# Patient Record
Sex: MALE | Race: ASIAN | NOT HISPANIC OR LATINO | Employment: FULL TIME | URBAN - METROPOLITAN AREA
[De-identification: names, ages, dates, MRNs, and addresses within clinical notes are randomized per-mention and may not be internally consistent; named-entity substitution may affect disease eponyms.]

---

## 2017-06-13 ENCOUNTER — OFFICE VISIT (OUTPATIENT)
Dept: FAMILY MEDICINE | Facility: CLINIC | Age: 32
End: 2017-06-13
Payer: COMMERCIAL

## 2017-06-13 ENCOUNTER — APPOINTMENT (OUTPATIENT)
Dept: GENERAL RADIOLOGY | Facility: CLINIC | Age: 32
DRG: 280 | End: 2017-06-13
Attending: EMERGENCY MEDICINE
Payer: COMMERCIAL

## 2017-06-13 ENCOUNTER — HOSPITAL ENCOUNTER (INPATIENT)
Facility: CLINIC | Age: 32
LOS: 3 days | Discharge: HOME | DRG: 280 | End: 2017-06-16
Attending: EMERGENCY MEDICINE | Admitting: INTERNAL MEDICINE
Payer: COMMERCIAL

## 2017-06-13 VITALS
HEART RATE: 113 BPM | WEIGHT: 315 LBS | OXYGEN SATURATION: 94 % | BODY MASS INDEX: 44.1 KG/M2 | DIASTOLIC BLOOD PRESSURE: 138 MMHG | TEMPERATURE: 98.6 F | SYSTOLIC BLOOD PRESSURE: 190 MMHG | HEIGHT: 71 IN

## 2017-06-13 DIAGNOSIS — R60.0 PERIPHERAL EDEMA: ICD-10-CM

## 2017-06-13 DIAGNOSIS — I50.9 CONGESTIVE HEART FAILURE, UNSPECIFIED CONGESTIVE HEART FAILURE CHRONICITY, UNSPECIFIED CONGESTIVE HEART FAILURE TYPE: ICD-10-CM

## 2017-06-13 DIAGNOSIS — N28.9 RENAL INSUFFICIENCY: ICD-10-CM

## 2017-06-13 DIAGNOSIS — I21.4 NSTEMI (NON-ST ELEVATED MYOCARDIAL INFARCTION) (H): ICD-10-CM

## 2017-06-13 DIAGNOSIS — I16.1 HYPERTENSIVE EMERGENCY: ICD-10-CM

## 2017-06-13 DIAGNOSIS — R06.02 SHORTNESS OF BREATH: Primary | ICD-10-CM

## 2017-06-13 PROBLEM — I16.0 HYPERTENSIVE URGENCY: Status: ACTIVE | Noted: 2017-06-13

## 2017-06-13 LAB
ANION GAP SERPL CALCULATED.3IONS-SCNC: 6 MMOL/L (ref 3–14)
BUN SERPL-MCNC: 21 MG/DL (ref 7–30)
CALCIUM SERPL-MCNC: 8.9 MG/DL (ref 8.5–10.1)
CHLORIDE SERPL-SCNC: 101 MMOL/L (ref 94–109)
CO2 SERPL-SCNC: 30 MMOL/L (ref 20–32)
CREAT SERPL-MCNC: 1.53 MG/DL (ref 0.66–1.25)
ERYTHROCYTE [DISTWIDTH] IN BLOOD BY AUTOMATED COUNT: 15.9 % (ref 10–15)
ERYTHROCYTE [DISTWIDTH] IN BLOOD BY AUTOMATED COUNT: 16 % (ref 10–15)
GFR SERPL CREATININE-BSD FRML MDRD: 53 ML/MIN/1.7M2
GLUCOSE BLDC GLUCOMTR-MCNC: 102 MG/DL (ref 70–99)
GLUCOSE SERPL-MCNC: 142 MG/DL (ref 70–99)
HCT VFR BLD AUTO: 37.9 % (ref 40–53)
HCT VFR BLD AUTO: 40.6 % (ref 40–53)
HGB BLD-MCNC: 12 G/DL (ref 13.3–17.7)
HGB BLD-MCNC: 13.2 G/DL (ref 13.3–17.7)
INTERPRETATION ECG - MUSE: NORMAL
INTERPRETATION ECG - MUSE: NORMAL
LMWH PPP CHRO-ACNC: 0.13 IU/ML
MAGNESIUM SERPL-MCNC: 2.3 MG/DL (ref 1.6–2.3)
MAGNESIUM SERPL-MCNC: 2.4 MG/DL (ref 1.6–2.3)
MCH RBC QN AUTO: 28.1 PG (ref 26.5–33)
MCH RBC QN AUTO: 28.8 PG (ref 26.5–33)
MCHC RBC AUTO-ENTMCNC: 31.7 G/DL (ref 31.5–36.5)
MCHC RBC AUTO-ENTMCNC: 32.5 G/DL (ref 31.5–36.5)
MCV RBC AUTO: 89 FL (ref 78–100)
MCV RBC AUTO: 89 FL (ref 78–100)
MRSA DNA SPEC QL NAA+PROBE: NORMAL
NT-PROBNP SERPL-MCNC: 7018 PG/ML (ref 0–450)
PLATELET # BLD AUTO: 200 10E9/L (ref 150–450)
PLATELET # BLD AUTO: 202 10E9/L (ref 150–450)
POTASSIUM SERPL-SCNC: 3.2 MMOL/L (ref 3.4–5.3)
POTASSIUM SERPL-SCNC: 3.4 MMOL/L (ref 3.4–5.3)
RBC # BLD AUTO: 4.27 10E12/L (ref 4.4–5.9)
RBC # BLD AUTO: 4.59 10E12/L (ref 4.4–5.9)
SODIUM SERPL-SCNC: 137 MMOL/L (ref 133–144)
SPECIMEN SOURCE: NORMAL
TROPONIN I SERPL-MCNC: 0.47 UG/L (ref 0–0.04)
TROPONIN I SERPL-MCNC: 0.48 UG/L (ref 0–0.04)
TROPONIN I SERPL-MCNC: 0.53 UG/L (ref 0–0.04)
WBC # BLD AUTO: 17.6 10E9/L (ref 4–11)
WBC # BLD AUTO: 17.7 10E9/L (ref 4–11)

## 2017-06-13 PROCEDURE — 99285 EMERGENCY DEPT VISIT HI MDM: CPT | Mod: 25

## 2017-06-13 PROCEDURE — 80048 BASIC METABOLIC PNL TOTAL CA: CPT | Performed by: EMERGENCY MEDICINE

## 2017-06-13 PROCEDURE — 99223 1ST HOSP IP/OBS HIGH 75: CPT | Mod: AI | Performed by: INTERNAL MEDICINE

## 2017-06-13 PROCEDURE — 93005 ELECTROCARDIOGRAM TRACING: CPT

## 2017-06-13 PROCEDURE — 84132 ASSAY OF SERUM POTASSIUM: CPT | Performed by: INTERNAL MEDICINE

## 2017-06-13 PROCEDURE — 83880 ASSAY OF NATRIURETIC PEPTIDE: CPT | Performed by: EMERGENCY MEDICINE

## 2017-06-13 PROCEDURE — 99221 1ST HOSP IP/OBS SF/LOW 40: CPT | Performed by: INTERNAL MEDICINE

## 2017-06-13 PROCEDURE — 00000146 ZZHCL STATISTIC GLUCOSE BY METER IP

## 2017-06-13 PROCEDURE — 84484 ASSAY OF TROPONIN QUANT: CPT | Performed by: INTERNAL MEDICINE

## 2017-06-13 PROCEDURE — 20000003 ZZH R&B ICU

## 2017-06-13 PROCEDURE — 84484 ASSAY OF TROPONIN QUANT: CPT | Performed by: EMERGENCY MEDICINE

## 2017-06-13 PROCEDURE — 85027 COMPLETE CBC AUTOMATED: CPT | Performed by: INTERNAL MEDICINE

## 2017-06-13 PROCEDURE — 96375 TX/PRO/DX INJ NEW DRUG ADDON: CPT

## 2017-06-13 PROCEDURE — 25000132 ZZH RX MED GY IP 250 OP 250 PS 637: Performed by: INTERNAL MEDICINE

## 2017-06-13 PROCEDURE — 85520 HEPARIN ASSAY: CPT | Performed by: INTERNAL MEDICINE

## 2017-06-13 PROCEDURE — 25000128 H RX IP 250 OP 636: Performed by: EMERGENCY MEDICINE

## 2017-06-13 PROCEDURE — 99204 OFFICE O/P NEW MOD 45 MIN: CPT | Performed by: PHYSICIAN ASSISTANT

## 2017-06-13 PROCEDURE — 96368 THER/DIAG CONCURRENT INF: CPT

## 2017-06-13 PROCEDURE — 83735 ASSAY OF MAGNESIUM: CPT | Performed by: INTERNAL MEDICINE

## 2017-06-13 PROCEDURE — 93005 ELECTROCARDIOGRAM TRACING: CPT | Mod: 76

## 2017-06-13 PROCEDURE — 25000132 ZZH RX MED GY IP 250 OP 250 PS 637: Performed by: EMERGENCY MEDICINE

## 2017-06-13 PROCEDURE — 25000128 H RX IP 250 OP 636: Performed by: INTERNAL MEDICINE

## 2017-06-13 PROCEDURE — 87640 STAPH A DNA AMP PROBE: CPT | Performed by: INTERNAL MEDICINE

## 2017-06-13 PROCEDURE — 96365 THER/PROPH/DIAG IV INF INIT: CPT

## 2017-06-13 PROCEDURE — 87641 MR-STAPH DNA AMP PROBE: CPT | Performed by: INTERNAL MEDICINE

## 2017-06-13 PROCEDURE — 36415 COLL VENOUS BLD VENIPUNCTURE: CPT | Performed by: INTERNAL MEDICINE

## 2017-06-13 PROCEDURE — 76604 US EXAM CHEST: CPT

## 2017-06-13 PROCEDURE — 71020 XR CHEST 2 VW: CPT

## 2017-06-13 PROCEDURE — 85027 COMPLETE CBC AUTOMATED: CPT | Performed by: EMERGENCY MEDICINE

## 2017-06-13 PROCEDURE — 25000125 ZZHC RX 250: Performed by: INTERNAL MEDICINE

## 2017-06-13 RX ORDER — POTASSIUM CHLORIDE 1.5 G/1.58G
20-40 POWDER, FOR SOLUTION ORAL
Status: DISCONTINUED | OUTPATIENT
Start: 2017-06-13 | End: 2017-06-16 | Stop reason: HOSPADM

## 2017-06-13 RX ORDER — NITROGLYCERIN 0.4 MG/1
0.4 TABLET SUBLINGUAL EVERY 5 MIN PRN
Status: COMPLETED | OUTPATIENT
Start: 2017-06-13 | End: 2017-06-13

## 2017-06-13 RX ORDER — TRAZODONE HYDROCHLORIDE 50 MG/1
50 TABLET, FILM COATED ORAL
Status: DISCONTINUED | OUTPATIENT
Start: 2017-06-13 | End: 2017-06-16 | Stop reason: HOSPADM

## 2017-06-13 RX ORDER — NALOXONE HYDROCHLORIDE 0.4 MG/ML
.1-.4 INJECTION, SOLUTION INTRAMUSCULAR; INTRAVENOUS; SUBCUTANEOUS
Status: DISCONTINUED | OUTPATIENT
Start: 2017-06-13 | End: 2017-06-15

## 2017-06-13 RX ORDER — ASPIRIN 325 MG
325 TABLET ORAL ONCE
Status: COMPLETED | OUTPATIENT
Start: 2017-06-13 | End: 2017-06-13

## 2017-06-13 RX ORDER — POTASSIUM CHLORIDE 1500 MG/1
20-40 TABLET, EXTENDED RELEASE ORAL
Status: DISCONTINUED | OUTPATIENT
Start: 2017-06-13 | End: 2017-06-16 | Stop reason: HOSPADM

## 2017-06-13 RX ORDER — SPIRONOLACTONE 25 MG/1
25 TABLET ORAL DAILY
Status: DISCONTINUED | OUTPATIENT
Start: 2017-06-13 | End: 2017-06-16 | Stop reason: HOSPADM

## 2017-06-13 RX ORDER — HYDRALAZINE HYDROCHLORIDE 20 MG/ML
10-20 INJECTION INTRAMUSCULAR; INTRAVENOUS
Status: DISCONTINUED | OUTPATIENT
Start: 2017-06-13 | End: 2017-06-16 | Stop reason: HOSPADM

## 2017-06-13 RX ORDER — PROCHLORPERAZINE MALEATE 5 MG
5-10 TABLET ORAL EVERY 6 HOURS PRN
Status: DISCONTINUED | OUTPATIENT
Start: 2017-06-13 | End: 2017-06-16 | Stop reason: HOSPADM

## 2017-06-13 RX ORDER — ONDANSETRON 4 MG/1
4 TABLET, ORALLY DISINTEGRATING ORAL EVERY 6 HOURS PRN
Status: DISCONTINUED | OUTPATIENT
Start: 2017-06-13 | End: 2017-06-16 | Stop reason: HOSPADM

## 2017-06-13 RX ORDER — LISINOPRIL 10 MG/1
10 TABLET ORAL DAILY
Status: DISCONTINUED | OUTPATIENT
Start: 2017-06-13 | End: 2017-06-13

## 2017-06-13 RX ORDER — NITROGLYCERIN 20 MG/100ML
.07-1.12 INJECTION INTRAVENOUS CONTINUOUS
Status: DISCONTINUED | OUTPATIENT
Start: 2017-06-13 | End: 2017-06-15

## 2017-06-13 RX ORDER — MAGNESIUM SULFATE HEPTAHYDRATE 40 MG/ML
4 INJECTION, SOLUTION INTRAVENOUS EVERY 4 HOURS PRN
Status: DISCONTINUED | OUTPATIENT
Start: 2017-06-13 | End: 2017-06-16 | Stop reason: HOSPADM

## 2017-06-13 RX ORDER — POTASSIUM CHLORIDE 1500 MG/1
20 TABLET, EXTENDED RELEASE ORAL ONCE
Status: COMPLETED | OUTPATIENT
Start: 2017-06-13 | End: 2017-06-13

## 2017-06-13 RX ORDER — FUROSEMIDE 10 MG/ML
20 INJECTION INTRAMUSCULAR; INTRAVENOUS 3 TIMES DAILY
Status: COMPLETED | OUTPATIENT
Start: 2017-06-13 | End: 2017-06-14

## 2017-06-13 RX ORDER — LIDOCAINE 40 MG/G
CREAM TOPICAL
Status: DISCONTINUED | OUTPATIENT
Start: 2017-06-13 | End: 2017-06-13

## 2017-06-13 RX ORDER — ACETAMINOPHEN 325 MG/1
650 TABLET ORAL EVERY 4 HOURS PRN
Status: DISCONTINUED | OUTPATIENT
Start: 2017-06-13 | End: 2017-06-15 | Stop reason: ALTCHOICE

## 2017-06-13 RX ORDER — AMOXICILLIN 250 MG
1-2 CAPSULE ORAL 2 TIMES DAILY PRN
Status: DISCONTINUED | OUTPATIENT
Start: 2017-06-13 | End: 2017-06-16 | Stop reason: HOSPADM

## 2017-06-13 RX ORDER — PROCHLORPERAZINE 25 MG
25 SUPPOSITORY, RECTAL RECTAL EVERY 12 HOURS PRN
Status: DISCONTINUED | OUTPATIENT
Start: 2017-06-13 | End: 2017-06-16 | Stop reason: HOSPADM

## 2017-06-13 RX ORDER — METOPROLOL TARTRATE 25 MG/1
25 TABLET, FILM COATED ORAL 2 TIMES DAILY
Status: DISCONTINUED | OUTPATIENT
Start: 2017-06-13 | End: 2017-06-13

## 2017-06-13 RX ORDER — POTASSIUM CHLORIDE 7.45 MG/ML
10 INJECTION INTRAVENOUS
Status: DISCONTINUED | OUTPATIENT
Start: 2017-06-13 | End: 2017-06-16 | Stop reason: HOSPADM

## 2017-06-13 RX ORDER — POTASSIUM CL/LIDO/0.9 % NACL 10MEQ/0.1L
10 INTRAVENOUS SOLUTION, PIGGYBACK (ML) INTRAVENOUS
Status: DISCONTINUED | OUTPATIENT
Start: 2017-06-13 | End: 2017-06-16 | Stop reason: HOSPADM

## 2017-06-13 RX ORDER — NITROGLYCERIN 20 MG/100ML
.07-1.12 INJECTION INTRAVENOUS CONTINUOUS
Status: DISCONTINUED | OUTPATIENT
Start: 2017-06-13 | End: 2017-06-13

## 2017-06-13 RX ORDER — LABETALOL HYDROCHLORIDE 5 MG/ML
10 INJECTION, SOLUTION INTRAVENOUS ONCE
Status: COMPLETED | OUTPATIENT
Start: 2017-06-13 | End: 2017-06-13

## 2017-06-13 RX ORDER — HYDRALAZINE HYDROCHLORIDE 20 MG/ML
10 INJECTION INTRAMUSCULAR; INTRAVENOUS ONCE
Status: DISCONTINUED | OUTPATIENT
Start: 2017-06-13 | End: 2017-06-13

## 2017-06-13 RX ORDER — CARVEDILOL 25 MG/1
25 TABLET ORAL 2 TIMES DAILY WITH MEALS
Status: DISCONTINUED | OUTPATIENT
Start: 2017-06-13 | End: 2017-06-14

## 2017-06-13 RX ORDER — LISINOPRIL 10 MG/1
10 TABLET ORAL 2 TIMES DAILY
Status: DISCONTINUED | OUTPATIENT
Start: 2017-06-13 | End: 2017-06-15

## 2017-06-13 RX ORDER — HYDROMORPHONE HYDROCHLORIDE 1 MG/ML
.3-.5 INJECTION, SOLUTION INTRAMUSCULAR; INTRAVENOUS; SUBCUTANEOUS
Status: DISCONTINUED | OUTPATIENT
Start: 2017-06-13 | End: 2017-06-16 | Stop reason: HOSPADM

## 2017-06-13 RX ORDER — POTASSIUM CHLORIDE 29.8 MG/ML
20 INJECTION INTRAVENOUS
Status: DISCONTINUED | OUTPATIENT
Start: 2017-06-13 | End: 2017-06-16 | Stop reason: HOSPADM

## 2017-06-13 RX ORDER — ONDANSETRON 2 MG/ML
4 INJECTION INTRAMUSCULAR; INTRAVENOUS EVERY 6 HOURS PRN
Status: DISCONTINUED | OUTPATIENT
Start: 2017-06-13 | End: 2017-06-16 | Stop reason: HOSPADM

## 2017-06-13 RX ORDER — BISACODYL 10 MG
10 SUPPOSITORY, RECTAL RECTAL DAILY PRN
Status: DISCONTINUED | OUTPATIENT
Start: 2017-06-13 | End: 2017-06-16 | Stop reason: HOSPADM

## 2017-06-13 RX ADMIN — NITROGLYCERIN 0.4 MG: 0.4 TABLET SUBLINGUAL at 11:40

## 2017-06-13 RX ADMIN — NITROGLYCERIN 0.07 MCG/KG/MIN: 20 INJECTION INTRAVENOUS at 13:00

## 2017-06-13 RX ADMIN — LISINOPRIL 10 MG: 10 TABLET ORAL at 15:34

## 2017-06-13 RX ADMIN — Medication 3500 UNITS: at 20:48

## 2017-06-13 RX ADMIN — CARVEDILOL 25 MG: 25 TABLET, FILM COATED ORAL at 17:10

## 2017-06-13 RX ADMIN — HYDRALAZINE HYDROCHLORIDE 10 MG: 20 INJECTION INTRAMUSCULAR; INTRAVENOUS at 15:56

## 2017-06-13 RX ADMIN — NITROGLYCERIN 0.4 MG: 0.4 TABLET SUBLINGUAL at 11:34

## 2017-06-13 RX ADMIN — HEPARIN SODIUM 1400 UNITS/HR: 10000 INJECTION, SOLUTION INTRAVENOUS at 13:37

## 2017-06-13 RX ADMIN — CARVEDILOL 25 MG: 25 TABLET, FILM COATED ORAL at 21:54

## 2017-06-13 RX ADMIN — LABETALOL HYDROCHLORIDE 10 MG: 5 INJECTION, SOLUTION INTRAVENOUS at 12:25

## 2017-06-13 RX ADMIN — TRAZODONE HYDROCHLORIDE 50 MG: 50 TABLET ORAL at 21:44

## 2017-06-13 RX ADMIN — METOPROLOL TARTRATE 25 MG: 25 TABLET ORAL at 15:34

## 2017-06-13 RX ADMIN — Medication 0.5 MG: at 21:44

## 2017-06-13 RX ADMIN — NITROGLYCERIN 0.4 MG: 0.4 TABLET SUBLINGUAL at 11:45

## 2017-06-13 RX ADMIN — ASPIRIN 325 MG ORAL TABLET 325 MG: 325 PILL ORAL at 12:59

## 2017-06-13 RX ADMIN — Medication 7000 UNITS: at 13:38

## 2017-06-13 RX ADMIN — POTASSIUM CHLORIDE 20 MEQ: 1500 TABLET, EXTENDED RELEASE ORAL at 13:21

## 2017-06-13 RX ADMIN — FUROSEMIDE 20 MG: 10 INJECTION, SOLUTION INTRAVENOUS at 21:44

## 2017-06-13 RX ADMIN — SPIRONOLACTONE 25 MG: 25 TABLET ORAL at 17:10

## 2017-06-13 RX ADMIN — POTASSIUM CHLORIDE 40 MEQ: 1500 TABLET, EXTENDED RELEASE ORAL at 18:43

## 2017-06-13 RX ADMIN — FUROSEMIDE 20 MG: 10 INJECTION, SOLUTION INTRAVENOUS at 15:35

## 2017-06-13 ASSESSMENT — ENCOUNTER SYMPTOMS
CHILLS: 0
HEMATURIA: 0
DIARRHEA: 0
ABDOMINAL PAIN: 0
NUMBNESS: 0
VOMITING: 0
FEVER: 0
SHORTNESS OF BREATH: 1
WEAKNESS: 0
DYSURIA: 0
NAUSEA: 0
LIGHT-HEADEDNESS: 0
NECK PAIN: 0
HEADACHES: 0
BACK PAIN: 0
DIZZINESS: 0
COUGH: 1
BLOOD IN STOOL: 0
FLANK PAIN: 0

## 2017-06-13 ASSESSMENT — PAIN DESCRIPTION - DESCRIPTORS: DESCRIPTORS: ACHING

## 2017-06-13 NOTE — CONSULTS
CARDIOLOGY CONSULTATION      INDICATION FOR CONSULTATION:  Chest pressure, severe hypertension, left heart failure in a 32-year-old male.      We were asked by Dr. Han to see Leydi Mensah urgently  because of profound symptoms noted that brought him to the emergency room today.  The patient  thinks he has been getting worse for approximately a month, his symptoms have progressed to severe and worsening dyspnea on exertion, orthopnea, increased abdominal girth and mild lower leg edema.  He generally has lots of habits that are less than ideal, he uses salt generously, he smokes half pack a day, he is busy but he does not particularly exercise.      His symptoms have progressed such that he has not been able to sleep for the last week to 10 days; he has had to sit up in a chair and he has been breathless much of the time.  He came to the [facility] Emergency Room where his blood pressure was in the 200/130 range.  He was as high as 216/143, he has subsequently been given small dose of metoprolol, lisinopril and nitroglycerin drip has been initiated and his pressures currently in 180/90 range.  Since he has been in the hospital he has done virtually nothing, he has just been sitting in the chair.  When I met him, he was not short of breath but when he got up from the chair to get into bed he became dyspneic, he was also dyspneic lying supine.  He has been given 20 mg of Lasix and has started to diurese.  Heparin has been initiated.      He really does not have a physician:  He does not go to the doctor.      SOCIALLY:  He lives with family, he smokes half pack a day, he does not do other drugs, he does not drink.  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  He denies ETOH use/abuse.      FAMILY HISTORY:  Positive for both mother and father having hypertension.      ALLERGIES:  None.      MEDICATIONS:  Only melatonin for sleep.      PAST MEDICAL HISTORY:  He really not  really seen a doctor or had PMH but it is clear that has had;   1.  Unappreciated and severe-malignant hypertension.   2.  Chronic obesity.   3.  Probably obstructive sleep apnea.   4.  Currently left heart failure.      REVIEW OF SYSTEMS:     IN GENERAL:  His endurance has been diminished, he is short of breath on minimal exertion, he has had cough, orthopnea, lower leg edema and increasing abdominal girth.  He has not had chest pain but he has had pressure in the chest if he pushes himself to the point that he really short of breath.     UPPER/LOWER GI:  Negative.   :  Positive for nocturia and some frequency.   MUSCULOSKELETAL:  Negative.   NEUROLOGIC:  Negative for dizziness, weakness, syncope, et cetera.  All other reviews were negative.      Chest x-ray on admission shows cardiomegaly with hilar cephalization of vessels and very faint pulmonary congestion.      LABORATORY WORK:  Showed potassium 3.2, sodium 137, creatinine 1.5, BUN 21, calcium 8.9, magnesium 2.4, BNP 7018, troponin 0.5, hemoglobin 13 grams, white count 17,000.      EKG shows sinus rhythm.  He has voltage and nonspecific ST-T changes very suspicious of LVH.  There are no diagnostic Q-waves; the QT is a bit prolonged at a heart rate of 94.      An echocardiogram is pending but it will be done tomorrow morning.      PHYSICAL EXAMINATION:     GENERAL:  Very massive young man, bright, alert, oriented and cooperative but short of breath on trivial exertion in going from the chair to the bed.  On admission he weighs 396 pounds.   VITAL SIGNS:  Blood pressures since admission have serially been 190/120, 200/115, 206/130, 195/116 and currently 185/100.  Respiratory rate is around 30, O2 sats 95%.  He is afebrile.   HEAD:  Normal.   NECK:  He has a very thick neck that is hard to appreciate neck vein distention, no bruits heard.   HEART:  Regular, no murmur heard, there is what sounds like summation gallop, S4 or S3.   LUNGS:  Are very distant and  difficult to hear and no definite rales appreciated.   ABDOMEN:  Obese, firm and nontender without obvious pain, mass or guarding.   EXTREMITIES:  Show bilateral +1 edema, pedal pulses difficult to feel but +1    He is a very heavy set man.      Mr. Mensah is 32, he likely has had severe chronic untreated hypertension  He presents now with left heart failure clinically as well as by chest x-ray.  This has presented in a pattern of orthopnea and FOSS that is quite significant and still present today.   There is  some evidence of right heart failure also with he says increased abdominal girth and +1 lower leg edema.      He has any substantial drug abuse, he denies cocaine.      Accordingly we need to ramp up his medications aggressively and smoothly.  I have switched him from metoprolol to carvedilol and we will probably have to go to quite high doses, we will need to ramp up lisinopril smoothly and quickly, I have added Aldactone 25 mg a day.  Ultimately he may do well with nitrates and Apresoline but we will see if we can cool off his blood pressure promptly.  He is currently on a nitroglycerin drip, it is only having mild effects and I doubt that it will be an ideal drug.  We may switch tonight though we will try to go with the oral agents.      Diet, weight loss and exercise is mandatory, I discussed this at some length but there is much more to do.  I reviewed the need to abstain and minimize salt which he has been cavalier about.      I talked about abstinence from smoking.      I think he is a bright man, I think he understands, disappointed that he has got significant and multiple health problems.      I think likely has sleep apnea; a sleep study will be needed at some point.      IMPRESSION:   1.  Biventricular heart failure left worse than right.   2.  Malignant hypertension.   3.  Likely hypertensive cardiomyopathy suspecting severe LVH and likely systolic and diastolic dysfunction.   4.  Morbid obesity.    5.  Chronic nutritional abuse.   6.  Chronic smoker.   7.  Positive family history for hypertension.      PLAN:  As above, prognosis is guarded.      Over an hour was spent doing consultation with multiple assessments, communicating with the patient and his family the nurses, et cetera.         HEIDY KAUFFMAN MD, [facility]             D: 2017 17:24   T: 2017 18:20   MT: IVELISSE#[id number]      Name:     BREA DO   MRN:      [id number]        Account:       [id number]   :      1985           Consult Date:  2017      Document: [id number]

## 2017-06-13 NOTE — H&P
[facility]  History and Physical   Hospitalist Service    Sarkis Han MD    Brooks Mensah MRN# [id number]   YOB: 1985 Age: 32 year old      Date of Admission:  6/13/2017           Assessment and Plan:   Patient is a 32-year-old male previously healthy male, who presented to clinic for evaluation of progressive shortness of breath over 2 weeks- initially with dyspnea on exertion, then orthopnea, and finally shortness of breath at rest for the last day.  He also has had progressive pedal and lower extremity edema.  In the last day he had developed chest pressure with heavy breathing. In clinic, he was noted to be hypertensive with systolic blood pressures in the 190s and diastolic blood pressures in the 130s. He was referred to the emergency department.  Workup here confirmed severe hypertension. Blood pressure was as high as 216/143. He was also found to have renal insufficiency, troponin elevation, and findings suggestive of congestive heart failure by BNP and chest x-ray.    Problem list:    1.  Hypertensive urgency with suspected acute congestive heart failure.  Patient will be admitted to the intensive care unit and treated aggressively for hypertension.  Goal blood pressure in the short-term will be 140-160 systolic, but evantually < 140 systolic.  I will prescribe metoprolol 25 mg twice daily and lisinopril 10 mg daily. I will order Lasix 20 mg IV 3 times daily for 3 doses and reassess tomorrow.  Nitroglycerin drip was started in the emergency department and will be continued as needed to help with blood pressure.  IV Hydralazine will be used in addition as needed.  Echocardiogram will be obtained.  Telemetry will be ordered. Cardiology will be consulted.    2. Troponin elevation.  This is most likely myocardial strain from hypertensive urgency but ischemia will also be considered.  There are no findings diagnostic of ischemia on EKG.  Telemetry will be ordered.  Troponins will  be trended.  Heparin drip was started in the emergency department and will be continued for now.  Cardiology will be consulted. Fasting lipid panel and liver function tests will be checked in the morning.    3.  Suspected acute congestive heart failure.  See discussion above. Rule out cardiomyopathy with echocardiogram.  I suspect we will find some left ventricular hypertrophy from long-standing hypertension. Lasix 20 mg IV 3 times daily for 3 doses will be ordered with reassessment tomorrow.    4.  Suspect essential hypertension.    5.  Tobacco use disorder.  Smoking cessation consult will be requested.    6.  Left eye with medial subconjunctival hemorrhage.  Patient attributes this to a coughing spell.  This is not affecting vision.  Monitor.    Full code  Heparin drip will cover DVT prophylaxis  Disposition.  Admit as inpatient to ICU.               Code Status:   Full Code         Primary Care Physician:   No Ref-Primary, Physician None         Chief Complaint:   Shortness of breath, orthopnea, dyspnea on exertion, lower extremity edema, and chest pressure    History is obtained from Brooks, his sister, Dr. Solo, and the medical record         History of Present Illness:   Brooks Mensah is a 32-year-old male previously healthy male, who presented to clinic for evaluation of progressive shortness of breath over 2 weeks- initially with dyspnea on exertion, then orthopnea, and finally shortness of breath at rest for the last day.  He also has had progressive pedal and lower extremity edema.  In the last day he had developed chest pressure with heavy breathing. He has also had some occasional tickle in his throat recently that has caused coughing spells. After one of these coughing spells he developed subconjunctival hemorrhage- this was in the last few days.  In clinic, he was noted to be hypertensive with systolic blood pressures in the 190s and diastolic blood pressures in the 130s. He was referred to the emergency  department.  Workup here confirmed severe hypertension. Blood pressure was as high as 216/143. He was also tachycardic but not hypoxic.  He was afebrile. He was also found to have renal insufficiency with creatinine of 1.5, troponin elevation of 0.528, and findings suggestive of congestive heart failure (BNP of 7018) and chest x-ray (cardiomegaly and pulmonary edema).  EKG showed sinus rhythm.  There were some nonspecific ST segment changes but nothing diagnostic of acute ischemia.  Bedside echocardiogram performed by the emergency department physician showed no gross pericardial effusion.           Past Medical History:     Patient Active Problem List   Diagnosis     Congestive heart failure, unspecified congestive heart failure chronicity, unspecified congestive heart failure type (H)     Hypertensive emergency     Hypertensive urgency      No previously diagnosed chronic medical problems          Past Surgical History:   He had a cyst removed from his back but no other surgeries         Home Medications:     Prior to Admission medications    Medication Sig Last Dose Taking? Auth Provider   MELATONIN PO Take 10 mg by mouth nightly as needed   Yes Reported, Patient   TRAZODONE HCL PO Take 50 mg by mouth nightly as needed for sleep  Yes Unknown, Entered By History            Allergies:   No Known Allergies         Social History:     Social History   Substance Use Topics     Smoking status: Current Every Day Smoker     Types: Cigarettes     Smokeless tobacco: Not on file     Alcohol use Yes      Comment: occassional   Single         Family History:   His parents both have hypertension  There is no family history of premature heart disease or stroke           Review of Systems:   The 10 point Review of Systems is negative other than as noted in the HPI.           Physical Exam:   Blood pressure (!) 194/121, pulse 112, temperature 98.9  F (37.2  C), temperature source Oral, resp. rate (!) 38, SpO2 98 %.  0 lbs 0 oz       GENERAL: Pleasant and cooperative. No acute distress.  EYES: Pupils equal and round. No scleral erythema or icterus. Left eye with medial subconjunctival hemorrhage.  ENT: External ears are normal without deformity. Posterior oropharynx is without erythem, swelling, or exudate.  NECK: Supple. No masses or swelling. No tenderness. Thyroid is normal without mass or tenderness.  CHEST: Clear to auscultation. Normal breath sounds. No retractions.   CV: Regular rate and rhythm. No JVD. Pulses normal.  ABDOMEN: Bowel sounds present. No tenderness. No masses or hernia.  EXTREMETIES: No clubbing, cyanosis, or ischemia. 2+ to 3 over 4 and symmetric pedal and lower extremity edema  SKIN: Warm and dry to touch. No wounds or rashes.  NEUROLOGIC: Strength and sensation are normal. Deep tendon reflexes are normal. Cranial nerves are normal.             Data:   All new lab and imaging data was reviewed.     Results for orders placed or performed during the hospital encounter of 06/13/17 (from the past 24 hour(s))   CBC (platelets, no diff)   Result Value Ref Range    WBC 17.6 (H) 4.0 - 11.0 10e9/L    RBC Count 4.59 4.4 - 5.9 10e12/L    Hemoglobin 13.2 (L) 13.3 - 17.7 g/dL    Hematocrit 40.6 40.0 - 53.0 %    MCV 89 78 - 100 fl    MCH 28.8 26.5 - 33.0 pg    MCHC 32.5 31.5 - 36.5 g/dL    RDW 15.9 (H) 10.0 - 15.0 %    Platelet Count 202 150 - 450 10e9/L   Basic metabolic panel (BMP)   Result Value Ref Range    Sodium 137 133 - 144 mmol/L    Potassium 3.2 (L) 3.4 - 5.3 mmol/L    Chloride 101 94 - 109 mmol/L    Carbon Dioxide 30 20 - 32 mmol/L    Anion Gap 6 3 - 14 mmol/L    Glucose 142 (H) 70 - 99 mg/dL    Urea Nitrogen 21 7 - 30 mg/dL    Creatinine 1.53 (H) 0.66 - 1.25 mg/dL    GFR Estimate 53 (L) >60 mL/min/1.7m2    GFR Estimate If Black 64 >60 mL/min/1.7m2    Calcium 8.9 8.5 - 10.1 mg/dL   BNP   Result Value Ref Range    N-Terminal Pro BNP Inpatient 7018 (H) 0 - 450 pg/mL   Troponin I   Result Value Ref Range    Troponin I ES  0.528 () 0.000 - 0.045 ug/L   EKG 12 lead   Result Value Ref Range    Interpretation ECG Click View Image link to view waveform and result    POC US ECHO LIMITED    Impression     Limited Bedside ED Cardiac Ultrasound Procedure Note:    PROCEDURE: PERFORMED BY: Dr. Cliff Solo  INDICATIONS/SYMPTOM:  Shortness of Breath  PROBE: Cardiac phased array probe  BODY LOCATION: Chest  FINDINGS:   The ultrasound was performed utilizing the parasternal long axis and apical 4 chamber views.  Cardiac contractility:  Present  Gross estimation of cardiac kinesis: Difficult to assess given body habitus, limited exam  Pericardial Effusion:  No visualized effusion  RV:LV ratio: Difficult to assess  INTERPRETATION:    Technically difficult exam, cardiac contractility present, no appreciable effusion.  IMAGE DOCUMENTATION: Images were archived to PACs system.         Chest XR,  PA & LAT    Narrative    XR CHEST 2 VW 6/13/2017 12:23 PM    COMPARISON: None.    HISTORY: Shortness of breath      Impression    IMPRESSION: Enlarged cardiac silhouette is demonstrated. Mild  pulmonary edema. No significant pleural effusion. No pneumothorax.    RU WATSON   EKG 12 lead   Result Value Ref Range    Interpretation ECG Click View Image link to view waveform and result

## 2017-06-13 NOTE — NURSING NOTE
I was called to room to assess, patient. Brooks states a few weeks ago he felt a bit out of breath, but thought he was out of shape, or it was from smoking. This continued until yesterday when it progressed. He presents today with elevated blood pressure and Heart rate. Brooks is sitting chair and I can hear him gasping for breath at 32-35 breaths per minute. Breaths are shallow, labored. His left eye is red (hematoma) in inside corner from coughing, he also has some veins on the bridge of his nose which looked possibly more pronounced than they should be and visibly bluish in color. Lungs are clear upper bilaterally, I heard some fine crackles in left lower lung that clears with a deep breath. Sats 94% on room air. Patient is a current smoker. He states coughing started one month ago. He denies any exposure to any aerosols or chemicals. Says his legs/feel and look more swollen then normal.     Informed patient I feel he should proceed to ED for further evaluation and treatment to determine etiology of his SOB. I informed Brooks that if his breathing continues like this he could become more exhausted as well and it could worsen. Advised Brooks would like someone to drive him. He was able to get his sister to drive him to the ED. Assessment given to Berenice Ayon PA-C who then saw patient prior to his d/c from clinic to proceed to ED. Susan Monk R.N.

## 2017-06-13 NOTE — MR AVS SNAPSHOT
After Visit Summary   6/13/2017    Brooks Mensah    MRN: [id number]           Patient Information     Date Of Birth          1985        Visit Information        Provider Department      6/13/2017 10:00 AM Berenice Ayon PA-C [facility]        Today's Diagnoses     Shortness of breath    -  1    Peripheral edema           Follow-ups after your visit        Future tests that were ordered for you today     Open Standing Orders        Priority Remaining Interval Expires Ordered    Review medications with patient STAT [id number] PRN  6/13/2017    Oxygen: Nasal cannula STAT [id number] CONTINUOUS  6/13/2017    Notify Provider STAT [id number] PRN  6/13/2017    Notify Provider STAT [id number] PRN  6/13/2017    CBC with platelets STAT 6/6 EVERY THREE DAYS  6/13/2017    Heparin Xa (10a) Level STAT 1/1 CONDITIONAL X 1 STAT  6/13/2017    Heparin Xa (10a) Level Routine 14/14 DAILY  6/13/2017    Heparin Xa (10a) Level Routine 100/100 CONDITIONAL (SPECIFY)  6/13/2017            Who to contact     If you have questions or need follow up information about today's clinic visit or your schedule please contact [facility] directly at [phone].  Normal or non-critical lab and imaging results will be communicated to you by Mobile [employer], letter or phone within 4 business days after the clinic has received the results. If you do not hear from us within 7 days, please contact the clinic through Mobile [employer] Dayhart or phone. If you have a critical or abnormal lab result, we will notify you by phone as soon as possible.  Submit refill requests through [employer] or call your pharmacy and they will forward the refill request to us. Please allow 3 business days for your refill to be completed.          Additional Information About Your Visit        Mobile Game Dayhart Information     [employer] lets you send messages to your doctor, view your test results, renew your prescriptions, schedule appointments and more. To sign up,  "go to [email or web address] . Click on \"Log in\" on the left side of the screen, which will take you to the Welcome page. Then click on \"Sign up Now\" on the right side of the page.     You will be asked to enter the access code listed below, as well as some personal information. Please follow the directions to create your username and password.     Your access code is: [email or web address]  Expires: 2017 12:51 PM     Your access code will  in 90 days. If you need help or a new code, please call your [facility] clinic or [phone].        Care EveryWhere ID     This is your Care EveryWhere ID. This could be used by other organizations to access your [facility] medical records  [id number]        Your Vitals Were     Pulse Temperature Height Pulse Oximetry BMI (Body Mass Index)       113 98.6  F (37  C) (Oral) 5' 11\" (1.803 m) 94% 55.09 kg/m2        Blood Pressure from Last 3 Encounters:   17 (!) 173/116   17 (!) 190/138    Weight from Last 3 Encounters:   17 (!) 395 lb (179.2 kg)              Today, you had the following     No orders found for display       Primary Care Provider    Physician No Ref-Primary       No address on file        Thank you!     Thank you for choosing [facility]  for your care. Our goal is always to provide you with excellent care. Hearing back from our patients is one way we can continue to improve our services. Please take a few minutes to complete the written survey that you may receive in the mail after your visit with us. Thank you!             Your Updated Medication List - Protect others around you: Learn how to safely use, store and throw away your medicines at www.disposemymeds.org.          This list is accurate as of: 17  2:05 PM.  Always use your most recent med list.                   Brand Name Dispense Instructions for use    MELATONIN PO      Take 10 mg by mouth nightly as needed       TRAZODONE HCL PO      Take 50 mg by mouth nightly as " needed for sleep

## 2017-06-13 NOTE — PROGRESS NOTES
"  SUBJECTIVE:                                                    Brooks Mensah is a 32 year old male who presents to clinic today for the following health issues:    Acute Illness   Acute illness concerns: shortness of breath   Onset: coughing x 2 weeks - severe shortness of breath started yesterday     Fever: no    Chills/Sweats: no    Headache (location?): no    Sinus Pressure:no    Conjunctivitis:  no    Ear Pain: no    Rhinorrhea: no    Congestion: no    Sore Throat: no     Cough: YES-non-productive - coughing so hard vomiting and popped blood vessel in eye     Wheeze: YES     Decreased Appetite: no     Nausea: no    Vomiting: YES- has coughed so hard vomited    Diarrhea:  no    Dysuria/Freq.: no    Fatigue/Achiness: YES    Sick/Strep Exposure: no      Therapies Tried and outcome: none    Patient presents to clinic today in significant respiratory distress. He states that shortness of breath developed approximately 2 weeks ago, but significantly worsened yesterday. States that he has been awake for the past 26 hours due to inability to breathe or lay flat. Does report some coughing. Denies chest pain, except for when he has been breathing rapidly for approximately 10 minutes; he will experience some chest discomfort then.  Denies fevers or other symptoms of illness  Reports worsening swelling in his lower extremities. States that his feet are more swollen than usual and he is starting to notice some discomfort in his lower extremities. Feels that his swelling is symmetrical    Denies history of hypertension, heart problems, asthma or other respiratory problems    Denies recent plane travel    He denies calf pain  Also denies dizziness or lightheadedness. He drove himself to the clinic today    Does state that he is a smoker. Initially attributed his shortness of breath to \"being out of shape.\" Became more concerned when symptoms significantly worsened yesterday    Problem list and histories reviewed & adjusted, as " "indicated.  Additional history: as documented    There is no problem list on file for this patient.    No past surgical history on file.    Social History   Substance Use Topics     Smoking status: Current Every Day Smoker     Types: Cigarettes     Smokeless tobacco: Not on file     Alcohol use Yes      Comment: occassional     No family history on file.      Current Outpatient Prescriptions   Medication Sig Dispense Refill     MELATONIN PO Take 10 mg by mouth nightly as needed        TRAZODONE HCL PO Take 50 mg by mouth nightly as needed for sleep       No Known Allergies    ROS:  Constitutional, HEENT, cardiovascular, pulmonary, GI, , musculoskeletal, neuro, skin, endocrine and psych systems are negative, except as otherwise noted.    OBJECTIVE:                                                    BP (!) 190/138 (BP Location: Right arm, Patient Position: Chair, Cuff Size: Adult Large)  Pulse 113  Temp 98.6  F (37  C) (Oral)  Ht 5' 11\" (1.803 m)  Wt (!) 395 lb (179.2 kg)  SpO2 94%  BMI 55.09 kg/m2  Body mass index is 55.09 kg/(m^2).  GENERAL: alert and in acute distress  RESP: Crackles at the bases bilaterally. No wheezes  CV: tachycardia, normal S1 S2, no S3 or S4 and no murmur, click or rub  ABDOMEN: Obese  MS: 2+ pedal/ankle edema  SKIN: no suspicious lesions or rashes  PSYCH: mentation appears normal and anxious    Diagnostic Test Results:  none      ASSESSMENT/PLAN:                                                      1. Shortness of breath  Patient reports dyspnea on exertion, dyspnea at rest, and orthopnea. Has been unable to sleep due to severity of symptoms. Audible crackles at bases bilaterally. No known history of heart or lung problems. BP significantly elevated. Patient is also tachycardic with low O2 sats. No recent infection. No clear DVT risk factors. Differential diagnosis includes cardiomyopathy/heart failure, CAD, pulmonary embolism. Discussed with patient that he requires emergent medical " evaluation for further testing, including EKG, chest x-ray, labs. Patient plans to drive to his mother's house and have his sister bring him to the emergency department. Emergency department contacted via telephone to advise them of patient's arrival    2. Peripheral edema  See above. New-onset peripheral edema in setting of shortness of breath is concerning for cardiac pathology. Edema is symmetrical and does not have clear DVT risks    See Patient Instructions    Berenice Ayon PA-C  [facility]

## 2017-06-13 NOTE — ED PROVIDER NOTES
History   Chief Complaint:  Shortness of Breath    HPI   Brooks Mensah is a 32 year old male who presents with shortness of breath that has been ongoing for the past 2 weeks but has become worse within the past day. He visited the clinic across the street this morning. The patient reports he has shortness of breath on exertion, prompting him to visit the ED today. He states he has had a dry cough that causes intermittent dry heaving. He reports his shortness of breath is exacerbated when laying flat. He reports he experiences chest pain after breathing heavy for 10 minutes or longer. He states he smokes a pack of cigarettes every other day. He reports his feet have been swelling for about a week and his legs, below the knees, are tightening up. He states he has gained weight recently (15# in 2 wks). He denies recent expose to illness. He denies any fever, nausea, vomiting, sore throat, runny nose, asthma, lung problems, PE/DVT, or heart problems. He denies recent travel or hospitalization.     Allergies:  No known drug allergies    Medications:    Melatonin    Past Medical History:    The patient does not have any past pertinent medical history.    Past Surgical History:    History reviewed. No pertinent surgical history.    Family History:    hypertension     Social History:  Marital Status:  Single [1]  Smoking status: current every day  Alcohol use: yes (binge drinking 12 beers / day one day per week)  Denies drug use  Arrived to ED with sister     Review of Systems   Constitutional: Negative for chills and fever.   Respiratory: Positive for cough and shortness of breath.    Cardiovascular: Positive for leg swelling. Negative for chest pain.   Gastrointestinal: Negative for abdominal pain, blood in stool, diarrhea, nausea and vomiting.   Genitourinary: Negative for dysuria, flank pain and hematuria.   Musculoskeletal: Negative for back pain and neck pain.   Neurological: Negative for dizziness, syncope, weakness,  light-headedness, numbness and headaches.   All other systems reviewed and are negative.    Physical Exam   Patient Vitals for the past 24 hrs:   BP Temp Temp src Pulse Heart Rate Resp SpO2   06/13/17 1225 (!) 193/116 - - - - - 95 %   06/13/17 1200 (!) 187/132 - - - - - 92 %   06/13/17 1141 (!) 194/130 - - - - - 93 %   06/13/17 1136 - 98.9  F (37.2  C) Oral - - - -   06/13/17 1132 (!) 216/143 - - - - - -   06/13/17 1123 - - Oral 112 112 20 91 %     Physical Exam  General:                        Well-nourished                        Speaking in full sentences                        Obese male sitting upright  Eyes:                        Conjunctiva without injection or scleral icterus                        PERRL  ENT:                        Moist mucous membranes                        Posterior oropharynx clear without erythema or exudate                        Nares patent                        Pinnae normal  Neck:                        Full ROM                        No stiffness appreciated  Resp:                        Upper lungs are CTA                        Crackles at bases bilateral                        Good air movement  CV:                                        Tachycardic rate, regular rhythm                        S1 and S2 present                        No murmur, gallop or rub  GI:                        BS present                        Abdomen soft without distention                        Non-tender to light and deep palpation                        No guarding or rebound tenderness  Skin:                        Warm, dry, well perfused                        No rashes or open wounds on exposed skin  MSK:                        Moves all extremities                        2+ pitting edema bilaterally  Neuro:                        Alert                        Answers questions appropriately                        Moves all extremities equally                        Gait stable  Psych:                         Normal affect, normal mood    Emergency Department Course   ECG (11:32:16):  Rate 10 bpm. ME interval 170. QRS duration 106. QT/QTc 364/492. P-R-T axes 56 80 48. Sinus tachycardia. Possible left atrial enlargement. Nonspecific ST and T wave abnormality. Abnormal ECG.  Interpreted at 1134 by Cliff Solo MD.    ECG (13:13:29):  Rate 94 bpm. ME interval 176. QRS duration 108. QT/QTc 418/522. P-R-T axes 50 67 55. Normal sinus rhythm. Nonspecific ST and T wave abnormality. Prolonged QT. Abnormal ECG. No significant change compared to EKG dated 6/13/2017 Interpreted at 1319 by Cliff Solo MD.    Imaging:  Radiographic findings were communicated with the patient and family who voiced understanding of the findings.  Chest XR, PA & LAT:  Enlarged cardiac silhouette is demonstrated. Mild  pulmonary edema. No significant pleural effusion. No pneumothorax.  As read by Radiology.    Procedure:   Final result by Cliff Solo MD (06/13/17 12:07:55)     Impression:      Limited Bedside ED Cardiac Ultrasound Procedure Note:    PROCEDURE: PERFORMED BY: Dr. Cliff Solo  INDICATIONS/SYMPTOM:  Shortness of Breath  PROBE: Cardiac phased array probe  BODY LOCATION: Chest  FINDINGS:   The ultrasound was performed utilizing the parasternal long axis and apical 4 chamber views.  Cardiac contractility:  Present  Gross estimation of cardiac kinesis: Difficult to assess given body habitus, limited exam  Pericardial Effusion:  No visualized effusion  RV:LV ratio: Difficult to assess  INTERPRETATION:    Technically difficult exam, cardiac contractility present, no appreciable effusion.  IMAGE DOCUMENTATION: Images were archived to PACs system.         Laboratory:  Troponin:0.0528(HH)  CBC: WBC 17.6(H), HGB 13.2(L) o/w WNL ()   BMP: glucose 142(H), potassium 3.2(L), creatinine 1.53(H), GFR 53(L) o/w WNL  BNP:7018(H)    Interventions:  1134 Nitrostat 0.4 mg sublingual  1140 Nitrostat 0.4 mg  sublingual  1145 Nitrostat 0.4 mg sublingual  1225 Trandate 10 mg IV  1300 nitroglycerin 50 mg IV  1330 Heparin gtt   mg    Emergency Department Course:  Past medical records, nursing notes, and vitals reviewed.  I performed an exam of the patient and obtained history, as documented above.  IV inserted and blood drawn.  ECG obtained - see above for results.  The patient was sent for a US abdomen limited while in the emergency department, findings above.  1235: I rechecked the patient. Explained findings to imaging and labs.  1247: I rechecked the patient.  1320 I talked to Dr. Sow of cardiology.  Findings and plan explained to the Patient and sister who consents to admission.   1304: Discussed the patient with Dr. Han, who will admit the patient to a ICU bed for further monitoring, evaluation, and treatment.     Impression & Plan    Medical Decision Making:  Brooks Mensah is a 32 year old male who presents to the emergency department accompanied by his sister for evaluation of shortness of breath. Vital signs on presentation reveal blood pressure of 216/143 as well as heart reate of 112 and SPO2 of 91% on room air. Work up in the emergency department included imaging, laboratory studies and EKG. At present, symptoms are most concerning of hypertensive emergency. He demonstrates persistently elevated blood pressures, with imaging findings of cardiomegaly, increased venous congestion, elevated BNP, 15# weight gain, and LE edema. He additionally has evidence of cardiac damage with elevated troponin as above. Patient was initially treated with 3 sublingual Nitro with some improvements in symptoms though persistently elevated BP. He was additionally given one dose of labetalol without significant improvements. Given persistently elevated blood pressure, borderline hypoxia, patient will be started on Nitro drip. He will additionally be started on Heparin in light of elevated troponin. His EKG demonstrates sinus  tachycardia, nonspecific ST and T wave changes, with 1 box BARRIE in leads V2-V3 with concave up morphology.  No dynamic changes were noted on repeat EKG.  Case discussed with Dr. Sow of cardiology, who reviewed the above history, and EKG findings.  He is in agreement with the above assessment, and plan of care, and no emergent indication for coronary angiography. Patient has remained chest pain free during his entire emergency department course. Aspirin was provided. Patient will be admitted to the ICU under the care of Dr. Han for further treatment and care. Patient was updated multiple times during his ED course. All questions answered prior to admission.    Diagnosis:  Visit Diagnosis, Associated Orders, and Comments     ICD-10-CM    1. Congestive heart failure, unspecified congestive heart failure chronicity, unspecified congestive heart failure type (H) I50.9    2. NSTEMI (non-ST elevated myocardial infarction) (H) I21.4    3. Renal insufficiency N28.9    4. Hypertensive emergency I16.1          Disposition:  Admitted to an ICU bed.    Augustina Zuniga  6/13/2017   [facility] EMERGENCY DEPARTMENT    I, Augustina Zuniga, am serving as a scribe at 11:25 AM on 6/13/2017 to document services personally performed by Cliff Solo MD based on my observations and the provider's statements to me.        Cliff Solo MD  06/13/17 [id number]

## 2017-06-13 NOTE — IP AVS SNAPSHOT
[place] [address] [facility]    [address]    [facility] [address]    Phone:  [phone]    Fax:  [phone]                                       After Visit Summary   6/13/2017    Brooks Mensah    MRN: [id number]           After Visit Summary Signature Page     I have received my discharge instructions, and my questions have been answered. I have discussed any challenges I see with this plan with the nurse or doctor.    ..........................................................................................................................................  Patient/Patient Representative Signature      ..........................................................................................................................................  Patient Representative Print Name and Relationship to Patient    ..................................................               ................................................  Date                                            Time    ..........................................................................................................................................  Reviewed by Signature/Title    ...................................................              ..............................................  Date                                                            Time

## 2017-06-13 NOTE — IP AVS SNAPSHOT
MRN:[id number]                      After Visit Summary   6/13/2017    Brooks Mensah    MRN: [id number]           Thank you!     Thank you for choosing [facility] for your care. Our goal is always to provide you with excellent care. Hearing back from our patients is one way we can continue to improve our services. Please take a few minutes to complete the written survey that you may receive in the mail after you visit. If you would like to speak to someone directly about your visit please contact Patient Relations at [phone]. Thank you!          Patient Information     Date Of Birth          1985        Designated Caregiver       Most Recent Value    Caregiver    Will someone help with your care after discharge? no      About your hospital stay     You were admitted on:  June 13, 2017 You last received care in the:  [place] [address] [facility]    You were discharged on:  June 16, 2017       Who to Call     For medical emergencies, please call 911.  For non-urgent questions about your medical care, please call your primary care provider or clinic, None          Attending Provider     Provider Specialty    Cliff Solo MD Emergency Medicine    Sarkis Han MD Internal Medicine       Primary Care Provider    Physician No Ref-Primary      After Care Instructions     Activity       Your activity upon discharge: activity as tolerated            Diet       Follow this diet upon discharge: Orders Placed This Encounter      Regular Diet Adult                  Follow-up Appointments     Follow-up and recommended labs and tests        Follow up with primary care provider, Physician No Ref-Primary, within 7 days to evaluate medication change and for hospital follow- up.  The following labs/tests are recommended: BMP.  Follow up in sleep clinic for sleep study ASAP.  Recommend follow up in bariatric clinic to address weight loss strategies.  Discuss strategies for  "tobacco cessation with PCP.                  Your next 10 appointments already scheduled     Jun 28, 2017  8:10 AM CDT   CORE Enrollment with Prem Painter PA-C   [facility] [place] ([facility])    [address] [address] [address] [phone]              Additional Services     Follow-Up with cardiac sub-specialty clinic                 General Recommendations To Control Heart Failure When You Get Home     Instructions To Patients and Families: Please read and check off each of these important instructions as you do them when you get home.           Weight and symptoms      ___ Put a scale in your bathroom  ___ Post a weight chart or calendar next to the scale  ___Weigh yourself every day as soon as you you get up in the morning. You should only be wearing your pajamas. Write your weight on the chart/calendar.  ___ Bring your weight chart/calendar with you to all appointments    ___Call your doctor if you gain 2 pounds in 1 day or 5 pounds in 1 week from your \"dry\" weight (baseline weight). Also call your doctor if you have shortness of breath that gets worse over time, leg swelling or fatigue.         Medicines and diet     ___ Make sure to take your medicines as prescribed.    ___Bring a current list of your medicines and all of your medicine bottles with you to all appointments.    ___ Limit fluids if you still have swelling or shortness of breath, or if your doctor tells you to do so.  ___ Eat less than 2000 mg of sodium (salt) every day. Read food labels, and do not add salt to meals.   ___ Heart healthy diet with low fat and low cholesterol          Activity and suggested lifestyle changes    ___ Stay active. Talk to your doctor about an exercise program that is safe for your heart.    ___ Stop smoking. Reduce alcohol use.      ___ Lose weight if you are overweight. Extra weight puts a lot of stress on the heart.          Control for Leg " Swelling   ___ Keep your legs elevated (raised) as needed for swelling. If swelling is uncomfortable or elevation doesn t help, ask your doctor about using ACE wrap or Jobst stockings.          Follow-up appointments   ___ Make a C.O.R.E. Clinic appointment with a basic metabolic panel lab draw 3 to 5 days after you leave the hospital. Call one of the following locations:   [facility] and [facility]  [phone],  [facility] [phone],  [facility]  [phone].     ___ Make sure to take your medications as prescribed and bring an accurate list of your medications and your weight chart/calendar to your follow up appointment at the C.O.R.E. Clinic for continued education and adjustments          What is the CORE clinic?    The C.O.R.E (Cardiomyopathy, Optimization, Rehabilitation, Education) Clinic is a heart failure specialty clinic within the [facility]. At C.O.R.E., you will work with nurse practitioners to carefully adjust medicines, get education and learn who and when to call if symptoms appear. C.O.R.E nurses specialize in helping you:    better understand your disease.    slow the progress of your disease.    improve the length and quality of your life.    detect future heart problems before they become life threatening.    avoid hospital stays.            Pending Results     Date and Time Order Name Status Description    6/16/2017 0938 Lactic acid level STAT In process             Statement of Approval     Ordered          06/16/17 1104  I have reviewed and agree with all the recommendations and orders detailed in this document.  EFFECTIVE NOW     Approved and electronically signed by:  Alex Saenz DO             Admission Information     Date & Time Provider Department Dept. Phone    6/13/2017 Sarkis Han MD [place] [address] [facility] [phone]      Your Vitals Were   "   Blood Pressure Pulse Temperature Respirations Height Weight    131/73 (BP Location: Left arm) 78 99.2  F (37.3  C) (Oral) 20 1.803 m (5' 11\") 178.2 kg (392 lb 13.8 oz)    Pulse Oximetry BMI (Body Mass Index)                97% 54.79 kg/m2          MyChart Information     [employer] lets you send messages to your doctor, view your test results, renew your prescriptions, schedule appointments and more. To sign up, go to [email or web address] . Click on \"Log in\" on the left side of the screen, which will take you to the Welcome page. Then click on \"Sign up Now\" on the right side of the page.     You will be asked to enter the access code listed below, as well as some personal information. Please follow the directions to create your username and password.     Your access code is: [email or web address]  Expires: 2017 12:51 PM     Your access code will  in 90 days. If you need help or a new code, please call your [facility] clinic or [phone].        Care EveryWhere ID     This is your Care EveryWhere ID. This could be used by other organizations to access your [facility] medical records  [id number]           Review of your medicines      START taking        Dose / Directions    aspirin 81 MG EC tablet        Dose:  81 mg   Take 1 tablet (81 mg) by mouth daily   Quantity:  30 tablet   Refills:  3       atorvastatin 10 MG tablet   Commonly known as:  LIPITOR        Dose:  10 mg   Take 1 tablet (10 mg) by mouth daily   Quantity:  30 tablet   Refills:  1       carvedilol 25 MG tablet   Commonly known as:  COREG        Dose:  25 mg   Take 1 tablet (25 mg) by mouth 2 times daily (with meals)   Quantity:  60 tablet   Refills:  1       furosemide 20 MG tablet   Commonly known as:  LASIX        Dose:  20 mg   Take 1 tablet (20 mg) by mouth daily   Quantity:  30 tablet   Refills:  1       lisinopril 20 MG tablet   Commonly known as:  PRINIVIL/ZESTRIL        Dose:  20 mg   Take 1 tablet (20 mg) by mouth 2 times daily "   Quantity:  60 tablet   Refills:  1       spironolactone 25 MG tablet   Commonly known as:  ALDACTONE        Dose:  25 mg   Take 1 tablet (25 mg) by mouth daily   Quantity:  30 tablet   Refills:  1         CONTINUE these medicines which have NOT CHANGED        Dose / Directions    MELATONIN PO        Dose:  10 mg   Take 10 mg by mouth nightly as needed   Refills:  0       TRAZODONE HCL PO        Dose:  50 mg   Take 50 mg by mouth nightly as needed for sleep   Refills:  0            Where to get your medicines      [place] [facility] [address] [facility]  [address] [facility] [address]     Phone:  [phone]     aspirin 81 MG EC tablet    atorvastatin 10 MG tablet    carvedilol 25 MG tablet    furosemide 20 MG tablet    lisinopril 20 MG tablet    spironolactone 25 MG tablet                Protect others around you: Learn how to safely use, store and throw away your medicines at www.disposemymeds.org.             Medication List: This is a list of all your medications and when to take them. Check marks below indicate your daily home schedule. Keep this list as a reference.      Medications           Morning Afternoon Evening Bedtime As Needed    aspirin 81 MG EC tablet   Take 1 tablet (81 mg) by mouth daily   Last time this was given:  81 mg on 6/16/2017  8:17 AM                                atorvastatin 10 MG tablet   Commonly known as:  LIPITOR   Take 1 tablet (10 mg) by mouth daily   Last time this was given:  10 mg on 6/16/2017  8:17 AM                                carvedilol 25 MG tablet   Commonly known as:  COREG   Take 1 tablet (25 mg) by mouth 2 times daily (with meals)   Last time this was given:  25 mg on 6/16/2017  8:17 AM                                furosemide 20 MG tablet   Commonly known as:  LASIX   Take 1 tablet (20 mg) by mouth daily   Last time this was given:  20 mg on 6/16/2017  8:17 AM                                 lisinopril 20 MG tablet   Commonly known as:  PRINIVIL/ZESTRIL   Take 1 tablet (20 mg) by mouth 2 times daily   Last time this was given:  20 mg on 6/16/2017  8:17 AM                                MELATONIN PO   Take 10 mg by mouth nightly as needed   Last time this was given:  10 mg on 6/15/2017 11:53 PM                                spironolactone 25 MG tablet   Commonly known as:  ALDACTONE   Take 1 tablet (25 mg) by mouth daily   Last time this was given:  25 mg on 6/16/2017 11:29 AM                                TRAZODONE HCL PO   Take 50 mg by mouth nightly as needed for sleep   Last time this was given:  50 mg on 6/13/2017  9:44 PM

## 2017-06-13 NOTE — PHARMACY-ADMISSION MEDICATION HISTORY
Admission medication history interview status for this patient is complete. See [facility] admission navigator for allergy information, prior to admission medications and immunization status.     Medication history interview source(s):Patient  Medication history resources (including written lists, pill bottles, clinic record):None  Primary pharmacy:[facility]    Changes made to PTA medication list:  Added: trazodone  Deleted: none  Changed: melatoning    Actions taken by pharmacist (provider contacted, etc):None     Additional medication history information:None    Medication reconciliation/reorder completed by provider prior to medication history? No    Do you take OTC medications (eg tylenol, ibuprofen, fish oil, eye/ear drops, etc)? N(Y/N)    For patients on insulin therapy: N (Y/N)  Lantus/levemir/NPH/Mix 70/30 dose:   (Y/N) (see below)   Sliding scale Novolog Y/N  If Yes, do you have a baseline novolog pre-meal dose:  units with meals   Patients eat three meals a day:   Y/N     Any Barriers to therapy:  cost of medications/comfortable with giving injections (if applicable)/ comfortable and confident with current diabetes regimen:       Prior to Admission medications    Medication Sig Last Dose Taking? Auth Provider   MELATONIN PO Take 10 mg by mouth nightly as needed   Yes Reported, Patient   TRAZODONE HCL PO Take 50 mg by mouth nightly as needed for sleep  Yes Unknown, Entered By History

## 2017-06-13 NOTE — IP AVS SNAPSHOT
"[place] [address] [facility]: [phone]                                              INTERAGENCY TRANSFER FORM - PHYSICIAN ORDERS   2017                    Hospital Admission Date: 2017  BREA DO   : 1985  Sex: Male        Attending Provider: Sarkis Han MD     Allergies:  No Known Allergies    Infection:  None   Service:  HOSPITALIST    Ht:  1.803 m (5' 11\")   Wt:  178.2 kg (392 lb 13.8 oz)   Admission Wt:  179.7 kg (396 lb 2.7 oz)    BMI:  54.79 kg/m 2   BSA:  2.99 m 2            Patient PCP Information     Provider PCP Type    Physician No Ref-Primary General      ED Clinical Impression     Diagnosis Description Comment Added By Time Added    Congestive heart failure, unspecified congestive heart failure chronicity, unspecified congestive heart failure type (H) [I50.9] Congestive heart failure, unspecified congestive heart failure chronicity, unspecified congestive heart failure type (H) [I50.9]  Cliff Solo MD 2017 12:49 PM    NSTEMI (non-ST elevated myocardial infarction) (H) [I21.4] NSTEMI (non-ST elevated myocardial infarction) (H) [I21.4]  Cliff Solo MD 2017 12:50 PM    Renal insufficiency [N28.9] Renal insufficiency [N28.9]  Cliff Solo MD 2017 12:50 PM    Hypertensive emergency [I16.1] Hypertensive emergency [I16.1]  Cliff Solo MD 2017  1:41 PM      Hospital Problems as of 2017              Priority Class Noted POA    Congestive heart failure, unspecified congestive heart failure chronicity, unspecified congestive heart failure type (H) Medium  2017 Unknown    Hypertensive emergency Medium  2017 Unknown    Hypertensive urgency Medium  2017 Yes    Renal insufficiency Medium  2017 Yes    NSTEMI (non-ST elevated myocardial infarction) (H) Medium  2017 Yes      Non-Hospital Problems as of 2017     None      Code Status History     Date Active Date Inactive Code Status Order ID " Comments User Context    6/16/2017 11:04 AM  Full Code [id number]  Alex Saenz DO Outpatient    6/13/2017  3:06 PM 6/16/2017 11:04 AM Full Code [id number]  Sarkis Han MD Inpatient         Medication Review      START taking        Dose / Directions Comments    aspirin 81 MG EC tablet        Dose:  81 mg   Take 1 tablet (81 mg) by mouth daily   Quantity:  30 tablet   Refills:  3        atorvastatin 10 MG tablet   Commonly known as:  LIPITOR        Dose:  10 mg   Take 1 tablet (10 mg) by mouth daily   Quantity:  30 tablet   Refills:  1        carvedilol 25 MG tablet   Commonly known as:  COREG        Dose:  25 mg   Take 1 tablet (25 mg) by mouth 2 times daily (with meals)   Quantity:  60 tablet   Refills:  1        furosemide 20 MG tablet   Commonly known as:  LASIX        Dose:  20 mg   Take 1 tablet (20 mg) by mouth daily   Quantity:  30 tablet   Refills:  1        lisinopril 20 MG tablet   Commonly known as:  PRINIVIL/ZESTRIL        Dose:  20 mg   Take 1 tablet (20 mg) by mouth 2 times daily   Quantity:  60 tablet   Refills:  1        spironolactone 25 MG tablet   Commonly known as:  ALDACTONE        Dose:  25 mg   Take 1 tablet (25 mg) by mouth daily   Quantity:  30 tablet   Refills:  1          CONTINUE these medications which have NOT CHANGED        Dose / Directions Comments    MELATONIN PO        Dose:  10 mg   Take 10 mg by mouth nightly as needed   Refills:  0        TRAZODONE HCL PO        Dose:  50 mg   Take 50 mg by mouth nightly as needed for sleep   Refills:  0                After Care     Activity       Your activity upon discharge: activity as tolerated       Diet       Follow this diet upon discharge: Orders Placed This Encounter      Regular Diet Adult             Referrals     Follow-Up with cardiac sub-specialty clinic                 Your next 10 appointments already scheduled     Jun 28, 2017  8:10 AM CDT   CORE Enrollment with Prem Painter PA-C   [facility]  PHYSICI[facility] [place] ([facility]ics)    [address] [address]  [facility] [address] [phone]              Follow-Up Appointment Instructions     Future Labs/Procedures    Follow-up and recommended labs and tests      Comments:    Follow up with primary care provider, Physician No Ref-Primary, within 7 days to evaluate medication change and for hospital follow- up.  The following labs/tests are recommended: BMP.  Follow up in sleep clinic for sleep study ASAP.  Recommend follow up in bariatric clinic to address weight loss strategies.  Discuss strategies for tobacco cessation with PCP.      Follow-Up Appointment Instructions     Follow-up and recommended labs and tests        Follow up with primary care provider, Physician No Ref-Primary, within 7 days to evaluate medication change and for hospital follow- up.  The following labs/tests are recommended: BMP.  Follow up in sleep clinic for sleep study ASAP.  Recommend follow up in bariatric clinic to address weight loss strategies.  Discuss strategies for tobacco cessation with PCP.             Statement of Approval     Ordered          06/16/17 1104  I have reviewed and agree with all the recommendations and orders detailed in this document.  EFFECTIVE NOW     Approved and electronically signed by:  Alex Saenz DO

## 2017-06-13 NOTE — ED NOTES
Pt presents after seen in clinic. Pt c/o increased SOB, denies chest pain but states it hurts once he deep breathes for 10 minutes. Pt alert, oriented x3. ABCs intact

## 2017-06-13 NOTE — CONSULTS
Malignant HTN with LV failure, likely chronic  Likely systolic and diastolic failure  Severe obesity  MAURI likely  ++ FH for HTN  + troponin due to myocardial stress.  Chest pressure due to HTN and LV failure.   Could be CAD also but I doubt it.   he'll likely need a cath or an ischemic work up subsequently    Weight loss is mandatory  Echo tomorrow.    BB => switch to coreg 25 mg bid from metop  ACEI/ARB as able  Aldactone  CORE  NO SMOKING  hE NEEDS A pmd

## 2017-06-13 NOTE — LETTER
"Transition Communication Hand-off for Care Transitions to Next Level of Care Provider    Name: Brooks Mensah  MRN #: [id number]  Primary Care Provider: Physician No Ref-Primary     Primary Clinic: No address on file  Primary Care Clinic Name:  (TBFAHAD. YESSI provided pt with choices of PCP clinics in [place]. )  Reason for Hospitalization:  Renal insufficiency [N28.9]  NSTEMI (non-ST elevated myocardial infarction) (H) [I21.4]  Hypertensive emergency [I16.1]  Congestive heart failure, unspecified congestive heart failure chronicity, unspecified congestive heart failure type (H) [I50.9]  Hypertensive urgency [I16.0]  Admit Date/Time: 6/13/2017 11:20 AM  Discharge Date: ***  Payor Source: Payor: MEDICA / Plan: MEDICA CHOICE / Product Type: Indemnity /     Readmission Assessment Measure (OFELIA) Risk Score/category: ***    Plan of Care Goals/Milestone Events:   Patient Concerns: ***   Patient Goals:   Short-term ***   Long-term ***   Medical Goals   Short-term ***   Long-term ***         Reason for Communication Hand-off Referral: {CCREASONCMCTNHANDOFFREFERRALCTS:69106}    Discharge Plan:       Concern for non-adherence with plan of care:   Y/N ***  Discharge Needs Assessment:  Needs       Most Recent Value    Equipment Currently Used at Home none    Transportation Available car, family or friend will provide    # of Referrals Placed by [facility] Care Coordination [Info on PCP choices within network]          Already enrolled in Tele-monitoring program and name of program:  ***  Follow-up specialty is recommended: {YES / NO:47940::\"Yes\"}    Follow-up plan:  Future Appointments  Date Time Provider Department Center   6/15/2017 11:30 AM [id number]       Any outstanding tests or procedures:              Key Recommendations:  Reinforce importance of daily weights to monitor fluid volume status. Also, assess compliance with low sodium diet.     [address]    AVS/Discharge Summary is the source of truth; " this is a helpful guide for improved communication of patient story

## 2017-06-13 NOTE — PROGRESS NOTES
"ICU End of Shift Summary.  For vital signs and complete assessments, please see documentation flowsheets.     Pertinent assessments: A&O X4, Blood pressure elevated,nitro drip titrated for BP. Heparin infused secondary to increase troponins. Up with standby, denies chest pain, pt verbalizes respirations \"hurt once he deep breathes for 10 minutes\". Using urinal, no BM. Pt expressed need for sleep, melatonin and trazadone ordered. Sister at bedside. Pt and sister present, spoke with Dr. Han and Dr. Sow, questions answered. Support given, will continue to monitor.   Major Shift Events: See pertinent assessment   Plan (Upcoming Events): Monitor BP, titrate nitro for BP,replace electrolytes, medications for sleep  Discharge/Transfer Needs: TBD    Bedside Shift Report Completed :  Yes   Bedside Safety Check Completed: yes         "

## 2017-06-14 ENCOUNTER — APPOINTMENT (OUTPATIENT)
Dept: CARDIOLOGY | Facility: CLINIC | Age: 32
DRG: 280 | End: 2017-06-14
Attending: INTERNAL MEDICINE
Payer: COMMERCIAL

## 2017-06-14 LAB
ALBUMIN SERPL-MCNC: 2.5 G/DL (ref 3.4–5)
ALP SERPL-CCNC: 48 U/L (ref 40–150)
ALT SERPL W P-5'-P-CCNC: 47 U/L (ref 0–70)
ANION GAP SERPL CALCULATED.3IONS-SCNC: 7 MMOL/L (ref 3–14)
AST SERPL W P-5'-P-CCNC: 30 U/L (ref 0–45)
BILIRUB DIRECT SERPL-MCNC: 0.1 MG/DL (ref 0–0.2)
BILIRUB SERPL-MCNC: 0.6 MG/DL (ref 0.2–1.3)
BUN SERPL-MCNC: 22 MG/DL (ref 7–30)
CALCIUM SERPL-MCNC: 7.9 MG/DL (ref 8.5–10.1)
CHLORIDE SERPL-SCNC: 102 MMOL/L (ref 94–109)
CHOLEST SERPL-MCNC: 154 MG/DL
CO2 SERPL-SCNC: 28 MMOL/L (ref 20–32)
CREAT SERPL-MCNC: 1.5 MG/DL (ref 0.66–1.25)
GFR SERPL CREATININE-BSD FRML MDRD: 54 ML/MIN/1.7M2
GLUCOSE BLDC GLUCOMTR-MCNC: 105 MG/DL (ref 70–99)
GLUCOSE BLDC GLUCOMTR-MCNC: 113 MG/DL (ref 70–99)
GLUCOSE BLDC GLUCOMTR-MCNC: 116 MG/DL (ref 70–99)
GLUCOSE BLDC GLUCOMTR-MCNC: 97 MG/DL (ref 70–99)
GLUCOSE SERPL-MCNC: 102 MG/DL (ref 70–99)
HDLC SERPL-MCNC: 41 MG/DL
LDLC SERPL CALC-MCNC: 87 MG/DL
LMWH PPP CHRO-ACNC: 0.15 IU/ML
MAGNESIUM SERPL-MCNC: 2.2 MG/DL (ref 1.6–2.3)
NONHDLC SERPL-MCNC: 113 MG/DL
POTASSIUM SERPL-SCNC: 3.7 MMOL/L (ref 3.4–5.3)
PROT SERPL-MCNC: 6.2 G/DL (ref 6.8–8.8)
SODIUM SERPL-SCNC: 137 MMOL/L (ref 133–144)
TRIGL SERPL-MCNC: 132 MG/DL
TROPONIN I SERPL-MCNC: 0.4 UG/L (ref 0–0.04)

## 2017-06-14 PROCEDURE — 25000132 ZZH RX MED GY IP 250 OP 250 PS 637: Performed by: NURSE PRACTITIONER

## 2017-06-14 PROCEDURE — 80048 BASIC METABOLIC PNL TOTAL CA: CPT | Performed by: INTERNAL MEDICINE

## 2017-06-14 PROCEDURE — 80061 LIPID PANEL: CPT | Performed by: INTERNAL MEDICINE

## 2017-06-14 PROCEDURE — 85520 HEPARIN ASSAY: CPT | Performed by: INTERNAL MEDICINE

## 2017-06-14 PROCEDURE — 25500064 ZZH RX 255 OP 636: Performed by: INTERNAL MEDICINE

## 2017-06-14 PROCEDURE — 25000132 ZZH RX MED GY IP 250 OP 250 PS 637: Performed by: INTERNAL MEDICINE

## 2017-06-14 PROCEDURE — 36415 COLL VENOUS BLD VENIPUNCTURE: CPT | Performed by: INTERNAL MEDICINE

## 2017-06-14 PROCEDURE — 93306 TTE W/DOPPLER COMPLETE: CPT | Mod: 26 | Performed by: INTERNAL MEDICINE

## 2017-06-14 PROCEDURE — 99233 SBSQ HOSP IP/OBS HIGH 50: CPT | Performed by: INTERNAL MEDICINE

## 2017-06-14 PROCEDURE — 12000007 ZZH R&B INTERMEDIATE

## 2017-06-14 PROCEDURE — 84132 ASSAY OF SERUM POTASSIUM: CPT | Performed by: INTERNAL MEDICINE

## 2017-06-14 PROCEDURE — 40000264 ECHO COMPLETE WITH LUMASON

## 2017-06-14 PROCEDURE — 80076 HEPATIC FUNCTION PANEL: CPT | Performed by: INTERNAL MEDICINE

## 2017-06-14 PROCEDURE — 99233 SBSQ HOSP IP/OBS HIGH 50: CPT | Mod: 25 | Performed by: INTERNAL MEDICINE

## 2017-06-14 PROCEDURE — 83735 ASSAY OF MAGNESIUM: CPT | Performed by: INTERNAL MEDICINE

## 2017-06-14 PROCEDURE — 25000128 H RX IP 250 OP 636: Performed by: INTERNAL MEDICINE

## 2017-06-14 PROCEDURE — 00000146 ZZHCL STATISTIC GLUCOSE BY METER IP

## 2017-06-14 PROCEDURE — 84484 ASSAY OF TROPONIN QUANT: CPT | Performed by: INTERNAL MEDICINE

## 2017-06-14 RX ORDER — ASPIRIN 81 MG/1
243 TABLET ORAL ONCE
Status: COMPLETED | OUTPATIENT
Start: 2017-06-14 | End: 2017-06-14

## 2017-06-14 RX ORDER — FUROSEMIDE 10 MG/ML
20 INJECTION INTRAMUSCULAR; INTRAVENOUS 3 TIMES DAILY
Status: COMPLETED | OUTPATIENT
Start: 2017-06-14 | End: 2017-06-15

## 2017-06-14 RX ORDER — FLUMAZENIL 0.1 MG/ML
0.2 INJECTION, SOLUTION INTRAVENOUS
Status: DISCONTINUED | OUTPATIENT
Start: 2017-06-14 | End: 2017-06-14

## 2017-06-14 RX ORDER — LORAZEPAM 2 MG/ML
0.5 INJECTION INTRAMUSCULAR
Status: DISCONTINUED | OUTPATIENT
Start: 2017-06-14 | End: 2017-06-15 | Stop reason: HOSPADM

## 2017-06-14 RX ORDER — NALOXONE HYDROCHLORIDE 0.4 MG/ML
.1-.4 INJECTION, SOLUTION INTRAMUSCULAR; INTRAVENOUS; SUBCUTANEOUS
Status: DISCONTINUED | OUTPATIENT
Start: 2017-06-14 | End: 2017-06-14

## 2017-06-14 RX ORDER — LIDOCAINE 40 MG/G
CREAM TOPICAL
Status: DISCONTINUED | OUTPATIENT
Start: 2017-06-14 | End: 2017-06-15 | Stop reason: HOSPADM

## 2017-06-14 RX ORDER — SODIUM CHLORIDE 9 MG/ML
INJECTION, SOLUTION INTRAVENOUS CONTINUOUS
Status: DISCONTINUED | OUTPATIENT
Start: 2017-06-14 | End: 2017-06-15

## 2017-06-14 RX ORDER — ASPIRIN 81 MG/1
81 TABLET ORAL DAILY
Status: DISCONTINUED | OUTPATIENT
Start: 2017-06-14 | End: 2017-06-14 | Stop reason: DRUGHIGH

## 2017-06-14 RX ORDER — FLUMAZENIL 0.1 MG/ML
0.2 INJECTION, SOLUTION INTRAVENOUS
Status: DISCONTINUED | OUTPATIENT
Start: 2017-06-14 | End: 2017-06-15 | Stop reason: ALTCHOICE

## 2017-06-14 RX ORDER — LIDOCAINE 40 MG/G
CREAM TOPICAL
Status: DISCONTINUED | OUTPATIENT
Start: 2017-06-14 | End: 2017-06-15

## 2017-06-14 RX ORDER — LORAZEPAM 0.5 MG/1
0.5 TABLET ORAL
Status: DISCONTINUED | OUTPATIENT
Start: 2017-06-14 | End: 2017-06-15

## 2017-06-14 RX ORDER — POTASSIUM CHLORIDE 1500 MG/1
20 TABLET, EXTENDED RELEASE ORAL
Status: DISCONTINUED | OUTPATIENT
Start: 2017-06-14 | End: 2017-06-15

## 2017-06-14 RX ORDER — LORAZEPAM 0.5 MG/1
0.5 TABLET ORAL
Status: DISCONTINUED | OUTPATIENT
Start: 2017-06-14 | End: 2017-06-15 | Stop reason: HOSPADM

## 2017-06-14 RX ORDER — LORAZEPAM 2 MG/ML
0.5 INJECTION INTRAMUSCULAR
Status: DISCONTINUED | OUTPATIENT
Start: 2017-06-14 | End: 2017-06-15

## 2017-06-14 RX ADMIN — LISINOPRIL 10 MG: 10 TABLET ORAL at 08:30

## 2017-06-14 RX ADMIN — FUROSEMIDE 20 MG: 10 INJECTION, SOLUTION INTRAVENOUS at 08:34

## 2017-06-14 RX ADMIN — SPIRONOLACTONE 25 MG: 25 TABLET ORAL at 12:33

## 2017-06-14 RX ADMIN — ENOXAPARIN SODIUM 40 MG: 40 INJECTION SUBCUTANEOUS at 13:30

## 2017-06-14 RX ADMIN — CARVEDILOL 25 MG: 25 TABLET, FILM COATED ORAL at 08:30

## 2017-06-14 RX ADMIN — FUROSEMIDE 20 MG: 10 INJECTION, SOLUTION INTRAVENOUS at 16:03

## 2017-06-14 RX ADMIN — ASPIRIN 81 MG: 81 TABLET, COATED ORAL at 08:30

## 2017-06-14 RX ADMIN — HEPARIN SODIUM 1600 UNITS/HR: 10000 INJECTION, SOLUTION INTRAVENOUS at 01:01

## 2017-06-14 RX ADMIN — FUROSEMIDE 20 MG: 10 INJECTION, SOLUTION INTRAVENOUS at 21:20

## 2017-06-14 RX ADMIN — SULFUR HEXAFLUORIDE 4 ML: KIT at 08:30

## 2017-06-14 RX ADMIN — LISINOPRIL 10 MG: 10 TABLET ORAL at 21:20

## 2017-06-14 RX ADMIN — POTASSIUM CHLORIDE 20 MEQ: 1500 TABLET, EXTENDED RELEASE ORAL at 08:29

## 2017-06-14 RX ADMIN — ASPIRIN 243 MG: 81 TABLET, COATED ORAL at 17:28

## 2017-06-14 RX ADMIN — CARVEDILOL 37.5 MG: 12.5 TABLET, FILM COATED ORAL at 17:28

## 2017-06-14 NOTE — PROGRESS NOTES
Cardiology Progress Note  YUNIER Medina          Assessment and Plan:   Admit (6/13) w/ 2 wk progressive orthopnea/SOB/abdominal distention/SIA  Evidence of CHF exacerbation/mild troponin elevation/HTN urgency.  No prior medical problems    Bi-ventricular HF  -BNP 7000/pulmonary edema/ECHO done:    Interpretation Summary:     The left ventricle is normal in size with severe concentric left ventricular  hypertrophy. Septal and LV posterior wall thicknesses are 2.8 and 2.6 cm (the ULNL = 1.2 cm).  Left ventricular systolic function is moderately reduced. The visual ejection fraction is estimated at 30-35%.  This decrease in the LVEF is due to moderate global hypokinesia and moderate  to severe lateral wall hypokinesis.  Grade III or advanced diastolic dysfunction is present.  The right ventricular systolic function is normal.  All four cardiac valves are normal in structure and function.  Hypertensive CM vs hypertrophic CM are to be considered.       -etiology:  HTN likely  Ischemia has not been r/o  -improved symptoms w/ diuresis  -(weight down 4lbs)  396--> 392lb  -(-1100 OP overnight)  -CHF education/low salt  -consider assessment of PE (CT/lower ext U/S)  Will check d-dimer  -bblocker/ACE-I/IV lasix/spironolactone/NTG drip    HTN urgency, acute and chronic  -BP greatly improved since admit--> 152/92mmHg this am  -will slowly increase meds as able    NSTEMI:  -troponin peak 0.5 (flat)/no significant ST abnormalities, poor R-wave progression/ECHO pending  -No CP (only after coughing)  -doubt ACS, but consider ischemic evaluation/cath  -LDL 87 w/o statin  Leukocytosis  -will add ASA   -heparin/NTG/bblocker    JEFFERY:  -Cr. 1.5 (stable with diuresis)    Likely MAURI  -outpt sleep study recommended  -consider overnight oximetry prior to discharge    Tobacco Dependence  -encouraged cessation    Leukocytosis    Severely over weight      Plan:  BP control with titration of his current program  MRI as OP to assess possible  "[facility]  Coronary angiogram tomorrow  Move to 3rd tele  Increase coreg 37.5 mg bid    I have reviewed the catheterization procedures including risks and benefits.   This could include angiography, angioplasty, stenting, and other coronary interventions as the  Interventional cardiologist deems necessary.  Risks were discussed but may not be limited to death, heart attack, bleeding, kidney injury, stroke, urgent coronary bypass surgery and vascular injury.                 Interval History:   Denies CP  SOB/orthopnea improved  No palpitations                Medications:       furosemide  20 mg Intravenous TID     carvedilol  25 mg Oral BID w/meals     lisinopril  10 mg Oral BID     spironolactone  25 mg Oral Daily            Physical Exam:   Blood pressure 147/90, pulse 112, temperature 98.6  F (37  C), temperature source Oral, resp. rate 15, height 1.803 m (5' 11\"), weight (!) 178.2 kg (392 lb 13.8 oz), SpO2 97 %.  Wt Readings from Last 3 Encounters:   06/14/17 (!) 178.2 kg (392 lb 13.8 oz)   06/13/17 (!) 179.2 kg (395 lb)     I/O last 3 completed shifts:  In: 1104.2 [P.O.:660; I.V.:444.2]  Out: 2240 [Urine:2240]    CONST:  Alert and oriented    LUNGS:  CTA bilat  CARDIO:  RRR, s1, S2  +S3 Diminished heart tones  ABD:  obese  EXT:  Generalized edema           Data:   TELE:  SR with prolonged QT interval    CBC    Recent Labs  Lab 06/13/17  1535 06/13/17  1131   WBC 17.7* 17.6*   HGB 12.0* 13.2*    202       BMP    Recent Labs  Lab 06/14/17  0605 06/13/17  1725 06/13/17  1131     --  137   POTASSIUM 3.7 3.4 3.2*   CHLORIDE 102  --  101   GAYLE 7.9*  --  8.9   CO2 28  --  30   BUN 22  --  21   CR 1.50*  --  1.53*   *  --  142*     Recent Labs   Lab Test  06/14/17   0605   CHOL  154   HDL  41   LDL  87   TRIG  132       TROP  Lab Results   Component Value Date    TROPI 0.399 () 06/14/2017    TROPI 0.465 () 06/13/2017    TROPI 0.483 () 06/13/2017    TROPI 0.528 () 06/13/2017       BNP    Recent " Labs  Lab 06/13/17  1131   NTBNPI 7018*

## 2017-06-14 NOTE — PROGRESS NOTES
[facility]  Hospitalist Progress Note  Patient Name: Brooks Mensah    MRN: [id number]  Provider: Sarkis Han MD  06/14/17    Initial presenting complaint/issue to hospital (Diagnosis): dyspnea with exertion, orthopnea, shortness of breath, progressive edema, and chest pressure.         Assessment and Plan:      Patient is a 32-year-old male previously healthy male, who presented to clinic for evaluation of progressive shortness of breath over 2 weeks- initially with dyspnea on exertion, then orthopnea, and finally shortness of breath at rest for the last day.  He also had progressive pedal and lower extremity edema.  In the last day he had developed chest pressure with heavy breathing. In clinic, he was noted to be hypertensive with systolic blood pressures in the 190s and diastolic blood pressures in the 130s. He was referred to the emergency department.  Workup in the ED here confirmed severe hypertension. Blood pressure was as high as 216/143. He was also found to have renal insufficiency, troponin elevation, and findings suggestive of congestive heart failure by BNP and chest x-ray.     Problem list:     1.  Hypertensive urgency with acute systolic and diastolic congestive heart failure. Echo shows severe LVH, grade III diastolic heart dysfunction, EF of 30-35%, and moderate global hypokinesia. Cardiology consult appreciated.  Continue Coreg, Lisinopril, and Aldactone.  Continue Lasix 20 mg IV q 8 hours for 3 more doses then reassess tomorrow (down 2 liters since admission, so far).  Continue prn IV Hydralazine.  Nitroglycerin drip was stopped last evening.      2. Non ST elevation myocardial infarction.  Suspect type 2, due to severe hypertension, but I agree with heart catheterization tomorrow. OK to stop Heparin drip.       3.  Acute renal insufficiency.  Stable. Monitor.      4.  Suspect essential hypertension. Continue Coreg, Lisinopril, and Aldactone.       5.  Tobacco use disorder.  Smoking  "cessation consult requested.     6.  Left eye with medial subconjunctival hemorrhage.  Stable.     7.  Severe obesity.     Full code  Stopping Heparin drip, but will add SQ Lovenox for DVT prophylaxis  Disposition.  OK to transfer to telemetry.            Interval History:      Patient is breathing much better.  No chest tightness. BP improved.                   Physical Exam:      Last Vital Signs:  BP (!) 162/119  Pulse 82  Temp (P) 97.7  F (36.5  C) (Oral)  Resp 14  Ht 1.803 m (5' 11\")  Wt (!) 178.2 kg (392 lb 13.8 oz)  SpO2 97%  BMI 54.79 kg/m2    Intake/Output Summary (Last 24 hours) at 06/14/17 1355  Last data filed at 06/14/17 1000   Gross per 24 hour   Intake           1104.2 ml   Output             3185 ml   Net          -2080.8 ml       GENERAL:  Comfortable appearing. Cooperative.  PSYCH: pleasant, oriented, No acute distress.  EYES: PERRLA, Normal conjunctiva.  HEART:  Regular rate and rhythm. No JVD. Pulses normal. Improved edema.  LUNGS:  Clear to auscultation, normal Respiratory effort.  ABDOMEN:  Soft, no hepatosplenomegaly, normal bowel sounds.  EXTREMETIES: No clubbing, cyanosis or ischemia  SKIN:  Dry to touch, No rash.           Medications:      All current medications were reviewed.         Data:      All new lab and imaging data was reviewed.   Labs:       Lab Results   Component Value Date     06/14/2017     06/13/2017    Lab Results   Component Value Date    CHLORIDE 102 06/14/2017    CHLORIDE 101 06/13/2017    Lab Results   Component Value Date    BUN 22 06/14/2017    BUN 21 06/13/2017      Lab Results   Component Value Date    POTASSIUM 3.7 06/14/2017    POTASSIUM 3.4 06/13/2017    POTASSIUM 3.2 06/13/2017    Lab Results   Component Value Date    CO2 28 06/14/2017    CO2 30 06/13/2017    Lab Results   Component Value Date    CR 1.50 06/14/2017    CR 1.53 06/13/2017          Recent Labs  Lab 06/13/17  1535 06/13/17  1131   WBC 17.7* 17.6*   HGB 12.0* 13.2*   HCT 37.9* " 40.6   MCV 89 89    202

## 2017-06-14 NOTE — PROCEDURES
SMALL BOWEL FEEDING TUBE PLACEMENT ASSESSMENT      Reason for Feeding Tube Placement:  Nutrition    Cortrak Start Time:  11:00 AM  Cortrak End Time: 11:30 AM    Medicine Delivered During Procedure:  N/a, lubrication    Placement Successful:  Yes, x-ray pending. Bridle successfully placed.    Procedure Complications:  none    Final Placement Janes at exit of nare 115 (cm)    Face to Face Time With Patient:  30 minutes (with supervision of carlos Gonzalez RN)    HERNANDEZ Flynn  3rd floor/ICU: [phone]  All other floors: [phone]  Weekend/holiday: [phone]  Office: [phone]

## 2017-06-14 NOTE — PLAN OF CARE
Problem: Goal Outcome Summary  Goal: Goal Outcome Summary  Outcome: Improving  Patient arrived from ICU at 1345.  Alert and oriented x4.  Lung sounds diminished and clear.  VSS.  Saline locked.  Low sodium diet now and NPO at midnight for cath lab procedure.  Denies pain.  Will continue to monitor.

## 2017-06-14 NOTE — PLAN OF CARE
Problem: Goal Outcome Summary  Goal: Goal Outcome Summary  Outcome: No Change  A & O. Up independently. VSS. Tele SR. IV lasix. LS dim. Plan for angiogram tomorrow. K replaced on days prior to ICU Tx. Declined watching angiogram video

## 2017-06-14 NOTE — PLAN OF CARE
Problem: Goal Outcome Summary  Goal: Goal Outcome Summary  Outcome: Improving  ICU End of Shift Summary.  For vital signs and complete assessments, please see documentation flowsheets.      Pertinent assessments: A&Ox4. VSS. Afebrile. Denies pain. LS: dim. SOB continues through the shift, tachyenic at times, +BS. Tele: SR, inverted T's. SBA. Heparin gtt infusing, Trop trending down, recheck this am. On IV lasix, uses Urinal Adequately   Major Shift Events: Coreg 25 mg was given and Lisinopril was held (per Dr Sow Instructions through Phone) and Nitro gtt turned off, BP maintains below 160 systolic. Slept better post trazadone  Plan (Upcoming Events): continue hep gtt, follow up labs, Echo this am  Discharge/Transfer Needs: continue ICU cares for now     Bedside Shift Report Completed : yes  Bedside Safety Check Completed: yes

## 2017-06-14 NOTE — PROGRESS NOTES
Pt A&O x4, BP decreased with Coreg & Lisinopril, Nitro gtt stopped overnight, heparin gtt d/c'd this Am. Up independent, minimal SOB with walking in peters.   Plan: Cath at 1130 6/15/17, explained procedure to pt, questions answered.   Report called to Juliann MACKEY  Pt walked independently to .

## 2017-06-15 ENCOUNTER — APPOINTMENT (OUTPATIENT)
Dept: CARDIOLOGY | Facility: CLINIC | Age: 32
DRG: 280 | End: 2017-06-15
Attending: INTERNAL MEDICINE
Payer: COMMERCIAL

## 2017-06-15 ENCOUNTER — APPOINTMENT (OUTPATIENT)
Dept: ULTRASOUND IMAGING | Facility: CLINIC | Age: 32
DRG: 280 | End: 2017-06-15
Attending: HOSPITALIST
Payer: COMMERCIAL

## 2017-06-15 ENCOUNTER — APPOINTMENT (OUTPATIENT)
Dept: OCCUPATIONAL THERAPY | Facility: CLINIC | Age: 32
DRG: 280 | End: 2017-06-15
Attending: INTERNAL MEDICINE
Payer: COMMERCIAL

## 2017-06-15 LAB
ANION GAP SERPL CALCULATED.3IONS-SCNC: 3 MMOL/L (ref 3–14)
ANION GAP SERPL CALCULATED.3IONS-SCNC: 5 MMOL/L (ref 3–14)
BUN SERPL-MCNC: 27 MG/DL (ref 7–30)
BUN SERPL-MCNC: 27 MG/DL (ref 7–30)
CALCIUM SERPL-MCNC: 8.5 MG/DL (ref 8.5–10.1)
CALCIUM SERPL-MCNC: 8.6 MG/DL (ref 8.5–10.1)
CHLORIDE SERPL-SCNC: 103 MMOL/L (ref 94–109)
CHLORIDE SERPL-SCNC: 104 MMOL/L (ref 94–109)
CO2 SERPL-SCNC: 28 MMOL/L (ref 20–32)
CO2 SERPL-SCNC: 30 MMOL/L (ref 20–32)
CREAT SERPL-MCNC: 1.54 MG/DL (ref 0.66–1.25)
CREAT SERPL-MCNC: 1.56 MG/DL (ref 0.66–1.25)
D DIMER PPP FEU-MCNC: 1.5 UG/ML FEU (ref 0–0.5)
ERYTHROCYTE [DISTWIDTH] IN BLOOD BY AUTOMATED COUNT: 16.3 % (ref 10–15)
GFR SERPL CREATININE-BSD FRML MDRD: 52 ML/MIN/1.7M2
GFR SERPL CREATININE-BSD FRML MDRD: 53 ML/MIN/1.7M2
GLUCOSE SERPL-MCNC: 106 MG/DL (ref 70–99)
GLUCOSE SERPL-MCNC: 106 MG/DL (ref 70–99)
HCG SERPL QL: NEGATIVE
HCT VFR BLD AUTO: 36.6 % (ref 40–53)
HGB BLD-MCNC: 11.9 G/DL (ref 13.3–17.7)
LACTATE BLD-SCNC: 0.5 MMOL/L (ref 0.7–2.1)
MAGNESIUM SERPL-MCNC: 2.2 MG/DL (ref 1.6–2.3)
MCH RBC QN AUTO: 29.2 PG (ref 26.5–33)
MCHC RBC AUTO-ENTMCNC: 32.5 G/DL (ref 31.5–36.5)
MCV RBC AUTO: 90 FL (ref 78–100)
PHOSPHATE SERPL-MCNC: 4.9 MG/DL (ref 2.5–4.5)
PLATELET # BLD AUTO: 219 10E9/L (ref 150–450)
POTASSIUM SERPL-SCNC: 3.7 MMOL/L (ref 3.4–5.3)
POTASSIUM SERPL-SCNC: 3.7 MMOL/L (ref 3.4–5.3)
POTASSIUM SERPL-SCNC: 4.1 MMOL/L (ref 3.4–5.3)
RBC # BLD AUTO: 4.07 10E12/L (ref 4.4–5.9)
SODIUM SERPL-SCNC: 136 MMOL/L (ref 133–144)
SODIUM SERPL-SCNC: 137 MMOL/L (ref 133–144)
WBC # BLD AUTO: 15.2 10E9/L (ref 4–11)

## 2017-06-15 PROCEDURE — 83605 ASSAY OF LACTIC ACID: CPT | Performed by: INTERNAL MEDICINE

## 2017-06-15 PROCEDURE — 25000128 H RX IP 250 OP 636: Performed by: INTERNAL MEDICINE

## 2017-06-15 PROCEDURE — 27210946 ZZH KIT HC TOTES DISP CR8

## 2017-06-15 PROCEDURE — 99153 MOD SED SAME PHYS/QHP EA: CPT

## 2017-06-15 PROCEDURE — 93458 L HRT ARTERY/VENTRICLE ANGIO: CPT

## 2017-06-15 PROCEDURE — 27210742 ZZH CATH CR1

## 2017-06-15 PROCEDURE — B2111ZZ FLUOROSCOPY OF MULTIPLE CORONARY ARTERIES USING LOW OSMOLAR CONTRAST: ICD-10-PCS | Performed by: INTERNAL MEDICINE

## 2017-06-15 PROCEDURE — 36415 COLL VENOUS BLD VENIPUNCTURE: CPT | Performed by: HOSPITALIST

## 2017-06-15 PROCEDURE — 4A023N7 MEASUREMENT OF CARDIAC SAMPLING AND PRESSURE, LEFT HEART, PERCUTANEOUS APPROACH: ICD-10-PCS | Performed by: INTERNAL MEDICINE

## 2017-06-15 PROCEDURE — 99152 MOD SED SAME PHYS/QHP 5/>YRS: CPT

## 2017-06-15 PROCEDURE — 84132 ASSAY OF SERUM POTASSIUM: CPT | Performed by: HOSPITALIST

## 2017-06-15 PROCEDURE — 85027 COMPLETE CBC AUTOMATED: CPT | Performed by: INTERNAL MEDICINE

## 2017-06-15 PROCEDURE — 25000132 ZZH RX MED GY IP 250 OP 250 PS 637: Performed by: INTERNAL MEDICINE

## 2017-06-15 PROCEDURE — 93458 L HRT ARTERY/VENTRICLE ANGIO: CPT | Mod: 26 | Performed by: INTERNAL MEDICINE

## 2017-06-15 PROCEDURE — 36415 COLL VENOUS BLD VENIPUNCTURE: CPT | Performed by: INTERNAL MEDICINE

## 2017-06-15 PROCEDURE — 25000132 ZZH RX MED GY IP 250 OP 250 PS 637: Performed by: HOSPITALIST

## 2017-06-15 PROCEDURE — 99406 BEHAV CHNG SMOKING 3-10 MIN: CPT | Performed by: REHABILITATION PRACTITIONER

## 2017-06-15 PROCEDURE — 84100 ASSAY OF PHOSPHORUS: CPT | Performed by: INTERNAL MEDICINE

## 2017-06-15 PROCEDURE — 83735 ASSAY OF MAGNESIUM: CPT | Performed by: INTERNAL MEDICINE

## 2017-06-15 PROCEDURE — 85379 FIBRIN DEGRADATION QUANT: CPT | Performed by: INTERNAL MEDICINE

## 2017-06-15 PROCEDURE — C1769 GUIDE WIRE: HCPCS

## 2017-06-15 PROCEDURE — 99152 MOD SED SAME PHYS/QHP 5/>YRS: CPT | Performed by: INTERNAL MEDICINE

## 2017-06-15 PROCEDURE — 99207 ZZC APP CREDIT; MD BILLING SHARED VISIT: CPT | Performed by: INTERNAL MEDICINE

## 2017-06-15 PROCEDURE — 93970 EXTREMITY STUDY: CPT

## 2017-06-15 PROCEDURE — 84703 CHORIONIC GONADOTROPIN ASSAY: CPT | Performed by: INTERNAL MEDICINE

## 2017-06-15 PROCEDURE — 80048 BASIC METABOLIC PNL TOTAL CA: CPT | Performed by: INTERNAL MEDICINE

## 2017-06-15 PROCEDURE — 99233 SBSQ HOSP IP/OBS HIGH 50: CPT | Performed by: HOSPITALIST

## 2017-06-15 PROCEDURE — 27210856 ZZH ACCESS HEART CATH CR2

## 2017-06-15 PROCEDURE — 25000125 ZZHC RX 250: Performed by: INTERNAL MEDICINE

## 2017-06-15 PROCEDURE — 40000133 ZZH STATISTIC OT WARD VISIT: Performed by: REHABILITATION PRACTITIONER

## 2017-06-15 PROCEDURE — C1894 INTRO/SHEATH, NON-LASER: HCPCS

## 2017-06-15 PROCEDURE — 12000007 ZZH R&B INTERMEDIATE

## 2017-06-15 PROCEDURE — 99232 SBSQ HOSP IP/OBS MODERATE 35: CPT | Mod: 25 | Performed by: INTERNAL MEDICINE

## 2017-06-15 PROCEDURE — 27210827 ZZH KIT ACIST INJECTOR CR6

## 2017-06-15 PROCEDURE — 99153 MOD SED SAME PHYS/QHP EA: CPT | Performed by: INTERNAL MEDICINE

## 2017-06-15 RX ORDER — FLUMAZENIL 0.1 MG/ML
0.2 INJECTION, SOLUTION INTRAVENOUS
Status: ACTIVE | OUTPATIENT
Start: 2017-06-15 | End: 2017-06-16

## 2017-06-15 RX ORDER — CLOPIDOGREL BISULFATE 75 MG/1
75 TABLET ORAL
Status: DISCONTINUED | OUTPATIENT
Start: 2017-06-15 | End: 2017-06-15 | Stop reason: HOSPADM

## 2017-06-15 RX ORDER — HEPARIN SODIUM 1000 [USP'U]/ML
INJECTION, SOLUTION INTRAVENOUS; SUBCUTANEOUS
Status: DISCONTINUED
Start: 2017-06-15 | End: 2017-06-15 | Stop reason: HOSPADM

## 2017-06-15 RX ORDER — SODIUM CHLORIDE 9 MG/ML
INJECTION, SOLUTION INTRAVENOUS CONTINUOUS
Status: DISCONTINUED | OUTPATIENT
Start: 2017-06-15 | End: 2017-06-15 | Stop reason: HOSPADM

## 2017-06-15 RX ORDER — FLUMAZENIL 0.1 MG/ML
0.2 INJECTION, SOLUTION INTRAVENOUS
Status: DISCONTINUED | OUTPATIENT
Start: 2017-06-15 | End: 2017-06-15 | Stop reason: HOSPADM

## 2017-06-15 RX ORDER — PROTAMINE SULFATE 10 MG/ML
25-100 INJECTION, SOLUTION INTRAVENOUS EVERY 5 MIN PRN
Status: DISCONTINUED | OUTPATIENT
Start: 2017-06-15 | End: 2017-06-15 | Stop reason: HOSPADM

## 2017-06-15 RX ORDER — NICARDIPINE HYDROCHLORIDE 2.5 MG/ML
100 INJECTION INTRAVENOUS
Status: DISCONTINUED | OUTPATIENT
Start: 2017-06-15 | End: 2017-06-15 | Stop reason: HOSPADM

## 2017-06-15 RX ORDER — POTASSIUM CHLORIDE 7.45 MG/ML
10 INJECTION INTRAVENOUS
Status: DISCONTINUED | OUTPATIENT
Start: 2017-06-15 | End: 2017-06-15 | Stop reason: HOSPADM

## 2017-06-15 RX ORDER — POTASSIUM CHLORIDE 29.8 MG/ML
20 INJECTION INTRAVENOUS
Status: DISCONTINUED | OUTPATIENT
Start: 2017-06-15 | End: 2017-06-15 | Stop reason: HOSPADM

## 2017-06-15 RX ORDER — HEPARIN SODIUM 1000 [USP'U]/ML
1000-10000 INJECTION, SOLUTION INTRAVENOUS; SUBCUTANEOUS EVERY 5 MIN PRN
Status: DISCONTINUED | OUTPATIENT
Start: 2017-06-15 | End: 2017-06-15 | Stop reason: HOSPADM

## 2017-06-15 RX ORDER — NITROGLYCERIN 20 MG/100ML
.07-1.12 INJECTION INTRAVENOUS CONTINUOUS PRN
Status: DISCONTINUED | OUTPATIENT
Start: 2017-06-15 | End: 2017-06-15 | Stop reason: HOSPADM

## 2017-06-15 RX ORDER — ONDANSETRON 2 MG/ML
4 INJECTION INTRAMUSCULAR; INTRAVENOUS EVERY 4 HOURS PRN
Status: DISCONTINUED | OUTPATIENT
Start: 2017-06-15 | End: 2017-06-15 | Stop reason: HOSPADM

## 2017-06-15 RX ORDER — FENTANYL CITRATE 50 UG/ML
25-50 INJECTION, SOLUTION INTRAMUSCULAR; INTRAVENOUS
Status: DISCONTINUED | OUTPATIENT
Start: 2017-06-15 | End: 2017-06-15 | Stop reason: HOSPADM

## 2017-06-15 RX ORDER — METHYLPREDNISOLONE SODIUM SUCCINATE 125 MG/2ML
125 INJECTION, POWDER, LYOPHILIZED, FOR SOLUTION INTRAMUSCULAR; INTRAVENOUS
Status: DISCONTINUED | OUTPATIENT
Start: 2017-06-15 | End: 2017-06-15 | Stop reason: HOSPADM

## 2017-06-15 RX ORDER — VERAPAMIL HYDROCHLORIDE 2.5 MG/ML
1-2.5 INJECTION, SOLUTION INTRAVENOUS
Status: COMPLETED | OUTPATIENT
Start: 2017-06-15 | End: 2017-06-15

## 2017-06-15 RX ORDER — ATORVASTATIN CALCIUM 10 MG/1
10 TABLET, FILM COATED ORAL DAILY
Status: DISCONTINUED | OUTPATIENT
Start: 2017-06-15 | End: 2017-06-16 | Stop reason: HOSPADM

## 2017-06-15 RX ORDER — CARVEDILOL 25 MG/1
25 TABLET ORAL 2 TIMES DAILY WITH MEALS
Status: DISCONTINUED | OUTPATIENT
Start: 2017-06-15 | End: 2017-06-16 | Stop reason: HOSPADM

## 2017-06-15 RX ORDER — EPTIFIBATIDE 2 MG/ML
180 INJECTION, SOLUTION INTRAVENOUS EVERY 10 MIN PRN
Status: DISCONTINUED | OUTPATIENT
Start: 2017-06-15 | End: 2017-06-15 | Stop reason: HOSPADM

## 2017-06-15 RX ORDER — NITROGLYCERIN 5 MG/ML
100-500 VIAL (ML) INTRAVENOUS
Status: COMPLETED | OUTPATIENT
Start: 2017-06-15 | End: 2017-06-15

## 2017-06-15 RX ORDER — ASPIRIN 81 MG/1
81 TABLET ORAL DAILY
Status: DISCONTINUED | OUTPATIENT
Start: 2017-06-16 | End: 2017-06-16 | Stop reason: HOSPADM

## 2017-06-15 RX ORDER — LIDOCAINE HYDROCHLORIDE 10 MG/ML
30 INJECTION, SOLUTION EPIDURAL; INFILTRATION; INTRACAUDAL; PERINEURAL
Status: DISCONTINUED | OUTPATIENT
Start: 2017-06-15 | End: 2017-06-15 | Stop reason: HOSPADM

## 2017-06-15 RX ORDER — DOPAMINE HYDROCHLORIDE 160 MG/100ML
2-20 INJECTION, SOLUTION INTRAVENOUS CONTINUOUS PRN
Status: DISCONTINUED | OUTPATIENT
Start: 2017-06-15 | End: 2017-06-15 | Stop reason: HOSPADM

## 2017-06-15 RX ORDER — LIDOCAINE 40 MG/G
CREAM TOPICAL
Status: DISCONTINUED | OUTPATIENT
Start: 2017-06-15 | End: 2017-06-15

## 2017-06-15 RX ORDER — PROMETHAZINE HYDROCHLORIDE 25 MG/ML
6.25-25 INJECTION, SOLUTION INTRAMUSCULAR; INTRAVENOUS EVERY 4 HOURS PRN
Status: DISCONTINUED | OUTPATIENT
Start: 2017-06-15 | End: 2017-06-15 | Stop reason: HOSPADM

## 2017-06-15 RX ORDER — PHENYLEPHRINE HCL IN 0.9% NACL 1 MG/10 ML
20-100 SYRINGE (ML) INTRAVENOUS
Status: DISCONTINUED | OUTPATIENT
Start: 2017-06-15 | End: 2017-06-15 | Stop reason: HOSPADM

## 2017-06-15 RX ORDER — MORPHINE SULFATE 2 MG/ML
1-2 INJECTION, SOLUTION INTRAMUSCULAR; INTRAVENOUS EVERY 5 MIN PRN
Status: DISCONTINUED | OUTPATIENT
Start: 2017-06-15 | End: 2017-06-15 | Stop reason: HOSPADM

## 2017-06-15 RX ORDER — NITROGLYCERIN 0.4 MG/1
0.4 TABLET SUBLINGUAL EVERY 5 MIN PRN
Status: DISCONTINUED | OUTPATIENT
Start: 2017-06-15 | End: 2017-06-15 | Stop reason: HOSPADM

## 2017-06-15 RX ORDER — CLOPIDOGREL BISULFATE 75 MG/1
300-600 TABLET ORAL
Status: DISCONTINUED | OUTPATIENT
Start: 2017-06-15 | End: 2017-06-15 | Stop reason: HOSPADM

## 2017-06-15 RX ORDER — FUROSEMIDE 20 MG
20 TABLET ORAL DAILY
Status: DISCONTINUED | OUTPATIENT
Start: 2017-06-15 | End: 2017-06-16 | Stop reason: HOSPADM

## 2017-06-15 RX ORDER — ACETAMINOPHEN 325 MG/1
325-650 TABLET ORAL EVERY 4 HOURS PRN
Status: DISCONTINUED | OUTPATIENT
Start: 2017-06-15 | End: 2017-06-16 | Stop reason: HOSPADM

## 2017-06-15 RX ORDER — FENTANYL CITRATE 50 UG/ML
INJECTION, SOLUTION INTRAMUSCULAR; INTRAVENOUS
Status: DISCONTINUED
Start: 2017-06-15 | End: 2017-06-15 | Stop reason: HOSPADM

## 2017-06-15 RX ORDER — NIFEDIPINE 10 MG/1
10 CAPSULE ORAL
Status: DISCONTINUED | OUTPATIENT
Start: 2017-06-15 | End: 2017-06-15 | Stop reason: HOSPADM

## 2017-06-15 RX ORDER — FENTANYL CITRATE 50 UG/ML
25-50 INJECTION, SOLUTION INTRAMUSCULAR; INTRAVENOUS
Status: ACTIVE | OUTPATIENT
Start: 2017-06-15 | End: 2017-06-16

## 2017-06-15 RX ORDER — ADENOSINE 3 MG/ML
12-12000 INJECTION, SOLUTION INTRAVENOUS
Status: DISCONTINUED | OUTPATIENT
Start: 2017-06-15 | End: 2017-06-15 | Stop reason: HOSPADM

## 2017-06-15 RX ORDER — ASPIRIN 81 MG/1
81-324 TABLET, CHEWABLE ORAL
Status: DISCONTINUED | OUTPATIENT
Start: 2017-06-15 | End: 2017-06-15 | Stop reason: HOSPADM

## 2017-06-15 RX ORDER — ATROPINE SULFATE 0.1 MG/ML
.5-1 INJECTION INTRAVENOUS
Status: DISCONTINUED | OUTPATIENT
Start: 2017-06-15 | End: 2017-06-15 | Stop reason: HOSPADM

## 2017-06-15 RX ORDER — SODIUM NITROPRUSSIDE 25 MG/ML
100-200 INJECTION INTRAVENOUS
Status: DISCONTINUED | OUTPATIENT
Start: 2017-06-15 | End: 2017-06-15 | Stop reason: HOSPADM

## 2017-06-15 RX ORDER — PRASUGREL 10 MG/1
10-60 TABLET, FILM COATED ORAL
Status: DISCONTINUED | OUTPATIENT
Start: 2017-06-15 | End: 2017-06-15 | Stop reason: HOSPADM

## 2017-06-15 RX ORDER — FUROSEMIDE 10 MG/ML
20-100 INJECTION INTRAMUSCULAR; INTRAVENOUS
Status: DISCONTINUED | OUTPATIENT
Start: 2017-06-15 | End: 2017-06-15 | Stop reason: HOSPADM

## 2017-06-15 RX ORDER — HYDRALAZINE HYDROCHLORIDE 20 MG/ML
10-20 INJECTION INTRAMUSCULAR; INTRAVENOUS
Status: DISCONTINUED | OUTPATIENT
Start: 2017-06-15 | End: 2017-06-15 | Stop reason: HOSPADM

## 2017-06-15 RX ORDER — ENALAPRILAT 1.25 MG/ML
1.25-2.5 INJECTION INTRAVENOUS
Status: DISCONTINUED | OUTPATIENT
Start: 2017-06-15 | End: 2017-06-15 | Stop reason: HOSPADM

## 2017-06-15 RX ORDER — ATROPINE SULFATE 0.1 MG/ML
0.5 INJECTION INTRAVENOUS EVERY 5 MIN PRN
Status: ACTIVE | OUTPATIENT
Start: 2017-06-15 | End: 2017-06-16

## 2017-06-15 RX ORDER — METOPROLOL TARTRATE 1 MG/ML
5 INJECTION, SOLUTION INTRAVENOUS EVERY 5 MIN PRN
Status: DISCONTINUED | OUTPATIENT
Start: 2017-06-15 | End: 2017-06-15 | Stop reason: HOSPADM

## 2017-06-15 RX ORDER — NALOXONE HYDROCHLORIDE 0.4 MG/ML
.1-.4 INJECTION, SOLUTION INTRAMUSCULAR; INTRAVENOUS; SUBCUTANEOUS
Status: DISCONTINUED | OUTPATIENT
Start: 2017-06-15 | End: 2017-06-15

## 2017-06-15 RX ORDER — DIPHENHYDRAMINE HYDROCHLORIDE 50 MG/ML
25-50 INJECTION INTRAMUSCULAR; INTRAVENOUS
Status: DISCONTINUED | OUTPATIENT
Start: 2017-06-15 | End: 2017-06-15 | Stop reason: HOSPADM

## 2017-06-15 RX ORDER — LIDOCAINE HYDROCHLORIDE 10 MG/ML
1-10 INJECTION, SOLUTION EPIDURAL; INFILTRATION; INTRACAUDAL; PERINEURAL
Status: COMPLETED | OUTPATIENT
Start: 2017-06-15 | End: 2017-06-15

## 2017-06-15 RX ORDER — SODIUM CHLORIDE 9 MG/ML
INJECTION, SOLUTION INTRAVENOUS CONTINUOUS
Status: DISCONTINUED | OUTPATIENT
Start: 2017-06-15 | End: 2017-06-16 | Stop reason: HOSPADM

## 2017-06-15 RX ORDER — NALOXONE HYDROCHLORIDE 0.4 MG/ML
0.4 INJECTION, SOLUTION INTRAMUSCULAR; INTRAVENOUS; SUBCUTANEOUS EVERY 5 MIN PRN
Status: DISCONTINUED | OUTPATIENT
Start: 2017-06-15 | End: 2017-06-15 | Stop reason: HOSPADM

## 2017-06-15 RX ORDER — VERAPAMIL HYDROCHLORIDE 2.5 MG/ML
INJECTION, SOLUTION INTRAVENOUS
Status: DISCONTINUED
Start: 2017-06-15 | End: 2017-06-15 | Stop reason: HOSPADM

## 2017-06-15 RX ORDER — LISINOPRIL 20 MG/1
20 TABLET ORAL 2 TIMES DAILY
Status: DISCONTINUED | OUTPATIENT
Start: 2017-06-15 | End: 2017-06-16 | Stop reason: HOSPADM

## 2017-06-15 RX ORDER — NITROGLYCERIN 5 MG/ML
100-200 VIAL (ML) INTRAVENOUS
Status: DISCONTINUED | OUTPATIENT
Start: 2017-06-15 | End: 2017-06-15 | Stop reason: HOSPADM

## 2017-06-15 RX ORDER — BUPIVACAINE HYDROCHLORIDE 2.5 MG/ML
1-10 INJECTION, SOLUTION EPIDURAL; INFILTRATION; INTRACAUDAL
Status: DISCONTINUED | OUTPATIENT
Start: 2017-06-15 | End: 2017-06-15 | Stop reason: HOSPADM

## 2017-06-15 RX ORDER — LORAZEPAM 2 MG/ML
.5-2 INJECTION INTRAMUSCULAR EVERY 4 HOURS PRN
Status: DISCONTINUED | OUTPATIENT
Start: 2017-06-15 | End: 2017-06-15 | Stop reason: HOSPADM

## 2017-06-15 RX ORDER — DOBUTAMINE HYDROCHLORIDE 200 MG/100ML
2-20 INJECTION INTRAVENOUS CONTINUOUS PRN
Status: DISCONTINUED | OUTPATIENT
Start: 2017-06-15 | End: 2017-06-15 | Stop reason: HOSPADM

## 2017-06-15 RX ORDER — ASPIRIN 325 MG
325 TABLET ORAL
Status: DISCONTINUED | OUTPATIENT
Start: 2017-06-15 | End: 2017-06-15 | Stop reason: HOSPADM

## 2017-06-15 RX ORDER — HYDROCODONE BITARTRATE AND ACETAMINOPHEN 5; 325 MG/1; MG/1
1-2 TABLET ORAL EVERY 4 HOURS PRN
Status: DISCONTINUED | OUTPATIENT
Start: 2017-06-15 | End: 2017-06-16 | Stop reason: HOSPADM

## 2017-06-15 RX ORDER — EPTIFIBATIDE 2 MG/ML
15 INJECTION, SOLUTION INTRAVENOUS CONTINUOUS PRN
Status: DISCONTINUED | OUTPATIENT
Start: 2017-06-15 | End: 2017-06-15 | Stop reason: HOSPADM

## 2017-06-15 RX ORDER — PROTAMINE SULFATE 10 MG/ML
1-5 INJECTION, SOLUTION INTRAVENOUS
Status: DISCONTINUED | OUTPATIENT
Start: 2017-06-15 | End: 2017-06-15 | Stop reason: HOSPADM

## 2017-06-15 RX ORDER — DEXTROSE MONOHYDRATE 25 G/50ML
12.5-5 INJECTION, SOLUTION INTRAVENOUS EVERY 30 MIN PRN
Status: DISCONTINUED | OUTPATIENT
Start: 2017-06-15 | End: 2017-06-15 | Stop reason: HOSPADM

## 2017-06-15 RX ORDER — IOPAMIDOL 755 MG/ML
100 INJECTION, SOLUTION INTRAVASCULAR ONCE
Status: COMPLETED | OUTPATIENT
Start: 2017-06-15 | End: 2017-06-15

## 2017-06-15 RX ORDER — NALOXONE HYDROCHLORIDE 0.4 MG/ML
.2-.4 INJECTION, SOLUTION INTRAMUSCULAR; INTRAVENOUS; SUBCUTANEOUS
Status: DISCONTINUED | OUTPATIENT
Start: 2017-06-15 | End: 2017-06-15

## 2017-06-15 RX ORDER — NITROGLYCERIN 5 MG/ML
VIAL (ML) INTRAVENOUS
Status: DISCONTINUED
Start: 2017-06-15 | End: 2017-06-15 | Stop reason: HOSPADM

## 2017-06-15 RX ORDER — POTASSIUM CHLORIDE 1500 MG/1
20 TABLET, EXTENDED RELEASE ORAL
Status: DISCONTINUED | OUTPATIENT
Start: 2017-06-15 | End: 2017-06-15 | Stop reason: HOSPADM

## 2017-06-15 RX ADMIN — CARVEDILOL 25 MG: 25 TABLET, FILM COATED ORAL at 08:01

## 2017-06-15 RX ADMIN — LISINOPRIL 10 MG: 10 TABLET ORAL at 08:01

## 2017-06-15 RX ADMIN — SODIUM CHLORIDE: 9 INJECTION, SOLUTION INTRAVENOUS at 16:12

## 2017-06-15 RX ADMIN — SPIRONOLACTONE 25 MG: 25 TABLET ORAL at 11:13

## 2017-06-15 RX ADMIN — Medication 10 MG: at 02:57

## 2017-06-15 RX ADMIN — CARVEDILOL 25 MG: 25 TABLET, FILM COATED ORAL at 17:18

## 2017-06-15 RX ADMIN — FUROSEMIDE 20 MG: 20 TABLET ORAL at 14:25

## 2017-06-15 RX ADMIN — HEPARIN SODIUM 5000 UNITS: 1000 INJECTION, SOLUTION INTRAVENOUS; SUBCUTANEOUS at 13:07

## 2017-06-15 RX ADMIN — FENTANYL CITRATE 50 MCG: 50 INJECTION INTRAMUSCULAR; INTRAVENOUS at 13:05

## 2017-06-15 RX ADMIN — IOPAMIDOL 60 ML: 755 INJECTION, SOLUTION INTRAVASCULAR at 13:00

## 2017-06-15 RX ADMIN — NITROGLYCERIN 400 MCG: 10 INJECTION INTRAVENOUS at 13:07

## 2017-06-15 RX ADMIN — LISINOPRIL 20 MG: 20 TABLET ORAL at 20:11

## 2017-06-15 RX ADMIN — SODIUM CHLORIDE: 9 INJECTION, SOLUTION INTRAVENOUS at 08:02

## 2017-06-15 RX ADMIN — VERAPAMIL HYDROCHLORIDE 2.5 MG: 2.5 INJECTION, SOLUTION INTRAVENOUS at 13:07

## 2017-06-15 RX ADMIN — Medication 10 MG: at 23:53

## 2017-06-15 RX ADMIN — LIDOCAINE HYDROCHLORIDE 10 MG: 10 INJECTION, SOLUTION EPIDURAL; INFILTRATION; INTRACAUDAL; PERINEURAL at 13:05

## 2017-06-15 RX ADMIN — ATORVASTATIN CALCIUM 10 MG: 10 TABLET, FILM COATED ORAL at 16:06

## 2017-06-15 RX ADMIN — MIDAZOLAM 1 MG: 1 INJECTION INTRAMUSCULAR; INTRAVENOUS at 13:05

## 2017-06-15 RX ADMIN — ASPIRIN 325 MG: 325 TABLET, DELAYED RELEASE ORAL at 08:01

## 2017-06-15 RX ADMIN — POTASSIUM CHLORIDE 20 MEQ: 1500 TABLET, EXTENDED RELEASE ORAL at 08:01

## 2017-06-15 RX ADMIN — FUROSEMIDE 20 MG: 10 INJECTION, SOLUTION INTRAVENOUS at 08:01

## 2017-06-15 NOTE — PROGRESS NOTES
[facility]    Cardiology Progress Note    Date of Service (when I saw the patient): 06/15/2017    Summary: Brooks Mensah is a 32 year old male with no significant past medical history, who was admitted on 6/13/2017 w/ 2 wk progressive orthopnea, SOB, abdominal distension, and edema.  HTN urgency, evidence of CHF exacerbation.  Mild troponin elevation.       Discussed near normal coronary arteries at cath,   Mild CAD => statin  Discussed meds  Need for weight loss  Long tern health issue  Decreased renal function  HEIDY SOW  Date of Service (when I saw the patient): 06/15/17         Interval History   Tele: SR with pauses up to 2.9 seconds when sleeping, episodes of 3rd degree AVB overnight  Edema better, breathing better but still cannot lie flat       Assessment & Plan   Acute systolic and diastolic CHF  - BNP 7018  - CXR showing pulmonary edema    - Echo 6/13/17: The left ventricle is normal in size with severe concentric left ventricular  hypertrophy. Septal and LV posterior wall thicknesses are 2.8 and 2.6 cm (the ULNL = 1.2 cm). Left ventricular systolic function is moderately reduced. The visual ejection fraction is estimated at 30-35%. This decrease in the LVEF is due to moderate global hypokinesia and moderate  to severe lateral wall hypokinesis. Grade III or advanced diastolic dysfunction is present. The right ventricular systolic function is normal. All four cardiac valves are normal in structure and function. Hypertensive CM vs hypertrophic CM are to be considered.  - improved symptoms w/ diuresis. Was on IV Lasix 20mg IV TID, last dose this AM 0800. Weight down 4 lbs initially, no weight today. Think he needs continued IV diuresis  - CHF education/low salt  - BP control  - Currently on Coreg 25mg BID (decreased from 37.5mg BID), lisinopril 10mg BID, and spironolactone 25mg  - Cor angio today, would get LVEDP or RHC as well   * R and B discussed with patient by Dr. Sow   * He can lie  nearly flat   - CMRI as outpatient     HTN urgency, acute and chronic  - BP greatly improved since admit   - Titrate meds as needed/able. Currently on Coreg 25mg BID, increase  lisinopril 20mg BID, and spironolactone 25mg     NSTEMI  - troponin peak 0.5 (flat)  - no significant ST abnormalities, poor R-wave progression  - Echo with decreased LVEF 30-35%  - no CP  - Cor angio today   - ASA, BB, ACEI  - LDL 87 w/o statin    - With SOB and + trop, D-Dimer checked and 1.5. Getting US LE      JEFFERY  - Cr. 1.5, stable with diuresis and ACEi     Likely MAURI  - outpt sleep study recommended  - consider overnight oximetry prior to discharge     Tobacco Dependence  - encouraged cessation     Leukocytosis   - Per IM    Morbid obesity, BMI > 50     Intermittent 3rd degree AVB during sleep  Pauses  - while sleeping, no sxs   - Likely has sleep apnea  - Decreasing Coreg to 25mg BID        Prem Painter PA-C      Patient Active Problem List   Diagnosis     Congestive heart failure, unspecified congestive heart failure chronicity, unspecified congestive heart failure type (H)     Hypertensive emergency     Hypertensive urgency     Renal insufficiency     NSTEMI (non-ST elevated myocardial infarction) (H)       Physical Exam   Temp: 97.8  F (36.6  C) Temp src: Oral BP: 146/86 Pulse: 82 Heart Rate: 82 Resp: 24 SpO2: 94 % O2 Device: Nasal cannula Oxygen Delivery: 2 LPM  Vitals:    06/13/17 1420 06/14/17 0645   Weight: (!) 179.7 kg (396 lb 2.7 oz) (!) 178.2 kg (392 lb 13.8 oz)     Vital Signs with Ranges  Temp:  [97.7  F (36.5  C)-98  F (36.7  C)] 97.8  F (36.6  C)  Pulse:  [82] 82  Heart Rate:  [71-85] 82  Resp:  [12-24] 24  BP: (119-162)/() 146/86  SpO2:  [83 %-98 %] 94 %  I/O last 3 completed shifts:  In: 240 [P.O.:240]  Out: 1720 [Urine:1720]    Constitutional: NAD. Obese  Respiratory: decreased air movement  Cardiovascular: RRR, distant heart tones, +S3. Cannot adequately assess JVP, + generalized edema though just trace-1+  pitting edema in LEs  GI: soft, BS+  Skin: warm, no rashes  Musculoskeletal: Moving all extremities  Neurologic: Alert, oriented x 3  Neuropsychiatric: Normal affect       Data     Recent Labs  Lab 06/15/17  0510 06/15/17  0440 06/14/17  0605 06/13/17  1930 06/13/17  1725 06/13/17  1535 06/13/17  1131   WBC 15.2*  --   --   --   --  17.7* 17.6*   HGB 11.9*  --   --   --   --  12.0* 13.2*   MCV 90  --   --   --   --  89 89     --   --   --   --  200 202    136 137  --   --   --  137   POTASSIUM 3.7 3.7 3.7  --  3.4  --  3.2*   CHLORIDE 104 103 102  --   --   --  101   CO2 28 30 28  --   --   --  30   BUN 27 27 22  --   --   --  21   CR 1.54* 1.56* 1.50*  --   --   --  1.53*   ANIONGAP 5 3 7  --   --   --  6   GAYLE 8.6 8.5 7.9*  --   --   --  8.9   * 106* 102*  --   --   --  142*   ALBUMIN  --   --  2.5*  --   --   --   --    PROTTOTAL  --   --  6.2*  --   --   --   --    BILITOTAL  --   --  0.6  --   --   --   --    ALKPHOS  --   --  48  --   --   --   --    ALT  --   --  47  --   --   --   --    AST  --   --  30  --   --   --   --    TROPI  --   --  0.399* 0.465* 0.483*  --  0.528*     No results found for this or any previous visit (from the past 24 hour(s)).    Echo 6/13/17:  Interpretation Summary     The left ventricle is normal in size with severe concentric left ventricular  hypertrophy. Septal and LV posterior wall thicknesses are 2.8 and 2.6 cm (the  ULNL = 1.2 cm).  Left ventricular systolic function is moderately reduced. The visual ejection  fraction is estimated at 30-35%.  This decrease in the LVEF is due to moderate global hypokinesia and moderate  to severe lateral wall hypokinesis.  Grade III or advanced diastolic dysfunction is present.  The right ventricular systolic function is normal.  All four cardiac valves are normal in structure and function.  Hypertensive CM vs hypertrophic CM are to be considered.       Medications     NaCl       - MEDICATION INSTRUCTIONS -        nitroglycerin Stopped (06/13/17 2985)       carvedilol  25 mg Oral BID w/meals     aspirin EC  325 mg Oral Daily     midazolam  1-2 mg Intravenous Once within 24 hrs     lisinopril  10 mg Oral BID     spironolactone  25 mg Oral Daily

## 2017-06-15 NOTE — PLAN OF CARE
Problem: Goal Outcome Summary  Goal: Goal Outcome Summary  Outcome: No Change  Pt. AxOx3, up independently, Tele: Pt. Has been SR but has had episodes of what appears to be 2nd degree block type 1, 3rd degree possibly, pauses up to 2.9 sec. Pt.'s sat's down to 88% while sleeping, O2 applied at 2L with sat's at 94%. Lung sounds diminished, appears to have sleep apneic episodes, Lactic acid level=0.5. D-Dimer=1.5, MD paged. Plan: Tele, monitor resp. Status, lasix, monitor BP, Lovenox for DVT prophylaxis, Angio scheduled for today. Pt. Denies any needs at this time. Will continue with POC.

## 2017-06-15 NOTE — PROVIDER NOTIFICATION
Pt. had 2.9 sec. pause and having non-conducted P waves at times on Tele. Pt. denies any chest pain, VSS. MD paged. Pt to have Angio in AM.

## 2017-06-15 NOTE — PLAN OF CARE
"Problem: Goal Outcome Summary  Goal: Goal Outcome Summary  Outcome: No Change  A & O. VSS. Tele  SR. Up independently. R radial cath with no intervention. Pt noncompliant with restrictions with multiple attempts at education. Pt states,\"It's like telling a person to not look down, I am going to look down.\" TR band removed at 2110. CMS intact. Reviewed meds and heart healthy tips. Nutrition consult ordered. Pt ate hospital diet and family brought in take out. Cardiology stressed weight loss, exercise, meds, tobacco avoidance.     1845 - 2 ml air replaced into band as site oozing  "

## 2017-06-15 NOTE — PROGRESS NOTES
"   06/15/17 0800   General Information   Patient is receptive to smoking cessation at this time Yes   Packs Per Day 0.5 PPD   Years Smoked (#) 10 yrs   Cigarette Pack Years 5   Stage of Behavior Change   Patient's Stage of Behavior Change Contemplation \"I might (within the next 60 days\"   Processes of Change   The following interventions were used (smoking cessation) Increase awareness of effects of smoking on health;Develop strategies to increase confidence to quit;Initiate pro/con list of reasons to quit;Problem-solve barriers to quitting;Identify healthy alternatives;Identify support system;Recognize rewards of value to him/her   Motivation to Quit Scale (1-10) 7   Confidence to Quit Scale (1-10) 8   Education/Recommendations   Education QUIT PLAN phone line   Total Evaluation Time   Total Evaluation Time (Minutes) 8     "

## 2017-06-15 NOTE — PROGRESS NOTES
Paged for elevated d-dimer which was ordered this am. Chart reviewed.    Unsure regarding the reason the d dimer was ordered. Pt is due to get a cardiac cath this am. Will get U/S to r/o DVT, though my suspicion is low.    Addendum.  - Will hold off on U/S for now.

## 2017-06-15 NOTE — CONSULTS
I have examined the patient, reviewed the history, medications and pre procedural tests. Acute systolic heart failure in setting of severe hypertension with baseline creatinine = 1.5/.  I have explained to the patient the risks of death, MI, stroke, hematoma, possible urgent bypass surgery for failed PCI, use of stents, thienopyridine agents, possible peripheral vascular complications, renal failure,  arrhythmia, the use of FFR in clinical decision making with  coronary angiography, and possible percutaneous coronary intervention. The patient voiced understanding and wishes to proceed. The patient has a good right radial pulse, normal ulnar pulse and a normal Werner's sign.

## 2017-06-15 NOTE — PLAN OF CARE
Problem: Cardiac: Heart Failure (Adult)  Goal: Signs and Symptoms of Listed Potential Problems Will be Absent or Manageable (Cardiac: Heart Failure)  Signs and symptoms of listed potential problems will be absent or manageable by discharge/transition of care (reference Cardiac: Heart Failure (Adult) CPG).   Outcome: No Change  RN SHIFT SUMMARY :  DX/STORY : admit of 6/13 with SOB, L/E edema , Heart failure , LV hypertrophy , EF=30 % , went for Angio this afternoon- basically clean & no intervention needed.   HX : SOB HTN , Obese, MAURI -, smoker 1/2 PPD   LABS/PROTOCOLS : K/MG protocols : K 3.7 -replaced per pre-angio protocol & recheck =4.1 , MG good at 2.2 . Had U/S of L/e's to r/o DVT (neg)  TELE : SR -did have episode of 2nd degree AVHB & 3rd degree during noc. Md &  cards aware. meds getting adjusted.   ASSESS : a/o, pleasant , obese, up in room indep. Had IV lasix this am & voiding well. Denies CP but does have SL. SOB with activity. 1+ L/E edema . Rt radial site with TR band intact - (10 cc) . Can start withdrawing at 1530 . Sl.numbness/tingling in fingers since procedure he says but moves well & pulse intact .    OT saw for Smoking cessation info   TEACHING : had discussed angio last treasure & this am gave handouts on all current meds -needs reinforcement as meds all new.   PLAN : at baseline lives in [place] with sister & Mom. Cont. POC of cardiology consulting -suggests wt loss/exercise program, Ace & BB, diuretic for BP control, out pt sleep study. ordered Nutrition consult for diet teaching & OT saw in am for smoking cessation- interested in Nicoderm patch

## 2017-06-15 NOTE — PROGRESS NOTES
[facility]    Hospitalist Progress Note  Name: Brooks Mensah    MRN: [id number]  Provider:  Alex Saenz DO, MPH  Date of Service: 06/15/2017    Summary:  Patient is a 32-year-old male previously healthy male, who presented to clinic for evaluation of progressive shortness of breath over 2 weeks- initially with dyspnea on exertion, then orthopnea, and finally shortness of breath at rest for the last day.  He also had progressive pedal and lower extremity edema.  In the last day he had developed chest pressure with heavy breathing. In clinic, he was noted to be hypertensive with systolic blood pressures in the 190s and diastolic blood pressures in the 130s. He was referred to the emergency department.  Workup in the ED here confirmed severe hypertension. Blood pressure was as high as 216/143. He was also found to have renal insufficiency, troponin elevation, and findings suggestive of congestive heart failure by BNP and chest x-ray.      Problem list:      1.  Hypertensive urgency with acute systolic and diastolic congestive heart failure. Echo shows severe LVH, grade III diastolic heart dysfunction, EF of 30-35%, and moderate global hypokinesia. Cardiology consult appreciated.  Continue Coreg, Lisinopril, and Aldactone.  Was on IV lasix and down -2.6L since admission.  Will start oral lasix 20 mg daily today.  Continue prn IV Hydralazine.  Nitroglycerin drip was stopped.       2. Non ST elevation myocardial infarction.  Suspect type 2, due to severe hypertension, but I agree with heart catheterization today.  Now off Heparin drip.        3.  EMILY vs CKD.  Stable since admission and therefore suspect more chronic renal insufficiency. Monitor. Check Phos to get an idea of chronicity of renal failure.      4.  Suspect essential hypertension. Continue Coreg, Lisinopril, and Aldactone.        5.  Tobacco use disorder.  Smoking cessation consult requested.      6.  Left eye with medial subconjunctival hemorrhage.   Stable.      7.  Severe obesity and suspected sleep disordered breathing.  Needs OP sleep study ASAP.    8.  Heart block and bradycardia.  Rhythm strips reviewed with cardiology. Concern for brief 3rd degree HB but not confirmatory. Now with normal rhythm. This is in context of unmanaged sleep disordered breathing. Will decrease coreg from 37.5 to 25 bid. No indication for PPM at this time.     9.  Elevated d-dimer.  Unclear significance. BL LE venous US neg for DVT.    DVT Prophylaxis: Pneumatic Compression Devices  Code Status: Full Code  Disposition: Expected discharge in 2-3 days to home. Goals prior to discharge include coronary angiogram, monitor rhythm, BP mgmt.   Family updated today: No     Interval History   Assumed care from previous hospitalist. The history was fully reviewed.  The patient reports doing well. No chest pain or shortness of breath. No nausea, vomiting, diarrhea, constipation. No fevers. No other specific complaints identified. Concern overnight for bradycardia and possible heart block.    -Data reviewed today: I reviewed all new labs and imaging results over the last 24 hours.     Physical Exam   Temp: 97.6  F (36.4  C) Temp src: Oral BP: 132/73   Heart Rate: 72 Resp: 22 SpO2: 93 % O2 Device: None (Room air) Oxygen Delivery: 2 LPM  Vitals:    06/13/17 1420 06/14/17 0645   Weight: (!) 179.7 kg (396 lb 2.7 oz) (!) 178.2 kg (392 lb 13.8 oz)     Vital Signs with Ranges  Temp:  [96.8  F (36  C)-98  F (36.7  C)] 97.6  F (36.4  C)  Heart Rate:  [72-84] 72  Resp:  [18-24] 22  BP: (119-158)/() 132/73  SpO2:  [83 %-97 %] 93 %  I/O last 3 completed shifts:  In: 240 [P.O.:240]  Out: 1720 [Urine:1720]    GENERAL: No apparent distress. Awake, alert, and fully oriented. Obese.  HEENT: Normocephalic, atraumatic. Extraocular movements intact.  CARDIOVASCULAR: Regular rate and rhythm without murmurs or rubs. No S3.  PULMONARY: Clear bilaterally.  GASTROINTESTINAL: Soft, non-tender, non-distended.  Bowel sounds normoactive.   EXTREMITIES: No cyanosis or clubbing. 2+ BL LE edema.  NEUROLOGICAL: CN 2-12 grossly intact, no focal neurological deficits.  DERMATOLOGICAL: No rash, ulcer, bruising, nor jaundice.     Medications     NaCl       adenosine (ADENOSCAN) 90mg in sodium chloride 0.9% 90 mL - for Cath LAB (FFR)       bivalirudin (ANGIOMAX) infusion ADULT       DOBUTamine       DOPamine       eptifibatide       nitroglycerin       nitroprusside (NIPRIDE) infusion ADULT/PEDS GREATER than or EQUAL to 45 kg std conc       phenylephrine IV infusion ADULT       - MEDICATION INSTRUCTIONS -       nitroglycerin Stopped (06/13/17 1458)       carvedilol  25 mg Oral BID w/meals     midazolam         verapamil         heparin (porcine)         fentaNYL Citrate (PF)         nitroglycerin         acetylcholine  20 mcg INTRACORONARY Once    Followed by     acetylcholine  50 mcg INTRACORONARY Once    Followed by     acetylcholine  50 mcg INTRACORONARY Once     acetylcholine  20 mcg INTRACORONARY Once    Followed by     acetylcholine  50 mcg INTRACORONARY Once     aspirin EC  325 mg Oral Daily     lisinopril  10 mg Oral BID     spironolactone  25 mg Oral Daily     Data     Laboratory:    Recent Labs  Lab 06/15/17  0510 06/13/17  1535 06/13/17  1131   WBC 15.2* 17.7* 17.6*   HGB 11.9* 12.0* 13.2*   HCT 36.6* 37.9* 40.6   MCV 90 89 89    200 202       Recent Labs  Lab 06/15/17  1159 06/15/17  0510 06/15/17  0440 06/14/17  0605   NA  --  137 136 137   POTASSIUM 4.1 3.7 3.7 3.7   CHLORIDE  --  104 103 102   CO2  --  28 30 28   ANIONGAP  --  5 3 7   GLC  --  106* 106* 102*   BUN  --  27 27 22   CR  --  1.54* 1.56* 1.50*   GFRESTIMATED  --  53* 52* 54*   GFRESTBLACK  --  64 63 66   GAYLE  --  8.6 8.5 7.9*       Recent Labs  Lab 06/14/17  0605 06/13/17  1930 06/13/17  1725   TROPI 0.399* 0.465* 0.483*     No results for input(s): CULT in the last 168 hours.    Imaging:  Recent Results (from the past 24 hour(s))   US Lower  Extremity Venous Duplex Bilateral    Narrative    ULTRASOUND VENOUS BILATERAL LOWER EXTREMITIES WITH DOPPLER   6/15/2017  9:02 AM     HISTORY: Shortness of breath.    COMPARISON: None.    TECHNIQUE: Spectral waveform and color Doppler evaluation were  performed.    FINDINGS: Normal compressibility and unremarkable Doppler waveform  evaluation of bilateral common femoral, femoral, popliteal and  posterior tibial veins.      Impression    IMPRESSION: No evidence of thrombus in the major veins of bilateral  lower extremities.    MD Alex SIU DO MPH  [facility] Hospitalist  [facility] [place] [address]  Pager: [phone]  06/15/2017

## 2017-06-15 NOTE — PLAN OF CARE
Problem: Cardiac: Heart Failure (Adult)  Goal: Signs and Symptoms of Listed Potential Problems Will be Absent or Manageable (Cardiac: Heart Failure)  Signs and symptoms of listed potential problems will be absent or manageable by discharge/transition of care (reference Cardiac: Heart Failure (Adult) CPG).   Outcome: No Change  RN SHIFT SUMMARY :  DX/STORY : admit of 6/13 with SOB, L/E edema , Heart failure , LV hypertrophy , EF=30 % , went for Angio this afternoon- pending   HX : SOB HTN , Obese, MAURI -,   LABS/PROTOCOLS : K/MG protocols : K 3.7 -replaced per pre-angio protocol & recheck =4.1 , MG good at 2.2 . Had U/S of L/e's to r/o DVT (neg)  TELE : SR -did have episode of 2nd degree AVHB & 3rd degree during noc. Md 7 cards aware. meds getting adjusted.   ASSESS : a/o, pleasant , obese, up in room indep. Had IV lasix this am & voiding well. Denies CP but does have SL. SOB with activity. 1+ L/E edema .  Had sips water with am meds. No family here. OT saw for Smoking cessation info   TEACHING : had discussed angio last treasure & this am gave handouts on all current meds -needs reinforcement as meds all new.   PLAN : at baseline lives in [place] with sister & Mom. Cont. POC of cariology consulting for Angio today -

## 2017-06-15 NOTE — PROVIDER NOTIFICATION
Pt.'s HR down to mid 30's, now 80's, Pt. denies any chest pain, O2 sat's down to 88%, O2 applied at 2L. Pt. having sleep apnea episodes. Pt. To have Angio today. MD paged.

## 2017-06-15 NOTE — PROCEDURES
Procedure    1) CAG  2) LHC    Approach RTR  Complications none  Indication : acute systolic /diastolic heart failure, Cr 1.5, severe hypertension    Findings  LVEDP 28 mmHg    LMCA normal large  LAD normal large  CX normal large nondominant  RI normal large   RCA very large, ectatic consistent with atherosclerotic remodeling 20% proximal   30% distal    Recommendations  1) asa 81 mg daily  2) tobacco avoidance  3) Mediterranean style weight loss diet  4) exercise program  5) ACE  6) Beta blocker  7) diuretics as needed with close follow up of renal function  8) eventual repeat TTE once therapy for systolic heart failure maximized.     [place]

## 2017-06-15 NOTE — CONSULTS
Care Transition Initial Assessment - RN    Reason For Consult: care coordination/care conference, discharge planning   Met with: Patient.    DATA   Active Problems:    Congestive heart failure, unspecified congestive heart failure chronicity, unspecified congestive heart failure type (H)    Hypertensive emergency    Hypertensive urgency    Renal insufficiency    NSTEMI (non-ST elevated myocardial infarction) (H)       Cognitive Status: awake, alert and oriented.  Primary Care Clinic Name:  (TBD. YESSI provided pt with choices of PCP clinics in [place]. )     Contact information and PCP information verified: No    Lives With: parent(s), sibling(s)  Living Arrangements: house     Description of Support System: Supportive, Involved   Who is your support system?: Parent(s), Sibling(s)      Insurance concerns: No Insurance issues identified    ASSESSMENT  Patient currently receives the following services:  None. He lives at home with his mother & sisters.         Identified issues/concerns regarding health management: New diagnosis of CHF, morbidly obese (Nutrition to see). CM discussed CHF strategies; patient has scale for daily weights. Discussed low sodium diet, provided patient with handouts on portion size and sodium content.     PLAN  Financial costs for the patient were not discussed.  Patient given options and choices for discharge.  Patient/family is agreeable to the plan?  Yes: home  Patient anticipates discharging back to home.     Patient anticipates needs for home equipment: No  Discharge Planner   Discharge Plans in progress: Yes  Barriers to discharge plan: Patient having diagnostic heart cath today.   Follow up plan: Information on clinic choices in [place] given to patient.        Entered by: Shea Way 06/15/2017 11:23 AM     Plan/Disposition: Home   Appointments: TBD. Once patient decides on clinic & MD, CM will schedule initial appointment.     Handoff pended in [facility].     CM will continue to  follow patient until discharge for any additional needs.     Tanja Way RN, BSN, CTS  [facility]  [phone]

## 2017-06-16 VITALS
BODY MASS INDEX: 44.1 KG/M2 | TEMPERATURE: 99.2 F | HEIGHT: 71 IN | RESPIRATION RATE: 20 BRPM | HEART RATE: 78 BPM | DIASTOLIC BLOOD PRESSURE: 73 MMHG | WEIGHT: 315 LBS | SYSTOLIC BLOOD PRESSURE: 131 MMHG | OXYGEN SATURATION: 97 %

## 2017-06-16 LAB
ANION GAP SERPL CALCULATED.3IONS-SCNC: 6 MMOL/L (ref 3–14)
BUN SERPL-MCNC: 24 MG/DL (ref 7–30)
CALCIUM SERPL-MCNC: 8.3 MG/DL (ref 8.5–10.1)
CHLORIDE SERPL-SCNC: 106 MMOL/L (ref 94–109)
CO2 SERPL-SCNC: 28 MMOL/L (ref 20–32)
CREAT SERPL-MCNC: 1.54 MG/DL (ref 0.66–1.25)
ERYTHROCYTE [DISTWIDTH] IN BLOOD BY AUTOMATED COUNT: 16.6 % (ref 10–15)
GFR SERPL CREATININE-BSD FRML MDRD: 53 ML/MIN/1.7M2
GLUCOSE SERPL-MCNC: 132 MG/DL (ref 70–99)
HCT VFR BLD AUTO: 37.2 % (ref 40–53)
HGB BLD-MCNC: 11.7 G/DL (ref 13.3–17.7)
LACTATE BLD-SCNC: 0.5 MMOL/L (ref 0.7–2.1)
MAGNESIUM SERPL-MCNC: 2.5 MG/DL (ref 1.6–2.3)
MCH RBC QN AUTO: 29 PG (ref 26.5–33)
MCHC RBC AUTO-ENTMCNC: 31.5 G/DL (ref 31.5–36.5)
MCV RBC AUTO: 92 FL (ref 78–100)
PLATELET # BLD AUTO: 214 10E9/L (ref 150–450)
POTASSIUM SERPL-SCNC: 4.2 MMOL/L (ref 3.4–5.3)
RBC # BLD AUTO: 4.04 10E12/L (ref 4.4–5.9)
SODIUM SERPL-SCNC: 140 MMOL/L (ref 133–144)
WBC # BLD AUTO: 13.3 10E9/L (ref 4–11)

## 2017-06-16 PROCEDURE — 85027 COMPLETE CBC AUTOMATED: CPT | Performed by: HOSPITALIST

## 2017-06-16 PROCEDURE — 25000132 ZZH RX MED GY IP 250 OP 250 PS 637: Performed by: INTERNAL MEDICINE

## 2017-06-16 PROCEDURE — 83735 ASSAY OF MAGNESIUM: CPT | Performed by: HOSPITALIST

## 2017-06-16 PROCEDURE — 83605 ASSAY OF LACTIC ACID: CPT | Performed by: HOSPITALIST

## 2017-06-16 PROCEDURE — 25000132 ZZH RX MED GY IP 250 OP 250 PS 637: Performed by: HOSPITALIST

## 2017-06-16 PROCEDURE — 99239 HOSP IP/OBS DSCHRG MGMT >30: CPT | Performed by: HOSPITALIST

## 2017-06-16 PROCEDURE — 80048 BASIC METABOLIC PNL TOTAL CA: CPT | Performed by: HOSPITALIST

## 2017-06-16 PROCEDURE — 36415 COLL VENOUS BLD VENIPUNCTURE: CPT | Performed by: HOSPITALIST

## 2017-06-16 PROCEDURE — 99233 SBSQ HOSP IP/OBS HIGH 50: CPT | Performed by: INTERNAL MEDICINE

## 2017-06-16 RX ORDER — FUROSEMIDE 20 MG
20 TABLET ORAL DAILY
Qty: 30 TABLET | Refills: 1 | Status: SHIPPED | OUTPATIENT
Start: 2017-06-16 | End: 2017-07-13

## 2017-06-16 RX ORDER — LISINOPRIL 20 MG/1
20 TABLET ORAL 2 TIMES DAILY
Qty: 60 TABLET | Refills: 1 | Status: SHIPPED | OUTPATIENT
Start: 2017-06-16 | End: 2017-07-13

## 2017-06-16 RX ORDER — CARVEDILOL 25 MG/1
25 TABLET ORAL 2 TIMES DAILY WITH MEALS
Qty: 60 TABLET | Refills: 1 | Status: SHIPPED | OUTPATIENT
Start: 2017-06-16 | End: 2017-07-13

## 2017-06-16 RX ORDER — ATORVASTATIN CALCIUM 10 MG/1
10 TABLET, FILM COATED ORAL DAILY
Qty: 30 TABLET | Refills: 1 | Status: SHIPPED | OUTPATIENT
Start: 2017-06-16 | End: 2017-07-13

## 2017-06-16 RX ORDER — SPIRONOLACTONE 25 MG/1
25 TABLET ORAL DAILY
Qty: 30 TABLET | Refills: 1 | Status: SHIPPED | OUTPATIENT
Start: 2017-06-16 | End: 2017-07-13

## 2017-06-16 RX ADMIN — LISINOPRIL 20 MG: 20 TABLET ORAL at 08:17

## 2017-06-16 RX ADMIN — ASPIRIN 81 MG: 81 TABLET, COATED ORAL at 08:17

## 2017-06-16 RX ADMIN — FUROSEMIDE 20 MG: 20 TABLET ORAL at 08:17

## 2017-06-16 RX ADMIN — CARVEDILOL 25 MG: 25 TABLET, FILM COATED ORAL at 08:17

## 2017-06-16 RX ADMIN — ATORVASTATIN CALCIUM 10 MG: 10 TABLET, FILM COATED ORAL at 08:17

## 2017-06-16 RX ADMIN — SPIRONOLACTONE 25 MG: 25 TABLET ORAL at 11:29

## 2017-06-16 NOTE — PLAN OF CARE
Pt is a/o. VSS. Tele: SR. Up independent. Angio yesterday. R radial site bandage c/d/i. All questions and concerns addressed. Will continue POC

## 2017-06-16 NOTE — PROGRESS NOTES
Cardiology Progress Note  YUNIER Medina          Assessment and Plan:   Admit (6/13) w/ 2 wk progressive orthopnea/SOB/abdominal distention/SIA  Evidence of CHF exacerbation/mild troponin elevation/HTN urgency.  No prior medical problems    Bi-ventricular HF  -BNP 7000/pulmonary edema/ECHO done:    Interpretation Summary:     The left ventricle is normal in size with severe concentric left ventricular  hypertrophy. Septal and LV posterior wall thicknesses are 2.8 and 2.6 cm (the ULNL = 1.2 cm).  Left ventricular systolic function is moderately reduced. The visual ejection fraction is estimated at 30-35%.  This decrease in the LVEF is due to moderate global hypokinesia and moderate  to severe lateral wall hypokinesis.  Grade III or advanced diastolic dysfunction is present.  The right ventricular systolic function is normal.  All four cardiac valves are normal in structure and function.  Hypertensive CM vs hypertrophic CM are to be considered.       -etiology:  HTN likely  Ischemia has not been r/o  -improved symptoms w/ diuresis  -(weight down 4lbs)  396--> 392lb  -(-1100 OP overnight)  -CHF education/low salt  -consider assessment of PE (CT/lower ext U/S)  Will check d-dimer  -bblocker/ACE-I/IV lasix/spironolactone/NTG drip    HTN urgency, acute and chronic  -BP greatly improved since admit--> 152/92mmHg this am  -will slowly increase meds as able    NSTEMI:  -troponin peak 0.5 (flat)/no significant ST abnormalities, poor R-wave progression/ECHO pending  -No CP (only after coughing)  -doubt ACS, but consider ischemic evaluation/cath  -LDL 87 w/o statin  Leukocytosis  -will add ASA   -heparin/NTG/bblocker    JEFFERY:  -Cr. 1.5 (stable with diuresis)    Likely MAURI  -outpt sleep study recommended  -consider overnight oximetry prior to discharge    Tobacco Dependence  -encouraged cessation    Leukocytosis    Severely over weight      Plan:  BP control with titration of his current program  MRI as OP to assess possible  "HTCM      LMCA normal large  LAD normal large  CX normal large nondominant  RI normal large   RCA very large, ectatic consistent with atherosclerotic remodeling 20% proximal   30% distal     Recommendations  1) asa 81 mg daily  2) tobacco avoidance  3) Mediterranean style weight loss diet  4) exercise program  5) ACE  6) Beta blocker  7) diuretics as needed with close follow up of renal function  8) eventual repeat TTE once therapy for systolic heart failure maximized.                  Interval History:   Denies CP  SOB/orthopnea improved  No palpitations                Medications:       carvedilol  25 mg Oral BID w/meals     furosemide  20 mg Oral Daily     sodium chloride (PF)  3 mL Intracatheter Q8H     aspirin EC  81 mg Oral Daily     lisinopril  20 mg Oral BID     atorvastatin  10 mg Oral Daily     spironolactone  25 mg Oral Daily            Physical Exam:   Blood pressure (!) 143/91, pulse 78, temperature 99.2  F (37.3  C), temperature source Oral, resp. rate 22, height 1.803 m (5' 11\"), weight (!) 178.2 kg (392 lb 13.8 oz), SpO2 93 %.  Wt Readings from Last 3 Encounters:   06/14/17 (!) 178.2 kg (392 lb 13.8 oz)   06/13/17 (!) 179.2 kg (395 lb)     I/O last 3 completed shifts:  In: 710 [P.O.:710]  Out: 600 [Urine:600]    CONST:  Alert and oriented    LUNGS:  CTA bilat  CARDIO:  RRR, s1, S2  +S4 Diminished heart tones  ABD:  obese  EXT:  Generalized edema           Data:   TELE:  SR with prolonged QT interval    CBC    Recent Labs  Lab 06/16/17  0615 06/15/17  0510   WBC 13.3* 15.2*   HGB 11.7* 11.9*    219       BMP    Recent Labs  Lab 06/16/17  0615 06/15/17  1159 06/15/17  0510 06/15/17  0440 06/14/17  0605     --  137 136 137   POTASSIUM 4.2 4.1 3.7 3.7 3.7   CHLORIDE 106  --  104 103 102   GAYLE 8.3*  --  8.6 8.5 7.9*   CO2 28  --  28 30 28   BUN 24  --  27 27 22   CR 1.54*  --  1.54* 1.56* 1.50*   *  --  106* 106* 102*     Recent Labs   Lab Test  06/14/17   0605   CHOL  154   HDL  41 "   LDL  87   TRIG  132       TROP  Lab Results   Component Value Date    TROPI 0.399 () 06/14/2017    TROPI 0.465 () 06/13/2017    TROPI 0.483 () 06/13/2017    TROPI 0.528 () 06/13/2017       BNP    Recent Labs  Lab 06/13/17  1131   NTBNPI 7018*

## 2017-06-16 NOTE — DISCHARGE SUMMARY
DATE OF ADMISSION:  06/13/2017.      DATE OF DISCHARGE:  06/16/2017.      DIAGNOSES ON DISCHARGE:   1.  Malignant hypertension.   2.  Biventricular heart failure, left worse than right; heart failure, both systolic and diastolic.   3.  Hypertensive cardiomyopathy.   4.  Trivial coronary artery disease.   5.  Mild renal insufficiency.   6.  Morbid obesity.   7.  Suspected obstructive sleep apnea.   8.  Chronic tobacco abuse.   9.  Trace positive troponins, but not due to a demand ischemia.      Brooks Mensah, 32-year-old gentleman was admitted with shortness of breath and orthopnea that have progressed over a couple of months.  In retrospect, he likely has had chronic untreated severe hypertension.  His blood pressures on initial admission were as high as 240/140.  He was typically 190/110.  He had mild left heart failure by chest x-ray.  Echocardiography showed severe LVH with septal and posterior wall measurements of approximately 2.5 cm with 1.5 cm being the upper limits of normal.  Not surprisingly, there was diastolic dysfunction of the left ventricle and visually approximated EF was 30-35%.  Left atrium was not particularly dilated, cardiac valves were relatively normal.      He was treated initially with nitrates, carvedilol, ACE inhibitors and Lasix diuresis.  With this, his pressures came down to be in the 140/80 range.        He weighed 392 pounds on admission and this was clearly a part of his complex medical situation.      LABORATORY DATA:  On admission showed a creatinine of 1.5, BUN of 22.  On discharge, creatinine 1.5, BUN 24.  Electrolytes were normal.  Magnesium 2.5, hemoglobin 11.7 grams, white count 13,000.      Coronary angiography was done.  He had trivial coronary artery disease of the RCA with a stenosis of 10-20%.  Most of his coronary arteries were large and free of disease.      Blood sugars were in the 100-105 range.  Cholesterol 154, LDL 87.  Chest x-ray showed mild left heart failure, hilar  cephalization and a borderline cardiomegaly.      EKG showed sinus rhythm, ST-T changes suggestive of mild LVH and voltage criteria supported that diagnosis, also QT was a bit prolonged.  This should be rechecked on an outpatient basis.      During monitoring and at 2:00 a.m. on the morning he was noted to have periods of sinus rhythm with increased AV block.  There were 2 waves did not seem to the ventricle with a 3 second pause and nothing like this occurred during waking hours, but it suggested a night time component of this and perhaps increased vagal tone, likely sleep apnea.      ADDITIONAL DIAGNOSES ON DISCHARGE:  In concert with the above would be:   1.  Transient increased AV block.   2.  Strongly suspected sleep apnea.      PLAN:  Long-term antihypertensive treatment for probably rest of his life.  Will have him follow up with Cardiology in 10-14 days and with me in 2-3 months.  He needs a primary care physician.      He needs to get a blood pressure cuff to monitor his at home blood pressures.      He needs to minimize salt intake.      Diet, weight loss and exercise were discussed redundantly and at length.      Medications will need to be adjusted with the goals of therapy to get the systolic blood pressure were obtainable 140/90.      MEDICATIONS ON DISCHARGE:   1.  Carvedilol 25 mg b.i.d.   2.  Lisinopril 20 mg b.i.d.   3.  Lasix 20 mg a day.   4.  Aspirin 81 mg a day.   5.  Atorvastatin 10 mg at bedtime.   6.  Spironolactone 25 mg a day.      We will need to follow renal and electrolyte functions closely.  We need to continue diet, weight loss, exercise.      It should be noted he also has been a smoker, although lightly.  Abstinence from cigarettes is mandatory.      He has mild noncritical coronary disease but it certainly has a trend in that direction at a very young age, coronary arteries otherwise were widely patent and large vessels.        Sleep apnea is likely, sleep study is indicated.       Abstinence from smoking, stress.      PROGNOSIS:  I think is good, but will have to hope that his ejection fraction improves and his LVH will remodel.      Over an hour was spent in review of the dictation and discharge summary reviewing multiple old records, imaging, cardiac catheterization, EKG, echo, laboratory work, medications, indications, side effects, followup.         HEIDY KAUFFMAN MD, [facility]             D: 2017 09:36   T: 2017 10:12   MT: [id number]      Name:     BREA DO   MRN:      [id number]        Account:        [id number]   :      1985           Admit Date:                                       Discharge Date:       Document: [id number]

## 2017-06-16 NOTE — DISCHARGE SUMMARY
Hospitalist Discharge Summary    Brooks Mensah MRN# [id number]   YOB: 1985 Age: 32 year old     Date of Admission:  6/13/2017  Date of Discharge:  6/16/2017  Admitting Physician:  Sarkis Han MD  Discharge Physician:  Alex Saenz  Discharging Service:  Hospitalist     Primary Provider: No Ref-Primary, Physician          Discharge Diagnosis:   Hypertensive urgency  Acute systolic and diastolic CHF exacerbation  Suspected CKD  Essential HTN  Tob dep  Lt eye subconjunctival hemorrhage  Morbid obesity  Suspected sleep disordered breathing  Bradycardia, resolved  Elevated d-dimer             Discharge Disposition:   Discharged to home           Allergies:   No Known Allergies           Discharge Medications:   Current Discharge Medication List      START taking these medications    Details   aspirin EC 81 MG EC tablet Take 1 tablet (81 mg) by mouth daily  Qty: 30 tablet, Refills: 3    Associated Diagnoses: NSTEMI (non-ST elevated myocardial infarction) (H)      atorvastatin (LIPITOR) 10 MG tablet Take 1 tablet (10 mg) by mouth daily  Qty: 30 tablet, Refills: 1    Associated Diagnoses: NSTEMI (non-ST elevated myocardial infarction) (H)      lisinopril (PRINIVIL/ZESTRIL) 20 MG tablet Take 1 tablet (20 mg) by mouth 2 times daily  Qty: 60 tablet, Refills: 1    Associated Diagnoses: Hypertensive emergency      carvedilol (COREG) 25 MG tablet Take 1 tablet (25 mg) by mouth 2 times daily (with meals)  Qty: 60 tablet, Refills: 1    Associated Diagnoses: Hypertensive emergency      furosemide (LASIX) 20 MG tablet Take 1 tablet (20 mg) by mouth daily  Qty: 30 tablet, Refills: 1    Associated Diagnoses: Congestive heart failure, unspecified congestive heart failure chronicity, unspecified congestive heart failure type (H)      spironolactone (ALDACTONE) 25 MG tablet Take 1 tablet (25 mg) by mouth daily  Qty: 30 tablet, Refills: 1    Associated Diagnoses: Congestive heart failure, unspecified congestive  "heart failure chronicity, unspecified congestive heart failure type (H)         CONTINUE these medications which have NOT CHANGED    Details   MELATONIN PO Take 10 mg by mouth nightly as needed       TRAZODONE HCL PO Take 50 mg by mouth nightly as needed for sleep                    Condition on Discharge:   Discharge condition: Stable   Discharge vitals: Blood pressure 131/73, pulse 78, temperature 99.2  F (37.3  C), temperature source Oral, resp. rate 20, height 1.803 m (5' 11\"), weight (!) 178.2 kg (392 lb 13.8 oz), SpO2 97 %.   Code status on discharge: Full Code      BASIC PHYSICAL EXAMINATION:  GENERAL: No apparent distress. Morbid obesity.  CARDIOVASCULAR: Regular rate and rhythm without murmurs.  PULMONARY: Clear to auscultation bilaterally.   GASTROINTESTINAL: Abdomen soft, non-tender.  EXTREMITIES: 2+ BL LE edema, pulses intact.  NEUROLOGIC: No focal deficits.          History of Illness:   See detailed admission note for full details.               Procedures excluding imaging which is summarized below:   See EMR           Consultations:   PHARMACY TO DOSE HEPARIN  CARDIOLOGY IP CONSULT  SMOKING CESSATION PROGRAM IP CONSULT  PHARMACY DISCHARGE EDUCATION BY PHARMACIST  CARE COORDINATOR IP CONSULT  NUTRITION SERVICES ADULT IP CONSULT  SMOKING CESSATION PROGRAM IP CONSULT          Significant Results:   Results for orders placed or performed during the hospital encounter of 06/13/17   Chest XR,  PA & LAT    Narrative    XR CHEST 2 VW 6/13/2017 12:23 PM    COMPARISON: None.    HISTORY: Shortness of breath      Impression    IMPRESSION: Enlarged cardiac silhouette is demonstrated. Mild  pulmonary edema. No significant pleural effusion. No pneumothorax.    RU WATSON   POC US ECHO LIMITED    Impression     Limited Bedside ED Cardiac Ultrasound Procedure Note:    PROCEDURE: PERFORMED BY: Dr. Cliff Solo  INDICATIONS/SYMPTOM:  Shortness of Breath  PROBE: Cardiac phased array probe  BODY LOCATION: " Chest  FINDINGS:   The ultrasound was performed utilizing the parasternal long axis and apical 4 chamber views.  Cardiac contractility:  Present  Gross estimation of cardiac kinesis: Difficult to assess given body habitus, limited exam  Pericardial Effusion:  No visualized effusion  RV:LV ratio: Difficult to assess  INTERPRETATION:    Technically difficult exam, cardiac contractility present, no appreciable effusion.  IMAGE DOCUMENTATION: Images were archived to PACs system.         US Lower Extremity Venous Duplex Bilateral    Narrative    ULTRASOUND VENOUS BILATERAL LOWER EXTREMITIES WITH DOPPLER   6/15/2017  9:02 AM     HISTORY: Shortness of breath.    COMPARISON: None.    TECHNIQUE: Spectral waveform and color Doppler evaluation were  performed.    FINDINGS: Normal compressibility and unremarkable Doppler waveform  evaluation of bilateral common femoral, femoral, popliteal and  posterior tibial veins.      Impression    IMPRESSION: No evidence of thrombus in the major veins of bilateral  lower extremities.    EMMANUELLE DUMONT MD                Pending Results:   Unresulted Labs Ordered in the Past 30 Days of this Admission     Date and Time Order Name Status Description    6/16/2017 0938 Lactic acid level STAT In process                         Discharge Instructions and Follow-Up:   Discharge instructions and follow-up:   Discharge Procedure Orders  Follow-Up with cardiac sub-specialty clinic   Standing Status: Future  Standing Exp. Date: 06/15/18   Referral Type: CV Cardio consult     Follow-up and recommended labs and tests    Order Comments: Follow up with primary care provider, Physician No Ref-Primary, within 7 days to evaluate medication change and for hospital follow- up.  The following labs/tests are recommended: BMP.  Follow up in sleep clinic for sleep study ASAP.  Recommend follow up in bariatric clinic to address weight loss strategies.  Discuss strategies for tobacco cessation with PCP.     Activity    Order Comments: Your activity upon discharge: activity as tolerated   Order Specific Question Answer Comments   Is discharge order? Yes      Full Code     Diet   Order Comments: Follow this diet upon discharge: Orders Placed This Encounter     Regular Diet Adult   Order Specific Question Answer Comments   Is discharge order? Yes                Hospital Course:   Summary:  Patient is a 32-year-old male previously healthy male, who presented to clinic for evaluation of progressive shortness of breath over 2 weeks- initially with dyspnea on exertion, then orthopnea, and finally shortness of breath at rest for the last day.  He also had progressive pedal and lower extremity edema.  In the last day he had developed chest pressure with heavy breathing. In clinic, he was noted to be hypertensive with systolic blood pressures in the 190s and diastolic blood pressures in the 130s. He was referred to the emergency department.  Workup in the ED here confirmed severe hypertension. Blood pressure was as high as 216/143. He was also found to have renal insufficiency, troponin elevation, and findings suggestive of congestive heart failure by BNP and chest x-ray.  He was started on coreg, lisinopril and IV diuretics with IV nitroglycerin and IV heparin.  He has improved with blood pressure management. TTE shows severe LVH, grade III diastolic heart dysfunction, EF of 30-35%, and moderate global hypokinesia. He had coronary angio showing no significant obstructive CAD. He likely has severe MAURI and will need outpatient sleep study. He also needs tobacco cessation. He was counseled extensively on importance of medication and dietary compliance and expressed understanding.       Problem list:      1.  Hypertensive urgency with acute systolic and diastolic congestive heart failure. Echo shows severe LVH, grade III diastolic heart dysfunction, EF of 30-35%, and moderate global hypokinesia. Cardiology consult appreciated.  Continue  Coreg, Lisinopril, Lasix, and Aldactone.  Was on IV lasix and down -2.5 L since admission.  Will continue oral lasix 20 mg daily on discharge and check BMP as outpatient in 1 week.      2. Non ST elevation myocardial infarction.  Suspect type 2, due to severe hypertension, but had heart catheterization yesterday.  Now off Heparin drip.  Coronary angiogram showed trivial non-obstructive CAD and no intervention performed.      3.  EMILY vs CKD.  Stable since admission and therefore suspect more chronic renal insufficiency. Monitor.  Phosphorus minimally elevated at 4.9 further supporting more chronic picture.  Baseline creatinine appears to be 1.5.      4.  Suspected essential hypertension.  Continue Coreg, Lisinopril, and Aldactone.  Could also have MAURI contributing to difficult to manage BP.      5.  Tobacco use disorder.  Smoking cessation encouraged.  He will discuss with PCP regarding smoking cessation strategies.      6.  Left eye with medial subconjunctival hemorrhage.  Stable.  Possibly related to significantly elevated BP.  No vision deficits.      7.  Severe obesity and suspected sleep disordered breathing.  Needs OP sleep study ASAP.     8.  Heart block and bradycardia.  Rhythm strips reviewed with cardiology yesterday. Concern for brief 3rd degree HB but not confirmatory. Now with normal rhythm and rate. This was in context of unmanaged sleep disordered breathing and high beta blocker dosing. Decreased coreg from 37.5 to 25 bid and now appears resolved. No indication for PPM at this time.  Should correct after proper management of presumed sleep apnea.     9.  Elevated d-dimer.  Unclear significance. BL LE venous US neg for DVT.  Hesitant to check CT PE due to recent contrast from coronary angio and creatinine of 1.5.    The patient was seen, examined, and counseled on this day. The hospitalization and plan of care was reviewed with the patient extensively. All questions were addressed and the patient agreed  to follow-up as noted above.      Total time spent in face to face contact with the patient and coordinating discharge was:  35 Minutes    Alex Saenz DO MPH  [facility] Hospitalist  [address] [place] [address]  Pager: [phone]  06/16/2017

## 2017-06-16 NOTE — PLAN OF CARE
Problem: Goal Outcome Summary  Goal: Goal Outcome Summary  Outcome: Adequate for Discharge Date Met:  06/16/17  Ambulatory Status:  Pt up independently.  VS:  Stable, BP high at times  Pain:  denies  Resp: LS diminished on room air, smoker  Cardiac: TELE: SR  GI:  denies nausea.  good appetite and on cardiac diet.  BS active.  Passing flatus.  Last BM yesterday.  :  Denies concerns  Skin:  Slight swelling in lower extremities, intact  Tx:  Needs sleep study done outpt, BP meds  Labs:  Na 140, K 4.2, creat 1.54, mag 2.5, WBC 13.3, Hgb 11.7, Lactic 0.5  Consults:  Cards, nutrition  Disposition:  Discharging today.  Pt to D/C to home.  Pt provided with d/c instructions, including new medications, when medications were last given, and when to take them again.  Pt also informed to f/u with PCP in 7 days.  Pt verbalized understanding of all d/c and f/u instructions.  All questions were answered at this time.  Copy of paperwork sent with pt.  Medication/Scripts sent with pt.  Family to provide transport.  All personal belongings sent with pt.

## 2017-06-16 NOTE — PHARMACY - DISCHARGE MEDICATION RECONCILIATION
Discharge medication review for this patient is complete. Face-to-face medication education was provided by the pharmacist.  See [facility] for allergy information and immunization status.   Pharmacist assisted with medication reconciliation of discharge medications with PTA medications.    Patient was informed to STOP taking the following HOME medications:   none    Patient was informed to START taking the following NEW medications:   ASA, Lipitor, Coreg, Lasix, Lisinopril and Spironolactone.    Patient was informed to make the following changes to prior to admission medications:  none    Patient was educated on the following for each discharge medication:   Rationale for therapy  Duration of treatment  Dosing and or monitoring drug levels  Common side effects  Importance of compliance  Drug/food interactions  Missed doses  Self monitoring parameters    Left written materials and instructions (Clinical notes from [facility]) for new medications: Yes    OUTCOMES: Patient verbalized understanding    Discharge Medication List     Review of your medicines      START taking       Dose / Directions    aspirin 81 MG EC tablet        Dose:  81 mg   Take 1 tablet (81 mg) by mouth daily   Quantity:  30 tablet   Refills:  3       atorvastatin 10 MG tablet   Commonly known as:  LIPITOR        Dose:  10 mg   Take 1 tablet (10 mg) by mouth daily   Quantity:  30 tablet   Refills:  1       carvedilol 25 MG tablet   Commonly known as:  COREG        Dose:  25 mg   Take 1 tablet (25 mg) by mouth 2 times daily (with meals)   Quantity:  60 tablet   Refills:  1       furosemide 20 MG tablet   Commonly known as:  LASIX        Dose:  20 mg   Take 1 tablet (20 mg) by mouth daily   Quantity:  30 tablet   Refills:  1       lisinopril 20 MG tablet   Commonly known as:  PRINIVIL/ZESTRIL        Dose:  20 mg   Take 1 tablet (20 mg) by mouth 2 times daily   Quantity:  60 tablet   Refills:  1       spironolactone 25 MG tablet   Commonly known as:  ALDACTONE         Dose:  25 mg   Take 1 tablet (25 mg) by mouth daily   Quantity:  30 tablet   Refills:  1         CONTINUE these medicines which have NOT CHANGED       Dose / Directions    MELATONIN PO        Dose:  10 mg   Take 10 mg by mouth nightly as needed   Refills:  0       TRAZODONE HCL PO        Dose:  50 mg   Take 50 mg by mouth nightly as needed for sleep   Refills:  0            Where to get your medicines      These medications were sent to [place], [place] - [address] [facility]  [address] [facility] [address]     Phone:  [phone]      aspirin 81 MG EC tablet     atorvastatin 10 MG tablet     carvedilol 25 MG tablet     furosemide 20 MG tablet     lisinopril 20 MG tablet     spironolactone 25 MG tablet

## 2017-06-16 NOTE — CONSULTS
"CLINICAL NUTRITION SERVICES  -  ASSESSMENT NOTE       REASON FOR ASSESSMENT  Brooks Mensah is a 32 year old male seen by Registered Dietitian for RN Consult - \"obese, poor heart function, CHF - wt loss/heart healthy diet\".       NUTRITION HISTORY  - Information obtained from patient.   Breakfast from gas station: Coffee (cream and sugar), breakfast sandwich (sausage, egg, cheese on biscuit), paired with fruit, sometimes chips  Lunch: From cafeteria or home.  French fries from cafeteria with ketchup, or cup of soup.  If from home, rice + protein + vegetables.   Dinner: Prepared by Mom.  Typically rice + protein + vegetables.  Often cultural food and is unaware of sauces/seasonings added to food.    HS Snack: Dessert - cookies, sometimes fruit, \"something sweet\".    Snacks: Yogurt with granola, sometimes candy.   Drinks: 8 oz pop daily, sometimes juice in the AM.  - NKFA.      CURRENT NUTRITION ORDERS  Diet Order:     Regular    Current Intake/Tolerance:  Per discussion with RN and per documentation from last evening, patient non-compliant and struggling with diet restrictions.  Family brought in fast food yesterday evening.  Noted [employer] napkins in room this AM.  Patient reports consumption of banana and peanut butter toast this AM.       PHYSICAL FINDINGS  Observed  See below  Obtained from Chart/Interdisciplinary Team  No nutritionally pertinent    ANTHROPOMETRICS  Height: 5' 11\"  Weight: 178.2 kg (392#)  Body mass index is 54.79 kg/(m^2).  Weight Status:  Obesity Grade III BMI >40  IBW: 78.2 kg (172#)  % IBW: 228%  Weight History:  Wt Readings from Last 10 Encounters:   06/14/17 (!) 178.2 kg (392 lb 13.8 oz)   06/13/17 (!) 179.2 kg (395 lb)   - Patient denies recent wt changes though suspect some degree of fluctuation 2/2 fluid shifts, use of diuretics as below.     LABS  Labs reviewed    MEDICATIONS  Medications reviewed:  - Lasix  - Aldactone    Dosing Weight 103.2 kg - adjusted weight    ASSESSED NUTRITION " "NEEDS PER APPROVED PRACTICE GUIDELINES:  Estimated Energy Needs: 7368-9832 kcals (20-25 Kcal/Kg)  Justification: obese  Estimated Protein Needs: 103-124 grams protein (1-1.2 g pro/Kg)  Justification: maintenance  Estimated Fluid Needs: 3577-1751 mL (1 mL/Kcal)  Justification: maintenance and per provider pending fluid status    MALNUTRITION:  % Weight Loss:  None noted  % Intake:  No decreased intake noted   Subcutaneous Fat Loss:  None observed  Muscle Loss:  None observed though suspect degree of sarcopenic obesity  Fluid Retention:  None noted    Malnutrition Diagnosis: Patient does not meet two of the above criteria necessary for diagnosing malnutrition    NUTRITION DIAGNOSIS:  Food and nutrition-related knowledge deficit related to lack of exposure to nutrition education as evidenced by high sodium food items identified in dietary recall, patient with many questions regarding portion control during session.      NUTRITION INTERVENTIONS  Recommendations / Nutrition Prescription  2 gm Na diet order with focus on portion control/limiting of empty calories, high fiber/high protein.     Highly recommend outpatient follow-up, patient still considering this option.      Implementation  Nutrition education: Provided education on combination 2 gm Na/wt loss nutrition therapy:    Assessed learning needs, learning preferences, and willingness to learn    Nutrition Education (Content):  a) Provided handout \"low sodium nutrition therapy\", and \"weight loss tips\".  b) Discussed foods high in sodium and appropriate substitutes, tips for decreasing, g salt recommended daily and how to read a food label  c) Discussed MyPlate method and importance of portion control.  Discussed importance of protein intake when focusing on wt loss.  Discussed zero calorie beverages and importance of hydration.     Nutrition Education (Application):  a) Discussed eating habits and recommended alternative food choices    Patient verbalizes " "understanding of diet by setting goals for not adding table salt to foods and limiting eating out, preparing more meals at home and using MyPlate method.     Anticipate fair compliance.  Patient often referring to reliance on Mom/family and using as a barrier to changing eating habits instead of realizing his own need to take control of meal prep instead of relying on others/convenience foods.  Made multiple comments on how is \"not sure\" his Mom will cook with lower-sodium seasonings/sauces and does not think she will adhere to recommendations when cooking.  Encouraged patient to focus on preparing meals independently.      Diet Education - refer to Education Flowsheet    Collaboration and Referral of Nutrition care: Discussed POC with team during rounds.  Discussed non-compliance and RD consult with MD/RN this AM.       Nutrition Goals  Patient to name at least 2 changes he will make to current eating habits.      MONITORING AND EVALUATION:  Progress towards goals will be monitored and evaluated per protocol and Practice Guidelines        Fabiola Biswas RD, LD  Clinical Dietitian  3rd floor/ICU: [phone]  All other floors: [phone]  Weekend/holiday: [phone]    "

## 2017-06-19 ENCOUNTER — TELEPHONE (OUTPATIENT)
Dept: FAMILY MEDICINE | Facility: CLINIC | Age: 32
End: 2017-06-19

## 2017-06-19 ENCOUNTER — CARE COORDINATION (OUTPATIENT)
Dept: CARDIOLOGY | Facility: CLINIC | Age: 32
End: 2017-06-19

## 2017-06-19 NOTE — TELEPHONE ENCOUNTER
ED / Discharge Outreach Protocol    Patient Contact    Attempt # 1    Was call answered?  No.  Left message on voicemail with information to call me back.  Brenda Kearney RN  Triage [employer]

## 2017-06-19 NOTE — TELEPHONE ENCOUNTER
"Hospital/TCU/ED for chronic condition Discharge Protocol    \"Hi, my name is Susan Monk, a registered nurse, and I am calling from [facility].  I am calling to follow up and see how things are going for you after your recent emergency visit/hospital/TCU stay.\"    Tell me how you are doing now that you are home?\" I'm doing a lot better!!! Thank you.       Discharge Instructions    \"Let's review your discharge instructions.  What is/are the follow-up recommendations?  Pt. Response: I have all the paperwork. I don't have any questions.     \"Has an appointment with your primary care provider been scheduled?\"   Yes. (confirm) Just scheduled now. 6/21 appointment [facility].     \"When you see the provider, I would recommend that you bring your medications with you.\"    Medications    \"Tell me what changed about your medicines when you discharged?\"    Changes to chronic meds?    New medications prescribed. No questions at this time. Will place MTM referral due to complexity of new diagnosis.     \"What questions do you have about your medications?\"    None  Will follow also with cardiology     New diagnoses of heart failure, COPD, diabetes, or MI?    No              Medication reconciliation completed? Yes  Was MTM referral placed (*Make sure to put transitions as reason for referral)?   Yes - \"The Medication Therapy  will call you within the next few days to schedule an appointment with an MTM pharmacist to discuss your medicines and make sure they are working as well as they possibly can for you. This visit can be done in a [facility] or on the phone and is at no charge to you.\"    Call Summary    \"What questions or concerns do you have about your recent visit and your follow-up care?\"     none  Advised to call us if he has any questions.     \"If you have questions or things don't continue to improve, we encourage you contact us through the main clinic number (give number).  Even if " "the clinic is not open, triage nurses are available 24/7 to help you.     We would like you to know that our clinic has extended hours (provide information).  We also have urgent care (provide details on closest location and hours/contact info)\"      \"Thank you for your time and take care!\"    Susan Monk R.N.               "

## 2017-06-19 NOTE — TELEPHONE ENCOUNTER
Pt was DC'd from [facility] on 6/16/2017 after being treated for Congestive Heart Failure, Unspecified Congestive Heart Failure Chronicity, Unspecified Congestive Heart Failure Type (H).  No upcoming scheduled appt.  Please call patient.  Thank you!  Cindy Palm

## 2017-06-19 NOTE — PROGRESS NOTES
Called patient and left VM to discuss any post hospital d/c questions he may have, review medication changes, and confirm f/u appts. RN advised patient in  that he should be weighing himself daily, recording weight, and bringing record of weights with him to apt. RN also advised patient in  that if he has a weight gain of >2lb in a day or 5lb in a week to call the clinic. RN advised patient in  that he has an apt scheduled on 6/28/17 with [facility]). Patient advised in  to call clinic with any cardiac related questions or concerns prior to his apt on 6/28/17.

## 2017-06-20 ENCOUNTER — TELEPHONE (OUTPATIENT)
Dept: PHARMACY | Facility: OTHER | Age: 32
End: 2017-06-20

## 2017-06-20 NOTE — TELEPHONE ENCOUNTER
MTM referral from: Transitions of Care (recent hospital discharge or ED visit)    MTM referral outreach attempt #1 on June 20, 2017 at 11:30 AM      Outcome: Left Message    Elizabeth Garcia MTM Coordinator

## 2017-06-21 ENCOUNTER — OFFICE VISIT (OUTPATIENT)
Dept: FAMILY MEDICINE | Facility: CLINIC | Age: 32
End: 2017-06-21
Payer: COMMERCIAL

## 2017-06-21 VITALS
HEART RATE: 89 BPM | TEMPERATURE: 98.8 F | DIASTOLIC BLOOD PRESSURE: 88 MMHG | HEIGHT: 71 IN | WEIGHT: 315 LBS | BODY MASS INDEX: 44.1 KG/M2 | OXYGEN SATURATION: 96 % | SYSTOLIC BLOOD PRESSURE: 132 MMHG

## 2017-06-21 DIAGNOSIS — N28.9 RENAL INSUFFICIENCY: ICD-10-CM

## 2017-06-21 DIAGNOSIS — I50.9 CONGESTIVE HEART FAILURE, UNSPECIFIED CONGESTIVE HEART FAILURE CHRONICITY, UNSPECIFIED CONGESTIVE HEART FAILURE TYPE: Primary | ICD-10-CM

## 2017-06-21 DIAGNOSIS — I21.4 NSTEMI (NON-ST ELEVATED MYOCARDIAL INFARCTION) (H): ICD-10-CM

## 2017-06-21 DIAGNOSIS — E66.01 MORBID OBESITY WITH BMI OF 50.0-59.9, ADULT (H): ICD-10-CM

## 2017-06-21 DIAGNOSIS — I16.0 HYPERTENSIVE URGENCY: ICD-10-CM

## 2017-06-21 DIAGNOSIS — R06.83 SNORING: ICD-10-CM

## 2017-06-21 DIAGNOSIS — I16.1 HYPERTENSIVE EMERGENCY: ICD-10-CM

## 2017-06-21 PROCEDURE — 36415 COLL VENOUS BLD VENIPUNCTURE: CPT | Performed by: PHYSICIAN ASSISTANT

## 2017-06-21 PROCEDURE — 80048 BASIC METABOLIC PNL TOTAL CA: CPT | Performed by: PHYSICIAN ASSISTANT

## 2017-06-21 PROCEDURE — 99214 OFFICE O/P EST MOD 30 MIN: CPT | Performed by: PHYSICIAN ASSISTANT

## 2017-06-21 NOTE — LETTER
[facility]          [address] [place] [address]                                           [phone]  June 30, 2017     Brooks Mensah  [address] [facility] [address]  [place] [address]      Dear Brooks,    The results of your recent tests were reviewed and are as follows:    -Kidney function (Cr, GFR) cpntinues to decline. It's hard to say if this is acute from your recent hospitalization or perhaps evidence of chronic kidney disease secondary to previously untreated high BP. Would advise repeat testing in 1 month to monitor for further decline.   -Sodium is normal, Potassium is normal, Calcium is normal, Glucose is normal (diabetes screening test).     Enclosed is a copy of the results.     Thank you for choosing [facility].  We appreciate the opportunity to serve you and look forward to supporting your healthcare needs in the future.    If you have any questions or concerns, please call me or my staff at [phone].      Sincerely,    KASEY Yip

## 2017-06-21 NOTE — PATIENT INSTRUCTIONS
So glad you're feeling better.  Continue meds without changes until follow and further recommendations by cardiology.  Re-check BMP to re-assess kidney function and electrolytes.  Needs further evaluation with sleep study - referral given.  Advised follow-up after cardiology visit for physical and further smoking cessation counseling.  Cessation options offered, but would like to continue own efforts for now.  Make list of triggers and alternate plan for follow-up.    Electronically Signed By: Eva Varner PA-C

## 2017-06-21 NOTE — MR AVS SNAPSHOT
After Visit Summary   6/21/2017    Brooks Mensah    MRN: [id number]           Patient Information     Date Of Birth          1985        Visit Information        Provider Department      6/21/2017 2:20 PM Eva Varner PA-C [facility]        Today's Diagnoses     Congestive heart failure, unspecified congestive heart failure chronicity, unspecified congestive heart failure type (H)    -  1    Hypertensive urgency        Renal insufficiency        NSTEMI (non-ST elevated myocardial infarction) (H)        Hypertensive emergency        Snoring        Morbid obesity with BMI of 50.0-59.9, adult (H)          Care Instructions    So glad you're feeling better.  Continue meds without changes until follow and further recommendations by cardiology.  Re-check BMP to re-assess kidney function and electrolytes.  Needs further evaluation with sleep study - referral given.  Advised follow-up after cardiology visit for physical and further smoking cessation counseling.  Cessation options offered, but would like to continue own efforts for now.  Make list of triggers and alternate plan for follow-up.    Electronically Signed By: Eva Varner PA-C            Follow-ups after your visit        Additional Services     SLEEP EVALUATION & MANAGEMENT REFERRAL - ADULT       Please be aware that coverage of these services is subject to the terms and limitations of your health insurance plan.  Call member services at your health plan with any benefit or coverage questions.      Please bring the following to your appointment:    >>   List of current medications   >>   This referral request   >>   Any documents/labs given to you for this referral    [facility] [phone] (Age 18 and up)                  Your next 10 appointments already scheduled     Jun 28, 2017  8:10 AM CDT   CORE Enrollment with Prem Painter PA-C   [facility] AT  "[place] [facility])    [address]  [place] [place] [facility] [address]   [phone]              Future tests that were ordered for you today     Open Future Orders        Priority Expected Expires Ordered    SLEEP EVALUATION & MANAGEMENT REFERRAL - ADULT Routine  2018            Who to contact     If you have questions or need follow up information about today's clinic visit or your schedule please contact [facility] directly at [phone].  Normal or non-critical lab and imaging results will be communicated to you by MyChart, letter or phone within 4 business days after the clinic has received the results. If you do not hear from us within 7 days, please contact the clinic through [employer] or phone. If you have a critical or abnormal lab result, we will notify you by phone as soon as possible.  Submit refill requests through [employer] or call your pharmacy and they will forward the refill request to us. Please allow 3 business days for your refill to be completed.          Additional Information About Your Visit        [employer] Information     [employer] lets you send messages to your doctor, view your test results, renew your prescriptions, schedule appointments and more. To sign up, go to [email or web address] . Click on \"Log in\" on the left side of the screen, which will take you to the Welcome page. Then click on \"Sign up Now\" on the right side of the page.     You will be asked to enter the access code listed below, as well as some personal information. Please follow the directions to create your username and password.     Your access code is: [email or web address]  Expires: 2017 12:51 PM     Your access code will  in 90 days. If you need help or a new code, please call your [facility] clinic or [phone].        Care EveryWhere ID     This is your Care EveryWhere ID. This could be used by other organizations to access your [facility] medical records  [id number]   " "     Your Vitals Were     Pulse Temperature Height Pulse Oximetry BMI (Body Mass Index)       89 98.8  F (37.1  C) (Oral) 5' 11\" (1.803 m) 96% 52.86 kg/m2        Blood Pressure from Last 3 Encounters:   06/21/17 132/88   06/16/17 131/73   06/13/17 (!) 190/138    Weight from Last 3 Encounters:   06/21/17 (!) 379 lb (171.9 kg)   06/14/17 (!) 392 lb 13.8 oz (178.2 kg)   06/13/17 (!) 395 lb (179.2 kg)              We Performed the Following     Basic metabolic panel  (Ca, Cl, CO2, Creat, Gluc, K, Na, BUN)        Primary Care Provider    Physician No Ref-Primary       No address on file        Equal Access to Services     LONNY DINH : Maggie Wall, waaxda luqadaha, qaybta kaalmada adeegyaheather, baldemar root . So [facility] [phone].    ATENCIÓN: Si habla español, tiene a tello disposición servicios gratuitos de asistencia lingüística. Llame al [phone].    We comply with applicable federal civil rights laws and [place] laws. We do not discriminate on the basis of race, color, national origin, age, disability sex, sexual orientation or gender identity.            Thank you!     [facility] you [facility]  [facility] your [facility]. Our goal is always to provide you with excellent care. Hearing back from our patients is one way we can continue to improve our services. Please take a few minutes to complete the written survey that you may receive in the mail after your visit with us. Thank you!             Your Updated Medication List - Protect others around you: Learn how to safely use, store and throw away your medicines at www.disposemymeds.org.          This list is accurate as of: 6/21/17  3:12 PM.  Always use your most recent med list.                   Brand Name Dispense Instructions for use Diagnosis    aspirin 81 MG EC tablet     30 tablet    Take 1 tablet (81 mg) by mouth daily    NSTEMI (non-ST elevated myocardial infarction) (H)       atorvastatin 10 MG tablet "    LIPITOR    30 tablet    Take 1 tablet (10 mg) by mouth daily    NSTEMI (non-ST elevated myocardial infarction) (H)       carvedilol 25 MG tablet    COREG    60 tablet    Take 1 tablet (25 mg) by mouth 2 times daily (with meals)    Hypertensive emergency       furosemide 20 MG tablet    LASIX    30 tablet    Take 1 tablet (20 mg) by mouth daily    Congestive heart failure, unspecified congestive heart failure chronicity, unspecified congestive heart failure type (H)       lisinopril 20 MG tablet    PRINIVIL/ZESTRIL    60 tablet    Take 1 tablet (20 mg) by mouth 2 times daily    Hypertensive emergency       MELATONIN PO      Take 10 mg by mouth nightly as needed        spironolactone 25 MG tablet    ALDACTONE    30 tablet    Take 1 tablet (25 mg) by mouth daily    Congestive heart failure, unspecified congestive heart failure chronicity, unspecified congestive heart failure type (H)       TRAZODONE HCL PO      Take 50 mg by mouth nightly as needed for sleep

## 2017-06-21 NOTE — PROGRESS NOTES
SUBJECTIVE:                                                    Brooks Mensah is a 32 year old male who presents to clinic today for the following health issues:          Hospital Follow-up Visit:    Hospital/Nursing Home/IP Rehab Facility: [facility]  Date of Admission: 6/13/2017  Date of Discharge: 6/16/2017  Reason(s) for Admission: high blood pressure, shortness of breath.  Diagnosed with an Acute MI, Hypertensive urgency, CHF.    Was felt that he had significant HTN that resulted in acute episode of HF.   Non ST elevation MI due to severe HTN, but heart cath showed trivial non-obstructive CAD.  EMILY vs CKD - as renal panel was stable thoughout visit   Needs repeat BMP for re-assessment and with being on lasix.  Has been tracking weight since discharge which was been stable - brings report of 5 readings showing start weight of 385 and he is down to 379 today on home scale.  Using BP machine at [facility] - highest reading since d/c 159/91 and best reading was 132/76  Of 5 readings 4 were above 140/90.  Considering getting a home cuff.  Needs sleep study - admits to snoring. No daytime fatigue or napping though.    No longer having SOB, swelling in LE. No orthopnea.   Feels markedly better.    Used to smoke 1/2 pack per day - smoked off/on for 10 yrs.  Attempting to quit smoking already - none all week while in hospital, but admits he did smoke about 4-5 cigarettes on Sunday. 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     Works at an office during the week so during the week he didn't have any cigarettes as doesn't have urge to smoke. Wants to continue efforts on own for now, but will re-address at his CPE.           Problems taking medications regularly:  None       Medication changes since discharge: yes, lipitor, coreg, lasix, lisinopril       Problems adhering to non-medication therapy:  none    Summary of hospitalization:  [place] hospital discharge  summary reviewed  Diagnostic Tests/Treatments reviewed.  Follow up needed: Cardiology appointment is scheduled for 6/28 and needs a referral for sleep study  Other Healthcare Providers Involved in Patient s Care: cardiology, sleep medicine coming up  Update since discharge: improved.     Post Discharge Medication Reconciliation: discharge medications reconciled, continue medications without change.  Plan of care communicated with patient     Coding guidelines for this visit:  Type of Medical   Decision Making Face-to-Face Visit       within 7 Days of discharge Face-to-Face Visit        within 14 days of discharge   Moderate Complexity [id number]   High Complexity [id number]          Problem list and histories reviewed & adjusted, as indicated.  Additional history: as documented    Patient Active Problem List   Diagnosis     Congestive heart failure, unspecified congestive heart failure chronicity, unspecified congestive heart failure type (H)     Hypertensive emergency     Hypertensive urgency     Renal insufficiency     NSTEMI (non-ST elevated myocardial infarction) (H)     History reviewed. No pertinent surgical history.    Social History   Substance Use Topics     Smoking status: Current Every Day Smoker     Types: Cigarettes     Smokeless tobacco: Not on file     Alcohol use Yes      Comment: occassional     History reviewed. No pertinent family history.      Current Outpatient Prescriptions   Medication Sig Dispense Refill     aspirin EC 81 MG EC tablet Take 1 tablet (81 mg) by mouth daily 30 tablet 3     atorvastatin (LIPITOR) 10 MG tablet Take 1 tablet (10 mg) by mouth daily 30 tablet 1     lisinopril (PRINIVIL/ZESTRIL) 20 MG tablet Take 1 tablet (20 mg) by mouth 2 times daily 60 tablet 1     carvedilol (COREG) 25 MG tablet Take 1 tablet (25 mg) by mouth 2 times daily (with meals) 60 tablet 1     furosemide (LASIX) 20 MG tablet Take 1 tablet (20 mg) by mouth daily 30 tablet 1     spironolactone (ALDACTONE)  "25 MG tablet Take 1 tablet (25 mg) by mouth daily 30 tablet 1     MELATONIN PO Take 10 mg by mouth nightly as needed        TRAZODONE HCL PO Take 50 mg by mouth nightly as needed for sleep       No Known Allergies    Reviewed and updated as needed this visit by clinical staff  Tobacco  Allergies  Meds  Med Hx  Surg Hx  Fam Hx  Soc Hx      Reviewed and updated as needed this visit by Provider  Tobacco  Allergies  Meds  Med Hx  Surg Hx  Fam Hx  Soc Hx        ROS:  Constitutional, HEENT, cardiovascular, pulmonary, gi and gu systems are negative, except as otherwise noted.    OBJECTIVE:                                                    /88  Pulse 89  Temp 98.8  F (37.1  C) (Oral)  Ht 5' 11\" (1.803 m)  Wt (!) 379 lb (171.9 kg)  SpO2 96%  BMI 52.86 kg/m2  Body mass index is 52.86 kg/(m^2).  GENERAL: healthy, alert and no distress  EYES: Eyes grossly normal to inspection, PERRL and conjunctivae and sclerae normal  RESP: lungs clear to auscultation - no rales, rhonchi or wheezes  CV: regular rate and rhythm, normal S1 S2, no S3 or S4, no murmur, click or rub, no peripheral edema and peripheral pulses strong  EXT: No LE edema.    Diagnostic Test Results:  BMP pending.     ASSESSMENT/PLAN:                                                        ICD-10-CM    1. Congestive heart failure, unspecified congestive heart failure chronicity, unspecified congestive heart failure type (H) I50.9 SLEEP EVALUATION & MANAGEMENT REFERRAL - ADULT     Basic metabolic panel  (Ca, Cl, CO2, Creat, Gluc, K, Na, BUN)   2. Hypertensive urgency I16.0 SLEEP EVALUATION & MANAGEMENT REFERRAL - ADULT     Basic metabolic panel  (Ca, Cl, CO2, Creat, Gluc, K, Na, BUN)   3. Renal insufficiency N28.9 Basic metabolic panel  (Ca, Cl, CO2, Creat, Gluc, K, Na, BUN)   4. NSTEMI (non-ST elevated myocardial infarction) (H) I21.4    5. Hypertensive emergency I16.1 Basic metabolic panel  (Ca, Cl, CO2, Creat, Gluc, K, Na, BUN)   6. Snoring " R06.83 SLEEP EVALUATION & MANAGEMENT REFERRAL - ADULT   7. Morbid obesity with BMI of 50.0-59.9, adult (H) E66.01     Z68.43    Weight and BP stable since hospital d/c.  Will defer to cardiology regarding final medication combination.  Long discussion had regarding treating any other underlying contributing conditions like potential MAURI and need to optimize other CV risk factors with smoking cessation and continued weight loss.  Pt would like to start with sleep study and quitting cigarettes on own, but is amenable to further discussion at physical.  May also need to consider nephrology consult moving forward should he persist to have abnormal kidney function. Reviewed why maintaining good BP control is imperative to minimize any further kidney damage.  Pt in agreement with plan.  Re-check after completion of sleep study.  See Patient Instructions  Patient Instructions   So glad you're feeling better.  Continue meds without changes until follow and further recommendations by cardiology.  Re-check BMP to re-assess kidney function and electrolytes.  Needs further evaluation with sleep study - referral given.  Advised follow-up after cardiology visit for physical and further smoking cessation counseling.  Cessation options offered, but would like to continue own efforts for now.  Make list of triggers and alternate plan for follow-up.    Electronically Signed By: Eva Varner PA-C

## 2017-06-21 NOTE — NURSING NOTE
"Chief Complaint   Patient presents with     Hospital F/U       Initial /88  Pulse 89  Temp 98.8  F (37.1  C) (Oral)  Ht 5' 11\" (1.803 m)  Wt (!) 379 lb (171.9 kg)  SpO2 96%  BMI 52.86 kg/m2 Estimated body mass index is 52.86 kg/(m^2) as calculated from the following:    Height as of this encounter: 5' 11\" (1.803 m).    Weight as of this encounter: 379 lb (171.9 kg).  Medication Reconciliation: complete    "

## 2017-06-22 LAB
ANION GAP SERPL CALCULATED.3IONS-SCNC: 10 MMOL/L (ref 3–14)
BUN SERPL-MCNC: 30 MG/DL (ref 7–30)
CALCIUM SERPL-MCNC: 9.3 MG/DL (ref 8.5–10.1)
CHLORIDE SERPL-SCNC: 103 MMOL/L (ref 94–109)
CO2 SERPL-SCNC: 27 MMOL/L (ref 20–32)
CREAT SERPL-MCNC: 1.72 MG/DL (ref 0.66–1.25)
GFR SERPL CREATININE-BSD FRML MDRD: 46 ML/MIN/1.7M2
GLUCOSE SERPL-MCNC: 95 MG/DL (ref 70–99)
POTASSIUM SERPL-SCNC: 4.9 MMOL/L (ref 3.4–5.3)
SODIUM SERPL-SCNC: 140 MMOL/L (ref 133–144)

## 2017-06-22 NOTE — TELEPHONE ENCOUNTER
MTM referral from: Transitions of Care (recent hospital discharge or ED visit)    MTM referral outreach attempt #2 on June 22, 2017 at 11:06 AM      Outcome: Patient not reachable after several attempts, will route to MTM Pharmacist/Provider as an FYI. Thank you for the referral.    Elizabeth Garcia MTM Coordinator

## 2017-06-27 DIAGNOSIS — I50.9 CONGESTIVE HEART FAILURE, UNSPECIFIED CONGESTIVE HEART FAILURE CHRONICITY, UNSPECIFIED CONGESTIVE HEART FAILURE TYPE: Primary | ICD-10-CM

## 2017-06-28 ENCOUNTER — OFFICE VISIT (OUTPATIENT)
Dept: CARDIOLOGY | Facility: CLINIC | Age: 32
End: 2017-06-28
Attending: PHYSICIAN ASSISTANT
Payer: COMMERCIAL

## 2017-06-28 VITALS
BODY MASS INDEX: 44.1 KG/M2 | DIASTOLIC BLOOD PRESSURE: 86 MMHG | HEIGHT: 71 IN | OXYGEN SATURATION: 94 % | HEART RATE: 81 BPM | WEIGHT: 315 LBS | SYSTOLIC BLOOD PRESSURE: 128 MMHG

## 2017-06-28 DIAGNOSIS — I50.9 CONGESTIVE HEART FAILURE, UNSPECIFIED CONGESTIVE HEART FAILURE CHRONICITY, UNSPECIFIED CONGESTIVE HEART FAILURE TYPE: ICD-10-CM

## 2017-06-28 DIAGNOSIS — I21.4 NSTEMI (NON-ST ELEVATED MYOCARDIAL INFARCTION) (H): ICD-10-CM

## 2017-06-28 DIAGNOSIS — I25.10 ASHD (ARTERIOSCLEROTIC HEART DISEASE): ICD-10-CM

## 2017-06-28 DIAGNOSIS — I50.23 ACUTE ON CHRONIC SYSTOLIC CONGESTIVE HEART FAILURE (H): Primary | ICD-10-CM

## 2017-06-28 LAB
ANION GAP SERPL CALCULATED.3IONS-SCNC: 5 MMOL/L (ref 3–14)
BUN SERPL-MCNC: 25 MG/DL (ref 7–30)
CALCIUM SERPL-MCNC: 9 MG/DL (ref 8.5–10.1)
CHLORIDE SERPL-SCNC: 107 MMOL/L (ref 94–109)
CO2 SERPL-SCNC: 27 MMOL/L (ref 20–32)
CREAT SERPL-MCNC: 1.6 MG/DL (ref 0.66–1.25)
GFR SERPL CREATININE-BSD FRML MDRD: 50 ML/MIN/1.7M2
GLUCOSE SERPL-MCNC: 136 MG/DL (ref 70–99)
POTASSIUM SERPL-SCNC: 4.2 MMOL/L (ref 3.4–5.3)
SODIUM SERPL-SCNC: 139 MMOL/L (ref 133–144)

## 2017-06-28 PROCEDURE — 99214 OFFICE O/P EST MOD 30 MIN: CPT | Performed by: PHYSICIAN ASSISTANT

## 2017-06-28 PROCEDURE — 80048 BASIC METABOLIC PNL TOTAL CA: CPT | Performed by: PHYSICIAN ASSISTANT

## 2017-06-28 PROCEDURE — 36415 COLL VENOUS BLD VENIPUNCTURE: CPT | Performed by: PHYSICIAN ASSISTANT

## 2017-06-28 NOTE — PATIENT INSTRUCTIONS
Call the C.O.R.E. nurse for any questions or concerns:  [phone]    1. Medication changes from today:         * No medication changes today, continue current meds.     2. Continue to weigh yourself daily and write it down.    3. Call CORE nurse if your weight is up more than 2 pounds in one day or 5 pounds in one week.    4. Call CORE nurse if you feel more short of breath, have more abdominal bloating, or leg swelling.  Also call if lightheaded, dizzy or feel like you will pass out.     5. Continue low sodium diet (less than 2000 mg daily). If you eat less salt, you will retain less fluid.    6. Alcohol can weaken your heart further. You should avoid alcohol or limit its use to special times, such as a holiday or birthday.    7. Do NOT take Aleve or ibuprofen (Advil) without talking to your doctor first.     8. Lab Results:     Component      Latest Ref Rng & Units 6/28/2017   Sodium      133 - 144 mmol/L 139   Potassium      3.4 - 5.3 mmol/L 4.2   Chloride      94 - 109 mmol/L 107   Carbon Dioxide      20 - 32 mmol/L 27   Anion Gap      3 - 14 mmol/L 5   Glucose      70 - 99 mg/dL 136 (H)   Urea Nitrogen      7 - 30 mg/dL 25   Creatinine      0.66 - 1.25 mg/dL 1.60 (H)   GFR Estimate      >60 mL/min/1.7m2 50 (L)   GFR Estimate If Black      >60 mL/min/1.7m2 61   Calcium      8.5 - 10.1 mg/dL 9.0     8.   Follow-Up:    * 2 weeks: nurse visit with lab and check blood pressure   * 1 month: see Prem Painter PA-C with lab   * 2 months: see Dr. Sow with cardiac MRI, EKG, labs (BMP, FLP, ALT)    [facility] Clinic: Cardiomyopathy, Optimization, Rehabilitation, Education  The [facility] is a heart failure specialty clinic within the [facility] where you will work with specialized nurse practitioners, physician assistants, doctors, and registered nurses. They are dedicated to helping patients with heart failure to carefully adjust medications, receive education, and learn who and when to call if symptoms  develop. They specialize in helping you better understand your condition, slow the progression of your disease, improve the length and quality of your life, help you detect future heart problems before they become life threatening, and avoid hospitalizations.

## 2017-06-28 NOTE — MR AVS SNAPSHOT
After Visit Summary   6/28/2017    Brooks Mensah    MRN: [id number]           Patient Information     Date Of Birth          1985        Visit Information        Provider Department      6/28/2017 8:10 AM Prem Painter PA-C [facility] [place]        Today's Diagnoses     Acute on chronic systolic congestive heart failure (H)    -  1    NSTEMI (non-ST elevated myocardial infarction) (H)        Congestive heart failure, unspecified congestive heart failure chronicity, unspecified congestive heart failure type (H)        ASHD (arteriosclerotic heart disease)          Care Instructions    Call the C.O.R.E. nurse for any questions or concerns:  [phone]    1. Medication changes from today:         * No medication changes today, continue current meds.     2. Continue to weigh yourself daily and write it down.    3. Call CORE nurse if your weight is up more than 2 pounds in one day or 5 pounds in one week.    4. Call CORE nurse if you feel more short of breath, have more abdominal bloating, or leg swelling.  Also call if lightheaded, dizzy or feel like you will pass out.     5. Continue low sodium diet (less than 2000 mg daily). If you eat less salt, you will retain less fluid.    6. Alcohol can weaken your heart further. You should avoid alcohol or limit its use to special times, such as a holiday or birthday.    7. Do NOT take Aleve or ibuprofen (Advil) without talking to your doctor first.     8. Lab Results:     Component      Latest Ref Rng & Units 6/28/2017   Sodium      133 - 144 mmol/L 139   Potassium      3.4 - 5.3 mmol/L 4.2   Chloride      94 - 109 mmol/L 107   Carbon Dioxide      20 - 32 mmol/L 27   Anion Gap      3 - 14 mmol/L 5   Glucose      70 - 99 mg/dL 136 (H)   Urea Nitrogen      7 - 30 mg/dL 25   Creatinine      0.66 - 1.25 mg/dL 1.60 (H)   GFR Estimate      >60 mL/min/1.7m2 50 (L)   GFR Estimate If Black      >60 mL/min/1.7m2 61   Calcium       8.5 - 10.1 mg/dL 9.0     8.   Follow-Up:    * 2 weeks: nurse visit with lab and check blood pressure   * 1 month: see Prem Painter PA-C with lab   * 2 months: see Dr. Sow with cardiac MRI, EKG, labs (BMP, FLP, ALT)    [facility]: Cardiomyopathy, Optimization, Rehabilitation, Education  The [facility] Clinic is a heart failure specialty clinic within the [facility] where you will work with specialized nurse practitioners, physician assistants, doctors, and registered nurses. They are dedicated to helping patients with heart failure to carefully adjust medications, receive education, and learn who and when to call if symptoms develop. They specialize in helping you better understand your condition, slow the progression of your disease, improve the length and quality of your life, help you detect future heart problems before they become life threatening, and avoid hospitalizations.            Follow-ups after your visit        Additional Services     Follow-Up with Nurse       Early morning preferable  BP check and BMP            Follow-Up with CORE Clinic           Follow-Up with Cardiologist                 Your next 10 appointments already scheduled     Jul 12, 2017  9:30 AM CDT   Nurse Only with RU [facility] HRT NURSE   [facility] ([facility])    [address]  [facility] [address]   [phone]            Jul 12, 2017 10:00 AM CDT   LAB with RU LAB   [facility] ([facility])    [address]  [facility] [address]   [phone]           Patient must bring picture ID.  Patient should be prepared to give a urine specimen  Please do not eat 10-12 hours before your appointment if you are coming in fasting for labs on lipids, cholesterol, or glucose (sugar).  Pregnant women should follow their Care Team instructions. Water with medications is okay. Do not drink coffee or other  fluids.   If you have concerns about taking  your medications, please ask at office or if scheduling via [employer], send a message by clicking on Secure Messaging, Message Your Care Team.            Aug 02, 2017  8:00 AM CDT   LAB with RU LAB   [facility] ([facility])    [address] [place] [address] [address]  [facility] [address]   [phone]           Patient must bring picture ID.  Patient should be prepared to give a urine specimen  Please do not eat 10-12 hours before your appointment if you are coming in fasting for labs on lipids, cholesterol, or glucose (sugar).  Pregnant women should follow their Care Team instructions. Water with medications is okay. Do not drink coffee or other fluids.   If you have concerns about taking  your medications, please ask at office or if scheduling via [employer], send a message by clicking on Secure Messaging, Message Your Care Team.            Aug 02, 2017  8:50 AM CDT   Core Return with Prem Painter PA-C   [facility] ([facility])    [address] [place] [address] [address]  [facility] [address]   [phone]            Aug 25, 2017  8:00 AM CDT   MR MYOCARDIUM W CONTRAST with SCIMR1   [facility] (Cardiovascular Imaging at [facility])    [address] [place] [id number] [phone]           Take your medicines as usual, unless your doctor tells you not to. Bring a list of your current medicines to your exam (including vitamins, minerals and over-the-counter drugs).  You will be given intravenous contrast for this exam. To prepare:   The day before your exam, drink extra fluids at least six 8-ounce glasses (unless your doctor tells you to restrict your fluids).   Have a blood test (creatinine test) within 30 days of your exam. Go to your clinic or Diagnostic Imaging Department for this test.  The MRI machine uses a strong magnet.  Please wear clothes without metal (snaps, zippers). A sweatsuit works well, or we may give you a hospital gown.  Please remove any body piercings and hair extensions before you arrive. You will also remove watches, jewelry, hairpins, wallets, dentures, partial dental plates and hearing aids. You may wear contact lenses, and you may be able to wear your rings. We have a safe place to keep your personal items, but it is safer to leave them at home.   **IMPORTANT** THE INSTRUCTIONS BELOW ARE ONLY FOR THOSE PATIENTS WHO HAVE BEEN TOLD THEY WILL RECEIVE SEDATION OR GENERAL ANESTHESIA DURING THEIR MRI PROCEDURE:  IF YOU WILL RECEIVE SEDATION (take medicine to help you relax during your exam):   You must get the medicine from your doctor before you arrive. Bring the medicine to the exam. Do not take it at home.   Arrive one hour early. Bring someone who can take you home after the test. Your medicine will make you sleepy. After the exam, you may not drive, take a bus or take a taxi by yourself.   No eating 8 hours before your exam. You may have clear liquids up until 4 hours before your exam. (Clear liquids include water, clear tea, black coffee and fruit juice without pulp.)  IF YOU WILL RECEIVE ANESTHESIA (be asleep for your exam):   Arrive 1 1/2 hours early. Bring someone who can take you home after the test. You may not drive, take a bus or take a taxi by yourself.   No eating 8 hours before your exam. You may have clear liquids up until 4 hours before your exam. (Clear liquids include water, clear tea, black coffee and fruit juice without pulp.)  Please call the Imaging Department at your exam site with any questions.            Sep 18, 2017  1:45 PM CDT   LAB with RU LAB   [facility] ([facility])    [address]  [facility] [address] [phone]           Patient must bring picture ID.  Patient should be prepared to give a urine specimen  Please  do not eat 10-12 hours before your appointment if you are coming in fasting for labs on lipids, cholesterol, or glucose (sugar).  Pregnant women should follow their Care Team instructions. Water with medications is okay. Do not drink coffee or other fluids.   If you have concerns about taking  your medications, please ask at office or if scheduling via [employer], send a message by clicking on Secure Messaging, Message Your Care Team.            Sep 18, 2017  2:45 PM CDT   Return Visit with Manuel Sow MD   [facility]YS[facility] AT [place] ([facility])    [address] [address] [id number] [phone]              Future tests that were ordered for you today     Open Future Orders        Priority Expected Expires Ordered    Basic metabolic panel Routine 8/25/2017 6/28/2018 6/28/2017    Lipid Profile Routine 8/25/2017 6/28/2018 6/28/2017    ALT Routine 8/25/2017 6/28/2018 6/28/2017    EKG 12-lead complete w/read - Clinics Routine 8/25/2017 6/28/2018 6/28/2017    MRI Cardiac w/contrast Routine 8/25/2017 6/28/2018 6/28/2017    Follow-Up with Cardiologist Routine 8/25/2017 6/28/2018 6/28/2017    Basic metabolic panel Routine 7/28/2017 6/28/2018 6/28/2017    Follow-Up with [facility] Clinic Routine 7/28/2017 6/28/2018 6/28/2017    Basic metabolic panel Routine 7/12/2017 6/28/2019 6/28/2017    Follow-Up with Nurse Routine 7/12/2017 6/28/2018 6/28/2017            Who to contact     If you have questions or need follow up information about today's clinic visit or your schedule please contact [facility] directly at [phone].  Normal or non-critical lab and imaging results will be communicated to you by MyChart, letter or phone within 4 business days after the clinic has received the results. If you do not hear from us within 7 days, please contact the clinic through MyChart or phone. If you have a critical or abnormal lab result, we  "will notify you by phone as soon as possible.  Submit refill requests through [employer] or call your pharmacy and they will forward the refill request to us. Please allow 3 business days for your refill to be completed.          Additional Information About Your Visit        [employer] Information     [employer]Pixies lets you send messages to your doctor, view your test results, renew your prescriptions, schedule appointments and more. To sign up, go to [email or web address] . Click on \"Log in\" on the left side of the screen, which will take you to the Welcome page. Then click on \"Sign up Now\" on the right side of the page.     You will be asked to enter the access code listed below, as well as some personal information. Please follow the directions to create your username and password.     Your access code is: [email or web address]  Expires: 2017 12:51 PM     Your access code will  in 90 days. If you need help or a new code, please call your [place] clinic or [phone].        Care EveryWhere ID     This is your Care EveryWhere ID. This could be used by other organizations to access your [place] medical records  [id number]        Your Vitals Were     Pulse Height Pulse Oximetry BMI (Body Mass Index)          81 1.803 m (5' 11\") 94% 52.36 kg/m2         Blood Pressure from Last 3 Encounters:   17 128/86   17 132/88   17 131/73    Weight from Last 3 Encounters:   17 (!) 170.3 kg (375 lb 6.4 oz)   17 (!) 171.9 kg (379 lb)   17 (!) 178.2 kg (392 lb 13.8 oz)              We Performed the Following     Follow-Up with cardiac sub-specialty clinic        Primary Care Provider Office Phone # Fax #    Eva Varner PA-C [phone]       [facility] [place] [address]    [place] [address]        Equal Access to Services     LONNY DINH AH: Hadii abhishek ku moriaho Soamaury, waaxda luqadaha, qaybta baldemar germain. So " [facility] [phone].    ATENCIÓN: Si mei delacruz, tiene a tello disposición servicios gratuitos de asistencia lingüística. Marion carreon [phone].    We comply with applicable federal civil rights laws and [place] laws. We do not discriminate on the basis of race, color, national origin, age, disability sex, sexual orientation or gender identity.            Thank you!     Thank you for choosing [facility] AT [place]  for your care. Our goal is always to provide you with excellent care. Hearing back from our patients is one way we can continue to improve our services. Please take a few minutes to complete the written survey that you may receive in the mail after your visit with us. Thank you!             Your Updated Medication List - Protect others around you: Learn how to safely use, store and throw away your medicines at www.disposemymeds.org.          This list is accurate as of: 6/28/17  8:54 AM.  Always use your most recent med list.                   Brand Name Dispense Instructions for use Diagnosis    aspirin 81 MG EC tablet     30 tablet    Take 1 tablet (81 mg) by mouth daily    NSTEMI (non-ST elevated myocardial infarction) (H)       atorvastatin 10 MG tablet    LIPITOR    30 tablet    Take 1 tablet (10 mg) by mouth daily    NSTEMI (non-ST elevated myocardial infarction) (H)       carvedilol 25 MG tablet    COREG    60 tablet    Take 1 tablet (25 mg) by mouth 2 times daily (with meals)    Hypertensive emergency       furosemide 20 MG tablet    LASIX    30 tablet    Take 1 tablet (20 mg) by mouth daily    Congestive heart failure, unspecified congestive heart failure chronicity, unspecified congestive heart failure type (H)       lisinopril 20 MG tablet    PRINIVIL/ZESTRIL    60 tablet    Take 1 tablet (20 mg) by mouth 2 times daily    Hypertensive emergency       MELATONIN PO      Take 10 mg by mouth nightly as needed        spironolactone 25 MG tablet    ALDACTONE    30 tablet     Take 1 tablet (25 mg) by mouth daily    Congestive heart failure, unspecified congestive heart failure chronicity, unspecified congestive heart failure type (H)       TRAZODONE HCL PO      Take 50 mg by mouth nightly as needed for sleep

## 2017-06-28 NOTE — PROGRESS NOTES
HPI and Plan:   See dictation  [id number]    Orders this Visit:  Orders Placed This Encounter   Procedures     MRI Cardiac w/contrast     Basic metabolic panel     Basic metabolic panel     Basic metabolic panel     Lipid Profile     ALT     Follow-Up with Nurse     Follow-Up with [facility] Clinic     Follow-Up with Cardiologist     EKG 12-lead complete w/read - Clinics     No orders of the defined types were placed in this encounter.    There are no discontinued medications.      Encounter Diagnoses   Name Primary?     NSTEMI (non-ST elevated myocardial infarction) (H)      Congestive heart failure, unspecified congestive heart failure chronicity, unspecified congestive heart failure type (H)      Acute on chronic systolic congestive heart failure (H) Yes     ASHD (arteriosclerotic heart disease)        CURRENT MEDICATIONS:  Current Outpatient Prescriptions   Medication Sig Dispense Refill     aspirin EC 81 MG EC tablet Take 1 tablet (81 mg) by mouth daily 30 tablet 3     atorvastatin (LIPITOR) 10 MG tablet Take 1 tablet (10 mg) by mouth daily 30 tablet 1     lisinopril (PRINIVIL/ZESTRIL) 20 MG tablet Take 1 tablet (20 mg) by mouth 2 times daily 60 tablet 1     carvedilol (COREG) 25 MG tablet Take 1 tablet (25 mg) by mouth 2 times daily (with meals) 60 tablet 1     furosemide (LASIX) 20 MG tablet Take 1 tablet (20 mg) by mouth daily 30 tablet 1     spironolactone (ALDACTONE) 25 MG tablet Take 1 tablet (25 mg) by mouth daily 30 tablet 1     MELATONIN PO Take 10 mg by mouth nightly as needed        TRAZODONE HCL PO Take 50 mg by mouth nightly as needed for sleep         ALLERGIES  No Known Allergies    PAST MEDICAL HISTORY:  No past medical history on file.    PAST SURGICAL HISTORY:  No past surgical history on file.    FAMILY HISTORY:  No family history on file.    SOCIAL HISTORY:  Social History     Social History     Marital status: Single     Spouse name: N/A     Number of children: N/A     Years of education: N/A  "    Social History Main Topics     Smoking status: Current Every Day Smoker     Types: Cigarettes     Smokeless tobacco: Not on file     Alcohol use Yes      Comment: occassional     Drug use: No     Sexual activity: Not on file     Other Topics Concern     Not on file     Social History Narrative       Review of Systems:  Skin:  Negative     Eyes:  Negative    ENT:  Negative    Respiratory:  Negative    Cardiovascular:  Negative    Gastroenterology: Negative    Genitourinary:  Negative    Musculoskeletal:  Negative    Neurologic:  Negative    Psychiatric:  Positive for substance abuse;excessive alcohol consumption  Heme/Lymph/Imm:       Endocrine:           Physical Exam:  Vitals: /86 (BP Location: Right arm, Patient Position: Chair, Cuff Size: Adult Large)  Pulse 81  Ht 1.803 m (5' 11\")  Wt (!) 170.3 kg (375 lb 6.4 oz)  SpO2 94%  BMI 52.36 kg/m2    Constitutional:  cooperative, alert and oriented, well developed, well nourished, in no acute distress morbidly obese      Skin:  warm and dry to the touch        Head:  normocephalic, no masses or lesions        Eyes:  pupils equal and round;conjunctivae and lids unremarkable;sclera white;no xanthalasma        ENT:  no pallor or cyanosis        Neck:      difficult to assess given neck size, but no apparent JVD    Chest:  clear to auscultation        Cardiac: regular rhythm   distant heart sounds         no S3 or S4 appreciated, no murmur appreciated     Abdomen:  abdomen soft;BS normoactive;non-tender obese      Vascular: pulses full and equal                                 right radial site: 2+ pulse, reverse paty test normal    Extremities and Back:  no deformities, clubbing, cyanosis, erythema observed;no edema        Neurological:  affect appropriate, oriented to time, person and place;no gross motor deficits          Recent Lab Results:  LIPID RESULTS:  Lab Results   Component Value Date    CHOL 154 06/14/2017    HDL 41 06/14/2017    LDL 87 " 06/14/2017    TRIG 132 06/14/2017       LIVER ENZYME RESULTS:  Lab Results   Component Value Date    AST 30 06/14/2017    ALT 47 06/14/2017       CBC RESULTS:  Lab Results   Component Value Date    WBC 13.3 (H) 06/16/2017    RBC 4.04 (L) 06/16/2017    HGB 11.7 (L) 06/16/2017    HCT 37.2 (L) 06/16/2017    MCV 92 06/16/2017    MCH 29.0 06/16/2017    MCHC 31.5 06/16/2017    RDW 16.6 (H) 06/16/2017     06/16/2017       BMP RESULTS:  Lab Results   Component Value Date     06/28/2017    POTASSIUM 4.2 06/28/2017    CHLORIDE 107 06/28/2017    CO2 27 06/28/2017    ANIONGAP 5 06/28/2017     (H) 06/28/2017    BUN 25 06/28/2017    CR 1.60 (H) 06/28/2017    GFRESTIMATED 50 (L) 06/28/2017    GFRESTBLACK 61 06/28/2017    GAYLE 9.0 06/28/2017        A1C RESULTS:  No results found for: A1C    INR RESULTS:  No results found for: INR        CC  Prem Painter PA-C   PHYSICIANS  [address] [address]

## 2017-06-28 NOTE — LETTER
6/28/2017    Eva Varner PA-C  [address]  [facility] [address]    RE: Brooks Mensah       Dear Colleague,    I had the pleasure of seeing Brooks Mensah in the [facility].    HPI and Plan:   See dictation  [id number]    Orders this Visit:  Orders Placed This Encounter   Procedures     MRI Cardiac w/contrast     Basic metabolic panel     Basic metabolic panel     Basic metabolic panel     Lipid Profile     ALT     Follow-Up with Nurse     Follow-Up with [facility]     Follow-Up with Cardiologist     EKG 12-lead complete w/read - Clinics     No orders of the defined types were placed in this encounter.    There are no discontinued medications.      Encounter Diagnoses   Name Primary?     NSTEMI (non-ST elevated myocardial infarction) (H)      Congestive heart failure, unspecified congestive heart failure chronicity, unspecified congestive heart failure type (H)      Acute on chronic systolic congestive heart failure (H) Yes     ASHD (arteriosclerotic heart disease)        CURRENT MEDICATIONS:  Current Outpatient Prescriptions   Medication Sig Dispense Refill     aspirin EC 81 MG EC tablet Take 1 tablet (81 mg) by mouth daily 30 tablet 3     atorvastatin (LIPITOR) 10 MG tablet Take 1 tablet (10 mg) by mouth daily 30 tablet 1     lisinopril (PRINIVIL/ZESTRIL) 20 MG tablet Take 1 tablet (20 mg) by mouth 2 times daily 60 tablet 1     carvedilol (COREG) 25 MG tablet Take 1 tablet (25 mg) by mouth 2 times daily (with meals) 60 tablet 1     furosemide (LASIX) 20 MG tablet Take 1 tablet (20 mg) by mouth daily 30 tablet 1     spironolactone (ALDACTONE) 25 MG tablet Take 1 tablet (25 mg) by mouth daily 30 tablet 1     MELATONIN PO Take 10 mg by mouth nightly as needed        TRAZODONE HCL PO Take 50 mg by mouth nightly as needed for sleep         ALLERGIES  No Known Allergies    PAST MEDICAL HISTORY:  No past medical history on file.    PAST SURGICAL HISTORY:  No past surgical history on  "file.    FAMILY HISTORY:  No family history on file.    SOCIAL HISTORY:  Social History     Social History     Marital status: Single     Spouse name: N/A     Number of children: N/A     Years of education: N/A     Social History Main Topics     Smoking status: Current Every Day Smoker     Types: Cigarettes     Smokeless tobacco: Not on file     Alcohol use Yes      Comment: occassional     Drug use: No     Sexual activity: Not on file     Other Topics Concern     Not on file     Social History Narrative       Review of Systems:  Skin:  Negative     Eyes:  Negative    ENT:  Negative    Respiratory:  Negative    Cardiovascular:  Negative    Gastroenterology: Negative    Genitourinary:  Negative    Musculoskeletal:  Negative    Neurologic:  Negative    Psychiatric:  Positive for substance abuse;excessive alcohol consumption  Heme/Lymph/Imm:       Endocrine:           Physical Exam:  Vitals: /86 (BP Location: Right arm, Patient Position: Chair, Cuff Size: Adult Large)  Pulse 81  Ht 1.803 m (5' 11\")  Wt (!) 170.3 kg (375 lb 6.4 oz)  SpO2 94%  BMI 52.36 kg/m2    Constitutional:  cooperative, alert and oriented, well developed, well nourished, in no acute distress morbidly obese      Skin:  warm and dry to the touch        Head:  normocephalic, no masses or lesions        Eyes:  pupils equal and round;conjunctivae and lids unremarkable;sclera white;no xanthalasma        ENT:  no pallor or cyanosis        Neck:      difficult to assess given neck size, but no apparent JVD    Chest:  clear to auscultation        Cardiac: regular rhythm   distant heart sounds         no S3 or S4 appreciated, no murmur appreciated     Abdomen:  abdomen soft;BS normoactive;non-tender obese      Vascular: pulses full and equal                                 right radial site: 2+ pulse, reverse paty test normal    Extremities and Back:  no deformities, clubbing, cyanosis, erythema observed;no edema        Neurological:  affect " appropriate, oriented to time, person and place;no gross motor deficits          Recent Lab Results:  LIPID RESULTS:  Lab Results   Component Value Date    CHOL 154 06/14/2017    HDL 41 06/14/2017    LDL 87 06/14/2017    TRIG 132 06/14/2017       LIVER ENZYME RESULTS:  Lab Results   Component Value Date    AST 30 06/14/2017    ALT 47 06/14/2017       CBC RESULTS:  Lab Results   Component Value Date    WBC 13.3 (H) 06/16/2017    RBC 4.04 (L) 06/16/2017    HGB 11.7 (L) 06/16/2017    HCT 37.2 (L) 06/16/2017    MCV 92 06/16/2017    MCH 29.0 06/16/2017    MCHC 31.5 06/16/2017    RDW 16.6 (H) 06/16/2017     06/16/2017       BMP RESULTS:  Lab Results   Component Value Date     06/28/2017    POTASSIUM 4.2 06/28/2017    CHLORIDE 107 06/28/2017    CO2 27 06/28/2017    ANIONGAP 5 06/28/2017     (H) 06/28/2017    BUN 25 06/28/2017    CR 1.60 (H) 06/28/2017    GFRESTIMATED 50 (L) 06/28/2017    GFRESTBLACK 61 06/28/2017    GAYLE 9.0 06/28/2017        A1C RESULTS:  No results found for: A1C    INR RESULTS:  No results found for: INR        CC  Prem Painter PA-C   [facility]  [address] [place] [address]                  Cardiology Progress Note          Assessment and Plan:   ASSESSMENT:  1.  Malignant hypertension. BP much better controlled, today 128/86.   2.   Acute systolic and diastolic CHF.  Echo 6/13/17: LVEF 30-35%, grade III diastolic dysfunction.  Currently NYHA functional class II.   3.  Hypertensive cardiomyopathy.  Cor angio 6/25/17 without significant obstructive CAD. On BB, ACEi, spironolactone.   4.  Trivial coronary artery disease. No anginal sxs.  On ASA, statin.    5.  Mild renal insufficiency.  Overall stable on Lasix, spironolactone, lisinopril.   6.  Morbid obesity.   7.  Suspected obstructive sleep apnea.  Referral has been made.   8.  Chronic tobacco abuse.  Working on cutting back.   9.  Trace positive troponins, but not due to a demand ischemia.   10. Transient increased AV  block, overnight while sleeping, and on Coreg 50mg BID. Decreased to 25mg BID and no recurrence.       PLAN:  1.  No med changes today. Continue ASA 81mg, atorvastatin 10mg QHS for CAD. Continue Coreg 25mg BID, lisinopril 20mg BID, spironolactone 25mg, Lasix 20mg for CHF and HTN.   2.  Low sodium diet and Mediterranean style diet.   3.  Daily weights. Educated to call if his weight is trending up.   4.  General weight loss.   5.  Outpatient sleep study.   6.  Complete smoking cessation.   7.  Follow up 2 weeks: BMP and RN visit to check blood pressure  8.  Follow up in 1 month: Prem Painter PA-C with BMP  9.  Follow up in 2 months: Dr. Sow with cardiac MRI (called MRI tech and with his height/weight, they think MRI should accommodate him), EKG, labs (BMP, FLP, ALT)           HPI:   I had the pleasure of meeting Brooks Mensah today for hospital follow up and C[facility] clinic enrollment.     Brooks is a very pleasant 32 year old male with no significant past medical history, who was admitted to [facility] on 6/13/2017 w/ 2 wk progressive orthopnea, SOB, abdominal distension, and edema.  His blood pressures on initial admission were as high as 210s/140s.  He was typically 190/110.  He had mild left heart failure by chest x-ray.   NT pro BNP elevated.  Mild troponin elevation.   Echo 6/13/17: The left ventricle is normal in size with severe concentric left ventricular  hypertrophy. Septal and LV posterior wall thicknesses are 2.8 and 2.6 cm (the ULNL = 1.2 cm). Left ventricular systolic function is moderately reduced. The visual ejection fraction is estimated at 30-35%. This decrease in the LVEF is due to moderate global hypokinesia and moderate  to severe lateral wall hypokinesis. Grade III or advanced diastolic dysfunction is present. The right ventricular systolic function is normal. All four cardiac valves are normal in structure and function. Hypertensive CM vs hypertrophic CM are to be considered.    He was treated  "initially with nitrates, carvedilol, ACE inhibitors and Lasix diuresis.  With this, his pressures came down to be in the 140/80 range.       He underwent coronary angiography 6/15/17 that showed trivial coronary artery disease of the RCA with a stenosis of about 20% proximal and 30% distal disease.  Most of his coronary arteries were large and free of disease.     He had some transient high grade AVB on tele on Coreg 50mg BID dosing. This was during sleep. His Coreg was decreased to 25mg BID and this did not recur. He was discharged to home on ASA, atorvastatin, lisinopril, Coreg, spironolactone.     He was seen by PCP 6/21/17. Creatinine was 1.72.     Presents today for follow up. He says he is taking his medications as prescribed. He is feeling much better. He denies chest pain, SOB, edema, orthopnea, PND, lightheadedness, dizziness, near syncope, syncope. He is weighing daily. He is trying to eat low sodium. Works two jobs, [employer], and in [employer] in [place] on weekends. 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 Alcohol just once or twice a week at most. Creatinine today is 1.60, down a little from PCP visit.                      Review of Systems:   Review of Systems:  Skin:  Negative     Eyes:  Negative    ENT:  Negative    Respiratory:  Negative    Cardiovascular:  Negative    Gastroenterology: Negative    Genitourinary:  Negative    Musculoskeletal:  Negative    Neurologic:  Negative    Psychiatric:  Positive for substance abuse;excessive alcohol consumption  Heme/Lymph/Imm:       Endocrine:                   Physical Exam:     Vitals: /86 (BP Location: Right arm, Patient Position: Chair, Cuff Size: Adult Large)  Pulse 81  Ht 1.803 m (5' 11\")  Wt (!) 170.3 kg (375 lb 6.4 oz)  SpO2 94%  BMI 52.36 kg/m2  Constitutional:  cooperative, alert and oriented, well developed, well nourished, in no acute distress morbidly obese      Skin:  warm and dry to the touch        Head:  " normocephalic, no masses or lesions        Eyes:  pupils equal and round;conjunctivae and lids unremarkable;sclera white;no xanthalasma        ENT:  no pallor or cyanosis        Neck:      difficult to assess given neck size, but no apparent JVD    Chest:  clear to auscultation        Cardiac: regular rhythm   distant heart sounds         no S3 or S4 appreciated, no murmur appreciated     Abdomen:  abdomen soft;BS normoactive;non-tender obese      Vascular: pulses full and equal                                 right radial site: 2+ pulse, reverse paty test normal    Extremities and Back:  no deformities, clubbing, cyanosis, erythema observed;no edema        Neurological:  affect appropriate, oriented to time, person and place;no gross motor deficits                 Medications:     Current Outpatient Prescriptions   Medication Sig Dispense Refill     aspirin EC 81 MG EC tablet Take 1 tablet (81 mg) by mouth daily 30 tablet 3     atorvastatin (LIPITOR) 10 MG tablet Take 1 tablet (10 mg) by mouth daily 30 tablet 1     lisinopril (PRINIVIL/ZESTRIL) 20 MG tablet Take 1 tablet (20 mg) by mouth 2 times daily 60 tablet 1     carvedilol (COREG) 25 MG tablet Take 1 tablet (25 mg) by mouth 2 times daily (with meals) 60 tablet 1     furosemide (LASIX) 20 MG tablet Take 1 tablet (20 mg) by mouth daily 30 tablet 1     spironolactone (ALDACTONE) 25 MG tablet Take 1 tablet (25 mg) by mouth daily 30 tablet 1     MELATONIN PO Take 10 mg by mouth nightly as needed        TRAZODONE HCL PO Take 50 mg by mouth nightly as needed for sleep                  Data:   All laboratory data reviewed  No results found for this or any previous visit (from the past 24 hour(s)).    All laboratory data reviewed  Lab Results   Component Value Date    CHOL 154 06/14/2017     Lab Results   Component Value Date    HDL 41 06/14/2017     Lab Results   Component Value Date    LDL 87 06/14/2017     Lab Results   Component Value Date    TRIG 132 06/14/2017      No results found for: CHOLHDLRATIO  No results found for: TSH  Last Basic Metabolic Panel:  Lab Results   Component Value Date     06/28/2017      Lab Results   Component Value Date    POTASSIUM 4.2 06/28/2017     Lab Results   Component Value Date    CHLORIDE 107 06/28/2017     Lab Results   Component Value Date    GAYLE 9.0 06/28/2017     Lab Results   Component Value Date    CO2 27 06/28/2017     Lab Results   Component Value Date    BUN 25 06/28/2017     Lab Results   Component Value Date    CR 1.60 06/28/2017     Lab Results   Component Value Date     06/28/2017     Lab Results   Component Value Date    WBC 13.3 06/16/2017     Lab Results   Component Value Date    RBC 4.04 06/16/2017     Lab Results   Component Value Date    HGB 11.7 06/16/2017     Lab Results   Component Value Date    HCT 37.2 06/16/2017     Lab Results   Component Value Date    MCV 92 06/16/2017     Lab Results   Component Value Date    MCH 29.0 06/16/2017     Lab Results   Component Value Date    MCHC 31.5 06/16/2017     Lab Results   Component Value Date    RDW 16.6 06/16/2017     Lab Results   Component Value Date     06/16/2017                 DATE OF SERVICE:  6/28/2017    HISTORY OF PRESENT ILLNESS:  It was a pleasure following up with the patient today in the Cardiology Clinic for followup of recent admission, coronary angiogram and to enroll in the Cardiology [facility].      The patient is a very pleasant 32-year-old gentleman with a prior past medical history significant for tobacco use, morbid obesity who was admitted to [facility] 6/13/2017 with a 2-week history of progressive orthopnea, shortness of breath, abdominal distention and edema.  He was found to be in hypertensive emergency with blood pressures as high as 240/140.  He had evidence of CHF exacerbation.  A troponin was mildly elevated.   Echocardiogram was performed 6/14/2017 that showed severe LVH with septal and posterior wall  measurements of approximately 2.5 cm with 1.5 cm being the upper limits of normal.  There was diastolic dysfunction of the left ventricle and the LVEF was 30-35%.  Left atrium was not particularly dilated and the predicts also relatively normal.  He was treated initially with nitrates, Carvedilol, ACE inhibitor, Lasix for diuresis.  With these therapies, his blood pressures came down nicely.  Coronary angiography was performed 6/15/2017.  He did show trivial coronary artery disease of the right coronary artery with a stenosis of 10-20%, otherwise his coronary arteries were large and free of disease.   During his admission, it was noted that he likely has severe sleep apnea as he had some pauses on telemetry and outpatient sleep study was recommended.   Counseled on tobacco cessation and on heart failure indication.   On telemetry there were some brief episodes of what looked intermittent 3rd degree AV block in the middle of the night during sleep.  He was asymptomatic.  His Carvedilol was decreased from 50 mg twice daily to 25 mg twice daily.  Further monitoring showed sinus rhythm.      The patient presents today for followup.  He tells me that he is doing quite well.  He has no symptoms of shortness of breath, orthopnea, PND, edema.  He has no chest pain or palpitations.  He also has no lightheadedness, dizziness, near syncope or syncope.  He says he is tolerating his medications without any adverse side effects.  He is weighing himself daily and his weights have been stable.  He is attempting a low-sodium diet but it is difficult as he works 7 days a week and long days.  He drinks alcohol 1 day a week in moderation.  He smokes cigarettes on Sundays only.  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  He established with a primary care provider on 6/21/2017.  On that day, his weight was down to 379 pounds, and his creatinine  was a bit elevated 1.72.  Today, his weight is down a couple more pounds at 375 pounds 6.4 ounces, though his creatinine is trending downward at 1.60.      He goes to the grocery store once a week and checks his blood pressure, and it has been controlled.     PHYSICAL EXAMINATION:  Blood pressure 128/86, pulse 81, weight 375 pounds 6.4 ounces, BMI 52.47. General:  A 32-year-old  gentleman, morbidly obese, no apparent distress.  Neck is thick but no JVD appreciated.  Lungs are clear.  Cardiac is regular, distant heart sounds.  No S3 or S4 or murmurs appreciated.  Abdomen:  Obese, soft.  Bowel sounds present, nontender.  Extremities are warm without any pitting edema.  The right radial arteriotomy site was assessed.  There was a 2+ radial pulse.  The reverse Werner test was normal.  Neuro:  Alert and oriented x3.  No focal deficits.     DIAGNOSTICS:  6/28/2017 BMP:  Sodium 139, potassium 4.2, BUN 25, creatinine 1.60, GFR 50.  6/21/2017 BMP:  Sodium 148, potassium 4.9, BUN 30, creatinine 1.72, GFR 46.      6/15/2017 coronary angiogram showed a very large ectatic right coronary artery with a 20% ostial/proximal and distal 30% narrowing for the crux.  LVEDP 28 mmHg.      6/14/2017 echocardiogram, LV normal in size with severe concentric LVH.  Septal and LV posterior wall thicknesses are 2.8 and 2.6 cm (the upper limits of normal is 1.2 mm).  LVEF 30-35%.  Moderate global hypokinesia and moderate to severe lateral wall hypokinesis, grade 3 or advanced diastolic dysfunction is present.  RV function is normal.  All 4 cardiac valves are normal in structure and function.  Hypertensive cardiomyopathy versus hypertrophic cardiomyopathy are to be considered.    ASSESSMENT:    1.  Chronic tobacco abuse.  2.  Morbid obesity.  3.  Suspected obstructive sleep apnea.  4.  Mild renal insufficiency.  5.  Trivial nonobstructive coronary artery disease.  6.  Suspected hypertensive cardiomyopathy.  7.  Acute systolic and diastolic  congestive heart failure.  8.  Hypertension, malignant.  9.  Transient high-grade AV block during sleep, asymptomatic.    PLAN:  1.  The patient is doing very well following this admission for heart failure, coronary angiogram, diuresis and transitioned to oral medications.  He is taking his medications as prescribed without any adverse effects.  He is weighing himself daily, aiming for a low-sodium diet.  He does continue to smoke but only 1 day a week, on Sundays.  He drinks alcohol rarely, at most once or twice a week.  He has no symptoms of chest pain, shortness of breath, edema, palpitations, lightheadedness, dizziness, near syncope or syncope.    2.  He had what appears to be chronic mild renal insufficiency.  This was a bit more elevated on 6/21/2017 when he was seen in the primary care clinic.  His weight continues to trend down on his current diuretic dosing and lisinopril.  Perhaps it was more elevated due to his contrast dye exposure.  Nonetheless, we will have him repeat a basic metabolic panel in 2 weeks and check blood pressure at that time.  However, he will continue on his current medications at this time.  3.  I will follow up with the patient in the [facility] Clinic in 1 month with the basic metabolic panel.    4.  He will follow up with  ______ in 2 months with an EKG to follow his QT, cardiac MRI to evaluate for hypertensive cardiomyopathy versus hypertrophic cardiomyopathy, and re-evaluate LVEF with medical therapy, basic metabolic panel, fasting lipid panel/ALT.    5.  We discussed using alcohol sparingly.  We discussed the importance of complete smoking cessation.    6.  Daily weights.  He knows to give our office a call should his weight go up more than 2 or 3 in a day or 5 pounds in a week.  7.  Low-sodium diet.    8.  He should be referred for an outpatient sleep study.    9.  Weight loss which would positively affect his sleep disorder breathing, blood pressure, coronary disease.    Thank  you very much for allowing me to participate in the care of this patient, a very pleasant gentleman.  He is very stable today and so in 2 weeks we will just check a blood pressure and a basic metabolic panel, and he will be seen again in clinic in 1 month.    DATE OF SERVICE:  6/28/2017    HISTORY OF PRESENT ILLNESS:  It was a pleasure following up with the patient today in the Cardiology Clinic for followup of recent admission, coronary angiogram and to enroll in the Cardiology [facility].      The patient is a very pleasant 32-year-old gentleman with a prior past medical history significant for tobacco use, morbid obesity who was admitted to [facility] 6/13/2017 with a 2-week history of progressive orthopnea, shortness of breath, abdominal distention and edema.  He was found to be in hypertensive emergency with blood pressures as high as 240/140.  He had evidence of CHF exacerbation.  A troponin was mildly elevated.   Echocardiogram was performed 6/14/2017 that showed severe LVH with septal and posterior wall measurements of approximately 2.5 cm with 1.5 cm being the upper limits of normal.  There was diastolic dysfunction of the left ventricle and the LVEF was 30-35%.  Left atrium was not particularly dilated and the predicts also relatively normal.  He was treated initially with nitrates, Carvedilol, ACE inhibitor, Lasix for diuresis.  With these therapies, his blood pressures came down nicely.  Coronary angiography was performed 6/15/2017.  He did show trivial coronary artery disease of the right coronary artery with a stenosis of 10-20%, otherwise his coronary arteries were large and free of disease.   During his admission, it was noted that he likely has severe sleep apnea as he had some pauses on telemetry and outpatient sleep study was recommended.   Counseled on tobacco cessation and on heart failure indication.   On telemetry there were some brief episodes of what looked intermittent 3rd  degree AV block in the middle of the night during sleep.  He was asymptomatic.  His Carvedilol was decreased from 50 mg twice daily to 25 mg twice daily.  Further monitoring showed sinus rhythm.      The patient presents today for followup.  He tells me that he is doing quite well.  He has no symptoms of shortness of breath, orthopnea, PND, edema.  He has no chest pain or palpitations.  He also has no lightheadedness, dizziness, near syncope or syncope.  He says he is tolerating his medications without any adverse side effects.  He is weighing himself daily and his weights have been stable.  He is attempting a low-sodium diet but it is difficult as he works 7 days a week and long days.  He drinks alcohol 1 day a week in moderation.  He smokes cigarettes on Sundays only.  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  He established with a primary care provider on 6/21/2017.  On that day, his weight was down to 379 pounds, and his creatinine was a bit elevated 1.72.  Today, his weight is down a couple more pounds at 375 pounds 6.4 ounces, though his creatinine is trending downward at 1.60.      He goes to the grocery store once a week and checks his blood pressure, and it has been controlled.     PHYSICAL EXAMINATION:  Blood pressure 128/86, pulse 81, weight 375 pounds 6.4 ounces, BMI 52.47. General:  A 32-year-old  gentleman, morbidly obese, no apparent distress.  Neck is thick but no JVD appreciated.  Lungs are clear.  Cardiac is regular, distant heart sounds.  No S3 or S4 or murmurs appreciated.  Abdomen:  Obese, soft.  Bowel sounds present, nontender.  Extremities are warm without any pitting edema.  The right radial arteriotomy site was assessed.  There was a 2+ radial pulse.  The reverse Werner test was normal.  Neuro:  Alert and oriented x3.  No focal deficits.     DIAGNOSTICS:  6/28/2017 BMP:  Sodium 139, potassium  4.2, BUN 25, creatinine 1.60, GFR 50.  6/21/2017 BMP:  Sodium 148, potassium 4.9, BUN 30, creatinine 1.72, GFR 46.      6/15/2017 coronary angiogram showed a very large ectatic right coronary artery with a 20% ostial/proximal and distal 30% narrowing for the crux.  LVEDP 28 mmHg.      6/14/2017 echocardiogram, LV normal in size with severe concentric LVH.  Septal and LV posterior wall thicknesses are 2.8 and 2.6 cm (the upper limits of normal is 1.2 mm).  LVEF 30-35%.  Moderate global hypokinesia and moderate to severe lateral wall hypokinesis, grade 3 or advanced diastolic dysfunction is present.  RV function is normal.  All 4 cardiac valves are normal in structure and function.  Hypertensive cardiomyopathy versus hypertrophic cardiomyopathy are to be considered.    ASSESSMENT:    1.  Chronic tobacco abuse.  2.  Morbid obesity.  3.  Suspected obstructive sleep apnea.  4.  Mild renal insufficiency.  5.  Trivial nonobstructive coronary artery disease.  6.  Suspected hypertensive cardiomyopathy.  7.  Acute systolic and diastolic congestive heart failure.  8.  Hypertension, malignant.  9.  Transient high-grade AV block during sleep, asymptomatic.    PLAN:  1.  The patient is doing very well following this admission for heart failure, coronary angiogram, diuresis and transitioned to oral medications.  He is taking his medications as prescribed without any adverse effects.  He is weighing himself daily, aiming for a low-sodium diet.  He does continue to smoke but only 1 day a week, on Sundays.  He drinks alcohol rarely, at most once or twice a week.  He has no symptoms of chest pain, shortness of breath, edema, palpitations, lightheadedness, dizziness, near syncope or syncope.    2.  He had what appears to be chronic mild renal insufficiency.  This was a bit more elevated on 6/21/2017 when he was seen in the primary care clinic.  His weight continues to trend down on his current diuretic dosing and lisinopril.  Perhaps  it was more elevated due to his contrast dye exposure.  Nonetheless, we will have him repeat a basic metabolic panel in 2 weeks and check blood pressure at that time.  However, he will continue on his current medications at this time.  3.  I will follow up with the patient in the [facility] in 1 month with the basic metabolic panel.    4.  He will follow up with Dr. Sow in 2 months with an EKG to follow his QT, cardiac MRI to evaluate for hypertensive cardiomyopathy versus hypertrophic cardiomyopathy, and re-evaluate LVEF with medical therapy, basic metabolic panel, fasting lipid panel/ALT.    5.  We discussed using alcohol sparingly.  We discussed the importance of complete smoking cessation.    6.  Daily weights.  He knows to give our office a call should his weight go up more than 2 or 3 in a day or 5 pounds in a week.  7.  Low-sodium diet.    8.  He should be referred for an outpatient sleep study.    9.  Weight loss which would positively affect his sleep disorder breathing, blood pressure, coronary disease.    Thank you very much for allowing me to participate in the care of this patient, a very pleasant gentleman.  He is very stable today and so in 2 weeks we will just check a blood pressure and a basic metabolic panel, and he will be seen again in clinic in 1 month.    Sincerely,     Prem Painter PA-C     [facility]

## 2017-06-30 NOTE — PROGRESS NOTES
Please call or write patient with the following results:    -Kidney function (Cr, GFR) cpntinues to decline. It's hard to say if this is acute from your recent hospitalization or perhaps evidence of chronic kidney disease secondary to previously untreated high BP. Would advise repeat testing in 1 month to monitor for further decline.  -Sodium is normal, Potassium is normal, Calcium is normal, Glucose is normal (diabetes screening test).     Electronically Signed By: Eva Varner PA-C

## 2017-07-06 NOTE — PROGRESS NOTES
Cardiology Progress Note          Assessment and Plan:   ASSESSMENT:  1.  Malignant hypertension. BP much better controlled, today 128/86.   2.  Acute systolic and diastolic CHF.  Echo 6/13/17: LVEF 30-35%, grade III diastolic dysfunction.  Currently NYHA functional class II.   3.  Hypertensive cardiomyopathy.  Cor angio 6/25/17 without significant obstructive CAD. On BB, ACEi, spironolactone.   4.  Trivial coronary artery disease. No anginal sxs.  On ASA, statin.    5.  Mild renal insufficiency.  Overall stable on Lasix, spironolactone, lisinopril.   6.  Morbid obesity.   7.  Suspected obstructive sleep apnea.  Referral has been made.   8.  Chronic tobacco abuse.  Working on cutting back.   9.  Trace positive troponins, but not due to a demand ischemia.   10. Transient increased AV block, overnight while sleeping, and on Coreg 50mg BID. Decreased to 25mg BID and no recurrence.       PLAN:  1.  No med changes today. Continue ASA 81mg, atorvastatin 10mg QHS for CAD. Continue Coreg 25mg BID, lisinopril 20mg BID, spironolactone 25mg, Lasix 20mg for CHF and HTN.   2.  Low sodium diet and Mediterranean style diet.   3.  Daily weights. Educated to call if his weight is trending up.   4.  General weight loss.   5.  Outpatient sleep study.   6.  Complete smoking cessation.   7.  Follow up 2 weeks: BMP and RN visit to check blood pressure  8.  Follow up in 1 month: Prem Painter PA-C with BMP  9.  Follow up in 2 months: Dr. Sow with cardiac MRI (called MRI tech and with his height/weight, they think MRI should accommodate him), EKG, labs (BMP, FLP, ALT)           HPI:   I had the pleasure of meeting Brooks Mensah today for hospital follow up and CORE clinic enrollment.     Brooks is a very pleasant 32 year old male with no significant past medical history, who was admitted to [facility] on 6/13/2017 w/ 2 wk progressive orthopnea, SOB, abdominal distension, and edema.  His blood pressures on initial admission were as high as  210s/140s.  He was typically 190/110.  He had mild left heart failure by chest x-ray.  NT pro BNP elevated.  Mild troponin elevation.   Echo 6/13/17: The left ventricle is normal in size with severe concentric left ventricular  hypertrophy. Septal and LV posterior wall thicknesses are 2.8 and 2.6 cm (the ULNL = 1.2 cm). Left ventricular systolic function is moderately reduced. The visual ejection fraction is estimated at 30-35%. This decrease in the LVEF is due to moderate global hypokinesia and moderate  to severe lateral wall hypokinesis. Grade III or advanced diastolic dysfunction is present. The right ventricular systolic function is normal. All four cardiac valves are normal in structure and function. Hypertensive CM vs hypertrophic CM are to be considered.    He was treated initially with nitrates, carvedilol, ACE inhibitors and Lasix diuresis.  With this, his pressures came down to be in the 140/80 range.       He underwent coronary angiography 6/15/17 that showed trivial coronary artery disease of the RCA with a stenosis of about 20% proximal and 30% distal disease.  Most of his coronary arteries were large and free of disease.     He had some transient high grade AVB on tele on Coreg 50mg BID dosing. This was during sleep. His Coreg was decreased to 25mg BID and this did not recur. He was discharged to home on ASA, atorvastatin, lisinopril, Coreg, spironolactone.     He was seen by PCP 6/21/17. Creatinine was 1.72.     Presents today for follow up. He says he is taking his medications as prescribed. He is feeling much better. He denies chest pain, SOB, edema, orthopnea, PND, lightheadedness, dizziness, near syncope, syncope. He is weighing daily. He is trying to eat low sodium. Works two jobs, [employer], and in [employer] in [place] on weekends. 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 Alcohol just once or twice a week at most. Creatinine today is 1.60, down a little from PCP visit.            "           Review of Systems:   Review of Systems:  Skin:  Negative     Eyes:  Negative    ENT:  Negative    Respiratory:  Negative    Cardiovascular:  Negative    Gastroenterology: Negative    Genitourinary:  Negative    Musculoskeletal:  Negative    Neurologic:  Negative    Psychiatric:  Positive for substance abuse;excessive alcohol consumption  Heme/Lymph/Imm:       Endocrine:                   Physical Exam:     Vitals: /86 (BP Location: Right arm, Patient Position: Chair, Cuff Size: Adult Large)  Pulse 81  Ht 1.803 m (5' 11\")  Wt (!) 170.3 kg (375 lb 6.4 oz)  SpO2 94%  BMI 52.36 kg/m2  Constitutional:  cooperative, alert and oriented, well developed, well nourished, in no acute distress morbidly obese      Skin:  warm and dry to the touch        Head:  normocephalic, no masses or lesions        Eyes:  pupils equal and round;conjunctivae and lids unremarkable;sclera white;no xanthalasma        ENT:  no pallor or cyanosis        Neck:      difficult to assess given neck size, but no apparent JVD    Chest:  clear to auscultation        Cardiac: regular rhythm   distant heart sounds         no S3 or S4 appreciated, no murmur appreciated     Abdomen:  abdomen soft;BS normoactive;non-tender obese      Vascular: pulses full and equal                                 right radial site: 2+ pulse, reverse paty test normal    Extremities and Back:  no deformities, clubbing, cyanosis, erythema observed;no edema        Neurological:  affect appropriate, oriented to time, person and place;no gross motor deficits                 Medications:     Current Outpatient Prescriptions   Medication Sig Dispense Refill     aspirin EC 81 MG EC tablet Take 1 tablet (81 mg) by mouth daily 30 tablet 3     atorvastatin (LIPITOR) 10 MG tablet Take 1 tablet (10 mg) by mouth daily 30 tablet 1     lisinopril (PRINIVIL/ZESTRIL) 20 MG tablet Take 1 tablet (20 mg) by mouth 2 times daily 60 tablet 1     carvedilol (COREG) 25 MG tablet " Take 1 tablet (25 mg) by mouth 2 times daily (with meals) 60 tablet 1     furosemide (LASIX) 20 MG tablet Take 1 tablet (20 mg) by mouth daily 30 tablet 1     spironolactone (ALDACTONE) 25 MG tablet Take 1 tablet (25 mg) by mouth daily 30 tablet 1     MELATONIN PO Take 10 mg by mouth nightly as needed        TRAZODONE HCL PO Take 50 mg by mouth nightly as needed for sleep                  Data:   All laboratory data reviewed  No results found for this or any previous visit (from the past 24 hour(s)).    All laboratory data reviewed  Lab Results   Component Value Date    CHOL 154 06/14/2017     Lab Results   Component Value Date    HDL 41 06/14/2017     Lab Results   Component Value Date    LDL 87 06/14/2017     Lab Results   Component Value Date    TRIG 132 06/14/2017     No results found for: CHOLHDLRATIO  No results found for: TSH  Last Basic Metabolic Panel:  Lab Results   Component Value Date     06/28/2017      Lab Results   Component Value Date    POTASSIUM 4.2 06/28/2017     Lab Results   Component Value Date    CHLORIDE 107 06/28/2017     Lab Results   Component Value Date    GAYLE 9.0 06/28/2017     Lab Results   Component Value Date    CO2 27 06/28/2017     Lab Results   Component Value Date    BUN 25 06/28/2017     Lab Results   Component Value Date    CR 1.60 06/28/2017     Lab Results   Component Value Date     06/28/2017     Lab Results   Component Value Date    WBC 13.3 06/16/2017     Lab Results   Component Value Date    RBC 4.04 06/16/2017     Lab Results   Component Value Date    HGB 11.7 06/16/2017     Lab Results   Component Value Date    HCT 37.2 06/16/2017     Lab Results   Component Value Date    MCV 92 06/16/2017     Lab Results   Component Value Date    MCH 29.0 06/16/2017     Lab Results   Component Value Date    MCHC 31.5 06/16/2017     Lab Results   Component Value Date    RDW 16.6 06/16/2017     Lab Results   Component Value Date     06/16/2017

## 2017-07-12 ENCOUNTER — ALLIED HEALTH/NURSE VISIT (OUTPATIENT)
Dept: CARDIOLOGY | Facility: CLINIC | Age: 32
End: 2017-07-12
Attending: PHYSICIAN ASSISTANT
Payer: COMMERCIAL

## 2017-07-12 ENCOUNTER — TELEPHONE (OUTPATIENT)
Dept: CARDIOLOGY | Facility: CLINIC | Age: 32
End: 2017-07-12

## 2017-07-12 VITALS — HEART RATE: 74 BPM | DIASTOLIC BLOOD PRESSURE: 80 MMHG | SYSTOLIC BLOOD PRESSURE: 140 MMHG

## 2017-07-12 DIAGNOSIS — I16.1 HYPERTENSIVE EMERGENCY: ICD-10-CM

## 2017-07-12 DIAGNOSIS — I50.9 CONGESTIVE HEART FAILURE, UNSPECIFIED CONGESTIVE HEART FAILURE CHRONICITY, UNSPECIFIED CONGESTIVE HEART FAILURE TYPE: ICD-10-CM

## 2017-07-12 DIAGNOSIS — I21.4 NSTEMI (NON-ST ELEVATED MYOCARDIAL INFARCTION) (H): ICD-10-CM

## 2017-07-12 DIAGNOSIS — I50.9 CONGESTIVE HEART FAILURE, UNSPECIFIED CONGESTIVE HEART FAILURE CHRONICITY, UNSPECIFIED CONGESTIVE HEART FAILURE TYPE: Primary | ICD-10-CM

## 2017-07-12 DIAGNOSIS — I50.23 ACUTE ON CHRONIC SYSTOLIC CONGESTIVE HEART FAILURE (H): ICD-10-CM

## 2017-07-12 LAB
ANION GAP SERPL CALCULATED.3IONS-SCNC: 5 MMOL/L (ref 3–14)
BUN SERPL-MCNC: 20 MG/DL (ref 7–30)
CALCIUM SERPL-MCNC: 8.7 MG/DL (ref 8.5–10.1)
CHLORIDE SERPL-SCNC: 105 MMOL/L (ref 94–109)
CO2 SERPL-SCNC: 27 MMOL/L (ref 20–32)
CREAT SERPL-MCNC: 1.51 MG/DL (ref 0.66–1.25)
GFR SERPL CREATININE-BSD FRML MDRD: 54 ML/MIN/1.7M2
GLUCOSE SERPL-MCNC: 131 MG/DL (ref 70–99)
POTASSIUM SERPL-SCNC: 4.2 MMOL/L (ref 3.4–5.3)
SODIUM SERPL-SCNC: 137 MMOL/L (ref 133–144)

## 2017-07-12 PROCEDURE — 99207 ZZC NO CHARGE LOS: CPT

## 2017-07-12 PROCEDURE — 80048 BASIC METABOLIC PNL TOTAL CA: CPT | Performed by: PHYSICIAN ASSISTANT

## 2017-07-12 PROCEDURE — 36415 COLL VENOUS BLD VENIPUNCTURE: CPT | Performed by: PHYSICIAN ASSISTANT

## 2017-07-12 NOTE — TELEPHONE ENCOUNTER
BMP results today after BP check. Cr down from 1.60 on 6/28/17.  Component      Latest Ref Rng & Units 7/12/2017   Sodium      133 - 144 mmol/L 137   Potassium      3.4 - 5.3 mmol/L 4.2   Chloride      94 - 109 mmol/L 105   Carbon Dioxide      20 - 32 mmol/L 27   Anion Gap      3 - 14 mmol/L 5   Glucose      70 - 99 mg/dL 131 (H)   Urea Nitrogen      7 - 30 mg/dL 20   Creatinine      0.66 - 1.25 mg/dL 1.51 (H)   GFR Estimate      >60 mL/min/1.7m2 54 (L)   GFR Estimate If Black      >60 mL/min/1.7m2 65   Calcium      8.5 - 10.1 mg/dL 8.7     Routing to Prem Brar RN

## 2017-07-12 NOTE — TELEPHONE ENCOUNTER
"Pt in clinic for nurse only BP check per Prem Painter PA-C. Last OV was 6/28/17, was enrolled in the CORE clinic. His BP at that time was 128/86 HR 81, continued on medical regimen.     Today pt BP in clinic is 140/80 HR 74, however pt took his meds 30 minutes prior. Pt denies SOB, HA, dizziness, chest pain, palpitations, swelling. Pt has been feeling \"really good\" and is monitoring his sodium intake as much as possible. Pt has been taking his medications consistently. Pt needs refills of his meds and will refill after recommendations from Prem Painter PA-C. Pt has BMP after nurse BP check. Will route results when available.     Pt scheduled for f/u w/ Prem Painter PA-C 8/2/17 w/ BMP; scheduled for cMRI 8/25/17; scheduled for f/u w/ Dr. Sow 9/18/17 w/ BMP/FLP/ALT.     Ordering Provider: Prem Painter PA-C / In-Clinic Provider: Dr. Aleman / Primary Cardiologist: Dr. Juanjose Brar RN  "

## 2017-07-12 NOTE — MR AVS SNAPSHOT
After Visit Summary   7/12/2017    Brooks Mensah    MRN: [id number]           Patient Information     Date Of Birth          1985        Visit Information        Provider Department      7/12/2017 9:30 AM [facility] HRT NURSE [facility]        Today's Diagnoses     Congestive heart failure, unspecified congestive heart failure chronicity, unspecified congestive heart failure type (H)    -  1    NSTEMI (non-ST elevated myocardial infarction) (H)        Hypertensive emergency           Follow-ups after your visit        Your next 10 appointments already scheduled     Jul 12, 2017 10:00 AM CDT   LAB with RU LAB   [facility] ([facility])    [address]  [place] [facility] [place] [id number] [phone]           Patient must bring picture ID.  Patient should be prepared to give a urine specimen  Please do not eat 10-12 hours before your appointment if you are coming in fasting for labs on lipids, cholesterol, or glucose (sugar).  Pregnant women should follow their Care Team instructions. Water with medications is okay. Do not drink coffee or other fluids.   If you have concerns about taking  your medications, please ask at office or if scheduling via [employer], send a message by clicking on Secure Messaging, Message Your Care Team.            Aug 02, 2017  8:00 AM CDT   LAB with RU LAB   [facility] ([facility])    [address]  [place] [facility] [place] [facility] [place] [id number] [phone]           Patient must bring picture ID.  Patient should be prepared to give a urine specimen  Please do not eat 10-12 hours before your appointment if you are coming in fasting for labs on lipids, cholesterol, or glucose (sugar).  Pregnant women should follow their Care Team instructions. Water with medications is okay. Do not drink coffee or other fluids.   If you have concerns about  taking  your medications, please ask at office or if scheduling via [email or web address], send a message by clicking on Secure Messaging, Message Your Care Team.            Aug 02, 2017  8:50 AM CDT   Core Return with Prem Painter PA-C   [facility] [place] ([facility])    [address]  [place] [address]   [phone]            Aug 25, 2017  8:00 AM CDT   MR MYOCARDIUM W CONTRAST with SCIMR1   [facility] ([facility])    [address]  [address]  [place] [address] [phone]           Take your medicines as usual, unless your doctor tells you not to. Bring a list of your current medicines to your exam (including vitamins, minerals and over-the-counter drugs).  You will be given intravenous contrast for this exam. To prepare:   The day before your exam, drink extra fluids at least six 8-ounce glasses (unless your doctor tells you to restrict your fluids).   Have a blood test (creatinine test) within 30 days of your exam. Go to your clinic or Diagnostic Imaging Department for this test.  The MRI machine uses a strong magnet. Please wear clothes without metal (snaps, zippers). A sweatsuit works well, or we may give you a hospital gown.  Please remove any body piercings and hair extensions before you arrive. You will also remove watches, jewelry, hairpins, wallets, dentures, partial dental plates and hearing aids. You may wear contact lenses, and you may be able to wear your rings. We have a safe place to keep your personal items, but it is safer to leave them at home.   **IMPORTANT** THE INSTRUCTIONS BELOW ARE ONLY FOR THOSE PATIENTS WHO HAVE BEEN TOLD THEY WILL RECEIVE SEDATION OR GENERAL ANESTHESIA DURING THEIR MRI PROCEDURE:  IF YOU WILL RECEIVE SEDATION (take medicine to help you relax during your exam):   You must get the medicine from your doctor before you arrive. Bring the medicine  to the exam. Do not take it at home.   Arrive one hour early. Bring someone who can take you home after the test. Your medicine will make you sleepy. After the exam, you may not drive, take a bus or take a taxi by yourself.   No eating 8 hours before your exam. You may have clear liquids up until 4 hours before your exam. (Clear liquids include water, clear tea, black coffee and fruit juice without pulp.)  IF YOU WILL RECEIVE ANESTHESIA (be asleep for your exam):   Arrive 1 1/2 hours early. Bring someone who can take you home after the test. You may not drive, take a bus or take a taxi by yourself.   No eating 8 hours before your exam. You may have clear liquids up until 4 hours before your exam. (Clear liquids include water, clear tea, black coffee and fruit juice without pulp.)  Please call the Imaging Department at your exam site with any questions.            Sep 18, 2017  1:45 PM CDT   LAB with RU LAB   [facility] ([facility])    [address]  [place] [address] [phone]           Patient must bring picture ID.  Patient should be prepared to give a urine specimen  Please do not eat 10-12 hours before your appointment if you are coming in fasting for labs on lipids, cholesterol, or glucose (sugar).  Pregnant women should follow their Care Team instructions. Water with medications is okay. Do not drink coffee or other fluids.   If you have concerns about taking  your medications, please ask at office or if scheduling via [employer], send a message by clicking on Secure Messaging, Message Your Care Team.            Sep 18, 2017  2:45 PM CDT   Return Visit with Manuel Sow MD   [facility] [place] ([facility])    [address]  [place] [address] [phone]              Who to contact     If you have questions or need follow up information about today's clinic visit or your  "schedule please contact [facility] [place] directly at [phone].  Normal or non-critical lab and imaging results will be communicated to you by MyChart, letter or phone within 4 business days after the clinic has received the results. If you do not hear from us within 7 days, please contact the clinic through MyChart or phone. If you have a critical or abnormal lab result, we will notify you by phone as soon as possible.  Submit refill requests through [employer] or call your pharmacy and they will forward the refill request to us. Please allow 3 business days for your refill to be completed.          Additional Information About Your Visit        [employer] Medical Information     [employer] lets you send messages to your doctor, view your test results, renew your prescriptions, schedule appointments and more. To sign up, go to [email or web address] . Click on \"Log in\" on the left side of the screen, which will take you to the Welcome page. Then click on \"Sign up Now\" on the right side of the page.     You will be asked to enter the access code listed below, as well as some personal information. Please follow the directions to create your username and password.     Your access code is: [email or web address]  Expires: 2017 12:51 PM     Your access code will  in 90 days. If you need help or a new code, please call your [facility] clinic or [phone].        Care EveryWhere ID     This is your Care EveryWhere ID. This could be used by other organizations to access your [facility] medical records  [id number]        Your Vitals Were     Pulse                   74            Blood Pressure from Last 3 Encounters:   17 140/80   17 128/86   17 132/88    Weight from Last 3 Encounters:   17 (!) 170.3 kg (375 lb 6.4 oz)   17 (!) 171.9 kg (379 lb)   17 (!) 178.2 kg (392 lb 13.8 oz)              Today, you had the following     No orders found for display       " Primary Care Provider Office Phone # Fax #    Eva Varner PA-C [phone]       [facility] [address] [address]        Equal Access to Services     LONNY DINH : Hadii abhishek ku hadkeshiao Soomaali, waaxda luqadaha, qaybta kaalmada adeegyada, baldemar palman alfredoina garrido laMarekanne swain. So [facility] [phone].    ATENCIÓN: Si habla español, tiene a tello disposición servicios gratuitos de asistencia lingüística. Llame al [phone].    We comply with applicable federal civil rights laws and [place] laws. We do not discriminate on the basis of race, color, national origin, age, disability sex, sexual orientation or gender identity.            Thank you!     Thank you for choosing [facility] [place]  for your care. Our goal is always to provide you with excellent care. Hearing back from our patients is one way we can continue to improve our services. Please take a few minutes to complete the written survey that you may receive in the mail after your visit with us. Thank you!             Your Updated Medication List - Protect others around you: Learn how to safely use, store and throw away your medicines at www.disposemymeds.org.          This list is accurate as of: 7/12/17  9:53 AM.  Always use your most recent med list.                   Brand Name Dispense Instructions for use Diagnosis    aspirin 81 MG EC tablet     30 tablet    Take 1 tablet (81 mg) by mouth daily    NSTEMI (non-ST elevated myocardial infarction) (H)       atorvastatin 10 MG tablet    LIPITOR    30 tablet    Take 1 tablet (10 mg) by mouth daily    NSTEMI (non-ST elevated myocardial infarction) (H)       carvedilol 25 MG tablet    COREG    60 tablet    Take 1 tablet (25 mg) by mouth 2 times daily (with meals)    Hypertensive emergency       furosemide 20 MG tablet    LASIX    30 tablet    Take 1 tablet (20 mg) by mouth daily    Congestive heart failure, unspecified  congestive heart failure chronicity, unspecified congestive heart failure type (H)       lisinopril 20 MG tablet    PRINIVIL/ZESTRIL    60 tablet    Take 1 tablet (20 mg) by mouth 2 times daily    Hypertensive emergency       MELATONIN PO      Take 10 mg by mouth nightly as needed        spironolactone 25 MG tablet    ALDACTONE    30 tablet    Take 1 tablet (25 mg) by mouth daily    Congestive heart failure, unspecified congestive heart failure chronicity, unspecified congestive heart failure type (H)       TRAZODONE HCL PO      Take 50 mg by mouth nightly as needed for sleep

## 2017-07-13 RX ORDER — LISINOPRIL 20 MG/1
20 TABLET ORAL 2 TIMES DAILY
Qty: 90 TABLET | Refills: 3 | Status: SHIPPED | OUTPATIENT
Start: 2017-07-13 | End: 2018-07-31

## 2017-07-13 RX ORDER — CARVEDILOL 25 MG/1
25 TABLET ORAL 2 TIMES DAILY WITH MEALS
Qty: 90 TABLET | Refills: 3 | Status: SHIPPED | OUTPATIENT
Start: 2017-07-13 | End: 2017-08-09

## 2017-07-13 RX ORDER — FUROSEMIDE 20 MG
20 TABLET ORAL DAILY
Qty: 90 TABLET | Refills: 1 | Status: SHIPPED | OUTPATIENT
Start: 2017-07-13 | End: 2017-08-09

## 2017-07-13 RX ORDER — SPIRONOLACTONE 25 MG/1
25 TABLET ORAL DAILY
Qty: 90 TABLET | Refills: 1 | Status: SHIPPED | OUTPATIENT
Start: 2017-07-13 | End: 2017-08-09

## 2017-07-13 RX ORDER — ATORVASTATIN CALCIUM 10 MG/1
10 TABLET, FILM COATED ORAL DAILY
Qty: 90 TABLET | Refills: 1 | Status: SHIPPED | OUTPATIENT
Start: 2017-07-13 | End: 2017-08-09

## 2017-07-13 NOTE — TELEPHONE ENCOUNTER
Called pt, left detailed vm advising BP and labs looked good per Prem Painter PA-C, continue current regimen. Also advised would refill meds to [facility].   Zonia MACKEY

## 2017-07-26 NOTE — PROGRESS NOTES
DATE OF SERVICE:  6/28/2017    HISTORY OF PRESENT ILLNESS:  It was a pleasure following up with the patient today in the Cardiology Clinic for followup of recent admission, coronary angiogram and to enroll in the Cardiology [facility] Clinic.      The patient is a very pleasant 32-year-old gentleman with a prior past medical history significant for tobacco use, morbid obesity who was admitted to [facility] 6/13/2017 with a 2-week history of progressive orthopnea, shortness of breath, abdominal distention and edema.  He was found to be in hypertensive emergency with blood pressures as high as 240/140.  He had evidence of CHF exacerbation.  A troponin was mildly elevated.   Echocardiogram was performed 6/14/2017 that showed severe LVH with septal and posterior wall measurements of approximately 2.5 cm with 1.5 cm being the upper limits of normal.  There was diastolic dysfunction of the left ventricle and the LVEF was 30-35%.  Left atrium was not particularly dilated and the predicts also relatively normal.  He was treated initially with nitrates, Carvedilol, ACE inhibitor, Lasix for diuresis.  With these therapies, his blood pressures came down nicely.  Coronary angiography was performed 6/15/2017.  He did show trivial coronary artery disease of the right coronary artery with a stenosis of 10-20%, otherwise his coronary arteries were large and free of disease.   During his admission, it was noted that he likely has severe sleep apnea as he had some pauses on telemetry and outpatient sleep study was recommended.   Counseled on tobacco cessation and on heart failure indication.   On telemetry there were some brief episodes of what looked intermittent 3rd degree AV block in the middle of the night during sleep.  He was asymptomatic.  His Carvedilol was decreased from 50 mg twice daily to 25 mg twice daily.  Further monitoring showed sinus rhythm.      The patient presents today for followup.  He tells me that he  is doing quite well.  He has no symptoms of shortness of breath, orthopnea, PND, edema.  He has no chest pain or palpitations.  He also has no lightheadedness, dizziness, near syncope or syncope.  He says he is tolerating his medications without any adverse side effects.  He is weighing himself daily and his weights have been stable.  He is attempting a low-sodium diet but it is difficult as he works 7 days a week and long days.  He drinks alcohol 1 day a week in moderation.  He smokes cigarettes on Sundays only.  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  He established with a primary care provider on 6/21/2017.  On that day, his weight was down to 379 pounds, and his creatinine was a bit elevated 1.72.  Today, his weight is down a couple more pounds at 375 pounds 6.4 ounces, though his creatinine is trending downward at 1.60.      He goes to the grocery store once a week and checks his blood pressure, and it has been controlled.     PHYSICAL EXAMINATION:  Blood pressure 128/86, pulse 81, weight 375 pounds 6.4 ounces, BMI 52.47. General:  A 32-year-old  gentleman, morbidly obese, no apparent distress.  Neck is thick but no JVD appreciated.  Lungs are clear.  Cardiac is regular, distant heart sounds.  No S3 or S4 or murmurs appreciated.  Abdomen:  Obese, soft.  Bowel sounds present, nontender.  Extremities are warm without any pitting edema.  The right radial arteriotomy site was assessed.  There was a 2+ radial pulse.  The reverse Werner test was normal.  Neuro:  Alert and oriented x3.  No focal deficits.     DIAGNOSTICS:  6/28/2017 BMP:  Sodium 139, potassium 4.2, BUN 25, creatinine 1.60, GFR 50.  6/21/2017 BMP:  Sodium 148, potassium 4.9, BUN 30, creatinine 1.72, GFR 46.      6/15/2017 coronary angiogram showed a very large ectatic right coronary artery with a 20% ostial/proximal and distal 30% narrowing for the crux.   LVEDP 28 mmHg.      6/14/2017 echocardiogram, LV normal in size with severe concentric LVH.  Septal and LV posterior wall thicknesses are 2.8 and 2.6 cm (the upper limits of normal is 1.2 mm).  LVEF 30-35%.  Moderate global hypokinesia and moderate to severe lateral wall hypokinesis, grade 3 or advanced diastolic dysfunction is present.  RV function is normal.  All 4 cardiac valves are normal in structure and function.  Hypertensive cardiomyopathy versus hypertrophic cardiomyopathy are to be considered.    ASSESSMENT:    1.  Chronic tobacco abuse.  2.  Morbid obesity.  3.  Suspected obstructive sleep apnea.  4.  Mild renal insufficiency.  5.  Trivial nonobstructive coronary artery disease.  6.  Suspected hypertensive cardiomyopathy.  7.  Acute systolic and diastolic congestive heart failure.  8.  Hypertension, malignant.  9.  Transient high-grade AV block during sleep, asymptomatic.    PLAN:  1.  The patient is doing very well following this admission for heart failure, coronary angiogram, diuresis and transitioned to oral medications.  He is taking his medications as prescribed without any adverse effects.  He is weighing himself daily, aiming for a low-sodium diet.  He does continue to smoke but only 1 day a week, on Sundays.  He drinks alcohol rarely, at most once or twice a week.  He has no symptoms of chest pain, shortness of breath, edema, palpitations, lightheadedness, dizziness, near syncope or syncope.    2.  He had what appears to be chronic mild renal insufficiency.  This was a bit more elevated on 6/21/2017 when he was seen in the primary care clinic.  His weight continues to trend down on his current diuretic dosing and lisinopril.  Perhaps it was more elevated due to his contrast dye exposure.  Nonetheless, we will have him repeat a basic metabolic panel in 2 weeks and check blood pressure at that time.  However, he will continue on his current medications at this time.  3.  I will follow up with the  patient in the Core Clinic in 1 month with the basic metabolic panel.    4.  He will follow up with  ______ in 2 months with an EKG to follow his QT, cardiac MRI to evaluate for hypertensive cardiomyopathy versus hypertrophic cardiomyopathy, and re-evaluate LVEF with medical therapy, basic metabolic panel, fasting lipid panel/ALT.    5.  We discussed using alcohol sparingly.  We discussed the importance of complete smoking cessation.    6.  Daily weights.  He knows to give our office a call should his weight go up more than 2 or 3 in a day or 5 pounds in a week.  7.  Low-sodium diet.    8.  He should be referred for an outpatient sleep study.    9.  Weight loss which would positively affect his sleep disorder breathing, blood pressure, coronary disease.    Thank you very much for allowing me to participate in the care of this patient, a very pleasant gentleman.  He is very stable today and so in 2 weeks we will just check a blood pressure and a basic metabolic panel, and he will be seen again in clinic in 1 month.

## 2017-08-01 NOTE — PROGRESS NOTES
DATE OF SERVICE:  6/28/2017    HISTORY OF PRESENT ILLNESS:  It was a pleasure following up with the patient today in the Cardiology Clinic for followup of recent admission, coronary angiogram and to enroll in the Cardiology Core Clinic.      The patient is a very pleasant 32-year-old gentleman with a prior past medical history significant for tobacco use, morbid obesity who was admitted to [facility] 6/13/2017 with a 2-week history of progressive orthopnea, shortness of breath, abdominal distention and edema.  He was found to be in hypertensive emergency with blood pressures as high as 240/140.  He had evidence of CHF exacerbation.  A troponin was mildly elevated.   Echocardiogram was performed 6/14/2017 that showed severe LVH with septal and posterior wall measurements of approximately 2.5 cm with 1.5 cm being the upper limits of normal.  There was diastolic dysfunction of the left ventricle and the LVEF was 30-35%.  Left atrium was not particularly dilated and the predicts also relatively normal.  He was treated initially with nitrates, Carvedilol, ACE inhibitor, Lasix for diuresis.  With these therapies, his blood pressures came down nicely.  Coronary angiography was performed 6/15/2017.  He did show trivial coronary artery disease of the right coronary artery with a stenosis of 10-20%, otherwise his coronary arteries were large and free of disease.   During his admission, it was noted that he likely has severe sleep apnea as he had some pauses on telemetry and outpatient sleep study was recommended.   Counseled on tobacco cessation and on heart failure indication.   On telemetry there were some brief episodes of what looked intermittent 3rd degree AV block in the middle of the night during sleep.  He was asymptomatic.  His Carvedilol was decreased from 50 mg twice daily to 25 mg twice daily.  Further monitoring showed sinus rhythm.      The patient presents today for followup.  He tells me that he  is doing quite well.  He has no symptoms of shortness of breath, orthopnea, PND, edema.  He has no chest pain or palpitations.  He also has no lightheadedness, dizziness, near syncope or syncope.  He says he is tolerating his medications without any adverse side effects.  He is weighing himself daily and his weights have been stable.  He is attempting a low-sodium diet but it is difficult as he works 7 days a week and long days.  He drinks alcohol 1 day a week in moderation.  He smokes cigarettes on Sundays only.  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  He established with a primary care provider on 6/21/2017.  On that day, his weight was down to 379 pounds, and his creatinine was a bit elevated 1.72.  Today, his weight is down a couple more pounds at 375 pounds 6.4 ounces, though his creatinine is trending downward at 1.60.      He goes to the grocery store once a week and checks his blood pressure, and it has been controlled.     PHYSICAL EXAMINATION:  Blood pressure 128/86, pulse 81, weight 375 pounds 6.4 ounces, BMI 52.47. General:  A 32-year-old  gentleman, morbidly obese, no apparent distress.  Neck is thick but no JVD appreciated.  Lungs are clear.  Cardiac is regular, distant heart sounds.  No S3 or S4 or murmurs appreciated.  Abdomen:  Obese, soft.  Bowel sounds present, nontender.  Extremities are warm without any pitting edema.  The right radial arteriotomy site was assessed.  There was a 2+ radial pulse.  The reverse Werner test was normal.  Neuro:  Alert and oriented x3.  No focal deficits.     DIAGNOSTICS:  6/28/2017 BMP:  Sodium 139, potassium 4.2, BUN 25, creatinine 1.60, GFR 50.  6/21/2017 BMP:  Sodium 148, potassium 4.9, BUN 30, creatinine 1.72, GFR 46.      6/15/2017 coronary angiogram showed a very large ectatic right coronary artery with a 20% ostial/proximal and distal 30% narrowing for the crux.   LVEDP 28 mmHg.      6/14/2017 echocardiogram, LV normal in size with severe concentric LVH.  Septal and LV posterior wall thicknesses are 2.8 and 2.6 cm (the upper limits of normal is 1.2 mm).  LVEF 30-35%.  Moderate global hypokinesia and moderate to severe lateral wall hypokinesis, grade 3 or advanced diastolic dysfunction is present.  RV function is normal.  All 4 cardiac valves are normal in structure and function.  Hypertensive cardiomyopathy versus hypertrophic cardiomyopathy are to be considered.    ASSESSMENT:    1.  Chronic tobacco abuse.  2.  Morbid obesity.  3.  Suspected obstructive sleep apnea.  4.  Mild renal insufficiency.  5.  Trivial nonobstructive coronary artery disease.  6.  Suspected hypertensive cardiomyopathy.  7.  Acute systolic and diastolic congestive heart failure.  8.  Hypertension, malignant.  9.  Transient high-grade AV block during sleep, asymptomatic.    PLAN:  1.  The patient is doing very well following this admission for heart failure, coronary angiogram, diuresis and transitioned to oral medications.  He is taking his medications as prescribed without any adverse effects.  He is weighing himself daily, aiming for a low-sodium diet.  He does continue to smoke but only 1 day a week, on Sundays.  He drinks alcohol rarely, at most once or twice a week.  He has no symptoms of chest pain, shortness of breath, edema, palpitations, lightheadedness, dizziness, near syncope or syncope.    2.  He had what appears to be chronic mild renal insufficiency.  This was a bit more elevated on 6/21/2017 when he was seen in the primary care clinic.  His weight continues to trend down on his current diuretic dosing and lisinopril.  Perhaps it was more elevated due to his contrast dye exposure.  Nonetheless, we will have him repeat a basic metabolic panel in 2 weeks and check blood pressure at that time.  However, he will continue on his current medications at this time.  3.  I will follow up with the  patient in the Core Clinic in 1 month with the basic metabolic panel.    4.  He will follow up with Dr. Sow in 2 months with an EKG to follow his QT, cardiac MRI to evaluate for hypertensive cardiomyopathy versus hypertrophic cardiomyopathy, and re-evaluate LVEF with medical therapy, basic metabolic panel, fasting lipid panel/ALT.    5.  We discussed using alcohol sparingly.  We discussed the importance of complete smoking cessation.    6.  Daily weights.  He knows to give our office a call should his weight go up more than 2 or 3 in a day or 5 pounds in a week.  7.  Low-sodium diet.    8.  He should be referred for an outpatient sleep study.    9.  Weight loss which would positively affect his sleep disorder breathing, blood pressure, coronary disease.    Thank you very much for allowing me to participate in the care of this patient, a very pleasant gentleman.  He is very stable today and so in 2 weeks we will just check a blood pressure and a basic metabolic panel, and he will be seen again in clinic in 1 month.    Prem Painter PA-C

## 2017-08-09 ENCOUNTER — OFFICE VISIT (OUTPATIENT)
Dept: CARDIOLOGY | Facility: CLINIC | Age: 32
End: 2017-08-09
Attending: PHYSICIAN ASSISTANT
Payer: COMMERCIAL

## 2017-08-09 VITALS
OXYGEN SATURATION: 96 % | DIASTOLIC BLOOD PRESSURE: 80 MMHG | BODY MASS INDEX: 44.1 KG/M2 | HEART RATE: 85 BPM | WEIGHT: 315 LBS | SYSTOLIC BLOOD PRESSURE: 116 MMHG | HEIGHT: 71 IN

## 2017-08-09 DIAGNOSIS — I50.23 ACUTE ON CHRONIC SYSTOLIC CONGESTIVE HEART FAILURE (H): ICD-10-CM

## 2017-08-09 DIAGNOSIS — I42.9 CARDIOMYOPATHY, UNSPECIFIED (H): Primary | ICD-10-CM

## 2017-08-09 DIAGNOSIS — I50.9 CONGESTIVE HEART FAILURE, UNSPECIFIED CONGESTIVE HEART FAILURE CHRONICITY, UNSPECIFIED CONGESTIVE HEART FAILURE TYPE: ICD-10-CM

## 2017-08-09 DIAGNOSIS — I21.4 NSTEMI (NON-ST ELEVATED MYOCARDIAL INFARCTION) (H): ICD-10-CM

## 2017-08-09 DIAGNOSIS — I16.1 HYPERTENSIVE EMERGENCY: ICD-10-CM

## 2017-08-09 LAB
ANION GAP SERPL CALCULATED.3IONS-SCNC: 7 MMOL/L (ref 3–14)
BUN SERPL-MCNC: 30 MG/DL (ref 7–30)
CALCIUM SERPL-MCNC: 9.2 MG/DL (ref 8.5–10.1)
CHLORIDE SERPL-SCNC: 101 MMOL/L (ref 94–109)
CO2 SERPL-SCNC: 29 MMOL/L (ref 20–32)
CREAT SERPL-MCNC: 1.6 MG/DL (ref 0.66–1.25)
GFR SERPL CREATININE-BSD FRML MDRD: 50 ML/MIN/1.7M2
GLUCOSE SERPL-MCNC: 123 MG/DL (ref 70–99)
POTASSIUM SERPL-SCNC: 4.1 MMOL/L (ref 3.4–5.3)
SODIUM SERPL-SCNC: 137 MMOL/L (ref 133–144)

## 2017-08-09 PROCEDURE — 80048 BASIC METABOLIC PNL TOTAL CA: CPT | Performed by: PHYSICIAN ASSISTANT

## 2017-08-09 PROCEDURE — 99213 OFFICE O/P EST LOW 20 MIN: CPT | Performed by: PHYSICIAN ASSISTANT

## 2017-08-09 PROCEDURE — 36415 COLL VENOUS BLD VENIPUNCTURE: CPT | Performed by: PHYSICIAN ASSISTANT

## 2017-08-09 RX ORDER — SPIRONOLACTONE 25 MG/1
12.5 TABLET ORAL DAILY
Qty: 15 TABLET | Refills: 11 | Status: SHIPPED | OUTPATIENT
Start: 2017-08-09 | End: 2018-09-04

## 2017-08-09 RX ORDER — ATORVASTATIN CALCIUM 10 MG/1
10 TABLET, FILM COATED ORAL DAILY
Qty: 30 TABLET | Refills: 11 | Status: SHIPPED | OUTPATIENT
Start: 2017-08-09 | End: 2018-08-31

## 2017-08-09 RX ORDER — FUROSEMIDE 20 MG
20 TABLET ORAL DAILY
Qty: 30 TABLET | Refills: 11 | Status: SHIPPED | OUTPATIENT
Start: 2017-08-09 | End: 2018-08-31

## 2017-08-09 RX ORDER — CARVEDILOL 25 MG/1
25 TABLET ORAL 2 TIMES DAILY WITH MEALS
Qty: 60 TABLET | Refills: 11 | Status: SHIPPED | OUTPATIENT
Start: 2017-08-09 | End: 2018-09-04

## 2017-08-09 NOTE — MR AVS SNAPSHOT
After Visit Summary   8/9/2017    Brooks Mensah    MRN: [id number]           Patient Information     Date Of Birth          1985        Visit Information        Provider Department      8/9/2017 3:50 PM Prem Painter PA-C [facility] [place]        Today's Diagnoses     Cardiomyopathy, unspecified (H)    -  1    Acute on chronic systolic congestive heart failure (H)        Congestive heart failure, unspecified congestive heart failure chronicity, unspecified congestive heart failure type (H)        Hypertensive emergency        NSTEMI (non-ST elevated myocardial infarction) (H)          Care Instructions    Call the C.O.R.E. nurse for any questions or concerns:  [phone]    1. Medication changes from today:         * Decrease the spironolactone to 12.5 mg (1/2 tab) daily.     2. Continue to weigh yourself daily and write it down. If you notice it is trending up after you get back into your normal routine (after traveling and eating out), please call us.     3. Call CORE nurse if your weight is up more than 2 pounds in one day or 5 pounds in one week.    4. Call CORE nurse if you feel more short of breath, have more abdominal bloating, or leg swelling.    5. Continue low sodium diet (less than 2000 mg daily). If you eat less salt, you will retain less fluid.    6. Alcohol can weaken your heart further. You should avoid alcohol or limit its use to special times, such as a holiday or birthday.    7. Do NOT take Aleve or ibuprofen without talking to your doctor first.     8. Lab Results:  Component      Latest Ref Rng & Units 8/9/2017   Sodium      133 - 144 mmol/L 137   Potassium      3.4 - 5.3 mmol/L 4.1   Chloride      94 - 109 mmol/L 101   Carbon Dioxide      20 - 32 mmol/L 29   Anion Gap      3 - 14 mmol/L 7   Glucose      70 - 99 mg/dL 123 (H)   Urea Nitrogen      7 - 30 mg/dL 30   Creatinine      0.66 - 1.25 mg/dL 1.60 (H)   GFR Estimate      >60  mL/min/1.7m2 50 (L)   GFR Estimate If Black      >60 mL/min/1.7m2 61   Calcium      8.5 - 10.1 mg/dL 9.2       8.   Follow-Up:    * Cardiac MRI is scheduled 8/25/17   * Dr. Sow 9/18/17 with EKG and labs    [facility] Clinic: Cardiomyopathy, Optimization, Rehabilitation, Education  The CORE Clinic is a heart failure specialty clinic within the [facility] where you will work with specialized nurse practitioners, physician assistants, doctors, and registered nurses. They are dedicated to helping patients with heart failure to carefully adjust medications, receive education, and learn who and when to call if symptoms develop. They specialize in helping you better understand your condition, slow the progression of your disease, improve the length and quality of your life, help you detect future heart problems before they become life threatening, and avoid hospitalizations.            Follow-ups after your visit        Your next 10 appointments already scheduled     Aug 25, 2017  8:00 AM CDT   MR MYOCARDIUM W CONTRAST with SCIMR1   [facility] (Cardiovascular Imaging at [facility] [place])    [address] [place] [address]  [address] [address] [phone]           Take your medicines as usual, unless your doctor tells you not to. Bring a list of your current medicines to your exam (including vitamins, minerals and over-the-counter drugs).  You will be given intravenous contrast for this exam. To prepare:   The day before your exam, drink extra fluids at least six 8-ounce glasses (unless your doctor tells you to restrict your fluids).   Have a blood test (creatinine test) within 30 days of your exam. Go to your clinic or Diagnostic Imaging Department for this test.  The MRI machine uses a strong magnet. Please wear clothes without metal (snaps, zippers). A sweatsuit works well, or we may give you a hospital gown.  Please remove any body piercings and hair extensions before you  arrive. You will also remove watches, jewelry, hairpins, wallets, dentures, partial dental plates and hearing aids. You may wear contact lenses, and you may be able to wear your rings. We have a safe place to keep your personal items, but it is safer to leave them at home.   **IMPORTANT** THE INSTRUCTIONS BELOW ARE ONLY FOR THOSE PATIENTS WHO HAVE BEEN TOLD THEY WILL RECEIVE SEDATION OR GENERAL ANESTHESIA DURING THEIR MRI PROCEDURE:  IF YOU WILL RECEIVE SEDATION (take medicine to help you relax during your exam):   You must get the medicine from your doctor before you arrive. Bring the medicine to the exam. Do not take it at home.   Arrive one hour early. Bring someone who can take you home after the test. Your medicine will make you sleepy. After the exam, you may not drive, take a bus or take a taxi by yourself.   No eating 8 hours before your exam. You may have clear liquids up until 4 hours before your exam. (Clear liquids include water, clear tea, black coffee and fruit juice without pulp.)  IF YOU WILL RECEIVE ANESTHESIA (be asleep for your exam):   Arrive 1 1/2 hours early. Bring someone who can take you home after the test. You may not drive, take a bus or take a taxi by yourself.   No eating 8 hours before your exam. You may have clear liquids up until 4 hours before your exam. (Clear liquids include water, clear tea, black coffee and fruit juice without pulp.)  Please call the Imaging Department at your exam site with any questions.            Sep 18, 2017  1:45 PM CDT   LAB with RU LAB   [facility] [place] ([facility])    [address] [address] [place] [id number] [phone]           Patient must bring picture ID. Patient should be prepared to give a urine specimen  Please do not eat 10-12 hours before your appointment if you are coming in fasting for labs on lipids, cholesterol, or glucose (sugar). Pregnant women should follow their Care Team  "instructions. Water with medications is okay. Do not drink coffee or other fluids. If you have concerns about taking  your medications, please ask at office or if scheduling via [employer], send a message by clicking on Secure Messaging, Message Your Care Team.            Sep 18, 2017  2:45 PM CDT   Return Visit with Manuel Sow MD   [facility]YS[facility] AT [place] ([facility])    [address]  [facility] [address] [phone]              Who to contact     If you have questions or need follow up information about today's clinic visit or your schedule please contact [facility] [place] directly at [phone].  Normal or non-critical lab and imaging results will be communicated to you by [employer], letter or phone within 4 business days after the clinic has received the results. If you do not hear from us within 7 days, please contact the clinic through [employer] or phone. If you have a critical or abnormal lab result, we will notify you by phone as soon as possible.  Submit refill requests through [employer] or call your pharmacy and they will forward the refill request to us. Please allow 3 business days for your refill to be completed.          Additional Information About Your Visit        [employer] Information     [employer] lets you send messages to your doctor, view your test results, renew your prescriptions, schedule appointments and more. To sign up, go to [email or web address] . Click on \"Log in\" on the left side of the screen, which will take you to the Welcome page. Then click on \"Sign up Now\" on the right side of the page.     You will be asked to enter the access code listed below, as well as some personal information. Please follow the directions to create your username and password.     Your access code is: [email or web address]  Expires: 2017 12:51 PM     Your access code will  in 90 days. If you need help or a new code, " "please call your [facility] clinic or [phone].        Care EveryWhere ID     This is your Care EveryWhere ID. This could be used by other organizations to access your [facility] medical records  [id number]        Your Vitals Were     Pulse Height Pulse Oximetry BMI (Body Mass Index)          85 1.803 m (5' 11\") 96% 53.17 kg/m2         Blood Pressure from Last 3 Encounters:   08/09/17 116/80   07/12/17 140/80   06/28/17 128/86    Weight from Last 3 Encounters:   08/09/17 (!) 172.9 kg (381 lb 3.2 oz)   06/28/17 (!) 170.3 kg (375 lb 6.4 oz)   06/21/17 (!) 171.9 kg (379 lb)              We Performed the Following     Follow-Up with [facility]          Today's Medication Changes          These changes are accurate as of: 8/9/17  4:54 PM.  If you have any questions, ask your nurse or doctor.               These medicines have changed or have updated prescriptions.        Dose/Directions    spironolactone 25 MG tablet   Commonly known as:  ALDACTONE   This may have changed:  how much to take   Used for:  Congestive heart failure, unspecified congestive heart failure chronicity, unspecified congestive heart failure type (H)   Changed by:  Prem Painter PA-C        Dose:  12.5 mg   Take 0.5 tablets (12.5 mg) by mouth daily   Quantity:  15 tablet   Refills:  11            Where to get your medicines      These medications were sent to [id number] [place] [id number] [address]     Phone:  [phone]     atorvastatin 10 MG tablet    carvedilol 25 MG tablet    furosemide 20 MG tablet    spironolactone 25 MG tablet                Primary Care Provider Office Phone # Fax #    Eva Varner PA-C [phone] [address]  [place] [address]        Equal Access to Services     [facility] FABRIZIO AH: Hadii abhishek ku hadasho Soomaali, waaxda luqadaha, qaybta kaalmada adeegyada, waxay shanika root ah. So wa " [phone].    ATENCIÓN: Si mei delacruz, tiene a tello disposición servicios gratuitos de asistencia lingüística. Marion carreon [phone].    We comply with applicable federal civil rights laws and [place] laws. We do not discriminate on the basis of race, color, national origin, age, disability sex, sexual orientation or gender identity.            Thank you!     Thank you for choosing [facility] [place]  for your care. Our goal is always to provide you with excellent care. Hearing back from our patients is one way we can continue to improve our services. Please take a few minutes to complete the written survey that you may receive in the mail after your visit with us. Thank you!             Your Updated Medication List - Protect others around you: Learn how to safely use, store and throw away your medicines at www.disposemymeds.org.          This list is accurate as of: 8/9/17  4:54 PM.  Always use your most recent med list.                   Brand Name Dispense Instructions for use Diagnosis    aspirin 81 MG EC tablet     30 tablet    Take 1 tablet (81 mg) by mouth daily    NSTEMI (non-ST elevated myocardial infarction) (H)       atorvastatin 10 MG tablet    LIPITOR    30 tablet    Take 1 tablet (10 mg) by mouth daily    NSTEMI (non-ST elevated myocardial infarction) (H)       carvedilol 25 MG tablet    COREG    60 tablet    Take 1 tablet (25 mg) by mouth 2 times daily (with meals)    Hypertensive emergency       furosemide 20 MG tablet    LASIX    30 tablet    Take 1 tablet (20 mg) by mouth daily    Congestive heart failure, unspecified congestive heart failure chronicity, unspecified congestive heart failure type (H)       lisinopril 20 MG tablet    PRINIVIL/ZESTRIL    90 tablet    Take 1 tablet (20 mg) by mouth 2 times daily    Hypertensive emergency       MELATONIN PO      Take 10 mg by mouth nightly as needed        spironolactone 25 MG tablet    ALDACTONE    15 tablet     Take 0.5 tablets (12.5 mg) by mouth daily    Congestive heart failure, unspecified congestive heart failure chronicity, unspecified congestive heart failure type (H)       TRAZODONE HCL PO      Take 50 mg by mouth nightly as needed for sleep

## 2017-08-09 NOTE — LETTER
2017    Eva Varner PA-C  [address]  [facility] [address]    RE: Brooks Mensah       Dear Colleague,    I had the pleasure of seeing Brooks Mensah in the [facility].    CARDIOLOGY CLINIC PROGRESS NOTE - [facility]IC    DOS: 2017    Brooks Mensah  : 1985, 32 year old  MRN: [id number]      History:   It was a pleasure following up with Brooks (or also goes by Javon) today in the Cardiology [facility] Clinic.     Brooks is a very pleasant 32-year-old gentleman with a prior past medical history significant for tobacco use, morbid obesity who was admitted to [facility] 2017 with a 2-week history of progressive orthopnea, shortness of breath, abdominal distention and edema.  He was found to be in hypertensive emergency with blood pressures as high as 240/140.  He had evidence of CHF exacerbation.  A troponin was mildly elevated.   Echocardiogram was performed 2017 that showed severe LVH with septal and posterior wall measurements of approximately 2.5 cm with 1.5 cm being the upper limits of normal.  There was diastolic dysfunction of the left ventricle and the LVEF was 30-35%.  Left atrium was not particularly dilated.  He was treated initially with nitrates, Carvedilol, ACE inhibitor, Lasix for diuresis.  With these therapies, his blood pressures came down nicely.  Coronary angiography was performed 6/15/2017.  He did show trivial coronary artery disease of the right coronary artery with a stenosis of 10-20%, otherwise his coronary arteries were large and free of disease.   During his admission, it was noted that he likely has severe sleep apnea as he had some pauses on telemetry and outpatient sleep study was recommended.  On telemetry there were some brief episodes of what looked intermittent 3rd degree AV block in the middle of the night during sleep.  He was asymptomatic.  His Carvedilol was decreased from 50 mg twice daily to 25 mg twice daily.   Further monitoring showed sinus rhythm.  Counseled on tobacco cessation and on heart failure indication.      The patient presents today for followup.  He tells me that he is doing very well.  He has no symptoms of shortness of breath, orthopnea, PND, edema.  He has no chest pain or palpitations.  He also has no lightheadedness, dizziness, near syncope or syncope.  He says he is tolerating his medications without any adverse side effects.   He is sleeping great, best in a very long time.   He goes to the grocery store once a week and checks his blood pressure, and it has been controlled. He was in [place] for the past 1-2 weeks for job training, and ate out daily. His weight is up some, but he thinks it is caloric as he is feeling so great.   He continues to attempt a low-sodium diet but it is difficult as he works 7 days a week and long days.  He drinks alcohol 1 day a week in moderation.  He smokes cigarettes on Sundays only.  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          ROS:  Skin:      no rashes  Eyes:       ENT:       Respiratory:  Negative  no FOSS, no SOB  Cardiovascular:  Negative  no CP, palpitations, no edema, no orthopnea  Gastroenterology: Negative  tolerating meds  Genitourinary:  Negative    Musculoskeletal:  Negative   no edema  Neurologic:  Negative    Psychiatric:       Heme/Lymph/Imm:       Endocrine:         PAST MEDICAL HISTORY:  No past medical history on file.    PAST SURGICAL HISTORY:  No past surgical history on file.    SOCIAL HISTORY:  Social History     Social History     Marital status: Single     Spouse name: N/A     Number of children: N/A     Years of education: N/A     Social History Main Topics     Smoking status: Current Every Day Smoker     Types: Cigarettes     Smokeless tobacco: Not on file     Alcohol use Yes      Comment: occassional     Drug use: No     Sexual activity: Not on  "file     Other Topics Concern     Not on file     Social History Narrative       FAMILY HISTORY:  No family history on file.    MEDS:   Current Outpatient Prescriptions on File Prior to Visit:  lisinopril (PRINIVIL/ZESTRIL) 20 MG tablet Take 1 tablet (20 mg) by mouth 2 times daily   aspirin EC 81 MG EC tablet Take 1 tablet (81 mg) by mouth daily   [DISCONTINUED] atorvastatin (LIPITOR) 10 MG tablet Take 1 tablet (10 mg) by mouth daily   [DISCONTINUED] carvedilol (COREG) 25 MG tablet Take 1 tablet (25 mg) by mouth 2 times daily (with meals)   [DISCONTINUED] furosemide (LASIX) 20 MG tablet Take 1 tablet (20 mg) by mouth daily   [DISCONTINUED] spironolactone (ALDACTONE) 25 MG tablet Take 1 tablet (25 mg) by mouth daily   MELATONIN PO Take 10 mg by mouth nightly as needed    TRAZODONE HCL PO Take 50 mg by mouth nightly as needed for sleep     No current facility-administered medications on file prior to visit.     ALLERGIES: No Known Allergies    PHYSICAL EXAM:  Vitals: /80 (BP Location: Right arm, Patient Position: Chair, Cuff Size: Adult Large)  Pulse 85  Ht 1.803 m (5' 11\")  Wt (!) 172.9 kg (381 lb 3.2 oz)  SpO2 96%  BMI 53.17 kg/m2  Constitutional:  cooperative, alert and oriented, well developed, well nourished, in no acute distress morbidly obese      Skin:  warm and dry to the touch        Head:  normocephalic, no masses or lesions        Eyes:  pupils equal and round;conjunctivae and lids unremarkable;sclera white;no xanthalasma        ENT:  no pallor or cyanosis        Neck:      difficult to assess given neck size, but no apparent JVD    Chest:  clear to auscultation        Cardiac: regular rhythm   distant heart sounds         no S3 or S4 appreciated, no murmur appreciated     Abdomen:  abdomen soft;BS normoactive;non-tender obese      Vascular: pulses full and equal                                 right radial site: 2+ pulse, reverse paty test normal    Extremities and Back:  no deformities, " clubbing, cyanosis, erythema observed;no edema        Neurological:  affect appropriate, oriented to time, person and place;no gross motor deficits          LABS/DATA:  I reviewed the following:    Lab Results   Component Value Date     08/09/2017    POTASSIUM 4.1 08/09/2017    CHLORIDE 101 08/09/2017    CO2 29 08/09/2017    BUN 30 08/09/2017    CR 1.60 (H) 08/09/2017     (H) 08/09/2017       ASSESSMENT:  1.  Chronic tobacco abuse.   2.  Morbid obesity.  3.  Suspected obstructive sleep apnea. Referral has been made.   4.  Mild renal insufficiency. ?hypertensive.  Overall stable on Lasix, spironolactone, lisinopril.   5.  Trivial nonobstructive coronary artery disease.   No anginal sxs.  On ASA, statin.    6.  Suspected hypertensive cardiomyopathy.  Echo 6/13/17: LVEF 30-35%, grade III diastolic dysfunction.  Cor angio 6/25/17 without significant obstructive CAD. On BB, ACEi, spironolactone.   7.  Acute systolic and diastolic congestive heart failure.  Currently NYHA functional class II.   8.  Hypertension, malignant. BP now controlled.   9.  Transient high-grade AV block during sleep, and on Coreg 50mg BID. Decreased to 25mg BID and no recurrence.        PLAN:  Brooks continues to do very well following his admission for heart failure, coronary angiogram, diuresis.  He is taking his medications as prescribed without any adverse effects.  He is weighing himself daily, aiming for a low-sodium diet.   He checks his BP frequently at [employer].  He does continue to smoke but only 1 day a week, on Sundays.  He drinks alcohol rarely, at most once or twice a week.  He has no symptoms of chest pain, shortness of breath, edema, palpitations, lightheadedness, dizziness, near syncope or syncope.      1. Despite his weight being up, he has no e/o fluid retention on exam and is asymptomatic from a cardiorespiratorry standpoint. Creat remains elevated (actually up a bit) and BUN is running a bit higher (though WNL). Will  decrease the spironolactone to 12.5 mg daily.   2.  Continue ASA 81mg, atorvastatin 10mg QHS for CAD. Continue Coreg 25mg BID, lisinopril 20mg BID, spironolactone 12.5mg (decreasing today), Lasix 20mg for CHF and HTN.   3.  Low sodium diet and Mediterranean style diet.   4.  Daily weights. Educated to call if his weight is trending up. Specifically, if his weight is not coming down as he resumes his normal daily routine and food patterns, he is to call.    4.  General weight loss.   5.  Outpatient sleep study.   6.  Complete smoking cessation.   7.  Follow up is scheduled: [facility] (called MRI tech and with his height/weight, they think MRI should accommodate him), labs and EKG to follow QT (prolonged in hospital); see Dr. Sow in about 1 month.    Thank you for allowing me to participate in the care of your patient.    Sincerely,     Prem Painter PA-C     [facility]

## 2017-08-09 NOTE — PROGRESS NOTES
CARDIOLOGY CLINIC PROGRESS NOTE - [facility]    DOS: 2017    Brooks Mensah  : 1985, 32 year old  MRN: [id number]      History:   It was a pleasure following up with Brooks (or also goes by Javon) today in the Cardiology [facility].     Brooks is a very pleasant 32-year-old gentleman with a prior past medical history significant for tobacco use, morbid obesity who was admitted to [facility] 2017 with a 2-week history of progressive orthopnea, shortness of breath, abdominal distention and edema.  He was found to be in hypertensive emergency with blood pressures as high as 240/140.  He had evidence of CHF exacerbation.  A troponin was mildly elevated.   Echocardiogram was performed 2017 that showed severe LVH with septal and posterior wall measurements of approximately 2.5 cm with 1.5 cm being the upper limits of normal.  There was diastolic dysfunction of the left ventricle and the LVEF was 30-35%.  Left atrium was not particularly dilated.  He was treated initially with nitrates, Carvedilol, ACE inhibitor, Lasix for diuresis.  With these therapies, his blood pressures came down nicely.  Coronary angiography was performed 6/15/2017.  He did show trivial coronary artery disease of the right coronary artery with a stenosis of 10-20%, otherwise his coronary arteries were large and free of disease.   During his admission, it was noted that he likely has severe sleep apnea as he had some pauses on telemetry and outpatient sleep study was recommended.  On telemetry there were some brief episodes of what looked intermittent 3rd degree AV block in the middle of the night during sleep.  He was asymptomatic.  His Carvedilol was decreased from 50 mg twice daily to 25 mg twice daily.  Further monitoring showed sinus rhythm.  Counseled on tobacco cessation and on heart failure indication.      The patient presents today for followup.  He tells me that he is doing very well.  He has no symptoms of  shortness of breath, orthopnea, PND, edema.  He has no chest pain or palpitations.  He also has no lightheadedness, dizziness, near syncope or syncope.  He says he is tolerating his medications without any adverse side effects.  He is sleeping great, best in a very long time.   He goes to the grocery store once a week and checks his blood pressure, and it has been controlled. He was in [place] for the past 1-2 weeks for job training, and ate out daily. His weight is up some, but he thinks it is caloric as he is feeling so great.   He continues to attempt a low-sodium diet but it is difficult as he works 7 days a week and long days.  He drinks alcohol 1 day a week in moderation.  He smokes cigarettes on Sundays only.  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          ROS:  Skin:      no rashes  Eyes:       ENT:       Respiratory:  Negative  no FOSS, no SOB  Cardiovascular:  Negative  no CP, palpitations, no edema, no orthopnea  Gastroenterology: Negative  tolerating meds  Genitourinary:  Negative    Musculoskeletal:  Negative   no edema  Neurologic:  Negative    Psychiatric:       Heme/Lymph/Imm:       Endocrine:         PAST MEDICAL HISTORY:  No past medical history on file.    PAST SURGICAL HISTORY:  No past surgical history on file.    SOCIAL HISTORY:  Social History     Social History     Marital status: Single     Spouse name: N/A     Number of children: N/A     Years of education: N/A     Social History Main Topics     Smoking status: Current Every Day Smoker     Types: Cigarettes     Smokeless tobacco: Not on file     Alcohol use Yes      Comment: occassional     Drug use: No     Sexual activity: Not on file     Other Topics Concern     Not on file     Social History Narrative       FAMILY HISTORY:  No family history on file.    MEDS:   Current Outpatient Prescriptions on File Prior to Visit:  lisinopril  "(PRINIVIL/ZESTRIL) 20 MG tablet Take 1 tablet (20 mg) by mouth 2 times daily   aspirin EC 81 MG EC tablet Take 1 tablet (81 mg) by mouth daily   [DISCONTINUED] atorvastatin (LIPITOR) 10 MG tablet Take 1 tablet (10 mg) by mouth daily   [DISCONTINUED] carvedilol (COREG) 25 MG tablet Take 1 tablet (25 mg) by mouth 2 times daily (with meals)   [DISCONTINUED] furosemide (LASIX) 20 MG tablet Take 1 tablet (20 mg) by mouth daily   [DISCONTINUED] spironolactone (ALDACTONE) 25 MG tablet Take 1 tablet (25 mg) by mouth daily   MELATONIN PO Take 10 mg by mouth nightly as needed    TRAZODONE HCL PO Take 50 mg by mouth nightly as needed for sleep     No current facility-administered medications on file prior to visit.     ALLERGIES: No Known Allergies    PHYSICAL EXAM:  Vitals: /80 (BP Location: Right arm, Patient Position: Chair, Cuff Size: Adult Large)  Pulse 85  Ht 1.803 m (5' 11\")  Wt (!) 172.9 kg (381 lb 3.2 oz)  SpO2 96%  BMI 53.17 kg/m2  Constitutional:  cooperative, alert and oriented, well developed, well nourished, in no acute distress morbidly obese      Skin:  warm and dry to the touch        Head:  normocephalic, no masses or lesions        Eyes:  pupils equal and round;conjunctivae and lids unremarkable;sclera white;no xanthalasma        ENT:  no pallor or cyanosis        Neck:      difficult to assess given neck size, but no apparent JVD    Chest:  clear to auscultation        Cardiac: regular rhythm   distant heart sounds         no S3 or S4 appreciated, no murmur appreciated     Abdomen:  abdomen soft;BS normoactive;non-tender obese      Vascular: pulses full and equal                                 right radial site: 2+ pulse, reverse paty test normal    Extremities and Back:  no deformities, clubbing, cyanosis, erythema observed;no edema        Neurological:  affect appropriate, oriented to time, person and place;no gross motor deficits          LABS/DATA:  I reviewed the following:    Lab Results "   Component Value Date     08/09/2017    POTASSIUM 4.1 08/09/2017    CHLORIDE 101 08/09/2017    CO2 29 08/09/2017    BUN 30 08/09/2017    CR 1.60 (H) 08/09/2017     (H) 08/09/2017       ASSESSMENT:  1.  Chronic tobacco abuse.   2.  Morbid obesity.  3.  Suspected obstructive sleep apnea. Referral has been made.   4.  Mild renal insufficiency. ?hypertensive.  Overall stable on Lasix, spironolactone, lisinopril.   5.  Trivial nonobstructive coronary artery disease.   No anginal sxs.  On ASA, statin.    6.  Suspected hypertensive cardiomyopathy.  Echo 6/13/17: LVEF 30-35%, grade III diastolic dysfunction.  Cor angio 6/25/17 without significant obstructive CAD. On BB, ACEi, spironolactone.   7.  Acute systolic and diastolic congestive heart failure.  Currently NYHA functional class II.   8.  Hypertension, malignant. BP now controlled.   9.  Transient high-grade AV block during sleep, and on Coreg 50mg BID. Decreased to 25mg BID and no recurrence.        PLAN:  Brooks continues to do very well following his admission for heart failure, coronary angiogram, diuresis.  He is taking his medications as prescribed without any adverse effects.  He is weighing himself daily, aiming for a low-sodium diet.  He checks his BP frequently at [employer].  He does continue to smoke but only 1 day a week, on Sundays.  He drinks alcohol rarely, at most once or twice a week.  He has no symptoms of chest pain, shortness of breath, edema, palpitations, lightheadedness, dizziness, near syncope or syncope.      1. Despite his weight being up, he has no e/o fluid retention on exam and is asymptomatic from a cardiorespiratorry standpoint. Creat remains elevated (actually up a bit) and BUN is running a bit higher (though WNL). Will decrease the spironolactone to 12.5 mg daily.   2.  Continue ASA 81mg, atorvastatin 10mg QHS for CAD. Continue Coreg 25mg BID, lisinopril 20mg BID, spironolactone 12.5mg (decreasing today), Lasix 20mg for CHF  and HTN.   3.  Low sodium diet and Mediterranean style diet.   4.  Daily weights. Educated to call if his weight is trending up. Specifically, if his weight is not coming down as he resumes his normal daily routine and food patterns, he is to call.    4.  General weight loss.   5.  Outpatient sleep study.   6.  Complete smoking cessation.   7.  Follow up is scheduled: [facility] (called MRI tech and with his height/weight, they think MRI should accommodate him), labs and EKG to follow QT (prolonged in hospital); see Dr. Sow in about 1 month.    YAIMA PiñaC

## 2017-08-09 NOTE — PATIENT INSTRUCTIONS
Call the C.O.R.E. nurse for any questions or concerns:  [phone]    1. Medication changes from today:         * Decrease the spironolactone to 12.5 mg (1/2 tab) daily.     2. Continue to weigh yourself daily and write it down. If you notice it is trending up after you get back into your normal routine (after traveling and eating out), please call us.     3. Call [facility] nurse if your weight is up more than 2 pounds in one day or 5 pounds in one week.    4. Call [facility] nurse if you feel more short of breath, have more abdominal bloating, or leg swelling.    5. Continue low sodium diet (less than 2000 mg daily). If you eat less salt, you will retain less fluid.    6. Alcohol can weaken your heart further. You should avoid alcohol or limit its use to special times, such as a holiday or birthday.    7. Do NOT take Aleve or ibuprofen without talking to your doctor first.     8. Lab Results:  Component      Latest Ref Rng & Units 8/9/2017   Sodium      133 - 144 mmol/L 137   Potassium      3.4 - 5.3 mmol/L 4.1   Chloride      94 - 109 mmol/L 101   Carbon Dioxide      20 - 32 mmol/L 29   Anion Gap      3 - 14 mmol/L 7   Glucose      70 - 99 mg/dL 123 (H)   Urea Nitrogen      7 - 30 mg/dL 30   Creatinine      0.66 - 1.25 mg/dL 1.60 (H)   GFR Estimate      >60 mL/min/1.7m2 50 (L)   GFR Estimate If Black      >60 mL/min/1.7m2 61   Calcium      8.5 - 10.1 mg/dL 9.2       8.   Follow-Up:    * Cardiac MRI is scheduled 8/25/17   * Dr. Sow 9/18/17 with EKG and labs    [facility]: Cardiomyopathy, Optimization, Rehabilitation, Education  The [facility] is a heart failure specialty clinic within the [facility] where you will work with specialized nurse practitioners, physician assistants, doctors, and registered nurses. They are dedicated to helping patients with heart failure to carefully adjust medications, receive education, and learn who and when to call if symptoms develop. They specialize in helping you better  understand your condition, slow the progression of your disease, improve the length and quality of your life, help you detect future heart problems before they become life threatening, and avoid hospitalizations.

## 2017-09-15 ENCOUNTER — PRE VISIT (OUTPATIENT)
Dept: CARDIOLOGY | Facility: CLINIC | Age: 32
End: 2017-09-15

## 2018-07-31 DIAGNOSIS — I16.1 HYPERTENSIVE EMERGENCY: ICD-10-CM

## 2018-07-31 RX ORDER — LISINOPRIL 20 MG/1
20 TABLET ORAL 2 TIMES DAILY
Qty: 60 TABLET | Refills: 0 | Status: SHIPPED | OUTPATIENT
Start: 2018-07-31 | End: 2018-08-01

## 2018-07-31 NOTE — TELEPHONE ENCOUNTER
Received refill request for:    Last OV was: 17 w/Prem Painter PA-C  Labs/EK17  BMP  F/U scheduled: Needs f/u, no show and cancels frequently  New script sent to: [facility], 1 month supply w/ note to pharm that pt needs to f/u   Donna Sandoval, RN 12:51 PM 18

## 2018-08-01 DIAGNOSIS — I16.1 HYPERTENSIVE EMERGENCY: ICD-10-CM

## 2018-08-01 RX ORDER — LISINOPRIL 20 MG/1
20 TABLET ORAL 2 TIMES DAILY
Qty: 60 TABLET | Refills: 0 | Status: SHIPPED | OUTPATIENT
Start: 2018-08-01 | End: 2018-08-31

## 2018-08-31 ENCOUNTER — TELEPHONE (OUTPATIENT)
Dept: CARDIOLOGY | Facility: CLINIC | Age: 33
End: 2018-08-31

## 2018-08-31 DIAGNOSIS — I50.9 CONGESTIVE HEART FAILURE, UNSPECIFIED CONGESTIVE HEART FAILURE CHRONICITY, UNSPECIFIED CONGESTIVE HEART FAILURE TYPE: ICD-10-CM

## 2018-08-31 DIAGNOSIS — I16.1 HYPERTENSIVE EMERGENCY: ICD-10-CM

## 2018-08-31 DIAGNOSIS — I21.4 NSTEMI (NON-ST ELEVATED MYOCARDIAL INFARCTION) (H): ICD-10-CM

## 2018-08-31 RX ORDER — FUROSEMIDE 20 MG
20 TABLET ORAL DAILY
Qty: 30 TABLET | Refills: 0 | Status: SHIPPED | OUTPATIENT
Start: 2018-08-31 | End: 2018-10-03

## 2018-08-31 RX ORDER — ATORVASTATIN CALCIUM 10 MG/1
10 TABLET, FILM COATED ORAL DAILY
Qty: 30 TABLET | Refills: 0 | Status: SHIPPED | OUTPATIENT
Start: 2018-08-31 | End: 2018-10-03

## 2018-08-31 RX ORDER — LISINOPRIL 20 MG/1
20 TABLET ORAL 2 TIMES DAILY
Qty: 60 TABLET | Refills: 0 | Status: SHIPPED | OUTPATIENT
Start: 2018-08-31 | End: 2018-10-03

## 2018-08-31 NOTE — TELEPHONE ENCOUNTER
Called patient and left message stating he is overdue for follow up and needs to be seen by a cardiologist.  He was last seen by KASEY Amaro in 08/2017 and was supposed to see Dr. Sow but he hasn't seen a cardiologist in the clinic yet.  We received refill request for Lisinopril, Lasix, and Lipitor.  We will refill meds for 1 month with no refills since he has been noncompliant with follow up.  Left message for patient to call the scheduling department.  Nikolay MACKEY

## 2018-09-04 DIAGNOSIS — I50.9 CONGESTIVE HEART FAILURE, UNSPECIFIED CONGESTIVE HEART FAILURE CHRONICITY, UNSPECIFIED CONGESTIVE HEART FAILURE TYPE: ICD-10-CM

## 2018-09-04 DIAGNOSIS — I16.1 HYPERTENSIVE EMERGENCY: ICD-10-CM

## 2018-09-04 RX ORDER — SPIRONOLACTONE 25 MG/1
12.5 TABLET ORAL DAILY
Qty: 15 TABLET | Refills: 0 | Status: SHIPPED | OUTPATIENT
Start: 2018-09-04 | End: 2018-10-03

## 2018-09-04 RX ORDER — CARVEDILOL 25 MG/1
25 TABLET ORAL 2 TIMES DAILY WITH MEALS
Qty: 60 TABLET | Refills: 0 | Status: SHIPPED | OUTPATIENT
Start: 2018-09-04 | End: 2018-10-03

## 2018-10-02 ENCOUNTER — CARE COORDINATION (OUTPATIENT)
Dept: CARDIOLOGY | Facility: CLINIC | Age: 33
End: 2018-10-02

## 2018-10-02 NOTE — PROGRESS NOTES
VM left for pt asking which medications he needs refills on.  Donna Sandoval RN 4:17 PM 10/02/18

## 2018-10-03 DIAGNOSIS — I50.22 CHRONIC SYSTOLIC CONGESTIVE HEART FAILURE (H): Primary | ICD-10-CM

## 2018-10-03 DIAGNOSIS — I21.4 NSTEMI (NON-ST ELEVATED MYOCARDIAL INFARCTION) (H): ICD-10-CM

## 2018-10-03 DIAGNOSIS — I16.1 HYPERTENSIVE EMERGENCY: ICD-10-CM

## 2018-10-03 RX ORDER — LISINOPRIL 20 MG/1
20 TABLET ORAL 2 TIMES DAILY
Qty: 60 TABLET | Refills: 3 | Status: SHIPPED | OUTPATIENT
Start: 2018-10-03 | End: 2019-03-22

## 2018-10-03 RX ORDER — SPIRONOLACTONE 25 MG/1
12.5 TABLET ORAL DAILY
Qty: 15 TABLET | Refills: 3 | Status: SHIPPED | OUTPATIENT
Start: 2018-10-03 | End: 2019-03-22

## 2018-10-03 RX ORDER — FUROSEMIDE 20 MG
20 TABLET ORAL DAILY
Qty: 30 TABLET | Refills: 3 | Status: SHIPPED | OUTPATIENT
Start: 2018-10-03 | End: 2019-03-22

## 2018-10-03 RX ORDER — CARVEDILOL 25 MG/1
25 TABLET ORAL 2 TIMES DAILY WITH MEALS
Qty: 60 TABLET | Refills: 3 | Status: SHIPPED | OUTPATIENT
Start: 2018-10-03 | End: 2019-03-22

## 2018-10-03 RX ORDER — LISINOPRIL 20 MG/1
20 TABLET ORAL 2 TIMES DAILY
Qty: 60 TABLET | Refills: 0 | Status: SHIPPED | OUTPATIENT
Start: 2018-10-03 | End: 2018-10-03

## 2018-10-03 RX ORDER — ATORVASTATIN CALCIUM 10 MG/1
10 TABLET, FILM COATED ORAL DAILY
Qty: 30 TABLET | Refills: 3 | Status: SHIPPED | OUTPATIENT
Start: 2018-10-03 | End: 2019-04-24

## 2018-10-03 NOTE — PROGRESS NOTES
Received refill request for:  Atorvastatin, carvedilol, furosemide, lisinopril & Spironlactone  Last OV was: 2017  Labs/EK2017  F/U scheduled: 10/9/2018 w/ [address] CNP  New script sent to: [facility] [place] Sandoval, RN 8:48 AM 10/03/18

## 2019-03-22 ENCOUNTER — OFFICE VISIT (OUTPATIENT)
Dept: CARDIOLOGY | Facility: CLINIC | Age: 34
End: 2019-03-22
Payer: COMMERCIAL

## 2019-03-22 VITALS
SYSTOLIC BLOOD PRESSURE: 158 MMHG | DIASTOLIC BLOOD PRESSURE: 100 MMHG | HEART RATE: 76 BPM | BODY MASS INDEX: 44.1 KG/M2 | WEIGHT: 315 LBS | HEIGHT: 71 IN

## 2019-03-22 DIAGNOSIS — I16.1 HYPERTENSIVE EMERGENCY: ICD-10-CM

## 2019-03-22 DIAGNOSIS — I42.8 OTHER CARDIOMYOPATHY (H): Primary | ICD-10-CM

## 2019-03-22 DIAGNOSIS — I50.22 CHRONIC SYSTOLIC CONGESTIVE HEART FAILURE (H): ICD-10-CM

## 2019-03-22 PROCEDURE — 99215 OFFICE O/P EST HI 40 MIN: CPT | Performed by: INTERNAL MEDICINE

## 2019-03-22 RX ORDER — LISINOPRIL 20 MG/1
20 TABLET ORAL 2 TIMES DAILY
Qty: 180 TABLET | Refills: 3 | Status: SHIPPED | OUTPATIENT
Start: 2019-03-22 | End: 2020-08-19

## 2019-03-22 RX ORDER — SPIRONOLACTONE 25 MG/1
12.5 TABLET ORAL DAILY
Qty: 45 TABLET | Refills: 3 | Status: SHIPPED | OUTPATIENT
Start: 2019-03-22 | End: 2020-08-19

## 2019-03-22 RX ORDER — FUROSEMIDE 20 MG
20 TABLET ORAL DAILY
Qty: 90 TABLET | Refills: 3 | Status: SHIPPED | OUTPATIENT
Start: 2019-03-22 | End: 2020-08-19

## 2019-03-22 RX ORDER — CARVEDILOL 25 MG/1
25 TABLET ORAL 2 TIMES DAILY WITH MEALS
Qty: 180 TABLET | Refills: 3 | Status: SHIPPED | OUTPATIENT
Start: 2019-03-22 | End: 2020-08-19

## 2019-03-22 ASSESSMENT — MIFFLIN-ST. JEOR: SCORE: 2798.3

## 2019-03-22 NOTE — PROGRESS NOTES
Service Date: 03/22/2019      HISTORY OF PRESENT ILLNESS:  It is a pleasure to follow up with Mr. Brooks Mensah.  This is a gentleman with a history of congestive heart failure from hypertensive cardiomyopathy.  I read from his excellent medical records that he was initially seen in 2017 when he was admitted with congestive heart failure and moderate to severe left ventricular systolic dysfunction.  His blood pressure was as high as 240.  He had a severe concentric left ventricular hypertrophy.  He underwent coronary angiography, which did not show significant obstructive coronary artery disease.        With appropriate medications, his CHF resolved.  Currently, he tells me that he is not out of breath that has no PND, orthopnea or ankle swelling.  His body mass index, which was 52.97 in 06/2017, has now increased to 56.3.  Initially he was on 50 mg of Coreg b.i.d. and this was decreased to 25 mg p.o. b.i.d. due to transient high-grade AV block during sleep.  When in hospital, he was noted to have episodes of apnea.  He was referred to have a sleep study, which he has not had as yet.  He was also set up for cardiac MR which again he has not had either.      It has been more than a year out since we last saw him.  His medications have run out.  He admits to medication noncompliance and only takes his medications intermittently.  Not surprisingly, his blood pressure is 158/100 today.  Fortunately, there is no evidence of congestive heart failure by physical exam.  He continues to smoke.      IMPRESSION:   1.  Hypertension.   2.  Medication noncompliance.   3.  Dietary noncompliance.   4.  Hypertensive cardiomyopathy.   5.  Probable sleep apnea   6.  Tobacco use.      We had a nice discussion about the importance of medication compliance.  I also advised him of the absolute necessity of a low-salt diet.  I strongly advised him to give up smoking.  We talked about gastric bypass and he is not keen on that at all.      I  renewed all his medications for him.  I think if he takes them, there is a good chance he would be able to maintain a normal blood pressure.  I will have him follow up with my colleague, Prem Painter, who he has seen previously, in about a month's time.  He is agreeable to having a sleep study and I will arrange.      Of note also when he was in hospital, his creatinine is 1.6.  I will have this rechecked as well prior to his next clinic visit.        Finally, I think we should have another look at his ejection fraction and I will request an echocardiogram for this purpose. I do not think a cardiac MR is necessary at this time, his echo images appeared to have been adequate previously.     I have provisionally arranged to see him again in 6 months' time for further followup.         AMELIA HOLLINGSWORTH MD, FACC             D: 2019   T: 2019   MT: MYLA      Name:     BREA DO   MRN:      [id number]        Account:      [id number]   :      1985           Service Date: 2019      Document: [id number]

## 2019-03-22 NOTE — LETTER
3/22/2019    Eva Kent PA-C  [address] [place] [address]    RE: rBooks Mensah       Dear Colleague,    I had the pleasure of seeing Brooks Mensah in the [facility].    HPI and Plan:   See dictation    Orders Placed This Encounter   Procedures     Basic metabolic panel     Follow-Up with Cardiac Advanced Practice Provider     SLEEP EVALUATION & MANAGEMENT REFERRAL - [facility]  [phone] (Age 18 and up)     Follow-Up with Cardiologist     Echocardiogram Complete       Orders Placed This Encounter   Medications     carvedilol (COREG) 25 MG tablet     Sig: Take 1 tablet (25 mg) by mouth 2 times daily (with meals)     Dispense:  180 tablet     Refill:  3     furosemide (LASIX) 20 MG tablet     Sig: Take 1 tablet (20 mg) by mouth daily     Dispense:  90 tablet     Refill:  3     lisinopril (PRINIVIL/ZESTRIL) 20 MG tablet     Sig: Take 1 tablet (20 mg) by mouth 2 times daily     Dispense:  180 tablet     Refill:  3     spironolactone (ALDACTONE) 25 MG tablet     Sig: Take 0.5 tablets (12.5 mg) by mouth daily     Dispense:  45 tablet     Refill:  3       Encounter Diagnoses   Name Primary?     Hypertensive emergency      Chronic systolic congestive heart failure (H)      Other cardiomyopathy (H) Yes       CURRENT MEDICATIONS:  Current Outpatient Medications   Medication Sig Dispense Refill     aspirin EC 81 MG EC tablet Take 1 tablet (81 mg) by mouth daily 30 tablet 3     atorvastatin (LIPITOR) 10 MG tablet Take 1 tablet (10 mg) by mouth daily 30 tablet 3     carvedilol (COREG) 25 MG tablet Take 1 tablet (25 mg) by mouth 2 times daily (with meals) 180 tablet 3     furosemide (LASIX) 20 MG tablet Take 1 tablet (20 mg) by mouth daily 90 tablet 3     lisinopril (PRINIVIL/ZESTRIL) 20 MG tablet Take 1 tablet (20 mg) by mouth 2 times daily 180 tablet 3     MELATONIN PO Take 10 mg by mouth nightly as needed        spironolactone (ALDACTONE) 25 MG tablet  Take 0.5 tablets (12.5 mg) by mouth daily 45 tablet 3     TRAZODONE HCL PO Take 50 mg by mouth nightly as needed for sleep         ALLERGIES   No Known Allergies    PAST MEDICAL HISTORY:  Past Medical History:   Diagnosis Date     Acute on chronic systolic congestive heart failure (H) 8/9/2017     CAD (coronary artery disease)     Nonobstructive     Cardiomyopathy, unspecified (H) 8/9/2017     Congestive heart failure, unspecified congestive heart failure chronicity, unspecified congestive heart failure type 6/13/2017     HTN (hypertension), malignant      Hypertensive urgency 6/13/2017     Morbid obesity with BMI of 50.0-59.9, adult (H) 6/21/2017     NSTEMI (non-ST elevated myocardial infarction) (H) 6/13/2017     Renal insufficiency 6/13/2017     Sleep apnea      Tobacco abuse        PAST SURGICAL HISTORY:  History reviewed. No pertinent surgical history.    FAMILY HISTORY:  History reviewed. No pertinent family history.    SOCIAL HISTORY:  Social History     Socioeconomic History     Marital status: Single     Spouse name: None     Number of children: None     Years of education: None     Highest education level: None   Occupational History     None   Social Needs     Financial resource strain: None     Food insecurity:     Worry: None     Inability: None     Transportation needs:     Medical: None     Non-medical: None   Tobacco Use     Smoking status: Current Every Day Smoker     Types: Cigarettes     Smokeless tobacco: Never Used   Substance and Sexual Activity     Alcohol use: Yes     Comment: occassional     Drug use: No     Sexual activity: None   Lifestyle     Physical activity:     Days per week: None     Minutes per session: None     Stress: None   Relationships     Social connections:     Talks on phone: None     Gets together: None     Attends [employer] service: None     Active member of club or organization: None     Attends meetings of clubs or organizations: None     Relationship status: None      "Intimate partner violence:     Fear of current or ex partner: None     Emotionally abused: None     Physically abused: None     Forced sexual activity: None   Other Topics Concern     Parent/sibling w/ CABG, MI or angioplasty before 65F 55M? Not Asked   Social History Narrative     None       Review of Systems:  Skin:  Negative     Eyes:  Negative    ENT:  Negative    Respiratory:  Negative    Cardiovascular:  Negative    Gastroenterology: Negative    Genitourinary:  Negative    Musculoskeletal:  Negative    Neurologic:  Negative    Psychiatric:  Positive for sleep disturbances  Heme/Lymph/Imm:  Negative    Endocrine:  Negative      Physical Exam:  Vitals: BP (!) 158/100 (BP Location: Right arm, Patient Position: Sitting, Cuff Size: Adult Large)   Pulse 76   Ht 1.803 m (5' 11\")   Wt (!) 183.1 kg (403 lb 11.2 oz)   BMI 56.30 kg/m       Constitutional:  cooperative, alert and oriented, well developed, well nourished, in no acute distress morbidly obese      Skin:  warm and dry to the touch          Head:  normocephalic, no masses or lesions        Eyes:  pupils equal and round;conjunctivae and lids unremarkable;sclera white;no xanthalasma        Lymph:No Cervical lymphadenopathy present     ENT:  no pallor or cyanosis        Neck:  carotid pulses are full and equal bilaterally, JVP normal, no carotid bruit   difficult to assess given neck size, but no apparent JVD    Respiratory:  normal breath sounds, clear to auscultation, normal A-P diameter, normal symmetry, normal respiratory excursion, no use of accessory muscles         Cardiac: regular rhythm;apical impulse not displaced   distant heart sounds         no S3 or S4 appreciated, no murmur appreciated   pulses full and equal                                   right radial site: 2+ pulse, reverse paty test normal    GI:  abdomen soft;BS normoactive;non-tender obese      Extremities and Muscular Skeletal:  no deformities, clubbing, cyanosis, erythema observed;no " edema              Neurological:  no gross motor deficits        Psych:  Alert and Oriented x 3        Recent Lab Results:  LIPID RESULTS:  Lab Results   Component Value Date    CHOL 154 06/14/2017    HDL 41 06/14/2017    LDL 87 06/14/2017    TRIG 132 06/14/2017       LIVER ENZYME RESULTS:  Lab Results   Component Value Date    AST 30 06/14/2017    ALT 47 06/14/2017       CBC RESULTS:  Lab Results   Component Value Date    WBC 13.3 (H) 06/16/2017    RBC 4.04 (L) 06/16/2017    HGB 11.7 (L) 06/16/2017    HCT 37.2 (L) 06/16/2017    MCV 92 06/16/2017    MCH 29.0 06/16/2017    MCHC 31.5 06/16/2017    RDW 16.6 (H) 06/16/2017     06/16/2017       BMP RESULTS:  Lab Results   Component Value Date     08/09/2017    POTASSIUM 4.1 08/09/2017    CHLORIDE 101 08/09/2017    CO2 29 08/09/2017    ANIONGAP 7 08/09/2017     (H) 08/09/2017    BUN 30 08/09/2017    CR 1.60 (H) 08/09/2017    GFRESTIMATED 50 (L) 08/09/2017    GFRESTBLACK 61 08/09/2017    GAYLE 9.2 08/09/2017        A1C RESULTS:  No results found for: A1C    INR RESULTS:  No results found for: INR        CC  Eva Kent PA-C  [address] [place] [address]                  Thank you for allowing me to participate in the care of your patient.      Sincerely,     DR AMELIA HOLLINGSWORTH MD     [facility]    cc:   Eva Kent PA-C  [address] [place] [address]

## 2019-03-22 NOTE — PROGRESS NOTES
HPI and Plan:   See dictation    Orders Placed This Encounter   Procedures     Basic metabolic panel     Follow-Up with Cardiac Advanced Practice Provider     SLEEP EVALUATION & MANAGEMENT REFERRAL - [facility]  [phone] (Age 18 and up)     Follow-Up with Cardiologist     Echocardiogram Complete       Orders Placed This Encounter   Medications     carvedilol (COREG) 25 MG tablet     Sig: Take 1 tablet (25 mg) by mouth 2 times daily (with meals)     Dispense:  180 tablet     Refill:  3     furosemide (LASIX) 20 MG tablet     Sig: Take 1 tablet (20 mg) by mouth daily     Dispense:  90 tablet     Refill:  3     lisinopril (PRINIVIL/ZESTRIL) 20 MG tablet     Sig: Take 1 tablet (20 mg) by mouth 2 times daily     Dispense:  180 tablet     Refill:  3     spironolactone (ALDACTONE) 25 MG tablet     Sig: Take 0.5 tablets (12.5 mg) by mouth daily     Dispense:  45 tablet     Refill:  3       Encounter Diagnoses   Name Primary?     Hypertensive emergency      Chronic systolic congestive heart failure (H)      Other cardiomyopathy (H) Yes       CURRENT MEDICATIONS:  Current Outpatient Medications   Medication Sig Dispense Refill     aspirin EC 81 MG EC tablet Take 1 tablet (81 mg) by mouth daily 30 tablet 3     atorvastatin (LIPITOR) 10 MG tablet Take 1 tablet (10 mg) by mouth daily 30 tablet 3     carvedilol (COREG) 25 MG tablet Take 1 tablet (25 mg) by mouth 2 times daily (with meals) 180 tablet 3     furosemide (LASIX) 20 MG tablet Take 1 tablet (20 mg) by mouth daily 90 tablet 3     lisinopril (PRINIVIL/ZESTRIL) 20 MG tablet Take 1 tablet (20 mg) by mouth 2 times daily 180 tablet 3     MELATONIN PO Take 10 mg by mouth nightly as needed        spironolactone (ALDACTONE) 25 MG tablet Take 0.5 tablets (12.5 mg) by mouth daily 45 tablet 3     TRAZODONE HCL PO Take 50 mg by mouth nightly as needed for sleep         ALLERGIES   No Known Allergies    PAST MEDICAL HISTORY:  Past Medical History:    Diagnosis Date     Acute on chronic systolic congestive heart failure (H) 8/9/2017     CAD (coronary artery disease)     Nonobstructive     Cardiomyopathy, unspecified (H) 8/9/2017     Congestive heart failure, unspecified congestive heart failure chronicity, unspecified congestive heart failure type 6/13/2017     HTN (hypertension), malignant      Hypertensive urgency 6/13/2017     Morbid obesity with BMI of 50.0-59.9, adult (H) 6/21/2017     NSTEMI (non-ST elevated myocardial infarction) (H) 6/13/2017     Renal insufficiency 6/13/2017     Sleep apnea      Tobacco abuse        PAST SURGICAL HISTORY:  History reviewed. No pertinent surgical history.    FAMILY HISTORY:  History reviewed. No pertinent family history.    SOCIAL HISTORY:  Social History     Socioeconomic History     Marital status: Single     Spouse name: None     Number of children: None     Years of education: None     Highest education level: None   Occupational History     None   Social Needs     Financial resource strain: None     Food insecurity:     Worry: None     Inability: None     Transportation needs:     Medical: None     Non-medical: None   Tobacco Use     Smoking status: Current Every Day Smoker     Types: Cigarettes     Smokeless tobacco: Never Used   Substance and Sexual Activity     Alcohol use: Yes     Comment: occassional     Drug use: No     Sexual activity: None   Lifestyle     Physical activity:     Days per week: None     Minutes per session: None     Stress: None   Relationships     Social connections:     Talks on phone: None     Gets together: None     Attends [employer] service: None     Active member of club or organization: None     Attends meetings of clubs or organizations: None     Relationship status: None     Intimate partner violence:     Fear of current or ex partner: None     Emotionally abused: None     Physically abused: None     Forced sexual activity: None   Other Topics Concern     Parent/sibling w/ CABG, MI  "or angioplasty before 65F 55M? Not Asked   Social History Narrative     None       Review of Systems:  Skin:  Negative     Eyes:  Negative    ENT:  Negative    Respiratory:  Negative    Cardiovascular:  Negative    Gastroenterology: Negative    Genitourinary:  Negative    Musculoskeletal:  Negative    Neurologic:  Negative    Psychiatric:  Positive for sleep disturbances  Heme/Lymph/Imm:  Negative    Endocrine:  Negative      Physical Exam:  Vitals: BP (!) 158/100 (BP Location: Right arm, Patient Position: Sitting, Cuff Size: Adult Large)   Pulse 76   Ht 1.803 m (5' 11\")   Wt (!) 183.1 kg (403 lb 11.2 oz)   BMI 56.30 kg/m      Constitutional:  cooperative, alert and oriented, well developed, well nourished, in no acute distress morbidly obese      Skin:  warm and dry to the touch          Head:  normocephalic, no masses or lesions        Eyes:  pupils equal and round;conjunctivae and lids unremarkable;sclera white;no xanthalasma        Lymph:No Cervical lymphadenopathy present     ENT:  no pallor or cyanosis        Neck:  carotid pulses are full and equal bilaterally, JVP normal, no carotid bruit   difficult to assess given neck size, but no apparent JVD    Respiratory:  normal breath sounds, clear to auscultation, normal A-P diameter, normal symmetry, normal respiratory excursion, no use of accessory muscles         Cardiac: regular rhythm;apical impulse not displaced   distant heart sounds         no S3 or S4 appreciated, no murmur appreciated   pulses full and equal                                   right radial site: 2+ pulse, reverse paty test normal    GI:  abdomen soft;BS normoactive;non-tender obese      Extremities and Muscular Skeletal:  no deformities, clubbing, cyanosis, erythema observed;no edema              Neurological:  no gross motor deficits        Psych:  Alert and Oriented x 3        Recent Lab Results:  LIPID RESULTS:  Lab Results   Component Value Date    CHOL 154 06/14/2017    HDL 41 " 06/14/2017    LDL 87 06/14/2017    TRIG 132 06/14/2017       LIVER ENZYME RESULTS:  Lab Results   Component Value Date    AST 30 06/14/2017    ALT 47 06/14/2017       CBC RESULTS:  Lab Results   Component Value Date    WBC 13.3 (H) 06/16/2017    RBC 4.04 (L) 06/16/2017    HGB 11.7 (L) 06/16/2017    HCT 37.2 (L) 06/16/2017    MCV 92 06/16/2017    MCH 29.0 06/16/2017    MCHC 31.5 06/16/2017    RDW 16.6 (H) 06/16/2017     06/16/2017       BMP RESULTS:  Lab Results   Component Value Date     08/09/2017    POTASSIUM 4.1 08/09/2017    CHLORIDE 101 08/09/2017    CO2 29 08/09/2017    ANIONGAP 7 08/09/2017     (H) 08/09/2017    BUN 30 08/09/2017    CR 1.60 (H) 08/09/2017    GFRESTIMATED 50 (L) 08/09/2017    GFRESTBLACK 61 08/09/2017    GAYLE 9.2 08/09/2017        A1C RESULTS:  No results found for: A1C    INR RESULTS:  No results found for: INR        CC  Eva Kent PA-C  [address] [place] [address]

## 2019-03-22 NOTE — LETTER
3/22/2019      Eva Kent PA-C  [address]  [facility] [address]      RE: Brooks Mensah       Dear Colleague,    I had the pleasure of seeing Brooks Mensah in the [facility].    Service Date: 03/22/2019      HISTORY OF PRESENT ILLNESS:  It is a pleasure to follow up with Mr. Brooks Mensah.  This is a gentleman with a history of congestive heart failure from hypertensive cardiomyopathy.  I read from his excellent medical records that he was initially seen in 2017 when he was admitted with congestive heart failure and moderate to severe left ventricular systolic dysfunction.  His blood pressure was as high as 240.  He had a severe concentric left ventricular hypertrophy.  He underwent coronary angiography, which did not show significant obstructive coronary artery disease.        With appropriate medications, his CHF resolved.  Currently, he tells me that he is not out of breath that has no PND, orthopnea or ankle swelling.  His body mass index, which was 52.97 in 06/2017, has now increased to 56.3.  Initially he was on 50 mg of Coreg b.i.d. and this was decreased to 25 mg p.o. b.i.d. due to transient high-grade AV block during sleep.  When in hospital, he was noted to have episodes of apnea.  He was referred to have a sleep study, which he has not had as yet.  He was also set up for cardiac MR which again he has not had either.      It has been more than a year out since we last saw him.  His medications have run out.  He admits to medication noncompliance and only takes his medications intermittently.  Not surprisingly, his blood pressure is 158/100 today.  Fortunately, there is no evidence of congestive heart failure by physical exam.  He continues to smoke.      IMPRESSION:   1.  Hypertension.   2.  Medication noncompliance.   3.  Dietary noncompliance.   4.  Hypertensive cardiomyopathy.   5.  Probable sleep apnea   6.  Tobacco use.      We had a nice discussion about the importance  of medication compliance.  I also advised him of the absolute necessity of a low-salt diet.  I strongly advised him to give up smoking.  We talked about gastric bypass and he is not keen on that at all.      I renewed all his medications for him.  I think if he takes them, there is a good chance he would be able to maintain a normal blood pressure.  I will have him follow up with my colleague, Prem Painter, who he has seen previously, in about a month's time.  He is agreeable to having a sleep study and I will arrange.      Of note also when he was in hospital, his creatinine is 1.6.  I will have this rechecked as well prior to his next clinic visit.        Finally, I think we should have another look at his ejection fraction and I will request an echocardiogram for this purpose. I do not think a cardiac MR is necessary at this time, his echo images appeared to have been adequate previously.     I have provisionally arranged to see him again in 6 months' time for further followup.         AMELIA HOLLINGSWORTH MD, [facility]             D: 2019   T: 2019   MT: MYLA      Name:     BREA DO   MRN:      [id number]        Account:      [id number]   :      1985           Service Date: 2019      Document: [id number]           Outpatient Encounter Medications as of 3/22/2019   Medication Sig Dispense Refill     aspirin EC 81 MG EC tablet Take 1 tablet (81 mg) by mouth daily 30 tablet 3     atorvastatin (LIPITOR) 10 MG tablet Take 1 tablet (10 mg) by mouth daily 30 tablet 3     carvedilol (COREG) 25 MG tablet Take 1 tablet (25 mg) by mouth 2 times daily (with meals) 180 tablet 3     furosemide (LASIX) 20 MG tablet Take 1 tablet (20 mg) by mouth daily 90 tablet 3     lisinopril (PRINIVIL/ZESTRIL) 20 MG tablet Take 1 tablet (20 mg) by mouth 2 times daily 180 tablet 3     MELATONIN PO Take 10 mg by mouth nightly as needed        spironolactone (ALDACTONE) 25 MG tablet Take 0.5 tablets (12.5 mg) by mouth daily 45  tablet 3     TRAZODONE HCL PO Take 50 mg by mouth nightly as needed for sleep       [DISCONTINUED] carvedilol (COREG) 25 MG tablet Take 1 tablet (25 mg) by mouth 2 times daily (with meals) 60 tablet 3     [DISCONTINUED] furosemide (LASIX) 20 MG tablet Take 1 tablet (20 mg) by mouth daily 30 tablet 3     [DISCONTINUED] lisinopril (PRINIVIL/ZESTRIL) 20 MG tablet Take 1 tablet (20 mg) by mouth 2 times daily 60 tablet 3     [DISCONTINUED] spironolactone (ALDACTONE) 25 MG tablet Take 0.5 tablets (12.5 mg) by mouth daily 15 tablet 3     No facility-administered encounter medications on file as of 3/22/2019.        Again, thank you for allowing me to participate in the care of your patient.      Sincerely,    DR AMELIA HOLLINGSWORTH MD     [facility]

## 2019-04-22 ENCOUNTER — HOSPITAL ENCOUNTER (OUTPATIENT)
Dept: CARDIOLOGY | Facility: CLINIC | Age: 34
Discharge: HOME | End: 2019-04-22
Attending: INTERNAL MEDICINE | Admitting: INTERNAL MEDICINE
Payer: COMMERCIAL

## 2019-04-22 DIAGNOSIS — I42.8 OTHER CARDIOMYOPATHY (H): ICD-10-CM

## 2019-04-22 DIAGNOSIS — I50.22 CHRONIC SYSTOLIC CONGESTIVE HEART FAILURE (H): ICD-10-CM

## 2019-04-22 DIAGNOSIS — I16.1 HYPERTENSIVE EMERGENCY: ICD-10-CM

## 2019-04-22 LAB
ANION GAP SERPL CALCULATED.3IONS-SCNC: 7 MMOL/L (ref 3–14)
BUN SERPL-MCNC: 19 MG/DL (ref 7–30)
CALCIUM SERPL-MCNC: 9.4 MG/DL (ref 8.5–10.1)
CHLORIDE SERPL-SCNC: 102 MMOL/L (ref 94–109)
CO2 SERPL-SCNC: 28 MMOL/L (ref 20–32)
CREAT SERPL-MCNC: 1.29 MG/DL (ref 0.66–1.25)
GFR SERPL CREATININE-BSD FRML MDRD: 72 ML/MIN/{1.73_M2}
GLUCOSE SERPL-MCNC: 132 MG/DL (ref 70–99)
POTASSIUM SERPL-SCNC: 3.9 MMOL/L (ref 3.4–5.3)
SODIUM SERPL-SCNC: 137 MMOL/L (ref 133–144)

## 2019-04-22 PROCEDURE — 40000264 ECHOCARDIOGRAM COMPLETE

## 2019-04-22 PROCEDURE — 93306 TTE W/DOPPLER COMPLETE: CPT | Mod: 26 | Performed by: INTERNAL MEDICINE

## 2019-04-22 PROCEDURE — 80048 BASIC METABOLIC PNL TOTAL CA: CPT | Performed by: INTERNAL MEDICINE

## 2019-04-22 PROCEDURE — 36415 COLL VENOUS BLD VENIPUNCTURE: CPT | Performed by: INTERNAL MEDICINE

## 2019-04-22 PROCEDURE — 25500064 ZZH RX 255 OP 636: Performed by: INTERNAL MEDICINE

## 2019-04-22 RX ADMIN — HUMAN ALBUMIN MICROSPHERES AND PERFLUTREN 3 ML: 10; .22 INJECTION, SOLUTION INTRAVENOUS at 09:00

## 2019-04-24 ENCOUNTER — OFFICE VISIT (OUTPATIENT)
Dept: CARDIOLOGY | Facility: CLINIC | Age: 34
End: 2019-04-24
Attending: INTERNAL MEDICINE
Payer: COMMERCIAL

## 2019-04-24 VITALS
HEIGHT: 71 IN | BODY MASS INDEX: 44.1 KG/M2 | OXYGEN SATURATION: 92 % | HEART RATE: 70 BPM | WEIGHT: 315 LBS | DIASTOLIC BLOOD PRESSURE: 92 MMHG | SYSTOLIC BLOOD PRESSURE: 144 MMHG

## 2019-04-24 DIAGNOSIS — I21.4 NSTEMI (NON-ST ELEVATED MYOCARDIAL INFARCTION) (H): ICD-10-CM

## 2019-04-24 DIAGNOSIS — I11.9 CARDIOMYOPATHY DUE TO HYPERTENSION, WITHOUT HEART FAILURE (H): Primary | ICD-10-CM

## 2019-04-24 DIAGNOSIS — I16.1 HYPERTENSIVE EMERGENCY: ICD-10-CM

## 2019-04-24 DIAGNOSIS — I43 CARDIOMYOPATHY DUE TO HYPERTENSION, WITHOUT HEART FAILURE (H): Primary | ICD-10-CM

## 2019-04-24 DIAGNOSIS — I50.22 CHRONIC SYSTOLIC CONGESTIVE HEART FAILURE (H): ICD-10-CM

## 2019-04-24 DIAGNOSIS — I10 ESSENTIAL HYPERTENSION: ICD-10-CM

## 2019-04-24 PROCEDURE — 99214 OFFICE O/P EST MOD 30 MIN: CPT | Performed by: PHYSICIAN ASSISTANT

## 2019-04-24 RX ORDER — ATORVASTATIN CALCIUM 10 MG/1
10 TABLET, FILM COATED ORAL EVERY EVENING
Qty: 90 TABLET | Refills: 3 | Status: SHIPPED | OUTPATIENT
Start: 2019-04-24 | End: 2020-08-19

## 2019-04-24 ASSESSMENT — MIFFLIN-ST. JEOR: SCORE: 2814.63

## 2019-04-24 NOTE — PATIENT INSTRUCTIONS
Call [email or web address].[email or web address] nurse for any questions or concerns Mon-Fri 8am-4pm: [phone] For concerns after hours: [phone]    Medication changes:   No medication changes today.       Plan from today:   Go through with the sleep study.   See my nurse in about 1 month to check blood pressure.  Make sure you take your medications 2 hours prior.  As long as this is controlled, we will not see you until September 2019 (Dr. Mcdonald).   Work on limiting sodium in your diet.   Weigh daily so we can notice if you have big jumps in weight, which would likely be fluid.   Congratulations on quitting smoking.  Keep this up.

## 2019-04-24 NOTE — LETTER
2019    Eva Kent PA-C  [address]  [facility] [address]    RE: Brooks Mensah       Dear Colleague,    I had the pleasure of seeing Brooks Mensah in the [facility].    CARDIOLOGY CLINIC PROGRESS NOTE    DOS: 2019      Brooks Mensah  : 1985, 33 year old  MRN: [id number]      History:  It was a pleasure following up with Brooks (also goes by Javon) today in the [facility].     Brooks is a very pleasant 33-year-old gentleman with history significant for prior tobacco use, morbid obesity, history of congestive heart failure from hypertensive cardiomyopathy.      In 2017 he was admitted with congestive heart failure and moderate to severe left ventricular systolic dysfunction.  His blood pressure was as high as 240/140.   Echo 2017 showed severe LVH with septal and posterior wall measurements of approximately 2.5 cm with 1.5 cm being the upper limits of normal.  There was diastolic dysfunction of the left ventricle and the LVEF was 30-35%.  Left atrium was not particularly dilated.   He underwent coronary angiography 6/15/2017, which did show trivial coronary artery disease of the right coronary artery with a stenosis of 10-20%, otherwise his coronary arteries were large and free of disease.    He was treated initially with nitrates, Carvedilol, ACE inhibitor, Lasix for diuresis.  With these therapies, his blood pressures came down nicely.     During his admission, it was noted that he likely has severe sleep apnea as he had some pauses on telemetry and outpatient sleep study was recommended.    On telemetry there were some brief episodes of what looked intermittent 3rd degree AV block in the middle of the night during sleep.  He was asymptomatic.  His Carvedilol was decreased from 50 mg twice daily to 25 mg twice daily.  Further monitoring showed sinus rhythm.      He did weill in follow up without sxs.     He was lost to follow up until he saw Dr. Mcdonald  3/22/19.  He was seen as he ran out of medications.  He did tell Dr. Mcdonald that he only takes his medications intermittently.  Not surprisingly, his blood pressure was 158/100.  Dr. Mcdonald renewed his medications.   An echo was ordered, and a BMP.     The echo was done 4/22/19 and showed improvement in LVEF to 50-55%.  He still had LVH.    BMP 4/22/19 showed improvement in creatinine from his admission to 1.29.     He presents today for followup.    He continues to do well without sxs of CHF.  No symptoms of shortness of breath, orthopnea, PND, edema.  He has no chest pain or palpitations.  He also has no lightheadedness, dizziness, near syncope or syncope.  He says he is tolerating his medications without any adverse side effects.  He is meeting sleep medicine this week.   Since he saw Dr. Mcdonald, he quit smoking.  He is trying to limit sodium in his diet.   His BP today is improved at 144/92.  This is not completely controlled, though he only took his medications one hour before this appointment.       ROS:  Skin:  Negative     Eyes:  Negative    ENT:  Negative    Respiratory:  Negative    Cardiovascular:  Negative    Gastroenterology: Negative    Genitourinary:  Negative    Musculoskeletal:  Negative    Neurologic:  Negative    Psychiatric:  Positive for sleep disturbances  Heme/Lymph/Imm:  Negative    Endocrine:  Negative      PAST MEDICAL HISTORY:  Past Medical History:   Diagnosis Date     Acute on chronic systolic congestive heart failure (H) 8/9/2017     CAD (coronary artery disease)     Nonobstructive     Cardiomyopathy, unspecified (H) 8/9/2017     Congestive heart failure, unspecified congestive heart failure chronicity, unspecified congestive heart failure type 6/13/2017     HTN (hypertension), malignant      Hypertensive urgency 6/13/2017     Morbid obesity with BMI of 50.0-59.9, adult (H) 6/21/2017     NSTEMI (non-ST elevated myocardial infarction) (H) 6/13/2017     Renal insufficiency 6/13/2017     Sleep apnea       Tobacco abuse        PAST SURGICAL HISTORY:  No past surgical history on file.    SOCIAL HISTORY:  Social History     Socioeconomic History     Marital status: Single     Spouse name: None     Number of children: None     Years of education: None     Highest education level: None   Occupational History     None   Social Needs     Financial resource strain: None     Food insecurity:     Worry: None     Inability: None     Transportation needs:     Medical: None     Non-medical: None   Tobacco Use     Smoking status: Current Every Day Smoker     Types: Cigarettes     Smokeless tobacco: Never Used   Substance and Sexual Activity     Alcohol use: Yes     Comment: occassional     Drug use: No     Sexual activity: None   Lifestyle     Physical activity:     Days per week: None     Minutes per session: None     Stress: None   Relationships     Social connections:     Talks on phone: None     Gets together: None     Attends Che[employer] service: None     Active member of club or organization: None     Attends meetings of clubs or organizations: None     Relationship status: None     Intimate partner violence:     Fear of current or ex partner: None     Emotionally abused: None     Physically abused: None     Forced sexual activity: None   Other Topics Concern     Parent/sibling w/ CABG, MI or angioplasty before 65F 55M? Not Asked   Social History Narrative     None       FAMILY HISTORY:  No family history on file.    MEDS:   Current Outpatient Medications on File Prior to Visit:  carvedilol (COREG) 25 MG tablet Take 1 tablet (25 mg) by mouth 2 times daily (with meals)   furosemide (LASIX) 20 MG tablet Take 1 tablet (20 mg) by mouth daily   lisinopril (PRINIVIL/ZESTRIL) 20 MG tablet Take 1 tablet (20 mg) by mouth 2 times daily   spironolactone (ALDACTONE) 25 MG tablet Take 0.5 tablets (12.5 mg) by mouth daily   aspirin EC 81 MG EC tablet Take 1 tablet (81 mg) by mouth daily (Patient not taking: Reported on 4/24/2019)  "  MELATONIN PO Take 10 mg by mouth nightly as needed    TRAZODONE HCL PO Take 50 mg by mouth nightly as needed for sleep     No current facility-administered medications on file prior to visit.     ALLERGIES: No Known Allergies    PHYSICAL EXAM:  Vitals: BP (!) 144/92 (BP Location: Right arm, Patient Position: Chair, Cuff Size: Adult Large)   Pulse 70   Ht 1.803 m (5' 11\")   Wt (!) 184.8 kg (407 lb 4.8 oz)   SpO2 92%   BMI 56.81 kg/m     Constitutional:  cooperative, alert and oriented, well developed, well nourished, in no acute distress morbidly obese      Skin:  warm and dry to the touch        Head:  normocephalic, no masses or lesions        Eyes:  pupils equal and round;conjunctivae and lids unremarkable;sclera white;no xanthalasma        ENT:  no pallor or cyanosis        Neck:      difficult to assess given neck size, but no apparent JVD    Respiratory:      decreased BS    Cardiac: regular rhythm;apical impulse not displaced   distant heart sounds         no S3 or S4 appreciated, no murmur appreciated     GI:  abdomen soft;BS normoactive;non-tender obese      Vascular: pulses full and equal                                      Extremities and Musculoskeletal:  no deformities, clubbing, cyanosis, erythema observed;no edema   chronic venous stasis changes    Neurological:  no gross motor deficits;affect appropriate            LABS/DATA:  I reviewed the following:  Echo 4/22/19:  Interpretation Summary     Left ventricular systolic function is low normal.  The visual ejection fraction is estimated at 50-55%.  Small area of apical, distal anteroseptal hypokinesis.  EF higher compared to study from 6/17. LV wall thickness still increased,  however, less pronounced than prior study. The study was technically  Difficult.      Component      Latest Ref Rng & Units 4/22/2019   Sodium      133 - 144 mmol/L 137   Potassium      3.4 - 5.3 mmol/L 3.9   Chloride      94 - 109 mmol/L 102   Carbon Dioxide      20 - 32 " mmol/L 28   Anion Gap      3 - 14 mmol/L 7   Glucose      70 - 99 mg/dL 132 (H)   Urea Nitrogen      7 - 30 mg/dL 19   Creatinine      0.66 - 1.25 mg/dL 1.29 (H)   GFR Estimate      >60 mL/min/1.73:m2 72   GFR Estimate If Black      >60 mL/min/1.73:m2 83   Calcium      8.5 - 10.1 mg/dL 9.4         ASSESSMENT:  1. History of tobacco abuse.   - Quit 3/2019.     2.  Morbid obesity.  - Needs weight loss.   - Not interested in gastric bypass.     3.  Suspected obstructive sleep apnea.   - Referral has been made.  Seeing sleep medicine this week.     4.  Mild renal insufficiency.   - Improved from his admission.   - Noted, he is on Lasix, spironolactone, lisinopril.     5.  Trivial nonobstructive coronary artery disease.     - No anginal sxs.    - On ASA, statin.      6.  Hypertensive cardiomyopathy.    - Echo 6/13/17: LVEF 30-35%, grade III diastolic dysfunction.    - Cor angio 6/25/17 without significant obstructive CAD.   - Echo 4/22/19: LVEF 50-55%, LVH still but improved.    - No CHF sxs.   - Continue BB, ACEi, spironolactone, Lasix.  - Needs sleep study as noted above.   - Sodium restriction.   - Daily weights.      7.  Acute systolic and diastolic congestive heart failure.    - Currently NYHA functional class II.     8.  Hypertension, malignant.   - BP improved.    - Will see RN in 1 month to check BP as he only took meds 1 hour ago today.     9.  Transient high-grade AV block during sleep, and on Coreg 50mg BID. Decreased to 25mg BID and no recurrence.   - Sleep medicine referral as noted.          Follow up:  RN in 1 [facility] to check BP.   Dr. Mcdonald 9/2019.         Thank you for allowing me to participate in the care of your patient.    Sincerely,     Prem Painter PA-C     [facility]

## 2019-04-26 ENCOUNTER — OFFICE VISIT (OUTPATIENT)
Dept: SLEEP MEDICINE | Facility: CLINIC | Age: 34
End: 2019-04-26
Payer: COMMERCIAL

## 2019-04-26 VITALS
OXYGEN SATURATION: 95 % | SYSTOLIC BLOOD PRESSURE: 152 MMHG | BODY MASS INDEX: 44.1 KG/M2 | WEIGHT: 315 LBS | HEIGHT: 71 IN | HEART RATE: 77 BPM | DIASTOLIC BLOOD PRESSURE: 90 MMHG

## 2019-04-26 DIAGNOSIS — I50.22 CHRONIC SYSTOLIC CONGESTIVE HEART FAILURE (H): ICD-10-CM

## 2019-04-26 DIAGNOSIS — E66.01 CLASS 3 SEVERE OBESITY WITH SERIOUS COMORBIDITY AND BODY MASS INDEX (BMI) OF 50.0 TO 59.9 IN ADULT, UNSPECIFIED OBESITY TYPE (H): Primary | ICD-10-CM

## 2019-04-26 DIAGNOSIS — E83.19 OTHER DISORDERS OF IRON METABOLISM: ICD-10-CM

## 2019-04-26 DIAGNOSIS — I16.1 HYPERTENSIVE EMERGENCY: ICD-10-CM

## 2019-04-26 DIAGNOSIS — E66.813 CLASS 3 SEVERE OBESITY WITH SERIOUS COMORBIDITY AND BODY MASS INDEX (BMI) OF 50.0 TO 59.9 IN ADULT, UNSPECIFIED OBESITY TYPE (H): Primary | ICD-10-CM

## 2019-04-26 PROCEDURE — 99205 OFFICE O/P NEW HI 60 MIN: CPT | Performed by: INTERNAL MEDICINE

## 2019-04-26 RX ORDER — ZOLPIDEM TARTRATE 10 MG/1
TABLET ORAL
Qty: 1 TABLET | Refills: 0 | Status: SHIPPED | OUTPATIENT
Start: 2019-04-26 | End: 2019-10-25

## 2019-04-26 ASSESSMENT — MIFFLIN-ST. JEOR: SCORE: 2813.01

## 2019-04-26 NOTE — PATIENT INSTRUCTIONS
Your blood pressure was checked while you were in clinic today.  Please read the guidelines below about what these numbers mean and what you should do about them.  Your systolic blood pressure is the top number.  This is the pressure when the heart is pumping.  Your diastolic blood pressure is the bottom number.  This is the pressure in between beats.  If your systolic blood pressure is less than 120 and your diastolic blood pressure is less than 80, then your blood pressure is normal. There is nothing more that you need to do about it  If your systolic blood pressure is 120-139 or your diastolic blood pressure is 80-89, your blood pressure may be higher than it should be.  You should have your blood pressure re-checked within a year by a primary care provider.  If your systolic blood pressure is 140 or greater or your diastolic blood pressure is 90 or greater, you may have high blood pressure.  High blood pressure is treatable, but if left untreated over time it can put you at risk for heart attack, stroke, or kidney failure.  You should have your blood pressure re-checked by a primary care provider within the next four weeks.  Your BMI is There is no height or weight on file to calculate BMI.  Weight management is a personal decision.  If you are interested in exploring weight loss strategies, the following discussion covers the approaches that may be successful. Body mass index (BMI) is one way to tell whether you are at a healthy weight, overweight, or obese. It measures your weight in relation to your height.  A BMI of 18.5 to 24.9 is in the healthy range. A person with a BMI of 25 to 29.9 is considered overweight, and someone with a BMI of 30 or greater is considered obese. More than two-thirds of American adults are considered overweight or obese.  Being overweight or obese increases the risk for further weight gain. Excess weight may lead to heart disease and diabetes.  Creating and following plans for  healthy eating and physical activity may help you improve your health.  Weight control is part of healthy lifestyle and includes exercise, emotional health, and healthy eating habits. Careful eating habits lifelong are the mainstay of weight control. Though there are significant health benefits from weight loss, long-term weight loss with diet alone may be very difficult to achieve- studies show long-term success with dietary management in less than 10% of people. Attaining a healthy weight may be especially difficult to achieve in those with severe obesity. In some cases, medications, devices and surgical management might be considered.  What can you do?  If you are overweight or obese and are interested in methods for weight loss, you should discuss this with your provider.     Consider reducing daily calorie intake by 500 calories.     Keep a food journal.     Avoiding skipping meals, consider cutting portions instead.    Diet combined with exercise helps maintain muscle while optimizing fat loss. Strength training is particularly important for building and maintaining muscle mass. Exercise helps reduce stress, increase energy, and improves fitness. Increasing exercise without diet control, however, may not burn enough calories to loose weight.       Start walking three days a week 10-20 minutes at a time    Work towards walking thirty minutes five days a week     Eventually, increase the speed of your walking for 1-2 minutes at time    In addition, we recommend that you review healthy lifestyles and methods for weight loss available through the National Institutes of Health patient information sites:  http://win.niddk.nih.gov/publications/index.htm    And look into health and wellness programs that may be available through your health insurance provider, employer, local community center, or [employer]aur[employer].    Weight management plan: Patient was referred to their PCP to discuss a diet and exercise plan.    MY  "TREATMENT INFORMATION FOR SLEEP APNEA-  Brooks Mensah    DOCTOR : John Carson [facility]  SLEEP CENTER :      MY CONTACT NUMBER:     Am I having a sleep study at a sleep center?  Make sure you have an appointment for the study before you leave!    Am I having a home sleep study?  Watch this video:  [email or web address]/[email or web address]  Please verify your insurance coverage with your insurance carrier    Frequently asked questions:  1. What is Obstructive Sleep Apnea (MAURI)? MAURI is the most common type of sleep apnea. Apnea means, \"without breath.\"  Apnea is most often caused by narrowing or collapse of the upper airway as muscles relax during sleep.   Almost everyone has occasional apneas. Most people with sleep apnea have had brief interruptions at night frequently for many years.  The severity of sleep apnea is related to how frequent and severe the events are.   2. What are the consequences of MAURI? Symptoms include: feeling sleepy during the day, snoring loudly, gasping or stopping of breathing, trouble sleeping, and occasionally morning headaches or heartburn at night.  Sleepiness can be serious and even increase the risk of falling asleep while driving. Other health consequences may include development of high blood pressure and other cardiovascular disease in persons who are susceptible. Untreated MAURI  can contribute to heart disease, stroke and diabetes.   3. What are the treatment options? In most situations, sleep apnea is a lifelong disease that must be managed with daily therapy. Medications are not effective for sleep apnea and surgery is generally not considered until other therapies have been tried. Your treatment is your choice . Continuous Positive Airway (CPAP) works right away and is the therapy that is effective in nearly everyone. An oral device to hold your jaw forward is usually the next most reliable option. Other options include " postioning devices (to keep you off your back), weight loss, and surgery including a tongue pacing device. There is more detail about some of these options below.    Important tips for using CPAP and similar devices   Know your equipment:  CPAP is continuous positive airway pressure that prevents obstructive sleep apnea by keeping the throat from collapsing while you are sleeping. In most cases, the device is  smart  and can slowly self-adjusts if your throat collapses and keeps a record every day of how well you are treated-this information is available to you and your care team.  BPAP is bilevel positive airway pressure that keeps your throat open and also assists each breath with a pressure boost to maintain adequate breathing.  Special kinds of BPAP are used in patients who have inadequate breathing from lung or heart disease. In most cases, the device is  smart  and can slowly self-adjusts to assist breathing. Like CPAP, the device keeps a record of how well you are treated.  Your mask is your connection to the device. You get to choose what feels most comfortable and the staff will help to make sure if fits. Here: are some examples of the different masks that are available:       Key points to remember on your journey with sleep apnea:  1. Sleep study.  PAP devices often need to be adjusted during a sleep study to show that they are effective and adjusted right.  2. Good tips to remember: Try wearing just the mask during a quiet time during the day so your body adapts to wearing it. A humidifier is recommended for comfort in most cases to prevent drying of your nose and throat. Allergy medication from your provider may help you if you are having nasal congestion.  3. Getting settled-in. It takes more than one night for most of us to get used to wearing a mask. Try wearing just the mask during a quiet time during the day so your body adapts to wearing it. A humidifier is recommended for comfort in most cases.  Our team will work with you carefully on the first day and will be in contact within 4 days and again at 2 and 4 weeks for advice and remote device adjustments. Your therapy is evaluated by the device each day.   4. Use it every night. The more you are able to sleep naturally for 7-8 hours, the more likely you will have good sleep and to prevent health risks or symptoms from sleep apnea. Even if you use it 4 hours it helps. Occasionally all of us are unable to use a medical therapy, in sleep apnea, it is not dangerous to miss one night.   5. Communicate. Call our skilled team on the number provided on the first day if your visit for problems that make it difficult to wear the device. Over 2 out of 3 patients can learn to wear the device long-term with help from our team. Remember to call our team or your sleep providers if you are unable to wear the device as we may have other solutions for those who cannot adapt to mask CPAP therapy. It is recommended that you sleep your sleep provider within the first 3 months and yearly after that if you are not having problems.   6. Use it for your health. We encourage use of CPAP masks during daytime quiet periods to allow your face and brain to adapt to the sensation of CPAP so that it will be a more natural sensation to awaken to at night or during naps. This can be very useful during the first few weeks or months of adapting to CPAP though it does not help medically to wear CPAP during wakefulness and  should not be used as a strategy just to meet guidelines.  7. Take care of your equipment. Make sure you clean your mask and tubing using directions every day and that your filter and mask are replaced as recommended or if they are not working.     BESIDES CPAP, WHAT OTHER THERAPIES ARE THERE?    Positioning Device  Positioning devices are generally used when sleep apnea is mild and only occurs on your back.This example shows a pillow that straps around the waist. It may be  appropriate for those whose sleep study shows milder sleep apnea that occurs primarily when lying flat on one's back. Preliminary studies have shown benefit but effectiveness at home may need to be verified by a home sleep test. These devices are generally not covered by medical insurance.  Examples of devices that maintain sleeping on the back to prevent snoring and mild sleep apnea.    Belt type body positioner  Http://CareerStarter.com/    Electronic reminder  Http://nightshifttherapy.com/  Http://[email or web address].Kontron.au/      Oral Appliance  What is oral appliance therapy?  An oral appliance device fits on your teeth at night like a retainer used after having braces. The device is made by a specialized dentist and requires several visits over 1-2 months before a manufactured device is made to fit your teeth and is adjusted to prevent your sleep apnea. Once an oral device is working properly, snoring should be improved. A home sleep test may be recommended at that time if to determine whether the sleep apnea is adequately treated.       Some things to remember:  -Oral devices are often, but not always, covered by your medical insurance. Be sure to check with your insurance provider.   -If you are referred for oral therapy, you will be given a list of specialized dentists to consider or you may choose to visit the Web site of the American Academy of Dental Sleep Medicine  -Oral devices are less likely to work if you have severe sleep apnea or are extremely overweight.     More detailed information  An oral appliance is a small acrylic device that fits over the upper and lower teeth  (similar to a retainer or a mouth guard). This device slightly moves jaw forward, which moves the base of the tongue forward, opens the airway, improves breathing for effective treat snoring and obstructive sleep apnea in perhaps 7 out of 10 people .  The best working devices are custom-made by a dental device  after a mold is made  of the teeth 1, 2, 3.  When is an oral appliance indicated?  Oral appliance therapy is recommended as a first-line treatment for patients with primary snoring, mild sleep apnea, and for patients with moderate sleep apnea who prefer appliance therapy to use of CPAP4, 5. Severity of sleep apnea is determined by sleep testing and is based on the number of respiratory events per hour of sleep.   How successful is oral appliance therapy?  The success rate of oral appliance therapy in patients with mild sleep apnea is 75-80% while in patients with moderate sleep apnea it is 50-70%. The chance of success in patients with severe sleep apnea is 40-50%. The research also shows that oral appliances have a beneficial effect on the cardiovascular health of MAURI patients at the same magnitude as CPAP therapy7.  Oral appliances should be a second-line treatment in cases of severe sleep apnea, but if not completely successful then a combination therapy utilizing CPAP plus oral appliance therapy may be effective. Oral appliances tend to be effective in a broad range of patients although studies show that the patients who have the highest success are females, younger patients, those with milder disease, and less severe obesity. 3, 6.   Finding a dentist that practices dental sleep medicine  Specific training is available through the American Academy of Dental Sleep Medicine for dentists interested in working in the field of sleep. To find a dentist who is educated in the field of sleep and the use of oral appliances, near you, visit the Web site of the American Academy of Dental Sleep Medicine.    References  1. Deanna, et al. Objectively measured vs self-reported compliance during oral appliance therapy for sleep-disordered breathing. Chest 2013; 144(5): 1353-5198.  2. Omaira et al. Objective measurement of compliance during oral appliance therapy for sleep-disordered breathing. Thorax 2013; 68(1): 91-96.  3. layla Powell  al. Mandibular advancement devices in 620 men and women with MAURI and snoring: tolerability and predictors of treatment success. Chest 2004; 125: 2983-8800.  4. Nikolas et al. Oral appliances for snoring and MAURI: a review. Sleep 2006; 29: 244-262.  5. Krystian et al. Oral appliance treatment for MAURI: an update. J Clin Sleep Med 2014; 10(2): 215-227.  6. Tuan et al. Predictors of OSAH treatment outcome. J Dent Res 2007; 86: 1890-2110.      Weight Loss:    Weight loss is a long-term strategy that may improve sleep apnea in some patients.    Weight management is a personal decision and the decision should be based on your interest and the potential benefits.  If you are interested in exploring weight loss strategies, the following discussion covers the impact on weight loss on sleep apnea and the approaches that may be successful.    Being overweight does not necessarily mean you will have health consequences.  Those who have BMI over 35 or over 27 with existing medical conditions carries greater risk.   Weight loss decreases severity of sleep apnea in most people with obesity. For those with mild obesity who have developed snoring with weight gain, even 15-30 pound weight loss can improve and occasionally eliminate sleep apnea.  Structured and life-long dietary and health habits are necessary to lose weight and keep healthier weight levels.     Though there may be significant health benefits from weight loss, long-term weight loss is very difficult to achieve- studies show success with dietary management in less than 10% of people. In addition, substantial weight loss may require years of dietary control and may be difficult if patients have severe obesity. In these cases, surgical management may be considered.  Finally, older individuals who have tolerated obesity without health complications may be less likely to benefit from weight loss strategies.        Your BMI is Body mass index is 56.79 kg/m .  Weight  management is a personal decision.  If you are interested in exploring weight loss strategies, the following discussion covers the approaches that may be successful. Body mass index (BMI) is one way to tell whether you are at a healthy weight, overweight, or obese. It measures your weight in relation to your height.  A BMI of 18.5 to 24.9 is in the healthy range. A person with a BMI of 25 to 29.9 is considered overweight, and someone with a BMI of 30 or greater is considered obese. More than two-thirds of American adults are considered overweight or obese.  Being overweight or obese increases the risk for further weight gain. Excess weight may lead to heart disease and diabetes.  Creating and following plans for healthy eating and physical activity may help you improve your health.  Weight control is part of healthy lifestyle and includes exercise, emotional health, and healthy eating habits. Careful eating habits lifelong are the mainstay of weight control. Though there are significant health benefits from weight loss, long-term weight loss with diet alone may be very difficult to achieve- studies show long-term success with dietary management in less than 10% of people. Attaining a healthy weight may be especially difficult to achieve in those with severe obesity. In some cases, medications, devices and surgical management might be considered.  What can you do?  If you are overweight or obese and are interested in methods for weight loss, you should discuss this with your provider.     Consider reducing daily calorie intake by 500 calories.     Keep a food journal.     Avoiding skipping meals, consider cutting portions instead.    Diet combined with exercise helps maintain muscle while optimizing fat loss. Strength training is particularly important for building and maintaining muscle mass. Exercise helps reduce stress, increase energy, and improves fitness. Increasing exercise without diet control, however, may  not burn enough calories to loose weight.       Start walking three days a week 10-20 minutes at a time    Work towards walking thirty minutes five days a week     Eventually, increase the speed of your walking for 1-2 minutes at time    In addition, we recommend that you review healthy lifestyles and methods for weight loss available through the National Institutes of Health patient information sites:  http://win.niddk.nih.gov/publications/index.htm    And look into health and wellness programs that may be available through your health insurance provider, employer, local community center, or [employer]aur[employer] club.          Surgery:    Surgery for obstructive sleep apnea is considered generally only when other therapies fail to work. Surgery may be discussed with you if you are having a difficult time tolerating CPAP and or when there is an abnormal structure that requires surgical correction.  Nose and throat surgeries often enlarge the airway to prevent collapse.  Most of these surgeries create pain for 1-2 weeks and up to half of the most common surgeries are not effective throughout life.  You should carefully discuss the benefits and drawbacks to surgery with your sleep provider and surgeon to determine if it is the best solution for you.   More information  Surgery for MAURI is directed at areas that are responsible for narrowing or complete obstruction of the airway during sleep.  There are a wide range of procedures available to enlarge and/or stabilize the airway to prevent blockage of breathing in the three major areas where it can occur: the palate, tongue, and nasal regions.  Successful surgical treatment depends on the accurate identification of the factors responsible for obstructive sleep apnea in each person.  A personalized approach is required because there is no single treatment that works well for everyone.  Because of anatomic variation, consultation with an examination by a sleep surgeon is a critical first  step in determining what surgical options are best for each patient.  In some cases, examination during sedation may be recommended in order to guide the selection of procedures.  Patients will be counseled about risks and benefits as well as the typical recovery course after surgery. Surgery is typically not a cure for a person s MAURI.  However, surgery will often significantly improve one s MAURI severity (termed  success rate ).  Even in the absence of a cure, surgery will decrease the cardiovascular risk associated with OSA7; improve overall quality of life8 (sleepiness, functionality, sleep quality, etc).      Palate Procedures:  Patients with MAURI often have narrowing of their airway in the region of their tonsils and uvula.  The goals of palate procedures are to widen the airway in this region as well as to help the tissues resist collapse.  Modern palate procedure techniques focus on tissue conservation and soft tissue rearrangement, rather than tissue removal.  Often the uvula is preserved in this procedure. Residual sleep apnea is common in patient after pharyngoplasty with an average reduction in sleep apnea events of 33%2.      Tongue Procedures:  ExamWhile patients are awake, the muscles that surround the throat are active and keep this region open for breathing. These muscles relax during sleep, allowing the tongue and other structures to collapse and block breathing.  There are several different tongue procedures available.  Selection of a tongue base procedure depends on characteristics seen on physical exam.  Generally, procedures are aimed at removing bulky tissues in this area or preventing the back of the tongue from falling back during sleep.  Success rates for tongue surgery range from 50-62%3.    Hypoglossal Nerve Stimulation:  Hypoglossal nerve stimulation has recently received approval from the [place] Food and Drug Administration for the treatment of obstructive sleep apnea.  This is  based on research showing that the system was safe and effective in treating sleep apnea6.  Results showed that the median AHI score decreased 68%, from 29.3 to 9.0. This therapy uses an implant system that senses breathing patterns and delivers mild stimulation to airway muscles, which keeps the airway open during sleep.  The system consists of three fully implanted components: a small generator (similar in size to a pacemaker), a breathing sensor, and a stimulation lead.  Using a small handheld remote, a patient turns the therapy on before bed and off upon awakening.    Candidates for this device must be greater than 22 years of age, have moderate to severe MAURI (AHI between 20-65), BMI less than 32, have tried CPAP/oral appliance without success, and have appropriate upper airway anatomy (determined by a sleep endoscopy performed by Dr. Noland).    Hypoglossal Nerve Stimulation Pathway:    The sleep surgeon s office will work with the patient through the insurance prior-authorization process (including communications and appeals).    Nasal Procedures:  Nasal obstruction can interfere with nasal breathing during the day and night.  Studies have shown that relief of nasal obstruction can improve the ability of some patients to tolerate positive airway pressure therapy for obstructive sleep apnea1.  Treatment options include medications such as nasal saline, topical corticosteroid and antihistamine sprays, and oral medications such as antihistamines or decongestants. Non-surgical treatments can include external nasal dilators for selected patients. If these are not successful by themselves, surgery can improve the nasal airway either alone or in combination with these other options.      Combination Procedures:  Combination of surgical procedures and other treatments may be recommended, particularly if patients have more than one area of narrowing or persistent positional disease.  The success rate of combination  surgery ranges from 66-80%2,3.    References  1. Dayanara MARTINEZ. The Role of the Nose in Snoring and Obstructive Sleep Apnoea: An Update.  Eur Arch Otorhinolaryngol. 2011; 268: 1365-73.  2.  Rafael SM; Tawanna JA; Tracy JR; Pallanch JF; Tammy MB; Sumi SG; Maurice WALKER. Surgical modifications of the upper airway for obstructive sleep apnea in adults: a systematic review and meta-analysis. SLEEP 2010;33(10):7653-7792. Douglas GALVEZ. Hypopharyngeal surgery in obstructive sleep apnea: an evidence-based medicine review.  Arch Otolaryngol Head Neck Surg. 2006 Feb;132(2):206-13.  3. Darius YH1, Armando Y, Chris DAWN. The efficacy of anatomically based multilevel surgery for obstructive sleep apnea. Otolaryngol Head Neck Surg. 2003 Oct;129(4):327-35.  4. Douglas GALVEZ, Goldberg A. Hypopharyngeal Surgery in Obstructive Sleep Apnea: An Evidence-Based Medicine Review. Arch Otolaryngol Head Neck Surg. 2006 Feb;132(2):206-13.  5. Strollo PJ et al. Upper-Airway Stimulation for Obstructive Sleep Apnea.  N Engl J Med. 2014 Jan 9;370(2):139-49.  6. Anita Y et al. Increased Incidence of Cardiovascular Disease in Middle-aged Men with Obstructive Sleep Apnea. Am J Respir Crit Care Med; 2002 166: 159-165  7. Ayers EM et al. Studying Life Effects and Effectiveness of Palatopharyngoplasty (SLEEP) study: Subjective Outcomes of Isolated Uvulopalatopharyngoplasty. Otolaryngol Head Neck Surg. 2011; 144: 623-631.        Treatment for delayed sleep phase involves the following:  Good sleep habits.  do everything they can to develop and maintain good sleep habits and a steady sleep schedule. Habits should include going to bed and waking up at the same times; avoiding caffeinated products (eg, coffees, teas, laura, some non-cola pops, energy drinks, chocolates) avoiding other stimulants and products that can disrupt sleep (eg, alcohol, sleeping pills, nicotine); maintaining a cool, quiet and comfortable bedroom; and avoiding activities before bedtime that are  "stimulating (eg, computer games and television).   Shifting the bedtime schedule / Advancing the internal clock. This method simply moves the bedtime a bit earlier on each successive night until the desired bedtime is reached. For example, setting the bedtime at midnight on one night, 11:45 p.m. on the next night, 11:30 on the following night and so on. GOAL BED TIME: 12 MN   GOAL BED TIME: 730AM  Staying motivated to stick with the schedule. It is especially important not to lose sight of the goals during holidays and weekends. Adhering to strict bed and wake times keeps the body s clock under control but does not \"cure\" the tendency for delayed sleep phase. Once the desired bedtime is reached, you must stay motivated and stick with going to bed at the desired bedtime on a nightly basis in order to reset the internal clock. Only after several months of sticking to the schedule can there be some flexibility allowed on special occasions.   Bright light therapy. Some physicians recommend bright light therapy, which requires the purchase of special light box(bright light box 02525 lux). Exposing  to bright light for approximately half an hour to one hour  in the morning soon after awakening helps to reset the body s internal clock. Reduced exposure to bright light in the evening also helps.   Melatonin. Melatonin ( natural sleep-inducing drugs ) take one half tab of 1 mg strength by mouth at 8PM.    Please do not drive/operate heavy machinery,  if drowsy or sleepy;  pull over if drowsy.           "

## 2019-04-26 NOTE — PROGRESS NOTES
"Sleep Consultation Note:    Date on this visit: 4/26/2019    Brooks Mensah is sent by Cliff Mcdonald for a sleep consultation regarding evalution for possible sleep apnea    Primary Physician: Eva Kent     Chief complaint: \"to check if I have sleep apnea that may contribute to my high blood pressure\"    Brooks Mensah 33 year old gentleman with history significant for prior tobacco use, morbid obesity, history of congestive heart failure from hypertensive cardiomyopathy.       He has not had a previous sleep study.    Brooks does complain of snoring, snort arousals, awakening due to gasping for air, dry mouth.  Denies awakening due to choking. Denies witnessed apneas, morning headaches.  He sleeps on his sides, but tosses and turns al lot during night.    He is  a night person. He reports  difficulty with falling asleep. He does not use a sleep aid.  In the past, used to take melatonin. He tried trazodone which did'nt help.  He is not always tired when he goes to bed and he also reports sometimes active mind can affect the sleep. During work days, Brooks goes to bed at 11 PM, and it usually takes 1 hour to fall asleep. He wakes up 3 times throughout the night. Night time awakenings occurs due to to go to the bathroom. After awakening, he is able to fall back asleep after 15 minutes. He wakes up at 530 AM, with an alarm.  He does not feel rested upon awakening from sleep.  During nonworking days,  his bedtime is around 1 AM,  he wakes up usually around 7 AM but lays in the bed and falls asleep an hour later and gets 2 to 3 hours more sleep and feels comparatively a little more rested upon awakening from sleep.  He reports occasional fatigue,  but denies excessive daytime sleepiness. He naps once a week for about 2 hours and feels refreshed after he wakes up from the nap.  He denies excessive daytime sleepiness. He  endorses an [place] sleepiness score of 7 out of 24.  He denies drowsiness while driving except " there was one episode in January 2019, he worked  straight 16 hours without much sleep and he felt  drowsy but there were no motor vehicle accident because of the drowsy driving.  He sometimes watches television in the bed and uses phone in his bed.    He reports nightmares once a month but denies sleepwalking or sleep eating or dream enactment behavior or teeth grinding or cataplexy or sleep paralysis or hallucinations.    He thinks he has restless legs. He reports  habitual foot tapping that he is able to control and stop there is no discomfort that he reports after he goes to bed at night /as he is trying to fall asleep though sometimes he has felt that his legs jerk during the night and it is very random and he does not think the symptoms affect his sleep.  There is no family history of restless legs. He donated blood several years ago.      Allergies:    No Known Allergies    Medications:    Current Outpatient Medications   Medication Sig Dispense Refill     aspirin EC 81 MG EC tablet Take 1 tablet (81 mg) by mouth daily (Patient not taking: Reported on 4/24/2019) 30 tablet 3     atorvastatin (LIPITOR) 10 MG tablet Take 1 tablet (10 mg) by mouth every evening 90 tablet 3     carvedilol (COREG) 25 MG tablet Take 1 tablet (25 mg) by mouth 2 times daily (with meals) 180 tablet 3     furosemide (LASIX) 20 MG tablet Take 1 tablet (20 mg) by mouth daily 90 tablet 3     lisinopril (PRINIVIL/ZESTRIL) 20 MG tablet Take 1 tablet (20 mg) by mouth 2 times daily 180 tablet 3     MELATONIN PO Take 10 mg by mouth nightly as needed        spironolactone (ALDACTONE) 25 MG tablet Take 0.5 tablets (12.5 mg) by mouth daily 45 tablet 3     TRAZODONE HCL PO Take 50 mg by mouth nightly as needed for sleep         Problem List:  Patient Active Problem List    Diagnosis Date Noted     Acute on chronic systolic congestive heart failure (H) 08/09/2017     Priority: Medium     Cardiomyopathy, unspecified (H) 08/09/2017     Priority:  Medium     Morbid obesity with BMI of 50.0-59.9, adult (H) 06/21/2017     Priority: Medium     Congestive heart failure, unspecified congestive heart failure chronicity, unspecified congestive heart failure type 06/13/2017     Priority: Medium     Hypertensive emergency 06/13/2017     Priority: Medium     Hypertensive urgency 06/13/2017     Priority: Medium     Renal insufficiency 06/13/2017     Priority: Medium     NSTEMI (non-ST elevated myocardial infarction) (H) 06/13/2017     Priority: Medium        Past Medical/Surgical History:  Past Medical History:   Diagnosis Date     Acute on chronic systolic congestive heart failure (H) 8/9/2017     CAD (coronary artery disease)     Nonobstructive     Cardiomyopathy, unspecified (H) 8/9/2017     Congestive heart failure, unspecified congestive heart failure chronicity, unspecified congestive heart failure type 6/13/2017     HTN (hypertension), malignant      Hypertensive urgency 6/13/2017     Morbid obesity with BMI of 50.0-59.9, adult (H) 6/21/2017     NSTEMI (non-ST elevated myocardial infarction) (H) 6/13/2017     Renal insufficiency 6/13/2017     Sleep apnea      Tobacco abuse      No past surgical history on file.    Social History: He is single.  He lives with his mother and 2 sisters.  He works as a  for [employer] for 5 days a week 7AM to 4 or 5 PM.  He drinks 3 caffeinated beverages 3 per day has been at times within 6 hrs before bed. Alcohol 1/week. No illicit drugs.  Social History     Socioeconomic History     Marital status: Single     Spouse name: Not on file     Number of children: Not on file     Years of education: Not on file     Highest education level: Not on file   Occupational History     Not on file   Social Needs     Financial resource strain: Not on file     Food insecurity:     Worry: Not on file     Inability: Not on file     Transportation needs:     Medical: Not on file     Non-medical: Not on file   Tobacco Use  "    Smoking status: Former Smoker     Types: Cigarettes     Last attempt to quit: 3/30/2019     Years since quittin.0     Smokeless tobacco: Never Used   Substance and Sexual Activity     Alcohol use: Yes     Comment: occassional     Drug use: No     Sexual activity: Not on file   Lifestyle     Physical activity:     Days per week: Not on file     Minutes per session: Not on file     Stress: Not on file   Relationships     Social connections:     Talks on phone: Not on file     Gets together: Not on file     Attends Yazidi service: Not on file     Active member of club or organization: Not on file     Attends meetings of clubs or organizations: Not on file     Relationship status: Not on file     Intimate partner violence:     Fear of current or ex partner: Not on file     Emotionally abused: Not on file     Physically abused: Not on file     Forced sexual activity: Not on file   Other Topics Concern     Parent/sibling w/ CABG, MI or angioplasty before 65F 55M? Not Asked   Social History Narrative     Not on file       Family History:  Family history pertinent to sleep disorders: his sisters and mom snore  No family history on file.    Review of Systems:  A complete review of systems reviewed by me is negative with the exeption of what has been mentioned in the history of present illness other than symptoms listed below:  Weight gain, runny nose, occasional wheezing when he is breathing.      Physical Examination:  /90   Pulse 77   Ht 1.803 m (5' 10.98\")   Wt (!) 184.6 kg (407 lb)   SpO2 95%   BMI 56.79 kg/m    General: No apparent distress, appropriately groomed  Head: Normocephalic, atraumatic  Eyes: no icterus, PERRL  Nose: Reduced airflow through the right nostril, inferior nasal turbinate hypertrophy.  Mouth: op pink and moist, teeth: wearing off enamel of front upper teeth, prominent tongue base  Orophraynx: Opening is narrowed, uvula:thick   Mallampati Class: IV   Tonsillar Stage:could not " be seen  Neck: Supple, Circumference: 22 inches  Cardiac: Regular S1, S2; no gallops or murmurs  Chest: decreased intensity of breath sounds; no rales or rhonchi  Musculoskeletal: no edema noted  Skin: Warm, dry, intact  Psych: Mood pleasant, affect congruent  Neuro:  Mental status: Awake, alert, attentive, oriented.  Speech: normal  Motor: Tone within normal limit  Gait: Normal       Echo 4/22/19:  Interpretation Summary:     Left ventricular systolic function is low normal.  The visual ejection fraction is estimated at 50-55%.  Small area of apical, distal anteroseptal hypokinesis.  EF higher compared to study from 6/17. LV wall thickness still increased,  however, less pronounced than prior study. The study was technically  Difficult.      IMPRESSION/PLAN:  Snoring, nonrestorative sleep in the setting of hypertensive cardiomyopathy.  Possible MAURI .  STOP BANG score is 6. Patient likely has sleep apnea based on clinical history, male gender, body habitus, neck circumference and cardiovascular disease.  Due to the history of congestive heart failure with elevated body mass index suggestive of possible coexistent hypoventilation, home sleep testing would not be ideal for evaluation of sleep-related breathing disorder.  Recommend in-lab split night sleep study, to evaluate for sleep apnea and the study will also include transcutaneous carbon dioxide monitoring with arterial blood gas analysis presleep study to the validate the TCM measurements. If there are significant central apneas or cheyne grider respiration with CPAP treatment, will consider switching  to titration using auto SV(there is no contraindication to using it based on his recent echo).  The study will be obtained in accordance to his circadian rhtyhm  Diagnosis and treatment for MAURI have been discussed. Complications of untreated MAURI have also been discussed.    Chronic initiation insomnia- due to delayed sleep phase/ inadequate sleep hygiene,  psychophysiological.  Strongly encouraged to follow good sleep hygiene/behavioral techniques.     Delayed sleep phase/ insufficient sleep: We discussed the consequences of chronic insufficient sleep.  We discussed regularizing the sleep-wake schedule (we arrived at a goal bedtime of 12 midnight and a goal wake up time of 7:30 AM since he is  willing to talk to his employer to see if he could start work at a later time around 9 AM) and increasing the sleep duration aiming at at least 7 to 7.5 hours of sleep per night. We discussed using melatonin to help with sleep phase advancement and exposure to bright light box of 10,000 Lux intensity upon awakening.   Details of the counseling provided today are listed under patient instructions and have been provided to patient as after visit summary.    Hypertension: His blood pressure was elevated today.  He was recommended to continue monitor the blood pressures and follow-up with his primary care provider for optimizing the management of the blood pressure.    Hypertensive cardiomyopathy. Systolic and diastolic congestive heart failure.  NYHA functional class II. Continue f/u with Cardiology. We discussed the association CVD and cardiovascular disease.      His symptoms are not very much suggestive of restless legs symptoms: But will still consider obtaining serum ferritin level and follow-up the results.  We will readdress this symptoms at the subsequent follow-up visit.    History of tobacco abuse. - Quit 3/2019. He  has been encouraged to continue to not smoke.    We discussed weight management with  healthy diet, and exercise.  He was fine in obtaining referral to weight management center (nonsurgical).    Patient was strongly advised to avoid driving, operating any heavy machinery or other hazardous situations while drowsy or sleepy.  Patient was counseled on the importance of driving while alert, to pull over if drowsy, or nap before getting into the vehicle if  "sleepy.        He will follow up with me in approximately two weeks after his sleep study has been competed to review the results and discuss plan of care.        CC: Cliff Mcdonald    The above note was dictated using voice recognition software. Although reviewed after completion, some word and grammatical error may remain . Please contact the author for any clarifications.    \"I spent a total of 60 minutes face to face with Brooks Mensah during today's office visit. Over 50% of this time was spent counseling the patient and  coordinating care regarding sleep related breathing disorder, associated with CVD, PSG, increasing sleep duration, regularizing sleep schedule, weight management.\"       John Loo MD   of Medicine,  Division of Pulmonary/Sleep Medicine  [facility].              "

## 2019-05-13 ENCOUNTER — THERAPY VISIT (OUTPATIENT)
Dept: SLEEP MEDICINE | Facility: CLINIC | Age: 34
End: 2019-05-13
Payer: COMMERCIAL

## 2019-05-13 ENCOUNTER — DOCUMENTATION ONLY (OUTPATIENT)
Dept: SLEEP MEDICINE | Facility: CLINIC | Age: 34
End: 2019-05-13

## 2019-05-13 DIAGNOSIS — G47.33 OSA (OBSTRUCTIVE SLEEP APNEA): Primary | ICD-10-CM

## 2019-05-13 DIAGNOSIS — I50.22 CHRONIC SYSTOLIC CONGESTIVE HEART FAILURE (H): ICD-10-CM

## 2019-05-13 LAB
BASE EXCESS BLDA CALC-SCNC: 3.1 MMOL/L
HCO3 BLD-SCNC: 29 MMOL/L (ref 21–28)
OXYHGB MFR BLD: 92 % (ref 92–100)
PCO2 BLD: 47 MM HG (ref 35–45)
PH BLD: 7.4 PH (ref 7.35–7.45)
PO2 BLD: 65 MM HG (ref 80–105)

## 2019-05-13 PROCEDURE — 82805 BLOOD GASES W/O2 SATURATION: CPT | Performed by: INTERNAL MEDICINE

## 2019-05-13 PROCEDURE — 95811 POLYSOM 6/>YRS CPAP 4/> PARM: CPT | Performed by: INTERNAL MEDICINE

## 2019-05-13 PROCEDURE — 36600 WITHDRAWAL OF ARTERIAL BLOOD: CPT | Performed by: INTERNAL MEDICINE

## 2019-05-13 NOTE — Clinical Note
Reji Vaughan, Pt has severe MAURI and needs urgent CPAP set up.  DME Orders for  CPAP generated in the epic. Patient to follow through [facility] and follow-up with me at the sleep clinic in 6 weeks after starting treatment.Thank you,Chato Singer Professor of Medicine,[facility].

## 2019-05-16 LAB — SLPCOMP: NORMAL

## 2019-05-16 NOTE — PROCEDURES
" SLEEP STUDY INTERPRETATION  SPLIT NIGHT STUDY      Patient: BREA DO  YOB: 1985  Study Date: 5/13/2019  MRN: [id number]  Referring Provider: -  Ordering Provider: Sophie Loo MD    Indications for Polysomnography: The patient is a 33 y old Male who is 5' 11\" and weighs 407.0 lbs. His BMI is 57.0, [place] sleepiness scale 0.0 and neck circumference is 56.0 cm. Relevant medical history includes prior tobacco use, morbid obesity, and history of congestive heart failure from hypertensive cardiomyopathy. Patient reports snoring, snort arousals, awakening due to gasping for air, dry mouth. A diagnostic polysomnogram was performed to evaluate for sleep apnea/ hypoventilation/hypoxemia. After 142.5 minutes of sleep time the patient exhibited sufficient respiratory events qualifying him for a CPAP trial which was then initiated.    Polysomnogram Data: A full night polysomnogram recorded the standard physiologic parameters including EEG, EOG, EMG, ECG, nasal and oral airflow. Respiratory parameters of chest and abdominal movements were recorded with respiratory inductance plethysmography. Oxygen saturation was recorded by pulse oximetry.  Hypopnea scoring rule used: 1B 4%    Diagnostic PSG  Sleep Architecture: Severe sleep fragmentation due to respiratory events. Both stages N3 and REM sleep were not observed.  The total recording time of the polysomnogram was 154.4 minutes. The total sleep time was 142.5 minutes. Sleep latency was decreased at 0.0 minutes with the use of a sleep aid (10mg Zolpidem). REM sleep was not observed. Arousal index was increased at 103.6 arousals per hour. Sleep efficiency was normal at 92.3%. Wake after sleep onset was 8.0 minutes. The patient spent 1.1% of total sleep time in Stage N1, 98.9% in Stage N2, 0.0% in Stage N3, and 0.0% in REM.     Respiration: Severe obstructive sleep apnea with sleep associated hypoxemia. Sleep associated hypoventilation could not be " assessed due to the questionable reliability of the TCM during the study.  However, there was evidence of both awake hypoxemia and hypoventilation based on the arterial blood gas analysis pre sleep study.  Events ? The polysomnogram revealed a presence of 43 obstructive, - central, and 4 mixed apneas resulting in an apnea index of 19.8 events per hour. There were 214 obstructive hypopneas and 1 central hypopneas resulting in an obstructive hypopnea index of 90.1 and central hypopnea index of 0.4 events per hour. The combined apnea/hypopnea index was 110.7 events per hour (central apnea/hypopnea index was 0.4 events per hour).  The REM AHI could not be assessed since REM sleep was not observed. The supine AHI was 117.7 events per hour. The RERA index was 0.8 events per hour. The RDI was 111.6 events per hour.    Snoring - was reported as loud.    Respiratory rate and pattern - was notable for normal respiratory rate and pattern.    Sustained Sleep Associated Hypoventilation - Transcutaneous carbon dioxide monitoring was used, with questionable reliability.    Sleep Associated Hypoxemia - (Greater than 5 minutes O2 sat at or below 88%) was present. Baseline oxygen saturation was low at 88.1%. Lowest oxygen saturation was 63.9%. Time spent less than or equal to 88% was 71.5 minutes. Time spent less than or equal to 89% was 81.1 minutes.     ABG pre sleep study: pH 7.40, pCO2 47, pO2 65, Bicarbonate 29, Oxyhemoglobin 92 %.    Treatment PSG  Sleep Architecture: Significant improvement in sleep architecture with CPAP treatment including reduction in arousals and rebound increases in both stages N3 and REM sleep.  At 02:08:34 AM the patient was placed on PAP treatment. The total recording time of the treatment portion of the study was 231.3 minutes. The total sleep time was 228.5 minutes. During the treatment portion of the study the sleep latency was 1.5 minutes. REM latency was 3.5 minutes. Arousal index was normal at  10.5 arousals per hour. Sleep efficiency was increased at 98.8%. Wake after sleep onset was 1.5 minutes. The patient spent 1.1% of total sleep time in Stage N1, 34.6% in Stage N2, 41.6% in Stage N3, and 22.8% in REM. Time in REM supine was 35.5 minutes.     Respiration: Optimum CPAP titration was achieved    CPAP was titrated at pressures ranging from CPAP 7 cmH2O up to CPAP 15 cmH2O using Simplus full face mask/Medium.  The final pressure was CPAP 15 cmH2O with an AHI of - events per hour. Time in REM supine on final pressure was 4.0 minutes.  Hypoxemia resolved with PAP treatment with oxygen saturations were  in higher 90s at the final pressure settings including REM sleep.    This titration was considered:  - Optimal (residual AHI < 5 events per hour and including REM?supine sleep at final pressure).     Movement Activity: There were no significant periodic limb movements. Abnormal sleep related behaviors were not noted.    Periodic Limb Movements  o During the diagnostic portion of the study, there were - PLMs recorded. The PLM index was - movements per hour. The PLM Arousal Index was - per hour.  o During the treatment portion of the study, there were 11 PLMs recorded. The PLM index was 2.9 movements per hour. The PLM Arousal Index was 1.3 per hour.    REM EMG Activity - Excessive transient/sustained muscle activity was not present.    Nocturnal Behavior - Abnormal sleep related behaviors were not noted during/arising out of NREM / REM sleep.      Bruxism - None apparent.    Cardiac Summary: Normal sinus rhythm was noted (10 second epoch shown below).  During the diagnostic portion of the study, the average pulse rate was 77.3 bpm. The minimum pulse rate was 57.0 bpm while the maximum pulse rate was 92.2 bpm.    During the treatment portion of the study, the average pulse rate was 74.4 bpm. The minimum pulse rate was 59.0 bpm while the maximum pulse rate was 89.8 bpm.     Arrhythmias were not  noted.        Assessment:     Severe obstructive sleep apnea with sleep associated hypoxemia. Sleep associated hypoventilation could not be assessed due to the questionable reliability of the TCM during the study.  However, there was evidence of both awake hypoxemia and hypoventilation based on the arterial blood gas analysis pre sleep study.    Optimum CPAP titration was achieved with elimination of obstructive events and oxygen saturations in higher 90s at the final pressure settings including REM sleep.    Sleep architecture during baseline was remarkable for severe sleep fragmentation due to respiratory events. Both stages N3 and REM sleep were not observed. There was significant improvement in sleep architecture with CPAP treatment including reduction in arousals and rebound increases in both stages N3 and REM sleep.    There were no significant periodic limb movements. Abnormal sleep related behaviors were not noted.    Normal sinus rhythm was noted.    Recommendations:    Treatment of MAURI with CPAP at 15 cmH2O. Recommend clinical follow up with sleep management team, for coaching and review of effectiveness and compliance measures.     Advice regarding the risks of drowsy driving.    Suggest optimizing sleep schedule and avoiding sleep deprivation.    Weight management (if BMI > 30).    Pharmacologic therapy should be used for management of restless legs syndrome only if present and clinically indicated and not based on the presence of periodic limb movements alone.    Diagnostic Codes:   Obstructive Sleep Apnea G47.33  Sleep Hypoxemia/Hypoventilation G47.36   Repetitive Intrusions Into Sleep F51.8    5/13/2019 [facility] Sleep Study (407.0 lbs) - .7, .6, Supine .7, REM AHI -, Low O2% 63.9%, Time Spent ?88% 71.5, Time Spent ?89% 81.1. Treatment was titrated to a pressure of CPAP 15 with an AHI -. Time spent in REM supine at this pressure was 4.0  minutes.      _____________________________________   Electronically Signed By: (Sophie Loo MD), 5/15/2019

## 2019-05-20 ENCOUNTER — TELEPHONE (OUTPATIENT)
Dept: SLEEP MEDICINE | Facility: CLINIC | Age: 34
End: 2019-05-20

## 2019-05-20 NOTE — TELEPHONE ENCOUNTER
I called patient today 05/20/19 at 11:18 am to schedule pap setup appointment. No answer left message for patient to call me back here in [place].

## 2019-06-07 ENCOUNTER — TELEPHONE (OUTPATIENT)
Dept: SLEEP MEDICINE | Facility: CLINIC | Age: 34
End: 2019-06-07

## 2019-06-07 NOTE — TELEPHONE ENCOUNTER
I called patient today 06/07/19 at 10:58 am to see if he would like to schedule pap machine setup appointment. No answer, left message for patient to call me back here in [place].

## 2019-06-25 ENCOUNTER — TELEPHONE (OUTPATIENT)
Dept: SLEEP MEDICINE | Facility: CLINIC | Age: 34
End: 2019-06-25

## 2019-06-25 NOTE — TELEPHONE ENCOUNTER
LVM asking  to call the nurseline.   no showed his return visit with Dr. Carson to go over results of sleep study on 6/14/19.  He has not responded to calls from [facility] to be setup on pap as ordered by Dr. Carson.  Severe obstructive apnea.

## 2019-10-24 ENCOUNTER — TELEPHONE (OUTPATIENT)
Dept: CARDIOLOGY | Facility: CLINIC | Age: 34
End: 2019-10-24

## 2019-10-24 NOTE — TELEPHONE ENCOUNTER
Pt called back. He would prefer to keep tomorrow's appt. Pt states he is feeling well and would like to have some paperwork for his work filled out. Will keep appt.   Donna Sandoval RN 4:16 PM 10/24/19

## 2019-10-24 NOTE — TELEPHONE ENCOUNTER
Pt was scheduled on a NON CORE day. Call to pt to see if he can come next wk or if he really wants to come tomorrow.   Donna Sandoval RN 3:22 PM 10/24/19

## 2019-10-25 ENCOUNTER — OFFICE VISIT (OUTPATIENT)
Dept: CARDIOLOGY | Facility: CLINIC | Age: 34
End: 2019-10-25
Attending: INTERNAL MEDICINE
Payer: COMMERCIAL

## 2019-10-25 VITALS
WEIGHT: 315 LBS | HEART RATE: 80 BPM | SYSTOLIC BLOOD PRESSURE: 155 MMHG | HEIGHT: 71 IN | DIASTOLIC BLOOD PRESSURE: 98 MMHG | BODY MASS INDEX: 44.1 KG/M2

## 2019-10-25 DIAGNOSIS — I50.22 CHRONIC SYSTOLIC CONGESTIVE HEART FAILURE (H): ICD-10-CM

## 2019-10-25 DIAGNOSIS — I42.8 OTHER CARDIOMYOPATHY (H): ICD-10-CM

## 2019-10-25 DIAGNOSIS — I16.1 HYPERTENSIVE EMERGENCY: ICD-10-CM

## 2019-10-25 DIAGNOSIS — I10 ESSENTIAL HYPERTENSION: Primary | ICD-10-CM

## 2019-10-25 PROCEDURE — 99214 OFFICE O/P EST MOD 30 MIN: CPT | Performed by: PHYSICIAN ASSISTANT

## 2019-10-25 RX ORDER — AMLODIPINE BESYLATE 5 MG/1
5 TABLET ORAL DAILY
Qty: 30 TABLET | Refills: 11 | Status: SHIPPED | OUTPATIENT
Start: 2019-10-25 | End: 2020-08-19

## 2019-10-25 ASSESSMENT — MIFFLIN-ST. JEOR: SCORE: 2861.34

## 2019-10-25 NOTE — PROGRESS NOTES
CARDIOLOGY CLINIC PROGRESS NOTE    DOS: 10/25/2019      Brooks Mensah  : 1985, 34 year old  MRN: [id number]      History:  It was a pleasure following up with Brooks (also goes by Javon) today in the [facility].     Brooks is a very pleasant 34-year-old gentleman with history significant for prior tobacco use, morbid obesity, history of congestive heart failure from hypertensive cardiomyopathy, severe sleep apnea not on CPAP.      In 2017 he was admitted with congestive heart failure and moderate to severe left ventricular systolic dysfunction.  His blood pressure was as high as 240/140.  Echo 2017 showed severe LVH with septal and posterior wall measurements of approximately 2.5 cm with 1.5 cm being the upper limits of normal.  There was diastolic dysfunction of the left ventricle and the LVEF was 30-35%.  Left atrium was not particularly dilated.   He underwent coronary angiography 6/15/2017, which did show trivial coronary artery disease of the right coronary artery with a stenosis of 10-20%, otherwise his coronary arteries were large and free of disease.    He was treated initially with nitrates, Carvedilol, ACE inhibitor, Lasix for diuresis.  With these therapies, his blood pressures came down nicely.     During his admission, it was noted that he likely has severe sleep apnea as he had some pauses on telemetry and outpatient sleep study was recommended.    On telemetry there were some brief episodes of what looked intermittent 3rd degree AV block in the middle of the night during sleep.  He was asymptomatic.  His Carvedilol was decreased from 50 mg twice daily to 25 mg twice daily.  Further monitoring showed sinus rhythm.      He did weill in follow up without sxs.     He was lost to follow up until he saw Dr. Mcdonald 3/22/19.  He was seen as he ran out of medications.  He did tell Dr. Mcdonald that he only takes his medications intermittently.  Not surprisingly, his blood pressure was 158/100.  Dr. Mcdonald  renewed his medications.   An echo was ordered, and a BMP.     The echo was done 4/22/19 and showed improvement in LVEF to 50-55%.  He still had LVH.    BMP 4/22/19 showed improvement in creatinine from his admission to 1.29.     I saw him last 4/24/19.  He had quit smoking.     He presents today for overdue follow up.    He continues to do well without sxs of CHF.  No symptoms of shortness of breath, orthopnea, PND, edema.  He has no chest pain or palpitations.  He also has no lightheadedness, dizziness, near syncope or syncope.  He says he is tolerating his medications without any adverse side effects.     He says he has been taking his medications.   This morning, however, he has not yet taken his medications.  His BP is 155/98.    But at home, when he is taking his medications, his BP is running 150s.   He is still off cigarettes.  He thinks he is eating more.  He has gained 15 lbs since he saw Dr. Mcdonald.  This does not seem fluid related.   He has not yet had a CPAP mask - he has failed to follow up on these appts. He says he does not like a mask.       ROS:  Skin:  Negative     Eyes:  Negative    ENT:  Negative    Respiratory:  Positive for sleep apnea  Cardiovascular:  Negative    Gastroenterology: Negative    Genitourinary:  Negative    Musculoskeletal:  Positive for neck pain  Neurologic:  Negative    Psychiatric:  Positive for sleep disturbances  Heme/Lymph/Imm:  Negative    Endocrine:  Negative      PAST MEDICAL HISTORY:  Past Medical History:   Diagnosis Date     Acute on chronic systolic congestive heart failure (H) 8/9/2017     CAD (coronary artery disease)     Nonobstructive     Cardiomyopathy, unspecified (H) 8/9/2017     Congestive heart failure, unspecified congestive heart failure chronicity, unspecified congestive heart failure type 6/13/2017     HTN (hypertension), malignant      Hypertensive urgency 6/13/2017     Morbid obesity with BMI of 50.0-59.9, adult (H) 6/21/2017     NSTEMI (non-ST elevated  myocardial infarction) (H) 6/13/2017     Renal insufficiency 6/13/2017     Sleep apnea      Tobacco abuse        PAST SURGICAL HISTORY:  History reviewed. No pertinent surgical history.    SOCIAL HISTORY:  Social History     Socioeconomic History     Marital status: Single     Spouse name: None     Number of children: None     Years of education: None     Highest education level: None   Occupational History     None   Social Needs     Financial resource strain: None     Food insecurity:     Worry: None     Inability: None     Transportation needs:     Medical: None     Non-medical: None   Tobacco Use     Smoking status: Current Every Day Smoker     Types: Cigarettes     Smokeless tobacco: Never Used   Substance and Sexual Activity     Alcohol use: Yes     Comment: occassional     Drug use: No     Sexual activity: None   Lifestyle     Physical activity:     Days per week: None     Minutes per session: None     Stress: None   Relationships     Social connections:     Talks on phone: None     Gets together: None     Attends Voodoo service: None     Active member of club or organization: None     Attends meetings of clubs or organizations: None     Relationship status: None     Intimate partner violence:     Fear of current or ex partner: None     Emotionally abused: None     Physically abused: None     Forced sexual activity: None   Other Topics Concern     Parent/sibling w/ CABG, MI or angioplasty before 65F 55M? Not Asked   Social History Narrative     None       FAMILY HISTORY:  History reviewed. No pertinent family history.    MEDS: atorvastatin (LIPITOR) 10 MG tablet, Take 1 tablet (10 mg) by mouth every evening  carvedilol (COREG) 25 MG tablet, Take 1 tablet (25 mg) by mouth 2 times daily (with meals)  furosemide (LASIX) 20 MG tablet, Take 1 tablet (20 mg) by mouth daily  lisinopril (PRINIVIL/ZESTRIL) 20 MG tablet, Take 1 tablet (20 mg) by mouth 2 times daily  spironolactone (ALDACTONE) 25 MG tablet, Take 0.5  "tablets (12.5 mg) by mouth daily  aspirin EC 81 MG EC tablet, Take 1 tablet (81 mg) by mouth daily (Patient not taking: Reported on 4/24/2019)  MELATONIN PO, Take 10 mg by mouth nightly as needed   TRAZODONE HCL PO, Take 50 mg by mouth nightly as needed for sleep    No current facility-administered medications on file prior to visit.       ALLERGIES: No Known Allergies    PHYSICAL EXAM:  Vitals: BP (!) 155/98 (BP Location: Right arm, Patient Position: Sitting, Cuff Size: Adult Large)   Pulse 80   Ht 1.803 m (5' 11\")   Wt (!) 189.9 kg (418 lb 11.2 oz)   BMI 58.40 kg/m    Constitutional:  cooperative, alert and oriented, well developed, well nourished, in no acute distress morbidly obese      Skin:  warm and dry to the touch        Head:  normocephalic, no masses or lesions        Eyes:  pupils equal and round;conjunctivae and lids unremarkable;sclera white;no xanthalasma        ENT:  no pallor or cyanosis        Neck:      difficult to assess given neck size, but no apparent JVD    Respiratory:      decreased air movement, but no rales or wheezing     Cardiac: regular rhythm;apical impulse not displaced   distant heart sounds         no S3 or S4 appreciated, no murmur appreciated     GI:  abdomen soft;BS normoactive;non-tender obese      Vascular: not assessed this visit                                      Extremities and Musculoskeletal:  no deformities, clubbing, cyanosis, erythema observed;no edema   chronic venous stasis changes    Neurological:  no gross motor deficits;affect appropriate            LABS/DATA:  I reviewed the following:  Echo 4/22/19:  Interpretation Summary  Left ventricular systolic function is low normal.  The visual ejection fraction is estimated at 50-55%.  Small area of apical, distal anteroseptal hypokinesis.  EF higher compared to study from 6/17. LV wall thickness still increased,  however, less pronounced than prior study. The study was " technically  Difficult.          ASSESSMENT:  1. History of tobacco abuse.   - Quit 3/2019.     2.  Morbid obesity.  - Needs weight loss.   - Not interested in gastric bypass.     3. Severe obstructive sleep apnea.   - CPAP is recommended, but has not picked up     4.  Mild renal insufficiency.   - Noted, he is on Lasix, spironolactone, lisinopril.   - BMP when he sees Dr. Mcdonald next    5.  Trivial nonobstructive coronary artery disease.     - No anginal sxs.    - On ASA, statin.    - FLP when he sees Dr. Mcdonald next    6.  Hypertensive cardiomyopathy.    - Echo 6/13/17: LVEF 30-35%, grade III diastolic dysfunction.    - Cor angio 6/25/17 without significant obstructive CAD.   - Echo 4/22/19: LVEF 50-55%, LVH still but improved.    - No CHF sxs.   - Continue BB, ACEi, spironolactone, Lasix.  Adding amlodipine for better BP control.  BP check in 1 month  - Needs sleep apnea treated as noted above.   - Sodium restriction.   - Daily weights.      7.  Acute systolic and diastolic congestive heart failure.    - Currently NYHA functional class II.     8.  Hypertension, malignant.   - BP today 155/98 without taking his AM meds, but he reports 150s at home even when he takes his meds  - Adding amlodipine for better BP control.  BP check in 1 month    9.  Transient high-grade AV block during sleep, and on Coreg 50mg BID. Decreased to 25mg BID and no recurrence.   - Needs CPAP as noted         Follow up:  RN visit to check BP in 1 month  Dr. Mcdonald in Feb or March 2019    Prem Painter PA-C

## 2019-10-25 NOTE — LETTER
10/25/2019    Eva Kent PA-C  [address]  [place] [address]    RE: Brooks Mensah       Dear Colleague,    I had the pleasure of seeing Brooks Mensah in the [facility].    CARDIOLOGY CLINIC PROGRESS NOTE    DOS: 10/25/2019      Brooks Mensah  : 1985, 34 year old  MRN: [id number]      History:  It was a pleasure following up with Brooks (also goes by Javon) today in the [facility].     Brooks is a very pleasant 34-year-old gentleman with history significant for prior tobacco use, morbid obesity, history of congestive heart failure from hypertensive cardiomyopathy, severe sleep apnea not on CPAP.      In 2017 he was admitted with congestive heart failure and moderate to severe left ventricular systolic dysfunction.  His blood pressure was as high as 240/140.  Echo 2017 showed severe LVH with septal and posterior wall measurements of approximately 2.5 cm with 1.5 cm being the upper limits of normal.  There was diastolic dysfunction of the left ventricle and the LVEF was 30-35%.  Left atrium was not particularly dilated.   He underwent coronary angiography 6/15/2017, which did show trivial coronary artery disease of the right coronary artery with a stenosis of 10-20%, otherwise his coronary arteries were large and free of disease.    He was treated initially with nitrates, Carvedilol, ACE inhibitor, Lasix for diuresis.  With these therapies, his blood pressures came down nicely.     During his admission, it was noted that he likely has severe sleep apnea as he had some pauses on telemetry and outpatient sleep study was recommended.    On telemetry there were some brief episodes of what looked intermittent 3rd degree AV block in the middle of the night during sleep.  He was asymptomatic.  His Carvedilol was decreased from 50 mg twice daily to 25 mg twice daily.  Further monitoring showed sinus rhythm.      He did weill in follow up without sxs.     He was lost to  follow up until he saw Dr. Mcdonald 3/22/19.  He was seen as he ran out of medications.  He did tell Dr. Mcdonald that he only takes his medications intermittently.  Not surprisingly, his blood pressure was 158/100.  Dr. Mcdonald renewed his medications.   An echo was ordered, and a BMP.     The echo was done 4/22/19 and showed improvement in LVEF to 50-55%.  He still had LVH.    BMP 4/22/19 showed improvement in creatinine from his admission to 1.29.     I saw him last 4/24/19.  He had quit smoking.     He presents today for overdue follow up.    He continues to do well without sxs of CHF.  No symptoms of shortness of breath, orthopnea, PND, edema.  He has no chest pain or palpitations.  He also has no lightheadedness, dizziness, near syncope or syncope.  He says he is tolerating his medications without any adverse side effects.     He says he has been taking his medications.   This morning, however, he has not yet taken his medications.  His BP is 155/98.    But at home, when he is taking his medications, his BP is running 150s.   He is still off cigarettes.  He thinks he is eating more.  He has gained 15 lbs since he saw Dr. Mcdonald.  This does not seem fluid related.   He has not yet had a CPAP mask - he has failed to follow up on these appts. He says he does not like a mask.       ROS:  Skin:  Negative     Eyes:  Negative    ENT:  Negative    Respiratory:  Positive for sleep apnea  Cardiovascular:  Negative    Gastroenterology: Negative    Genitourinary:  Negative    Musculoskeletal:  Positive for neck pain  Neurologic:  Negative    Psychiatric:  Positive for sleep disturbances  Heme/Lymph/Imm:  Negative    Endocrine:  Negative      PAST MEDICAL HISTORY:  Past Medical History:   Diagnosis Date     Acute on chronic systolic congestive heart failure (H) 8/9/2017     CAD (coronary artery disease)     Nonobstructive     Cardiomyopathy, unspecified (H) 8/9/2017     Congestive heart failure, unspecified congestive heart failure chronicity,  unspecified congestive heart failure type 6/13/2017     HTN (hypertension), malignant      Hypertensive urgency 6/13/2017     Morbid obesity with BMI of 50.0-59.9, adult (H) 6/21/2017     NSTEMI (non-ST elevated myocardial infarction) (H) 6/13/2017     Renal insufficiency 6/13/2017     Sleep apnea      Tobacco abuse        PAST SURGICAL HISTORY:  History reviewed. No pertinent surgical history.    SOCIAL HISTORY:  Social History     Socioeconomic History     Marital status: Single     Spouse name: None     Number of children: None     Years of education: None     Highest education level: None   Occupational History     None   Social Needs     Financial resource strain: None     Food insecurity:     Worry: None     Inability: None     Transportation needs:     Medical: None     Non-medical: None   Tobacco Use     Smoking status: Current Every Day Smoker     Types: Cigarettes     Smokeless tobacco: Never Used   Substance and Sexual Activity     Alcohol use: Yes     Comment: occassional     Drug use: No     Sexual activity: None   Lifestyle     Physical activity:     Days per week: None     Minutes per session: None     Stress: None   Relationships     Social connections:     Talks on phone: None     Gets together: None     Attends Episcopalian service: None     Active member of club or organization: None     Attends meetings of clubs or organizations: None     Relationship status: None     Intimate partner violence:     Fear of current or ex partner: None     Emotionally abused: None     Physically abused: None     Forced sexual activity: None   Other Topics Concern     Parent/sibling w/ CABG, MI or angioplasty before 65F 55M? Not Asked   Social History Narrative     None       FAMILY HISTORY:  History reviewed. No pertinent family history.    MEDS: atorvastatin (LIPITOR) 10 MG tablet, Take 1 tablet (10 mg) by mouth every evening  carvedilol (COREG) 25 MG tablet, Take 1 tablet (25 mg) by mouth 2 times daily (with  "meals)  furosemide (LASIX) 20 MG tablet, Take 1 tablet (20 mg) by mouth daily  lisinopril (PRINIVIL/ZESTRIL) 20 MG tablet, Take 1 tablet (20 mg) by mouth 2 times daily  spironolactone (ALDACTONE) 25 MG tablet, Take 0.5 tablets (12.5 mg) by mouth daily  aspirin EC 81 MG EC tablet, Take 1 tablet (81 mg) by mouth daily (Patient not taking: Reported on 4/24/2019)  MELATONIN PO, Take 10 mg by mouth nightly as needed   TRAZODONE HCL PO, Take 50 mg by mouth nightly as needed for sleep    No current facility-administered medications on file prior to visit.       ALLERGIES: No Known Allergies    PHYSICAL EXAM:  Vitals: BP (!) 155/98 (BP Location: Right arm, Patient Position: Sitting, Cuff Size: Adult Large)   Pulse 80   Ht 1.803 m (5' 11\")   Wt (!) 189.9 kg (418 lb 11.2 oz)   BMI 58.40 kg/m     Constitutional:  cooperative, alert and oriented, well developed, well nourished, in no acute distress morbidly obese      Skin:  warm and dry to the touch        Head:  normocephalic, no masses or lesions        Eyes:  pupils equal and round;conjunctivae and lids unremarkable;sclera white;no xanthalasma        ENT:  no pallor or cyanosis        Neck:      difficult to assess given neck size, but no apparent JVD    Respiratory:      decreased air movement, but no rales or wheezing     Cardiac: regular rhythm;apical impulse not displaced   distant heart sounds         no S3 or S4 appreciated, no murmur appreciated     GI:  abdomen soft;BS normoactive;non-tender obese      Vascular: not assessed this visit                                      Extremities and Musculoskeletal:  no deformities, clubbing, cyanosis, erythema observed;no edema   chronic venous stasis changes    Neurological:  no gross motor deficits;affect appropriate            LABS/DATA:  I reviewed the following:  Echo 4/22/19:  Interpretation Summary  Left ventricular systolic function is low normal.  The visual ejection fraction is estimated at 50-55%.  Small area of " apical, distal anteroseptal hypokinesis.  EF higher compared to study from 6/17. LV wall thickness still increased,  however, less pronounced than prior study. The study was technically  Difficult.          ASSESSMENT:  1. History of tobacco abuse.   - Quit 3/2019.     2.  Morbid obesity.  - Needs weight loss.   - Not interested in gastric bypass.     3. Severe obstructive sleep apnea.   - CPAP is recommended, but has not picked up     4.  Mild renal insufficiency.   - Noted, he is on Lasix, spironolactone, lisinopril.   - BMP when he sees Dr. Mcdonald next    5.  Trivial nonobstructive coronary artery disease.     - No anginal sxs.    - On ASA, statin.    - FLP when he sees Dr. Mcdonald next    6.  Hypertensive cardiomyopathy.    - Echo 6/13/17: LVEF 30-35%, grade III diastolic dysfunction.    - Cor angio 6/25/17 without significant obstructive CAD.   - Echo 4/22/19: LVEF 50-55%, LVH still but improved.    - No CHF sxs.   - Continue BB, ACEi, spironolactone, Lasix.  Adding amlodipine for better BP control.  BP check in 1 month  - Needs sleep apnea treated as noted above.   - Sodium restriction.   - Daily weights.      7.  Acute systolic and diastolic congestive heart failure.    - Currently NYHA functional class II.     8.  Hypertension, malignant.   - BP today 155/98 without taking his AM meds, but he reports 150s at home even when he takes his meds  - Adding amlodipine for better BP control.  BP check in 1 month    9.  Transient high-grade AV block during sleep, and on Coreg 50mg BID. Decreased to 25mg BID and no recurrence.   - Needs CPAP as noted         Follow up:  RN visit to check BP in 1 month  Dr. Mcdonald in Feb or March 2019      Thank you for allowing me to participate in the care of your patient.    Sincerely,     Prem Painter PA-C     [facility]

## 2019-10-25 NOTE — LETTER
10/25/2019    Eva Kent PA-C  [address]  [place] [address]    RE: Brooks Mensah       Dear Colleague,    I had the pleasure of seeing Brooks Mensah in the [facility].    CARDIOLOGY CLINIC PROGRESS NOTE    DOS: 10/25/2019      Brooks Mensah  : 1985, 34 year old  MRN: [id number]      History:  It was a pleasure following up with Brooks (also goes by Javon) today in the [facility].     Brooks is a very pleasant 34-year-old gentleman with history significant for prior tobacco use, morbid obesity, history of congestive heart failure from hypertensive cardiomyopathy, severe sleep apnea not on CPAP.      In 2017 he was admitted with congestive heart failure and moderate to severe left ventricular systolic dysfunction.  His blood pressure was as high as 240/140.  Echo 2017 showed severe LVH with septal and posterior wall measurements of approximately 2.5 cm with 1.5 cm being the upper limits of normal.  There was diastolic dysfunction of the left ventricle and the LVEF was 30-35%.  Left atrium was not particularly dilated.   He underwent coronary angiography 6/15/2017, which did show trivial coronary artery disease of the right coronary artery with a stenosis of 10-20%, otherwise his coronary arteries were large and free of disease.    He was treated initially with nitrates, Carvedilol, ACE inhibitor, Lasix for diuresis.  With these therapies, his blood pressures came down nicely.     During his admission, it was noted that he likely has severe sleep apnea as he had some pauses on telemetry and outpatient sleep study was recommended.    On telemetry there were some brief episodes of what looked intermittent 3rd degree AV block in the middle of the night during sleep.  He was asymptomatic.  His Carvedilol was decreased from 50 mg twice daily to 25 mg twice daily.  Further monitoring showed sinus rhythm.      He did weill in follow up without sxs.     He was lost to  follow up until he saw Dr. Mcdonald 3/22/19.  He was seen as he ran out of medications.  He did tell Dr. Mcdonald that he only takes his medications intermittently.  Not surprisingly, his blood pressure was 158/100.  Dr. Mcdonald renewed his medications.   An echo was ordered, and a BMP.     The echo was done 4/22/19 and showed improvement in LVEF to 50-55%.  He still had LVH.    BMP 4/22/19 showed improvement in creatinine from his admission to 1.29.     I saw him last 4/24/19.  He had quit smoking.     He presents today for overdue follow up.    He continues to do well without sxs of CHF.  No symptoms of shortness of breath, orthopnea, PND, edema.  He has no chest pain or palpitations.  He also has no lightheadedness, dizziness, near syncope or syncope.  He says he is tolerating his medications without any adverse side effects.     He says he has been taking his medications.   This morning, however, he has not yet taken his medications.  His BP is 155/98.    But at home, when he is taking his medications, his BP is running 150s.   He is still off cigarettes.  He thinks he is eating more.  He has gained 15 lbs since he saw Dr. Mcdonald.  This does not seem fluid related.   He has not yet had a CPAP mask - he has failed to follow up on these appts. He says he does not like a mask.       ROS:  Skin:  Negative     Eyes:  Negative    ENT:  Negative    Respiratory:  Positive for sleep apnea  Cardiovascular:  Negative    Gastroenterology: Negative    Genitourinary:  Negative    Musculoskeletal:  Positive for neck pain  Neurologic:  Negative    Psychiatric:  Positive for sleep disturbances  Heme/Lymph/Imm:  Negative    Endocrine:  Negative      PAST MEDICAL HISTORY:  Past Medical History:   Diagnosis Date     Acute on chronic systolic congestive heart failure (H) 8/9/2017     CAD (coronary artery disease)     Nonobstructive     Cardiomyopathy, unspecified (H) 8/9/2017     Congestive heart failure, unspecified congestive heart failure chronicity,  unspecified congestive heart failure type 6/13/2017     HTN (hypertension), malignant      Hypertensive urgency 6/13/2017     Morbid obesity with BMI of 50.0-59.9, adult (H) 6/21/2017     NSTEMI (non-ST elevated myocardial infarction) (H) 6/13/2017     Renal insufficiency 6/13/2017     Sleep apnea      Tobacco abuse        PAST SURGICAL HISTORY:  History reviewed. No pertinent surgical history.    SOCIAL HISTORY:  Social History     Socioeconomic History     Marital status: Single     Spouse name: None     Number of children: None     Years of education: None     Highest education level: None   Occupational History     None   Social Needs     Financial resource strain: None     Food insecurity:     Worry: None     Inability: None     Transportation needs:     Medical: None     Non-medical: None   Tobacco Use     Smoking status: Current Every Day Smoker     Types: Cigarettes     Smokeless tobacco: Never Used   Substance and Sexual Activity     Alcohol use: Yes     Comment: occassional     Drug use: No     Sexual activity: None   Lifestyle     Physical activity:     Days per week: None     Minutes per session: None     Stress: None   Relationships     Social connections:     Talks on phone: None     Gets together: None     Attends Buddhism service: None     Active member of club or organization: None     Attends meetings of clubs or organizations: None     Relationship status: None     Intimate partner violence:     Fear of current or ex partner: None     Emotionally abused: None     Physically abused: None     Forced sexual activity: None   Other Topics Concern     Parent/sibling w/ CABG, MI or angioplasty before 65F 55M? Not Asked   Social History Narrative     None       FAMILY HISTORY:  History reviewed. No pertinent family history.    MEDS: atorvastatin (LIPITOR) 10 MG tablet, Take 1 tablet (10 mg) by mouth every evening  carvedilol (COREG) 25 MG tablet, Take 1 tablet (25 mg) by mouth 2 times daily (with  "meals)  furosemide (LASIX) 20 MG tablet, Take 1 tablet (20 mg) by mouth daily  lisinopril (PRINIVIL/ZESTRIL) 20 MG tablet, Take 1 tablet (20 mg) by mouth 2 times daily  spironolactone (ALDACTONE) 25 MG tablet, Take 0.5 tablets (12.5 mg) by mouth daily  aspirin EC 81 MG EC tablet, Take 1 tablet (81 mg) by mouth daily (Patient not taking: Reported on 4/24/2019)  MELATONIN PO, Take 10 mg by mouth nightly as needed   TRAZODONE HCL PO, Take 50 mg by mouth nightly as needed for sleep    No current facility-administered medications on file prior to visit.       ALLERGIES: No Known Allergies    PHYSICAL EXAM:  Vitals: BP (!) 155/98 (BP Location: Right arm, Patient Position: Sitting, Cuff Size: Adult Large)   Pulse 80   Ht 1.803 m (5' 11\")   Wt (!) 189.9 kg (418 lb 11.2 oz)   BMI 58.40 kg/m     Constitutional:  cooperative, alert and oriented, well developed, well nourished, in no acute distress morbidly obese      Skin:  warm and dry to the touch        Head:  normocephalic, no masses or lesions        Eyes:  pupils equal and round;conjunctivae and lids unremarkable;sclera white;no xanthalasma        ENT:  no pallor or cyanosis        Neck:      difficult to assess given neck size, but no apparent JVD    Respiratory:      decreased air movement, but no rales or wheezing     Cardiac: regular rhythm;apical impulse not displaced   distant heart sounds         no S3 or S4 appreciated, no murmur appreciated     GI:  abdomen soft;BS normoactive;non-tender obese      Vascular: not assessed this visit                                      Extremities and Musculoskeletal:  no deformities, clubbing, cyanosis, erythema observed;no edema   chronic venous stasis changes    Neurological:  no gross motor deficits;affect appropriate            LABS/DATA:  I reviewed the following:  Echo 4/22/19:  Interpretation Summary  Left ventricular systolic function is low normal.  The visual ejection fraction is estimated at 50-55%.  Small area of " apical, distal anteroseptal hypokinesis.  EF higher compared to study from 6/17. LV wall thickness still increased,  however, less pronounced than prior study. The study was technically  Difficult.          ASSESSMENT:  1. History of tobacco abuse.   - Quit 3/2019.     2.  Morbid obesity.  - Needs weight loss.   - Not interested in gastric bypass.     3. Severe obstructive sleep apnea.   - CPAP is recommended, but has not picked up     4.  Mild renal insufficiency.   - Noted, he is on Lasix, spironolactone, lisinopril.   - BMP when he sees Dr. Mcdonald next    5.  Trivial nonobstructive coronary artery disease.     - No anginal sxs.    - On ASA, statin.    - FLP when he sees Dr. Mcdonald next    6.  Hypertensive cardiomyopathy.    - Echo 6/13/17: LVEF 30-35%, grade III diastolic dysfunction.    - Cor angio 6/25/17 without significant obstructive CAD.   - Echo 4/22/19: LVEF 50-55%, LVH still but improved.    - No CHF sxs.   - Continue BB, ACEi, spironolactone, Lasix.  Adding amlodipine for better BP control.  BP check in 1 month  - Needs sleep apnea treated as noted above.   - Sodium restriction.   - Daily weights.      7.  Acute systolic and diastolic congestive heart failure.    - Currently NYHA functional class II.     8.  Hypertension, malignant.   - BP today 155/98 without taking his AM meds, but he reports 150s at home even when he takes his meds  - Adding amlodipine for better BP control.  BP check in 1 month    9.  Transient high-grade AV block during sleep, and on Coreg 50mg BID. Decreased to 25mg BID and no recurrence.   - Needs CPAP as noted         Follow up:  RN visit to check BP in 1 month  Dr. Mcdonald in Feb or March 2019    Prem Painter PA-C    Thank you for allowing me to participate in the care of your patient.      Sincerely,     Prem Painter PA-C     [facility] [place]    cc:   Cliff Mcdonald MD  [address]  [place] [address]

## 2019-10-25 NOTE — PATIENT INSTRUCTIONS
I recommend that you go back to the sleep medicine doctors and work with them to find a CPAP mask that will work for you.  It is very important to treat your sleep apnea.     Your blood pressure is elevated at home, even when you have taken your medications.    Start amlodipine 5 mg.    See my nurse in 1 month to check blood pressure.  Make sure you have taken your meds at least 2 hours prior.

## 2020-08-19 DIAGNOSIS — I50.22 CHRONIC SYSTOLIC CONGESTIVE HEART FAILURE (H): ICD-10-CM

## 2020-08-19 DIAGNOSIS — I10 ESSENTIAL HYPERTENSION: ICD-10-CM

## 2020-08-19 DIAGNOSIS — I21.4 NSTEMI (NON-ST ELEVATED MYOCARDIAL INFARCTION) (H): ICD-10-CM

## 2020-08-19 DIAGNOSIS — I16.1 HYPERTENSIVE EMERGENCY: ICD-10-CM

## 2020-08-19 DIAGNOSIS — I42.8 OTHER CARDIOMYOPATHY (H): ICD-10-CM

## 2020-08-19 RX ORDER — LISINOPRIL 20 MG/1
20 TABLET ORAL 2 TIMES DAILY
Qty: 180 TABLET | Refills: 0 | Status: SHIPPED | OUTPATIENT
Start: 2020-08-19 | End: 2021-03-12

## 2020-08-19 RX ORDER — FUROSEMIDE 20 MG
20 TABLET ORAL DAILY
Qty: 90 TABLET | Refills: 0 | Status: SHIPPED | OUTPATIENT
Start: 2020-08-19 | End: 2021-03-15

## 2020-08-19 RX ORDER — CARVEDILOL 25 MG/1
25 TABLET ORAL 2 TIMES DAILY WITH MEALS
Qty: 180 TABLET | Refills: 0 | Status: SHIPPED | OUTPATIENT
Start: 2020-08-19 | End: 2021-03-12

## 2020-08-19 RX ORDER — AMLODIPINE BESYLATE 5 MG/1
5 TABLET ORAL DAILY
Qty: 90 TABLET | Refills: 0 | Status: SHIPPED | OUTPATIENT
Start: 2020-08-19 | End: 2021-03-12

## 2020-08-19 RX ORDER — ATORVASTATIN CALCIUM 10 MG/1
10 TABLET, FILM COATED ORAL EVERY EVENING
Qty: 90 TABLET | Refills: 0 | Status: SHIPPED | OUTPATIENT
Start: 2020-08-19 | End: 2021-03-12

## 2020-08-19 RX ORDER — SPIRONOLACTONE 25 MG/1
12.5 TABLET ORAL DAILY
Qty: 45 TABLET | Refills: 0 | Status: SHIPPED | OUTPATIENT
Start: 2020-08-19 | End: 2021-03-12

## 2021-03-05 DIAGNOSIS — I50.9 CONGESTIVE HEART FAILURE (H): Primary | ICD-10-CM

## 2021-03-05 DIAGNOSIS — I50.9 CONGESTIVE HEART FAILURE (H): ICD-10-CM

## 2021-03-05 LAB
ALT SERPL W P-5'-P-CCNC: 21 U/L (ref 0–70)
ANION GAP SERPL CALCULATED.3IONS-SCNC: 6 MMOL/L (ref 3–14)
BUN SERPL-MCNC: 25 MG/DL (ref 7–30)
CALCIUM SERPL-MCNC: 8.7 MG/DL (ref 8.5–10.1)
CHLORIDE SERPL-SCNC: 108 MMOL/L (ref 94–109)
CHOLEST SERPL-MCNC: 169 MG/DL
CO2 SERPL-SCNC: 25 MMOL/L (ref 20–32)
CREAT SERPL-MCNC: 1.91 MG/DL (ref 0.66–1.25)
GFR SERPL CREATININE-BSD FRML MDRD: 44 ML/MIN/{1.73_M2}
GLUCOSE SERPL-MCNC: 97 MG/DL (ref 70–99)
HDLC SERPL-MCNC: 44 MG/DL
LDLC SERPL CALC-MCNC: 104 MG/DL
NONHDLC SERPL-MCNC: 125 MG/DL
POTASSIUM SERPL-SCNC: 3.9 MMOL/L (ref 3.4–5.3)
SODIUM SERPL-SCNC: 139 MMOL/L (ref 133–144)
TRIGL SERPL-MCNC: 104 MG/DL

## 2021-03-05 PROCEDURE — 36415 COLL VENOUS BLD VENIPUNCTURE: CPT | Performed by: INTERNAL MEDICINE

## 2021-03-05 PROCEDURE — 80061 LIPID PANEL: CPT | Performed by: INTERNAL MEDICINE

## 2021-03-05 PROCEDURE — 80048 BASIC METABOLIC PNL TOTAL CA: CPT | Performed by: INTERNAL MEDICINE

## 2021-03-05 PROCEDURE — 84460 ALANINE AMINO (ALT) (SGPT): CPT | Performed by: INTERNAL MEDICINE

## 2021-03-12 ENCOUNTER — OFFICE VISIT (OUTPATIENT)
Dept: CARDIOLOGY | Facility: CLINIC | Age: 36
End: 2021-03-12
Payer: COMMERCIAL

## 2021-03-12 VITALS
HEIGHT: 71 IN | WEIGHT: 315 LBS | BODY MASS INDEX: 44.1 KG/M2 | DIASTOLIC BLOOD PRESSURE: 110 MMHG | OXYGEN SATURATION: 96 % | HEART RATE: 97 BPM | SYSTOLIC BLOOD PRESSURE: 190 MMHG

## 2021-03-12 DIAGNOSIS — I42.8 OTHER CARDIOMYOPATHY (H): ICD-10-CM

## 2021-03-12 DIAGNOSIS — I43 CARDIOMYOPATHY DUE TO HYPERTENSION, WITHOUT HEART FAILURE (H): ICD-10-CM

## 2021-03-12 DIAGNOSIS — I16.1 HYPERTENSIVE EMERGENCY: ICD-10-CM

## 2021-03-12 DIAGNOSIS — I50.22 CHRONIC SYSTOLIC CONGESTIVE HEART FAILURE (H): Primary | ICD-10-CM

## 2021-03-12 DIAGNOSIS — I10 ESSENTIAL HYPERTENSION: ICD-10-CM

## 2021-03-12 DIAGNOSIS — I11.9 CARDIOMYOPATHY DUE TO HYPERTENSION, WITHOUT HEART FAILURE (H): ICD-10-CM

## 2021-03-12 DIAGNOSIS — I21.4 NSTEMI (NON-ST ELEVATED MYOCARDIAL INFARCTION) (H): ICD-10-CM

## 2021-03-12 PROCEDURE — 99214 OFFICE O/P EST MOD 30 MIN: CPT | Mod: 95 | Performed by: INTERNAL MEDICINE

## 2021-03-12 RX ORDER — ATORVASTATIN CALCIUM 10 MG/1
10 TABLET, FILM COATED ORAL EVERY EVENING
Qty: 90 TABLET | Refills: 3 | Status: SHIPPED | OUTPATIENT
Start: 2021-03-12

## 2021-03-12 RX ORDER — SPIRONOLACTONE 25 MG/1
12.5 TABLET ORAL DAILY
Qty: 45 TABLET | Refills: 3 | Status: SHIPPED | OUTPATIENT
Start: 2021-03-12 | End: 2021-05-12

## 2021-03-12 RX ORDER — AMLODIPINE BESYLATE 5 MG/1
5 TABLET ORAL DAILY
Qty: 90 TABLET | Refills: 3 | Status: SHIPPED | OUTPATIENT
Start: 2021-03-12 | End: 2021-04-28

## 2021-03-12 RX ORDER — CARVEDILOL 25 MG/1
25 TABLET ORAL 2 TIMES DAILY WITH MEALS
Qty: 180 TABLET | Refills: 3 | Status: SHIPPED | OUTPATIENT
Start: 2021-03-12 | End: 2022-12-07

## 2021-03-12 RX ORDER — LISINOPRIL 20 MG/1
20 TABLET ORAL 2 TIMES DAILY
Qty: 180 TABLET | Refills: 3 | Status: SHIPPED | OUTPATIENT
Start: 2021-03-12 | End: 2021-05-12

## 2021-03-12 ASSESSMENT — MIFFLIN-ST. JEOR: SCORE: 2724.8

## 2021-03-12 NOTE — LETTER
3/12/2021    Eva Kent PA-C  [address] [facility] [address]    RE: Brooks Mensah       Dear Colleague,    I had the pleasure of seeing Brooks Mensah in the [facility].    HPI and Plan:   Javon returns for follow up of CHF.  His PMH is significant for prior tobacco use, morbid obesity, history of congestive heart failure from hypertensive cardiomyopathy, severe sleep apnea not on CPAP.       In 6/2017 he was admitted with congestive heart failure and moderate to severe left ventricular systolic dysfunction.  His blood pressure was as high as 240/140.  Echo 6/14/2017 showed severe LVH with septal and posterior wall measurements of approximately 2.5 cm with 1.5 cm being the upper limits of normal.  There was diastolic dysfunction of the left ventricle and the LVEF was 30-35%.  Left atrium was not particularly dilated.   He underwent coronary angiography 6/15/2017, which did show trivial coronary artery disease of the right coronary artery with a stenosis of 10-20%, otherwise his coronary arteries were large and free of disease.    He was treated initially with nitrates, Carvedilol, ACE inhibitor, Lasix for diuresis.  With these therapies, his blood pressures came down nicely.       During his admission, it was noted that he likely has severe sleep apnea as he had some pauses on telemetry and outpatient sleep study was recommended.    On telemetry there were some brief episodes of what looked intermittent 3rd degree AV block in the middle of the night during sleep.  He was asymptomatic.  His Carvedilol was decreased from 50 mg twice daily to 25 mg twice daily.  Further monitoring showed sinus rhythm.          [facility] him twice in 2019, in April and October.  His blood pressure still not ideal with deafly better with systolics in the 150s.  He had also given up smoking at the time.    He is back today for follow-up.  He had run out of meds for  the past 2 weeks and his blood pressure was 220 systolic when he first arrived.  When I rechecked it it was still 190/110.  He had labs done recently and his creatinine which was previously around 1.3 is now 1.9.  I am pleasantly surprised to hear that he feels well and has no symptoms.  Specifically he denies shortness of breath chest pains PND orthopnea and ankle swelling.  He works 2 jobs.  He works in an  [employer] and he also works as a  at a [employer].  He does not watch his diet.  He has also resumed smoking.  He is not using CPAP for sleep apnea.  He does not measure his blood pressures at home.  His weight is still around 380 pounds and his body mass index is 54.    By cardiac examination I am pleasantly surprised to see that his JVP is not elevated and I do not really think he has any significant ankle edema.  His chest sounded clear.  Heart sounds are unremarkable.    Impression  1.  Uncontrolled hypertension, secondary to medication noncompliance.  I renewed all his medications today.  I stressed the utmost importance of taking all his medications which he tells me he does when he has them.  I also recommended home blood pressure monitoring particularly as he used to monitor it in the pharmacy at this facility is no longer available.  I told him he would need a big arm cuff.  I mentioned the importance of a low-salt diet as well.  2.  Hypertensive cardiomyopathy.  No signs of CHF at this time.  Once his blood pressure is better controlled we should check his echocardiogram.  3.  Chronic renal failure, stage III.  Hopefully this will improve again with better control of his blood pressure.    4.   Morbid obesity.  I mentioned portion control.  I think gastric bypass is the only solution.  He might think about it.  5.  Obstructive sleep apnea.  Urged use of CPAP.  6.  Tobacco use.  Told him in no uncertain terms that he needs to give up.    I am glad Javon is here for clinic  visit.  We had a fausto discussion and I told him that if he continues the way he is doing right now he probably will not live past the age of 50.  He will be infected with worsening heart failure renal failure and perhaps CVA.  I think he understands.    I think Javon somebody who probably needs constant reminding of the need to look after himself better.  I have asked him to see Xiomara in a month's time for follow-up.  Orders Placed This Encounter   Procedures     Basic metabolic panel     Follow-Up with Cardiac Advanced Practice Provider       Orders Placed This Encounter   Medications     amLODIPine (NORVASC) 5 MG tablet     Sig: Take 1 tablet (5 mg) by mouth daily     Dispense:  90 tablet     Refill:  3     atorvastatin (LIPITOR) 10 MG tablet     Sig: Take 1 tablet (10 mg) by mouth every evening     Dispense:  90 tablet     Refill:  3     carvedilol (COREG) 25 MG tablet     Sig: Take 1 tablet (25 mg) by mouth 2 times daily (with meals)     Dispense:  180 tablet     Refill:  3     lisinopril (ZESTRIL) 20 MG tablet     Sig: Take 1 tablet (20 mg) by mouth 2 times daily     Dispense:  180 tablet     Refill:  3     spironolactone (ALDACTONE) 25 MG tablet     Sig: Take 0.5 tablets (12.5 mg) by mouth daily     Dispense:  45 tablet     Refill:  3       Encounter Diagnoses   Name Primary?     Chronic systolic congestive heart failure (H) Yes     Essential hypertension      Cardiomyopathy due to hypertension, without heart failure (H)      Other cardiomyopathy (H)      NSTEMI (non-ST elevated myocardial infarction) (H)      Hypertensive emergency        CURRENT MEDICATIONS:  Current Outpatient Medications   Medication Sig Dispense Refill     amLODIPine (NORVASC) 5 MG tablet Take 1 tablet (5 mg) by mouth daily 90 tablet 3     aspirin EC 81 MG EC tablet Take 1 tablet (81 mg) by mouth daily 30 tablet 3     atorvastatin (LIPITOR) 10 MG tablet Take 1 tablet (10 mg) by mouth every evening 90 tablet 3     carvedilol (COREG) 25 MG  tablet Take 1 tablet (25 mg) by mouth 2 times daily (with meals) 180 tablet 3     furosemide (LASIX) 20 MG tablet Take 1 tablet (20 mg) by mouth daily 90 tablet 0     lisinopril (ZESTRIL) 20 MG tablet Take 1 tablet (20 mg) by mouth 2 times daily 180 tablet 3     MELATONIN PO Take 10 mg by mouth nightly as needed        spironolactone (ALDACTONE) 25 MG tablet Take 0.5 tablets (12.5 mg) by mouth daily 45 tablet 3     TRAZODONE HCL PO Take 50 mg by mouth nightly as needed for sleep         ALLERGIES   No Known Allergies    PAST MEDICAL HISTORY:  Past Medical History:   Diagnosis Date     Acute on chronic systolic congestive heart failure (H) 8/9/2017     CAD (coronary artery disease)     Nonobstructive     Cardiomyopathy, unspecified (H) 8/9/2017     Congestive heart failure, unspecified congestive heart failure chronicity, unspecified congestive heart failure type 6/13/2017     HTN (hypertension), malignant      Hypertensive urgency 6/13/2017     Morbid obesity with BMI of 50.0-59.9, adult (H) 6/21/2017     NSTEMI (non-ST elevated myocardial infarction) (H) 6/13/2017     Renal insufficiency 6/13/2017     Sleep apnea      Tobacco abuse        PAST SURGICAL HISTORY:  History reviewed. No pertinent surgical history.    FAMILY HISTORY:  History reviewed. No pertinent family history.    SOCIAL HISTORY:  Social History     Socioeconomic History     Marital status: Single     Spouse name: None     Number of children: None     Years of education: None     Highest education level: None   Occupational History     None   Social Needs     Financial resource strain: None     Food insecurity     Worry: None     Inability: None     Transportation needs     Medical: None     Non-medical: None   Tobacco Use     Smoking status: Current Every Day Smoker     Packs/day: 0.25     Types: Cigarettes     Start date: 2002     Smokeless tobacco: Never Used     Tobacco comment: Quit four times as of 3/12/21   Substance and Sexual Activity      "Alcohol use: Yes     Comment: occassional     Drug use: No     Sexual activity: None   Lifestyle     Physical activity     Days per week: None     Minutes per session: None     Stress: None   Relationships     Social connections     Talks on phone: None     Gets together: None     Attends Spiritism service: None     Active member of club or organization: None     Attends meetings of clubs or organizations: None     Relationship status: None     Intimate partner violence     Fear of current or ex partner: None     Emotionally abused: None     Physically abused: None     Forced sexual activity: None   Other Topics Concern     Parent/sibling w/ CABG, MI or angioplasty before 65F 55M? Not Asked   Social History Narrative     None       Review of Systems:  Skin:  Negative     Eyes:  Negative    ENT:  Negative    Respiratory:  Negative    Cardiovascular:  Negative    Gastroenterology: Negative    Genitourinary:  Negative    Musculoskeletal:  Negative    Neurologic:  Negative    Psychiatric:  Negative    Heme/Lymph/Imm:  Negative    Endocrine:  Negative      Physical Exam:  Vitals: BP (!) 220/140   Pulse 97   Ht 1.803 m (5' 11\")   Wt (!) 176.8 kg (389 lb 11.2 oz)   SpO2 96%   BMI 54.35 kg/m      Constitutional:  cooperative, alert and oriented, well developed, well nourished, in no acute distress morbidly obese      Skin:  warm and dry to the touch          Head:  normocephalic, no masses or lesions        Eyes:  pupils equal and round;conjunctivae and lids unremarkable;sclera white;no xanthalasma        Lymph:No Cervical lymphadenopathy present     ENT:  no pallor or cyanosis        Neck:  carotid pulses are full and equal bilaterally, JVP normal, no carotid bruit   difficult to assess given neck size, but no apparent JVD    Respiratory:  normal breath sounds, clear to auscultation, normal A-P diameter, normal symmetry, normal respiratory excursion, no use of accessory muscles    decreased air movement, but no rales " or wheezing     Cardiac: regular rhythm;apical impulse not displaced   distant heart sounds         no S3 or S4 appreciated, no murmur appreciated   not assessed this visit                                        GI:  abdomen soft;BS normoactive;non-tender obese      Extremities and Muscular Skeletal:  no deformities, clubbing, cyanosis, erythema observed;no edema         chronic venous stasis changes    Neurological:  no gross motor deficits;affect appropriate        Psych:  Alert and Oriented x 3        Recent Lab Results:  LIPID RESULTS:  Lab Results   Component Value Date    CHOL 169 03/05/2021    HDL 44 03/05/2021     (H) 03/05/2021    TRIG 104 03/05/2021       LIVER ENZYME RESULTS:  Lab Results   Component Value Date    AST 30 06/14/2017    ALT 21 03/05/2021       CBC RESULTS:  Lab Results   Component Value Date    WBC 13.3 (H) 06/16/2017    RBC 4.04 (L) 06/16/2017    HGB 11.7 (L) 06/16/2017    HCT 37.2 (L) 06/16/2017    MCV 92 06/16/2017    MCH 29.0 06/16/2017    MCHC 31.5 06/16/2017    RDW 16.6 (H) 06/16/2017     06/16/2017       BMP RESULTS:  Lab Results   Component Value Date     03/05/2021    POTASSIUM 3.9 03/05/2021    CHLORIDE 108 03/05/2021    CO2 25 03/05/2021    ANIONGAP 6 03/05/2021    GLC 97 03/05/2021    BUN 25 03/05/2021    CR 1.91 (H) 03/05/2021    GFRESTIMATED 44 (L) 03/05/2021    GFRESTBLACK 51 (L) 03/05/2021    GAYLE 8.7 03/05/2021        A1C RESULTS:  No results found for: A1C    INR RESULTS:  No results found for: INR        CC  No referring provider defined for this encounter.    Thank you for allowing me to participate in the care of your patient.      Sincerely,     DR AMELIA HOLLINGSWORTH MD     [facility]    cc:   No referring provider defined for this encounter.

## 2021-03-12 NOTE — PROGRESS NOTES
HPI and Plan:   Javon returns for follow up of CHF.  His PMH is significant for prior tobacco use, morbid obesity, history of congestive heart failure from hypertensive cardiomyopathy, severe sleep apnea not on CPAP.       In 6/2017 he was admitted with congestive heart failure and moderate to severe left ventricular systolic dysfunction.  His blood pressure was as high as 240/140.  Echo 6/14/2017 showed severe LVH with septal and posterior wall measurements of approximately 2.5 cm with 1.5 cm being the upper limits of normal.  There was diastolic dysfunction of the left ventricle and the LVEF was 30-35%.  Left atrium was not particularly dilated.   He underwent coronary angiography 6/15/2017, which did show trivial coronary artery disease of the right coronary artery with a stenosis of 10-20%, otherwise his coronary arteries were large and free of disease.    He was treated initially with nitrates, Carvedilol, ACE inhibitor, Lasix for diuresis.  With these therapies, his blood pressures came down nicely.       During his admission, it was noted that he likely has severe sleep apnea as he had some pauses on telemetry and outpatient sleep study was recommended.    On telemetry there were some brief episodes of what looked intermittent 3rd degree AV block in the middle of the night during sleep.  He was asymptomatic.  His Carvedilol was decreased from 50 mg twice daily to 25 mg twice daily.  Further monitoring showed sinus rhythm.          formerly [facility] saw him twice in 2019, in April and October.  His blood pressure still not ideal with deafly better with systolics in the 150s.  He had also given up smoking at the time.    He is back today for follow-up.  He had run out of meds for the past 2 weeks and his blood pressure was 220 systolic when he first arrived.  When I rechecked it it was still 190/110.  He had labs done recently and his creatinine which was previously around 1.3 is now 1.9.  I am pleasantly surprised to hear  that he feels well and has no symptoms.  Specifically he denies shortness of breath chest pains PND orthopnea and ankle swelling.  He works 2 jobs.  He works in an  [employer] and he also works as a  at a [employer].  He does not watch his diet.  He has also resumed smoking.  He is not using CPAP for sleep apnea.  He does not measure his blood pressures at home.  His weight is still around 380 pounds and his body mass index is 54.    By cardiac examination I am pleasantly surprised to see that his JVP is not elevated and I do not really think he has any significant ankle edema.  His chest sounded clear.  Heart sounds are unremarkable.    Impression  1.  Uncontrolled hypertension, secondary to medication noncompliance.  I renewed all his medications today.  I stressed the utmost importance of taking all his medications which he tells me he does when he has them.  I also recommended home blood pressure monitoring particularly as he used to monitor it in the pharmacy at this facility is no longer available.  I told him he would need a big arm cuff.  I mentioned the importance of a low-salt diet as well.  2.  Hypertensive cardiomyopathy.  No signs of CHF at this time.  Once his blood pressure is better controlled we should check his echocardiogram.  3.  Chronic renal failure, stage III.  Hopefully this will improve again with better control of his blood pressure.    4.   Morbid obesity.  I mentioned portion control.  I think gastric bypass is the only solution.  He might think about it.  5.  Obstructive sleep apnea.  Urged use of CPAP.  6.  Tobacco use.  Told him in no uncertain terms that he needs to give up.    I am glad Javon is here for clinic visit.  We had a fausto discussion and I told him that if he continues the way he is doing right now he probably will not live past the age of 50.  He will be infected with worsening heart failure renal failure and perhaps CVA.  I think he  understands.    I think Javon somebody who probably needs constant reminding of the need to look after himself better.  I have asked him to see Xiomara in a month's time for follow-up.  Orders Placed This Encounter   Procedures     Basic metabolic panel     Follow-Up with Cardiac Advanced Practice Provider       Orders Placed This Encounter   Medications     amLODIPine (NORVASC) 5 MG tablet     Sig: Take 1 tablet (5 mg) by mouth daily     Dispense:  90 tablet     Refill:  3     atorvastatin (LIPITOR) 10 MG tablet     Sig: Take 1 tablet (10 mg) by mouth every evening     Dispense:  90 tablet     Refill:  3     carvedilol (COREG) 25 MG tablet     Sig: Take 1 tablet (25 mg) by mouth 2 times daily (with meals)     Dispense:  180 tablet     Refill:  3     lisinopril (ZESTRIL) 20 MG tablet     Sig: Take 1 tablet (20 mg) by mouth 2 times daily     Dispense:  180 tablet     Refill:  3     spironolactone (ALDACTONE) 25 MG tablet     Sig: Take 0.5 tablets (12.5 mg) by mouth daily     Dispense:  45 tablet     Refill:  3       Encounter Diagnoses   Name Primary?     Chronic systolic congestive heart failure (H) Yes     Essential hypertension      Cardiomyopathy due to hypertension, without heart failure (H)      Other cardiomyopathy (H)      NSTEMI (non-ST elevated myocardial infarction) (H)      Hypertensive emergency        CURRENT MEDICATIONS:  Current Outpatient Medications   Medication Sig Dispense Refill     amLODIPine (NORVASC) 5 MG tablet Take 1 tablet (5 mg) by mouth daily 90 tablet 3     aspirin EC 81 MG EC tablet Take 1 tablet (81 mg) by mouth daily 30 tablet 3     atorvastatin (LIPITOR) 10 MG tablet Take 1 tablet (10 mg) by mouth every evening 90 tablet 3     carvedilol (COREG) 25 MG tablet Take 1 tablet (25 mg) by mouth 2 times daily (with meals) 180 tablet 3     furosemide (LASIX) 20 MG tablet Take 1 tablet (20 mg) by mouth daily 90 tablet 0     lisinopril (ZESTRIL) 20 MG tablet Take 1 tablet (20 mg) by mouth 2 times  daily 180 tablet 3     MELATONIN PO Take 10 mg by mouth nightly as needed        spironolactone (ALDACTONE) 25 MG tablet Take 0.5 tablets (12.5 mg) by mouth daily 45 tablet 3     TRAZODONE HCL PO Take 50 mg by mouth nightly as needed for sleep         ALLERGIES   No Known Allergies    PAST MEDICAL HISTORY:  Past Medical History:   Diagnosis Date     Acute on chronic systolic congestive heart failure (H) 8/9/2017     CAD (coronary artery disease)     Nonobstructive     Cardiomyopathy, unspecified (H) 8/9/2017     Congestive heart failure, unspecified congestive heart failure chronicity, unspecified congestive heart failure type 6/13/2017     HTN (hypertension), malignant      Hypertensive urgency 6/13/2017     Morbid obesity with BMI of 50.0-59.9, adult (H) 6/21/2017     NSTEMI (non-ST elevated myocardial infarction) (H) 6/13/2017     Renal insufficiency 6/13/2017     Sleep apnea      Tobacco abuse        PAST SURGICAL HISTORY:  History reviewed. No pertinent surgical history.    FAMILY HISTORY:  History reviewed. No pertinent family history.    SOCIAL HISTORY:  Social History     Socioeconomic History     Marital status: Single     Spouse name: None     Number of children: None     Years of education: None     Highest education level: None   Occupational History     None   Social Needs     Financial resource strain: None     Food insecurity     Worry: None     Inability: None     Transportation needs     Medical: None     Non-medical: None   Tobacco Use     Smoking status: Current Every Day Smoker     Packs/day: 0.25     Types: Cigarettes     Start date: 2002     Smokeless tobacco: Never Used     Tobacco comment: Quit four times as of 3/12/21   Substance and Sexual Activity     Alcohol use: Yes     Comment: occassional     Drug use: No     Sexual activity: None   Lifestyle     Physical activity     Days per week: None     Minutes per session: None     Stress: None   Relationships     Social connections     Talks on  "phone: None     Gets together: None     Attends Restoration service: None     Active member of club or organization: None     Attends meetings of clubs or organizations: None     Relationship status: None     Intimate partner violence     Fear of current or ex partner: None     Emotionally abused: None     Physically abused: None     Forced sexual activity: None   Other Topics Concern     Parent/sibling w/ CABG, MI or angioplasty before 65F 55M? Not Asked   Social History Narrative     None       Review of Systems:  Skin:  Negative     Eyes:  Negative    ENT:  Negative    Respiratory:  Negative    Cardiovascular:  Negative    Gastroenterology: Negative    Genitourinary:  Negative    Musculoskeletal:  Negative    Neurologic:  Negative    Psychiatric:  Negative    Heme/Lymph/Imm:  Negative    Endocrine:  Negative      Physical Exam:  Vitals: BP (!) 220/140   Pulse 97   Ht 1.803 m (5' 11\")   Wt (!) 176.8 kg (389 lb 11.2 oz)   SpO2 96%   BMI 54.35 kg/m      Constitutional:  cooperative, alert and oriented, well developed, well nourished, in no acute distress morbidly obese      Skin:  warm and dry to the touch          Head:  normocephalic, no masses or lesions        Eyes:  pupils equal and round;conjunctivae and lids unremarkable;sclera white;no xanthalasma        Lymph:No Cervical lymphadenopathy present     ENT:  no pallor or cyanosis        Neck:  carotid pulses are full and equal bilaterally, JVP normal, no carotid bruit   difficult to assess given neck size, but no apparent JVD    Respiratory:  normal breath sounds, clear to auscultation, normal A-P diameter, normal symmetry, normal respiratory excursion, no use of accessory muscles    decreased air movement, but no rales or wheezing     Cardiac: regular rhythm;apical impulse not displaced   distant heart sounds         no S3 or S4 appreciated, no murmur appreciated   not assessed this visit                                        GI:  abdomen soft;BS " normoactive;non-tender obese      Extremities and Muscular Skeletal:  no deformities, clubbing, cyanosis, erythema observed;no edema         chronic venous stasis changes    Neurological:  no gross motor deficits;affect appropriate        Psych:  Alert and Oriented x 3        Recent Lab Results:  LIPID RESULTS:  Lab Results   Component Value Date    CHOL 169 03/05/2021    HDL 44 03/05/2021     (H) 03/05/2021    TRIG 104 03/05/2021       LIVER ENZYME RESULTS:  Lab Results   Component Value Date    AST 30 06/14/2017    ALT 21 03/05/2021       CBC RESULTS:  Lab Results   Component Value Date    WBC 13.3 (H) 06/16/2017    RBC 4.04 (L) 06/16/2017    HGB 11.7 (L) 06/16/2017    HCT 37.2 (L) 06/16/2017    MCV 92 06/16/2017    MCH 29.0 06/16/2017    MCHC 31.5 06/16/2017    RDW 16.6 (H) 06/16/2017     06/16/2017       BMP RESULTS:  Lab Results   Component Value Date     03/05/2021    POTASSIUM 3.9 03/05/2021    CHLORIDE 108 03/05/2021    CO2 25 03/05/2021    ANIONGAP 6 03/05/2021    GLC 97 03/05/2021    BUN 25 03/05/2021    CR 1.91 (H) 03/05/2021    GFRESTIMATED 44 (L) 03/05/2021    GFRESTBLACK 51 (L) 03/05/2021    GAYLE 8.7 03/05/2021        A1C RESULTS:  No results found for: A1C    INR RESULTS:  No results found for: INR        CC  No referring provider defined for this encounter.

## 2021-03-15 DIAGNOSIS — I50.22 CHRONIC SYSTOLIC CONGESTIVE HEART FAILURE (H): ICD-10-CM

## 2021-03-15 RX ORDER — FUROSEMIDE 20 MG
20 TABLET ORAL DAILY
Qty: 90 TABLET | Refills: 0 | Status: SHIPPED | OUTPATIENT
Start: 2021-03-15 | End: 2021-06-15

## 2021-04-07 DIAGNOSIS — I50.23 ACUTE ON CHRONIC SYSTOLIC CONGESTIVE HEART FAILURE (H): Primary | ICD-10-CM

## 2021-04-22 DIAGNOSIS — R06.02 SOB (SHORTNESS OF BREATH): Primary | ICD-10-CM

## 2021-04-28 ENCOUNTER — OFFICE VISIT (OUTPATIENT)
Dept: CARDIOLOGY | Facility: CLINIC | Age: 36
End: 2021-04-28
Payer: COMMERCIAL

## 2021-04-28 VITALS
HEART RATE: 82 BPM | WEIGHT: 315 LBS | DIASTOLIC BLOOD PRESSURE: 94 MMHG | SYSTOLIC BLOOD PRESSURE: 142 MMHG | OXYGEN SATURATION: 94 % | BODY MASS INDEX: 44.1 KG/M2 | HEIGHT: 71 IN

## 2021-04-28 DIAGNOSIS — I42.8 OTHER CARDIOMYOPATHY (H): ICD-10-CM

## 2021-04-28 DIAGNOSIS — R06.02 SOB (SHORTNESS OF BREATH): ICD-10-CM

## 2021-04-28 DIAGNOSIS — I16.1 HYPERTENSIVE EMERGENCY: ICD-10-CM

## 2021-04-28 DIAGNOSIS — I50.23 ACUTE ON CHRONIC SYSTOLIC CONGESTIVE HEART FAILURE (H): ICD-10-CM

## 2021-04-28 DIAGNOSIS — I10 ESSENTIAL HYPERTENSION: ICD-10-CM

## 2021-04-28 DIAGNOSIS — I50.22 CHRONIC SYSTOLIC CONGESTIVE HEART FAILURE (H): ICD-10-CM

## 2021-04-28 DIAGNOSIS — I43 CARDIOMYOPATHY DUE TO HYPERTENSION, WITHOUT HEART FAILURE (H): ICD-10-CM

## 2021-04-28 DIAGNOSIS — I11.9 CARDIOMYOPATHY DUE TO HYPERTENSION, WITHOUT HEART FAILURE (H): ICD-10-CM

## 2021-04-28 DIAGNOSIS — I21.4 NSTEMI (NON-ST ELEVATED MYOCARDIAL INFARCTION) (H): ICD-10-CM

## 2021-04-28 LAB
ANION GAP SERPL CALCULATED.3IONS-SCNC: 2 MMOL/L (ref 3–14)
BUN SERPL-MCNC: 26 MG/DL (ref 7–30)
CALCIUM SERPL-MCNC: 8.5 MG/DL (ref 8.5–10.1)
CHLORIDE SERPL-SCNC: 107 MMOL/L (ref 94–109)
CO2 SERPL-SCNC: 28 MMOL/L (ref 20–32)
CREAT SERPL-MCNC: 2.19 MG/DL (ref 0.66–1.25)
GFR SERPL CREATININE-BSD FRML MDRD: 37 ML/MIN/{1.73_M2}
GLUCOSE SERPL-MCNC: 163 MG/DL (ref 70–99)
NT-PROBNP SERPL-MCNC: 725 PG/ML (ref 0–125)
POTASSIUM SERPL-SCNC: 4.4 MMOL/L (ref 3.4–5.3)
SODIUM SERPL-SCNC: 137 MMOL/L (ref 133–144)

## 2021-04-28 PROCEDURE — 80048 BASIC METABOLIC PNL TOTAL CA: CPT | Performed by: PHYSICIAN ASSISTANT

## 2021-04-28 PROCEDURE — 83880 ASSAY OF NATRIURETIC PEPTIDE: CPT | Performed by: PHYSICIAN ASSISTANT

## 2021-04-28 PROCEDURE — 36415 COLL VENOUS BLD VENIPUNCTURE: CPT | Performed by: PHYSICIAN ASSISTANT

## 2021-04-28 PROCEDURE — 99214 OFFICE O/P EST MOD 30 MIN: CPT | Performed by: PHYSICIAN ASSISTANT

## 2021-04-28 RX ORDER — AMLODIPINE BESYLATE 10 MG/1
10 TABLET ORAL DAILY
Qty: 90 TABLET | Refills: 3 | Status: ON HOLD | OUTPATIENT
Start: 2021-04-28 | End: 2023-10-06

## 2021-04-28 ASSESSMENT — MIFFLIN-ST. JEOR: SCORE: 2724.34

## 2021-04-28 NOTE — LETTER
2021    Eva Kent PA-C  [id number] [facility] [id number]    RE: Brooks Mensah       Dear Colleague,    I had the pleasure of seeing Brooks Mensah in the [facility].    CARDIOLOGY CLINIC PROGRESS NOTE    DOS: 2021      Brooks Mensah  : 1985, 35 year old  MRN: [id number]      History:   It was a pleasure following up with Brooks (also goes by Javon) today in the [facility].     Brooks is a very pleasant 35-year-old gentleman with history significant for prior tobacco use, morbid obesity, history of congestive heart failure from hypertensive cardiomyopathy, severe sleep apnea not on CPAP.       In 2017 he was admitted with congestive heart failure and moderate to severe left ventricular systolic dysfunction.  His blood pressure was as high as 240/140.  Echo 2017 showed severe LVH with septal and posterior wall measurements of approximately 2.5 cm with 1.5 cm being the upper limits of normal.  There was diastolic dysfunction of the left ventricle and the LVEF was 30-35%.  Left atrium was not particularly dilated.   He underwent coronary angiography 6/15/2017, which did show trivial coronary artery disease of the right coronary artery with a stenosis of 10-20%, otherwise his coronary arteries were large and free of disease.    He was treated initially with nitrates, Carvedilol, ACE inhibitor, Lasix for diuresis.  With these therapies, his blood pressures came down nicely.     During his admission, it was noted that he likely has severe sleep apnea as he had some pauses on telemetry and outpatient sleep study was recommended.    On telemetry there were some brief episodes of what looked intermittent 3rd degree AV block in the middle of the night during sleep.  He was asymptomatic.  His Carvedilol was decreased from 50 mg twice daily to 25 mg twice daily.  Further monitoring showed sinus rhythm.       He was lost to follow  up until he saw Dr. Mcdonald 3/22/19.  He was seen as he ran out of medications.  He did tell Dr. Mcdonald that he only takes his medications intermittently.  Not surprisingly, his blood pressure was 158/100.   Echo 4/22/19 and showed improvement in LVEF to 50-55%.  He still had LVH.       He was seen 3/12/21 by Dr. Mcdonald.  His BP was initially 200 then 190/110 on recheck.  He was not taking his medications as he had run out.        Interval History:   He presents today for follow up.   He is taking his meds. Today at home his BP was 180/100 before meds.   In clinic it is 142/94, then 150/98 on recheck.   He says his SBP runs 140s or higher at home.   Not weighing, but has a scale.   He was diagnosed with sleep apnea, but never picked up the CPAP mask.     Some fatigue.   Still working at [employer].  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       ROS:  Skin:  Negative     Eyes:  Negative    ENT:  Negative    Respiratory:  Positive for sleep apnea;CPAP  Cardiovascular:  Negative    Gastroenterology: Negative    Genitourinary:  Negative    Musculoskeletal:  Negative    Neurologic:  Negative    Psychiatric:  Negative    Heme/Lymph/Imm:  Negative    Endocrine:  Negative      PAST MEDICAL HISTORY:  Past Medical History:   Diagnosis Date     Acute on chronic systolic congestive heart failure (H) 8/9/2017     CAD (coronary artery disease)     Nonobstructive     Cardiomyopathy, unspecified (H) 8/9/2017     Congestive heart failure, unspecified congestive heart failure chronicity, unspecified congestive heart failure type 6/13/2017     HTN (hypertension), malignant      Hypertensive urgency 6/13/2017     Morbid obesity with BMI of 50.0-59.9, adult (H) 6/21/2017     NSTEMI (non-ST elevated myocardial infarction) (H) 6/13/2017     Renal insufficiency 6/13/2017     Sleep apnea      Tobacco abuse        PAST SURGICAL HISTORY:  History reviewed. No pertinent surgical history.    SOCIAL HISTORY:  Social History      Socioeconomic History     Marital status: Single     Spouse name: Not on file     Number of children: Not on file     Years of education: Not on file     Highest education level: Not on file   Occupational History     Not on file   Social Needs     Financial resource strain: Not on file     Food insecurity     Worry: Not on file     Inability: Not on file     Transportation needs     Medical: Not on file     Non-medical: Not on file   Tobacco Use     Smoking status: Current Every Day Smoker     Packs/day: 0.25     Types: Cigarettes     Start date: 2002     Smokeless tobacco: Never Used     Tobacco comment: Quit four times as of 3/12/21   Substance and Sexual Activity     Alcohol use: Yes     Comment: occassional     Drug use: No     Sexual activity: Not on file   Lifestyle     Physical activity     Days per week: Not on file     Minutes per session: Not on file     Stress: Not on file   Relationships     Social connections     Talks on phone: Not on file     Gets together: Not on file     Attends Latter day service: Not on file     Active member of club or organization: Not on file     Attends meetings of clubs or organizations: Not on file     Relationship status: Not on file     Intimate partner violence     Fear of current or ex partner: Not on file     Emotionally abused: Not on file     Physically abused: Not on file     Forced sexual activity: Not on file   Other Topics Concern     Parent/sibling w/ CABG, MI or angioplasty before 65F 55M? Not Asked   Social History Narrative     Not on file       FAMILY HISTORY:  Family History   Problem Relation Age of Onset     Hyperlipidemia Mother      Hyperlipidemia Father        MEDS: aspirin EC 81 MG EC tablet, Take 1 tablet (81 mg) by mouth daily  atorvastatin (LIPITOR) 10 MG tablet, Take 1 tablet (10 mg) by mouth every evening  carvedilol (COREG) 25 MG tablet, Take 1 tablet (25 mg) by mouth 2 times daily (with meals)  furosemide (LASIX) 20 MG tablet, Take 1 tablet  "(20 mg) by mouth daily  lisinopril (ZESTRIL) 20 MG tablet, Take 1 tablet (20 mg) by mouth 2 times daily  MELATONIN PO, Take 10 mg by mouth nightly as needed   spironolactone (ALDACTONE) 25 MG tablet, Take 0.5 tablets (12.5 mg) by mouth daily  TRAZODONE HCL PO, Take 50 mg by mouth nightly as needed for sleep    No current facility-administered medications on file prior to visit.       ALLERGIES: No Known Allergies    PHYSICAL EXAM:  Vitals: BP (!) 142/94   Pulse 82   Ht 1.803 m (5' 11\")   Wt (!) 176.7 kg (389 lb 9.6 oz)   SpO2 94%   BMI 54.34 kg/m    Constitutional:  cooperative, alert and oriented, well developed, well nourished, in no acute distress morbidly obese      Skin:  warm and dry to the touch        Head:  normocephalic, no masses or lesions        Eyes:  pupils equal and round;conjunctivae and lids unremarkable;sclera white;no xanthalasma        ENT:  no pallor or cyanosis        Neck:      difficult to assess given neck size, but no apparent JVD    Respiratory:      decreased air movement, but no rales or wheezing     Cardiac: regular rhythm;apical impulse not displaced   distant heart sounds         no S3 or S4 appreciated, no murmur appreciated     GI:  abdomen soft;BS normoactive;non-tender obese      Vascular: not assessed this visit                                      Extremities and Musculoskeletal:  no deformities, clubbing, cyanosis, erythema observed   chronic venous stasis changes    Neurological:  no gross motor deficits;affect appropriate            LABS/DATA:  I reviewed the following:  Component      Latest Ref Rng & Units 3/5/2021 4/28/2021   Sodium      133 - 144 mmol/L 139 137   Potassium      3.4 - 5.3 mmol/L 3.9 4.4   Chloride      94 - 109 mmol/L 108 107   Carbon Dioxide      20 - 32 mmol/L 25 28   Anion Gap      3 - 14 mmol/L 6 2 (L)   Glucose      70 - 99 mg/dL 97 163 (H)   Urea Nitrogen      7 - 30 mg/dL 25 26   Creatinine      0.66 - 1.25 mg/dL 1.91 (H) 2.19 (H)   GFR " Estimate      >60 mL/min/1.73:m2 44 (L) 37 (L)   GFR Estimate If Black      >60 mL/min/1.73:m2 51 (L) 43 (L)   Calcium      8.5 - 10.1 mg/dL 8.7 8.5   Cholesterol      <200 mg/dL 169    Triglycerides      <150 mg/dL 104    HDL Cholesterol      >39 mg/dL 44    LDL Cholesterol Calculated      <100 mg/dL 104 (H)    Non HDL Cholesterol      <130 mg/dL 125    ALT      0 - 70 U/L 21    N-Terminal Pro Bnp      0 - 125 pg/mL  725 (H)         ASSESSMENT/PLAN:  1. History of tobacco abuse.   - Quit 3/2019. But restarted and is smoking 1/4 pack a day.   - We discussed that he needs to quit for his health     2.  Morbid obesity.  - Needs weight loss for his health    - Not interested in gastric bypass, but this would be beneficial to him      3. Severe obstructive sleep apnea.   - CPAP is recommended but he never picked up a mask     4. Renal insufficiency.   - Renal function worsened since 2019   - Noted, he is on Lasix, spironolactone, lisinopril.   - Hopefully this will improve again with better control of his blood pressure.    - We will follow closely.  If not improving at next 1 month follow up, will refer to nephrology     5.  Trivial nonobstructive coronary artery disease.     - No anginal sxs.    - FLP 3/5/21: , HDL 44, ,    - On ASA, statin.       6.  Hypertensive cardiomyopathy.    - Echo 6/13/17: LVEF 30-35%, grade III diastolic dysfunction.    - Cor angio 6/25/17 without significant obstructive CAD.   - Echo 4/22/19: LVEF 50-55%, LVH still but improved.     - No CHF sxs.   - Continue BB, ACEi, spironolactone, Lasix, amlodipine.  - Needs sleep apnea treated as noted above.   - Sodium restriction.   - Daily weights.   - Once his blood pressure is better controlled we should check his echocardiogram.     7.  Hypertension, malignant.   - BP today 142/94.  Increasing amlodipine to 10 mg for better BP control.  BP check in 1 month     8.  Transient high-grade AV block during sleep, and on Coreg 50mg  BID. Decreased to 25mg BID and no recurrence.   - Needs CPAP as noted        Follow up:  [facility] in 1 month with GALILEO Painter PA-C    cc:   No referring provider defined for this encounter.

## 2021-04-28 NOTE — PATIENT INSTRUCTIONS
Call [facility] nurse for any questions or concerns Mon-Fri 8am-4pm:                                                 #[phone]                                       For concerns after hours:                                               #[phone]       Lab results: see attached: **  Component      Latest Ref Rng & Units 3/5/2021 4/28/2021   Sodium      133 - 144 mmol/L 139 137   Potassium      3.4 - 5.3 mmol/L 3.9 4.4   Chloride      94 - 109 mmol/L 108 107   Carbon Dioxide      20 - 32 mmol/L 25 28   Anion Gap      3 - 14 mmol/L 6 2 (L)   Glucose      70 - 99 mg/dL 97 163 (H)   Urea Nitrogen      7 - 30 mg/dL 25 26   Creatinine      0.66 - 1.25 mg/dL 1.91 (H) 2.19 (H)   GFR Estimate      >60 mL/min/1.73:m2 44 (L) 37 (L)   GFR Estimate If Black      >60 mL/min/1.73:m2 51 (L) 43 (L)   Calcium      8.5 - 10.1 mg/dL 8.7 8.5   N-Terminal Pro Bnp      0 - 125 pg/mL  725 (H)         Your blood pressure is better but still too high.   Increase amlodipine to 10 mg daily.  You can use your 5 mg tablets by taking 2 until gone. Your new prescription is for 10 mg tablets, so then you will just take one tablet.     Your labs today show your kidney function is worse than it was in 2019.  We hope that it will improve with better Blood pressure control.      See me back in 1 month to recheck blood pressure, and labs.      If the kidney function is not improving, we will send you to a kidney doctor.     Call if your weight is increasing, you develop shortness of breath, swelling.

## 2021-04-28 NOTE — PROGRESS NOTES
CARDIOLOGY CLINIC PROGRESS NOTE    DOS: 2021      Brooks Mensah  : 1985, 35 year old  MRN: [id number]      History:   It was a pleasure following up with Brooks (also goes by Javon) today in the [facility].     Brooks is a very pleasant 35-year-old gentleman with history significant for prior tobacco use, morbid obesity, history of congestive heart failure from hypertensive cardiomyopathy, severe sleep apnea not on CPAP.       In 2017 he was admitted with congestive heart failure and moderate to severe left ventricular systolic dysfunction.  His blood pressure was as high as 240/140.  Echo 2017 showed severe LVH with septal and posterior wall measurements of approximately 2.5 cm with 1.5 cm being the upper limits of normal.  There was diastolic dysfunction of the left ventricle and the LVEF was 30-35%.  Left atrium was not particularly dilated.   He underwent coronary angiography 6/15/2017, which did show trivial coronary artery disease of the right coronary artery with a stenosis of 10-20%, otherwise his coronary arteries were large and free of disease.    He was treated initially with nitrates, Carvedilol, ACE inhibitor, Lasix for diuresis.  With these therapies, his blood pressures came down nicely.     During his admission, it was noted that he likely has severe sleep apnea as he had some pauses on telemetry and outpatient sleep study was recommended.    On telemetry there were some brief episodes of what looked intermittent 3rd degree AV block in the middle of the night during sleep.  He was asymptomatic.  His Carvedilol was decreased from 50 mg twice daily to 25 mg twice daily.  Further monitoring showed sinus rhythm.       He was lost to follow up until he saw Dr. Mcdonald 3/22/19.  He was seen as he ran out of medications.  He did tell Dr. Mcdonald that he only takes his medications intermittently.  Not surprisingly, his blood pressure was 158/100.   Echo 19 and showed improvement in LVEF  to 50-55%.  He still had LVH.       He was seen 3/12/21 by Dr. Mcdonald.  His BP was initially 200 then 190/110 on recheck.  He was not taking his medications as he had run out.        Interval History:   He presents today for follow up.   He is taking his meds. Today at home his BP was 180/100 before meds.   In clinic it is 142/94, then 150/98 on recheck.   He says his SBP runs 140s or higher at home.   Not weighing, but has a scale.   He was diagnosed with sleep apnea, but never picked up the CPAP mask.     Some fatigue.   Still working at [employer].  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       ROS:  Skin:  Negative     Eyes:  Negative    ENT:  Negative    Respiratory:  Positive for sleep apnea;CPAP  Cardiovascular:  Negative    Gastroenterology: Negative    Genitourinary:  Negative    Musculoskeletal:  Negative    Neurologic:  Negative    Psychiatric:  Negative    Heme/Lymph/Imm:  Negative    Endocrine:  Negative      PAST MEDICAL HISTORY:  Past Medical History:   Diagnosis Date     Acute on chronic systolic congestive heart failure (H) 8/9/2017     CAD (coronary artery disease)     Nonobstructive     Cardiomyopathy, unspecified (H) 8/9/2017     Congestive heart failure, unspecified congestive heart failure chronicity, unspecified congestive heart failure type 6/13/2017     HTN (hypertension), malignant      Hypertensive urgency 6/13/2017     Morbid obesity with BMI of 50.0-59.9, adult (H) 6/21/2017     NSTEMI (non-ST elevated myocardial infarction) (H) 6/13/2017     Renal insufficiency 6/13/2017     Sleep apnea      Tobacco abuse        PAST SURGICAL HISTORY:  History reviewed. No pertinent surgical history.    SOCIAL HISTORY:  Social History     Socioeconomic History     Marital status: Single     Spouse name: Not on file     Number of children: Not on file     Years of education: Not on file     Highest education level: Not on file   Occupational History     Not on file   Social  Needs     Financial resource strain: Not on file     Food insecurity     Worry: Not on file     Inability: Not on file     Transportation needs     Medical: Not on file     Non-medical: Not on file   Tobacco Use     Smoking status: Current Every Day Smoker     Packs/day: 0.25     Types: Cigarettes     Start date: 2002     Smokeless tobacco: Never Used     Tobacco comment: Quit four times as of 3/12/21   Substance and Sexual Activity     Alcohol use: Yes     Comment: occassional     Drug use: No     Sexual activity: Not on file   Lifestyle     Physical activity     Days per week: Not on file     Minutes per session: Not on file     Stress: Not on file   Relationships     Social connections     Talks on phone: Not on file     Gets together: Not on file     Attends Druze service: Not on file     Active member of club or organization: Not on file     Attends meetings of clubs or organizations: Not on file     Relationship status: Not on file     Intimate partner violence     Fear of current or ex partner: Not on file     Emotionally abused: Not on file     Physically abused: Not on file     Forced sexual activity: Not on file   Other Topics Concern     Parent/sibling w/ CABG, MI or angioplasty before 65F 55M? Not Asked   Social History Narrative     Not on file       FAMILY HISTORY:  Family History   Problem Relation Age of Onset     Hyperlipidemia Mother      Hyperlipidemia Father        MEDS: aspirin EC 81 MG EC tablet, Take 1 tablet (81 mg) by mouth daily  atorvastatin (LIPITOR) 10 MG tablet, Take 1 tablet (10 mg) by mouth every evening  carvedilol (COREG) 25 MG tablet, Take 1 tablet (25 mg) by mouth 2 times daily (with meals)  furosemide (LASIX) 20 MG tablet, Take 1 tablet (20 mg) by mouth daily  lisinopril (ZESTRIL) 20 MG tablet, Take 1 tablet (20 mg) by mouth 2 times daily  MELATONIN PO, Take 10 mg by mouth nightly as needed   spironolactone (ALDACTONE) 25 MG tablet, Take 0.5 tablets (12.5 mg) by mouth  "daily  TRAZODONE HCL PO, Take 50 mg by mouth nightly as needed for sleep    No current facility-administered medications on file prior to visit.       ALLERGIES: No Known Allergies    PHYSICAL EXAM:  Vitals: BP (!) 142/94   Pulse 82   Ht 1.803 m (5' 11\")   Wt (!) 176.7 kg (389 lb 9.6 oz)   SpO2 94%   BMI 54.34 kg/m    Constitutional:  cooperative, alert and oriented, well developed, well nourished, in no acute distress morbidly obese      Skin:  warm and dry to the touch        Head:  normocephalic, no masses or lesions        Eyes:  pupils equal and round;conjunctivae and lids unremarkable;sclera white;no xanthalasma        ENT:  no pallor or cyanosis        Neck:      difficult to assess given neck size, but no apparent JVD    Respiratory:      decreased air movement, but no rales or wheezing     Cardiac: regular rhythm;apical impulse not displaced   distant heart sounds         no S3 or S4 appreciated, no murmur appreciated     GI:  abdomen soft;BS normoactive;non-tender obese      Vascular: not assessed this visit                                      Extremities and Musculoskeletal:  no deformities, clubbing, cyanosis, erythema observed   chronic venous stasis changes    Neurological:  no gross motor deficits;affect appropriate            LABS/DATA:  I reviewed the following:  Component      Latest Ref Rng & Units 3/5/2021 4/28/2021   Sodium      133 - 144 mmol/L 139 137   Potassium      3.4 - 5.3 mmol/L 3.9 4.4   Chloride      94 - 109 mmol/L 108 107   Carbon Dioxide      20 - 32 mmol/L 25 28   Anion Gap      3 - 14 mmol/L 6 2 (L)   Glucose      70 - 99 mg/dL 97 163 (H)   Urea Nitrogen      7 - 30 mg/dL 25 26   Creatinine      0.66 - 1.25 mg/dL 1.91 (H) 2.19 (H)   GFR Estimate      >60 mL/min/1.73:m2 44 (L) 37 (L)   GFR Estimate If Black      >60 mL/min/1.73:m2 51 (L) 43 (L)   Calcium      8.5 - 10.1 mg/dL 8.7 8.5   Cholesterol      <200 mg/dL 169    Triglycerides      <150 mg/dL 104    HDL Cholesterol     "  >39 mg/dL 44    LDL Cholesterol Calculated      <100 mg/dL 104 (H)    Non HDL Cholesterol      <130 mg/dL 125    ALT      0 - 70 U/L 21    N-Terminal Pro Bnp      0 - 125 pg/mL  725 (H)         ASSESSMENT/PLAN:  1. History of tobacco abuse.   - Quit 3/2019. But restarted and is smoking 1/4 pack a day.   - We discussed that he needs to quit for his health     2.  Morbid obesity.  - Needs weight loss for his health    - Not interested in gastric bypass, but this would be beneficial to him      3. Severe obstructive sleep apnea.   - CPAP is recommended but he never picked up a mask     4. Renal insufficiency.   - Renal function worsened since 2019   - Noted, he is on Lasix, spironolactone, lisinopril.   - Hopefully this will improve again with better control of his blood pressure.    - We will follow closely.  If not improving at next 1 month follow up, will refer to nephrology     5.  Trivial nonobstructive coronary artery disease.     - No anginal sxs.    - FLP 3/5/21: , HDL 44, ,    - On ASA, statin.       6.  Hypertensive cardiomyopathy.    - Echo 6/13/17: LVEF 30-35%, grade III diastolic dysfunction.    - Cor angio 6/25/17 without significant obstructive CAD.   - Echo 4/22/19: LVEF 50-55%, LVH still but improved.     - No CHF sxs.   - Continue BB, ACEi, spironolactone, Lasix, amlodipine.  - Needs sleep apnea treated as noted above.   - Sodium restriction.   - Daily weights.   - Once his blood pressure is better controlled we should check his echocardiogram.     7.  Hypertension, malignant.   - BP today 142/94.  Increasing amlodipine to 10 mg for better BP control.  BP check in 1 month     8.  Transient high-grade AV block during sleep, and on Coreg 50mg BID. Decreased to 25mg BID and no recurrence.   - Needs CPAP as noted        Follow up:  [facility] in 1 month with GALILEO Painter PA-C

## 2021-04-29 ENCOUNTER — CARE COORDINATION (OUTPATIENT)
Dept: CARDIOLOGY | Facility: CLINIC | Age: 36
End: 2021-04-29

## 2021-04-29 NOTE — PROGRESS NOTES
Ip, Cliff Tamayo MD  [facility] Team 3             Pls ask him to hold lisinopril and spironolactone as renal function has significantly worsened.  Pls have him come in to see [facility] for antihypertensive med adjustments.  Many thanks.      Called and spoke with Javon. He had his medications with him when we spoke. He pulled the lisinopril and spironolactone bottles. I asked him to put those 2 medications on a shelf somewhere so that he does not accidentally take them. He agreed. He stated understanding that the plan is to let his kidneys rest.    Javon is already set up for follow-up with Mag[facility] next month, but I advised I would have scheduling contact him to see if he can be seen sooner. Javon verbalized understanding.     JEANNETTE AllredN, RN, PHN, HNB-BC   4/29/2021 at 11:18 AM

## 2021-05-12 ENCOUNTER — OFFICE VISIT (OUTPATIENT)
Dept: CARDIOLOGY | Facility: CLINIC | Age: 36
End: 2021-05-12
Attending: PHYSICIAN ASSISTANT
Payer: COMMERCIAL

## 2021-05-12 VITALS
BODY MASS INDEX: 44.1 KG/M2 | HEART RATE: 84 BPM | HEIGHT: 71 IN | SYSTOLIC BLOOD PRESSURE: 154 MMHG | DIASTOLIC BLOOD PRESSURE: 100 MMHG | WEIGHT: 315 LBS | OXYGEN SATURATION: 97 %

## 2021-05-12 DIAGNOSIS — N18.32 STAGE 3B CHRONIC KIDNEY DISEASE (H): Primary | ICD-10-CM

## 2021-05-12 DIAGNOSIS — I50.22 CHRONIC SYSTOLIC CONGESTIVE HEART FAILURE (H): ICD-10-CM

## 2021-05-12 DIAGNOSIS — I10 ESSENTIAL HYPERTENSION: ICD-10-CM

## 2021-05-12 LAB
ANION GAP SERPL CALCULATED.3IONS-SCNC: 4 MMOL/L (ref 3–14)
BUN SERPL-MCNC: 24 MG/DL (ref 7–30)
CALCIUM SERPL-MCNC: 9.1 MG/DL (ref 8.5–10.1)
CHLORIDE SERPL-SCNC: 105 MMOL/L (ref 94–109)
CO2 SERPL-SCNC: 27 MMOL/L (ref 20–32)
CREAT SERPL-MCNC: 2.11 MG/DL (ref 0.66–1.25)
GFR SERPL CREATININE-BSD FRML MDRD: 39 ML/MIN/{1.73_M2}
GLUCOSE SERPL-MCNC: 157 MG/DL (ref 70–99)
POTASSIUM SERPL-SCNC: 3.6 MMOL/L (ref 3.4–5.3)
SODIUM SERPL-SCNC: 136 MMOL/L (ref 133–144)

## 2021-05-12 PROCEDURE — 80048 BASIC METABOLIC PNL TOTAL CA: CPT | Performed by: PHYSICIAN ASSISTANT

## 2021-05-12 PROCEDURE — 99214 OFFICE O/P EST MOD 30 MIN: CPT | Performed by: PHYSICIAN ASSISTANT

## 2021-05-12 PROCEDURE — 36415 COLL VENOUS BLD VENIPUNCTURE: CPT | Performed by: PHYSICIAN ASSISTANT

## 2021-05-12 RX ORDER — HYDRALAZINE HYDROCHLORIDE 25 MG/1
25 TABLET, FILM COATED ORAL 3 TIMES DAILY
Qty: 90 TABLET | Refills: 3 | Status: SHIPPED | OUTPATIENT
Start: 2021-05-12 | End: 2021-09-24

## 2021-05-12 ASSESSMENT — MIFFLIN-ST. JEOR: SCORE: 2724.8

## 2021-05-12 NOTE — PATIENT INSTRUCTIONS
Call [facility] nurse for any questions or concerns Mon-Fri 8am-4pm:                                                 #[phone]                                       For concerns after hours:                                               #[phone]         Plan from today:   Dr. Mcdonald stopped your lisinopril and spironolactone due to your worsening kidney function.  I suspect your worsening kidney function is from the chronically uncontrolled hypertension.    Continue the Coreg, amlodipine and Lasix.   Start hydralazine 25 mg three times a day.   We will have you see the kidney doctor.  Hopefully, they can evaluate you and we can get you back on lisinopril and maybe spironolactone, too, and off the three times a day hydralazine.           Lab results: see attached:   Component      Latest Ref Rng & Units 4/28/2021 5/12/2021   Sodium      133 - 144 mmol/L 137 136   Potassium      3.4 - 5.3 mmol/L 4.4 3.6   Chloride      94 - 109 mmol/L 107 105   Carbon Dioxide      20 - 32 mmol/L 28 27   Anion Gap      3 - 14 mmol/L 2 (L) 4   Glucose      70 - 99 mg/dL 163 (H) 157 (H)   Urea Nitrogen      7 - 30 mg/dL 26 24   Creatinine      0.66 - 1.25 mg/dL 2.19 (H) 2.11 (H)   GFR Estimate      >60 mL/min/1.73:m2 37 (L) 39 (L)   GFR Estimate If Black      >60 mL/min/1.73:m2 43 (L) 45 (L)   Calcium      8.5 - 10.1 mg/dL 8.5 9.1

## 2021-05-12 NOTE — LETTER
2021    Eva Kent PA-C  [address] [facility] [address]    RE: Brooks Mensah       Dear Colleague,    I had the pleasure of seeing Brooks Mensah in the [facility].    CARDIOLOGY CLINIC PROGRESS NOTE    DOS: 2021      Brooks Mensah  : 1985, 35 year old  MRN: [id number]      History:   It was a pleasure following up with Brooks (also goes by Javon) today in the [facility].     Brooks is a very pleasant 35-year-old gentleman with history significant for tobacco use, morbid obesity, history of congestive heart failure from hypertensive cardiomyopathy, HTN, severe sleep apnea not on CPAP.       In 2017 he was admitted with congestive heart failure and moderate to severe left ventricular systolic dysfunction.  His blood pressure was as high as 240/140.  Echo 2017 showed severe LVH with septal and posterior wall measurements of approximately 2.5 cm with 1.5 cm being the upper limits of normal.  There was diastolic dysfunction of the left ventricle and the LVEF was 30-35%.  Left atrium was not particularly dilated.   He underwent coronary angiography 6/15/2017, which did show trivial coronary artery disease of the right coronary artery with a stenosis of 10-20%, otherwise his coronary arteries were large and free of disease.    He was treated initially with nitrates, Carvedilol, ACE inhibitor, Lasix for diuresis.  With these therapies, his blood pressures came down nicely.     During his admission, it was noted that he likely has severe sleep apnea as he had some pauses on telemetry and outpatient sleep study was recommended.    On telemetry there were some brief episodes of what looked intermittent 3rd degree AV block in the middle of the night during sleep.  He was asymptomatic.  His Carvedilol was decreased from 50 mg twice daily to 25 mg twice daily.  Further monitoring showed sinus rhythm.       He was lost to follow  up until he saw Dr. Mcdonald 3/22/19.  He was seen as he ran out of medications.  He did tell Dr. Mcdonald that he only takes his medications intermittently.  Not surprisingly, his blood pressure was 158/100.   Echo 4/22/19 and showed improvement in LVEF to 50-55%.  He still had LVH.    Last creatinine in 4/2019 was 1.29.     Two years later, 3/5/21 creatinine was 1.91.     He had again run out of medications.  He saw Dr. Mcdonald 3/12/21.  His BP was initially 200 then 190/110 on recheck. Meds resumed.     4/28/21 BMP showed creat 2.19.  Though there was minimal change from 3/5/21, there was a significant change from 2019.   Dr. Mcdonald recommended that lisinopril and spironolactone be held.     Interval History:   He presents today for follow up.   He is taking his meds.  After stopping the spironolactone and lisinopril, he thinks he had a little more pedal edema, but this has gone away now.   Not weighing, but has a scale.   He was diagnosed with sleep apnea, but never picked up the CPAP mask.     Some fatigue.   Still working at [employer].  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       ROS:  Skin:  Negative     Eyes:  Negative    ENT:  Negative    Respiratory:  Positive for sleep apnea;CPAP  Cardiovascular:  Negative    Gastroenterology: Negative    Genitourinary:  Negative    Musculoskeletal:  Negative    Neurologic:  Negative    Psychiatric:  Negative    Heme/Lymph/Imm:  Negative    Endocrine:  Negative      PAST MEDICAL HISTORY:  Past Medical History:   Diagnosis Date     Acute on chronic systolic congestive heart failure (H) 8/9/2017     CAD (coronary artery disease)     Nonobstructive     Cardiomyopathy, unspecified (H) 8/9/2017     Congestive heart failure, unspecified congestive heart failure chronicity, unspecified congestive heart failure type 6/13/2017     HTN (hypertension), malignant      Hypertensive urgency 6/13/2017     Morbid obesity with BMI of 50.0-59.9, adult (H) 6/21/2017     NSTEMI  (non-ST elevated myocardial infarction) (H) 6/13/2017     Renal insufficiency 6/13/2017     Sleep apnea      Tobacco abuse        PAST SURGICAL HISTORY:  History reviewed. No pertinent surgical history.    SOCIAL HISTORY:  Social History     Socioeconomic History     Marital status: Single     Spouse name: Not on file     Number of children: Not on file     Years of education: Not on file     Highest education level: Not on file   Occupational History     Not on file   Social Needs     Financial resource strain: Not on file     Food insecurity     Worry: Not on file     Inability: Not on file     Transportation needs     Medical: Not on file     Non-medical: Not on file   Tobacco Use     Smoking status: Current Every Day Smoker     Packs/day: 0.25     Types: Cigarettes     Start date: 2002     Smokeless tobacco: Never Used     Tobacco comment: Quit four times as of 3/12/21   Substance and Sexual Activity     Alcohol use: Yes     Comment: occassional     Drug use: No     Sexual activity: Not on file   Lifestyle     Physical activity     Days per week: Not on file     Minutes per session: Not on file     Stress: Not on file   Relationships     Social connections     Talks on phone: Not on file     Gets together: Not on file     Attends Sabianism service: Not on file     Active member of club or organization: Not on file     Attends meetings of clubs or organizations: Not on file     Relationship status: Not on file     Intimate partner violence     Fear of current or ex partner: Not on file     Emotionally abused: Not on file     Physically abused: Not on file     Forced sexual activity: Not on file   Other Topics Concern     Parent/sibling w/ CABG, MI or angioplasty before 65F 55M? Not Asked   Social History Narrative     Not on file       FAMILY HISTORY:  Family History   Problem Relation Age of Onset     Hyperlipidemia Mother      Hyperlipidemia Father        MEDS: amLODIPine (NORVASC) 10 MG tablet, Take 1 tablet  "(10 mg) by mouth daily  aspirin EC 81 MG EC tablet, Take 1 tablet (81 mg) by mouth daily (Patient taking differently: Take 81 mg by mouth When remembers, approx x2 doses a week)  atorvastatin (LIPITOR) 10 MG tablet, Take 1 tablet (10 mg) by mouth every evening  carvedilol (COREG) 25 MG tablet, Take 1 tablet (25 mg) by mouth 2 times daily (with meals)  furosemide (LASIX) 20 MG tablet, Take 1 tablet (20 mg) by mouth daily    No current facility-administered medications on file prior to visit.       ALLERGIES: No Known Allergies    PHYSICAL EXAM:  Vitals: BP (!) 154/100   Pulse 84   Ht 1.803 m (5' 11\")   Wt (!) 176.8 kg (389 lb 11.2 oz)   SpO2 97%   BMI 54.35 kg/m    Constitutional:  cooperative, alert and oriented, well developed, well nourished, in no acute distress morbidly obese      Skin:  warm and dry to the touch        Head:  normocephalic, no masses or lesions        Eyes:  pupils equal and round;conjunctivae and lids unremarkable;sclera white;no xanthalasma        ENT:  no pallor or cyanosis        Neck:      difficult to assess given neck size, but no apparent JVD    Respiratory:      decreased air movement, but no rales or wheezing     Cardiac: regular rhythm;apical impulse not displaced   distant heart sounds         no S3 or S4 appreciated, no murmur appreciated     GI:  abdomen soft;BS normoactive;non-tender obese      Vascular: not assessed this visit                                      Extremities and Musculoskeletal:  no deformities, clubbing, cyanosis, erythema observed   chronic venous stasis changes    Neurological:  no gross motor deficits;affect appropriate            LABS/DATA:  I reviewed the following:  Component      Latest Ref Rng & Units 3/5/2021 4/28/2021 5/12/2021   Sodium      133 - 144 mmol/L 139 137 136   Potassium      3.4 - 5.3 mmol/L 3.9 4.4 3.6   Chloride      94 - 109 mmol/L 108 107 105   Carbon Dioxide      20 - 32 mmol/L 25 28 27   Anion Gap      3 - 14 mmol/L 6 2 (L) 4 "   Glucose      70 - 99 mg/dL 97 163 (H) 157 (H)   Urea Nitrogen      7 - 30 mg/dL 25 26 24   Creatinine      0.66 - 1.25 mg/dL 1.91 (H) 2.19 (H) 2.11 (H)   GFR Estimate      >60 mL/min/1.73:m2 44 (L) 37 (L) 39 (L)   GFR Estimate If Black      >60 mL/min/1.73:m2 51 (L) 43 (L) 45 (L)   Calcium      8.5 - 10.1 mg/dL 8.7 8.5 9.1   N-Terminal Pro Bnp      0 - 125 pg/mL  725 (H)          ASSESSMENT/PLAN:  1. History of tobacco abuse.   - Quit 3/2019. But restarted and is smoking 1/4 pack a day.   - We discussed that he needs to quit for his health     2.  Morbid obesity.  - Needs weight loss for his health    - Not interested in gastric bypass, but this would be beneficial to him      3. Severe obstructive sleep apnea.   - CPAP is recommended but he never picked up a mask     4. Renal insufficiency.   - Renal function worsened since 2019  - Lisinopril and spironolactone have been stopped and renal function remains in the similar range, creatinine 2.11 today    - I think his CKD is due to chronic HTN, and that ultimately better BPs will improve his renal function.  Will keep off nephrotoxins lisinopril and spironolactone for now.  Start hydralazine 25 mg TID.  Also send to nephrology for their evaluation and BP med recommmendations     5.  Trivial nonobstructive coronary artery disease.     - No anginal sxs.    - FLP 3/5/21: , HDL 44, ,    - On ASA, statin.       6.  Hypertensive cardiomyopathy.         HTN   - Echo 6/13/17: LVEF 30-35%, grade III diastolic dysfunction.    - Cor angio 6/25/17 without significant obstructive CAD.   - Echo 4/22/19: LVEF 50-55%, LVH still but improved.     - No CHF sxs.   - Currently on Coreg, Lasix, amlodipine. Lisinopril and spironolactone were stopped due to renal dysfunction.  Start hydralazine 25 mg TID.  See nephrology and hopefully can eventually add back lisinopril and/or spironolactone.   - Needs sleep apnea treated as noted above.   - Sodium restriction.   - Daily  weights.   - Once his blood pressure is better controlled we should check his echocardiogram.        7.  Transient high-grade AV block during sleep, and on Coreg 50 mg BID. Decreased to 25 mg BID and no recurrence.   - Needs CPAP as noted        Follow up:  Nephrology  Then see [facility] in 1 month with GALILEO    Thank you for allowing me to participate in the care of your patient.      Sincerely,     Prem Painter PA-C     [facility]    cc:   Prem Painter PA-C  [address] [place] [address]

## 2021-05-12 NOTE — PROGRESS NOTES
CARDIOLOGY CLINIC PROGRESS NOTE    DOS: 2021      Brooks Mensah  : 1985, 35 year old  MRN: [id number]      History:   It was a pleasure following up with Brooks (also goes by Javon) today in the [facility].     Brooks is a very pleasant 35-year-old gentleman with history significant for tobacco use, morbid obesity, history of congestive heart failure from hypertensive cardiomyopathy, HTN, severe sleep apnea not on CPAP.       In 2017 he was admitted with congestive heart failure and moderate to severe left ventricular systolic dysfunction.  His blood pressure was as high as 240/140.  Echo 2017 showed severe LVH with septal and posterior wall measurements of approximately 2.5 cm with 1.5 cm being the upper limits of normal.  There was diastolic dysfunction of the left ventricle and the LVEF was 30-35%.  Left atrium was not particularly dilated.   He underwent coronary angiography 6/15/2017, which did show trivial coronary artery disease of the right coronary artery with a stenosis of 10-20%, otherwise his coronary arteries were large and free of disease.    He was treated initially with nitrates, Carvedilol, ACE inhibitor, Lasix for diuresis.  With these therapies, his blood pressures came down nicely.     During his admission, it was noted that he likely has severe sleep apnea as he had some pauses on telemetry and outpatient sleep study was recommended.    On telemetry there were some brief episodes of what looked intermittent 3rd degree AV block in the middle of the night during sleep.  He was asymptomatic.  His Carvedilol was decreased from 50 mg twice daily to 25 mg twice daily.  Further monitoring showed sinus rhythm.       He was lost to follow up until he saw Dr. Mcdonald 3/22/19.  He was seen as he ran out of medications.  He did tell Dr. Mcdonald that he only takes his medications intermittently.  Not surprisingly, his blood pressure was 158/100.   Echo 19 and showed improvement in LVEF  to 50-55%.  He still had LVH.    Last creatinine in 4/2019 was 1.29.     Two years later, 3/5/21 creatinine was 1.91.     He had again run out of medications.  He saw Dr. Mcdonald 3/12/21.  His BP was initially 200 then 190/110 on recheck. Meds resumed.     4/28/21 BMP showed creat 2.19.  Though there was minimal change from 3/5/21, there was a significant change from 2019.   Dr. Mcdonald recommended that lisinopril and spironolactone be held.     Interval History:   He presents today for follow up.   He is taking his meds.  After stopping the spironolactone and lisinopril, he thinks he had a little more pedal edema, but this has gone away now.   Not weighing, but has a scale.   He was diagnosed with sleep apnea, but never picked up the CPAP mask.     Some fatigue.   Still working at [employer].  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       ROS:  Skin:  Negative     Eyes:  Negative    ENT:  Negative    Respiratory:  Positive for sleep apnea;CPAP  Cardiovascular:  Negative    Gastroenterology: Negative    Genitourinary:  Negative    Musculoskeletal:  Negative    Neurologic:  Negative    Psychiatric:  Negative    Heme/Lymph/Imm:  Negative    Endocrine:  Negative      PAST MEDICAL HISTORY:  Past Medical History:   Diagnosis Date     Acute on chronic systolic congestive heart failure (H) 8/9/2017     CAD (coronary artery disease)     Nonobstructive     Cardiomyopathy, unspecified (H) 8/9/2017     Congestive heart failure, unspecified congestive heart failure chronicity, unspecified congestive heart failure type 6/13/2017     HTN (hypertension), malignant      Hypertensive urgency 6/13/2017     Morbid obesity with BMI of 50.0-59.9, adult (H) 6/21/2017     NSTEMI (non-ST elevated myocardial infarction) (H) 6/13/2017     Renal insufficiency 6/13/2017     Sleep apnea      Tobacco abuse        PAST SURGICAL HISTORY:  History reviewed. No pertinent surgical history.    SOCIAL HISTORY:  Social History      Socioeconomic History     Marital status: Single     Spouse name: Not on file     Number of children: Not on file     Years of education: Not on file     Highest education level: Not on file   Occupational History     Not on file   Social Needs     Financial resource strain: Not on file     Food insecurity     Worry: Not on file     Inability: Not on file     Transportation needs     Medical: Not on file     Non-medical: Not on file   Tobacco Use     Smoking status: Current Every Day Smoker     Packs/day: 0.25     Types: Cigarettes     Start date: 2002     Smokeless tobacco: Never Used     Tobacco comment: Quit four times as of 3/12/21   Substance and Sexual Activity     Alcohol use: Yes     Comment: occassional     Drug use: No     Sexual activity: Not on file   Lifestyle     Physical activity     Days per week: Not on file     Minutes per session: Not on file     Stress: Not on file   Relationships     Social connections     Talks on phone: Not on file     Gets together: Not on file     Attends Christian service: Not on file     Active member of club or organization: Not on file     Attends meetings of clubs or organizations: Not on file     Relationship status: Not on file     Intimate partner violence     Fear of current or ex partner: Not on file     Emotionally abused: Not on file     Physically abused: Not on file     Forced sexual activity: Not on file   Other Topics Concern     Parent/sibling w/ CABG, MI or angioplasty before 65F 55M? Not Asked   Social History Narrative     Not on file       FAMILY HISTORY:  Family History   Problem Relation Age of Onset     Hyperlipidemia Mother      Hyperlipidemia Father        MEDS: amLODIPine (NORVASC) 10 MG tablet, Take 1 tablet (10 mg) by mouth daily  aspirin EC 81 MG EC tablet, Take 1 tablet (81 mg) by mouth daily (Patient taking differently: Take 81 mg by mouth When remembers, approx x2 doses a week)  atorvastatin (LIPITOR) 10 MG tablet, Take 1 tablet (10 mg)  "by mouth every evening  carvedilol (COREG) 25 MG tablet, Take 1 tablet (25 mg) by mouth 2 times daily (with meals)  furosemide (LASIX) 20 MG tablet, Take 1 tablet (20 mg) by mouth daily    No current facility-administered medications on file prior to visit.       ALLERGIES: No Known Allergies    PHYSICAL EXAM:  Vitals: BP (!) 154/100   Pulse 84   Ht 1.803 m (5' 11\")   Wt (!) 176.8 kg (389 lb 11.2 oz)   SpO2 97%   BMI 54.35 kg/m    Constitutional:  cooperative, alert and oriented, well developed, well nourished, in no acute distress morbidly obese      Skin:  warm and dry to the touch        Head:  normocephalic, no masses or lesions        Eyes:  pupils equal and round;conjunctivae and lids unremarkable;sclera white;no xanthalasma        ENT:  no pallor or cyanosis        Neck:      difficult to assess given neck size, but no apparent JVD    Respiratory:      decreased air movement, but no rales or wheezing     Cardiac: regular rhythm;apical impulse not displaced   distant heart sounds         no S3 or S4 appreciated, no murmur appreciated     GI:  abdomen soft;BS normoactive;non-tender obese      Vascular: not assessed this visit                                      Extremities and Musculoskeletal:  no deformities, clubbing, cyanosis, erythema observed   chronic venous stasis changes    Neurological:  no gross motor deficits;affect appropriate            LABS/DATA:  I reviewed the following:  Component      Latest Ref Rng & Units 3/5/2021 4/28/2021 5/12/2021   Sodium      133 - 144 mmol/L 139 137 136   Potassium      3.4 - 5.3 mmol/L 3.9 4.4 3.6   Chloride      94 - 109 mmol/L 108 107 105   Carbon Dioxide      20 - 32 mmol/L 25 28 27   Anion Gap      3 - 14 mmol/L 6 2 (L) 4   Glucose      70 - 99 mg/dL 97 163 (H) 157 (H)   Urea Nitrogen      7 - 30 mg/dL 25 26 24   Creatinine      0.66 - 1.25 mg/dL 1.91 (H) 2.19 (H) 2.11 (H)   GFR Estimate      >60 mL/min/1.73:m2 44 (L) 37 (L) 39 (L)   GFR Estimate If Black    "   >60 mL/min/1.73:m2 51 (L) 43 (L) 45 (L)   Calcium      8.5 - 10.1 mg/dL 8.7 8.5 9.1   N-Terminal Pro Bnp      0 - 125 pg/mL  725 (H)          ASSESSMENT/PLAN:  1. History of tobacco abuse.   - Quit 3/2019. But restarted and is smoking 1/4 pack a day.   - We discussed that he needs to quit for his health     2.  Morbid obesity.  - Needs weight loss for his health    - Not interested in gastric bypass, but this would be beneficial to him      3. Severe obstructive sleep apnea.   - CPAP is recommended but he never picked up a mask     4. Renal insufficiency.   - Renal function worsened since 2019  - Lisinopril and spironolactone have been stopped and renal function remains in the similar range, creatinine 2.11 today    - I think his CKD is due to chronic HTN, and that ultimately better BPs will improve his renal function.  Will keep off nephrotoxins lisinopril and spironolactone for now.  Start hydralazine 25 mg TID.  Also send to nephrology for their evaluation and BP med recommmendations     5.  Trivial nonobstructive coronary artery disease.     - No anginal sxs.    - FLP 3/5/21: , HDL 44, ,    - On ASA, statin.       6.  Hypertensive cardiomyopathy.         HTN   - Echo 6/13/17: LVEF 30-35%, grade III diastolic dysfunction.    - Cor angio 6/25/17 without significant obstructive CAD.   - Echo 4/22/19: LVEF 50-55%, LVH still but improved.     - No CHF sxs.   - Currently on Coreg, Lasix, amlodipine. Lisinopril and spironolactone were stopped due to renal dysfunction.  Start hydralazine 25 mg TID.  See nephrology and hopefully can eventually add back lisinopril and/or spironolactone.   - Needs sleep apnea treated as noted above.   - Sodium restriction.   - Daily weights.   - Once his blood pressure is better controlled we should check his echocardiogram.        7.  Transient high-grade AV block during sleep, and on Coreg 50 mg BID. Decreased to 25 mg BID and no recurrence.   - Needs CPAP as  noted        Follow up:  Nephrology  Then see [facility] in 1 month with GALILEO Painter PA-C

## 2021-06-15 DIAGNOSIS — I50.22 CHRONIC SYSTOLIC CONGESTIVE HEART FAILURE (H): ICD-10-CM

## 2021-06-15 RX ORDER — FUROSEMIDE 20 MG
20 TABLET ORAL DAILY
Qty: 90 TABLET | Refills: 0 | Status: SHIPPED | OUTPATIENT
Start: 2021-06-15 | End: 2021-09-24

## 2021-07-06 ENCOUNTER — TELEPHONE (OUTPATIENT)
Dept: NEPHROLOGY | Facility: CLINIC | Age: 36
End: 2021-07-06

## 2021-07-06 DIAGNOSIS — N28.9 RENAL INSUFFICIENCY: Primary | ICD-10-CM

## 2021-07-06 NOTE — TELEPHONE ENCOUNTER
Attempted to contact pt.  No answer.  Message left to return call.    Calling to Offer Pt. a InClinic Appt with Dr. Munoz 7/28/21. (currently Pt is scheduled for a Video Visit.  Dr. Munoz is seeing pts in clinic this day.)    Sagrario Covarrubias LPN

## 2021-08-04 NOTE — TELEPHONE ENCOUNTER
RECORDS RECEIVED FROM: Internal   DATE RECEIVED: 09.29.2021   NOTES STATUS DETAILS   OFFICE NOTE from referring provider Internal 05.12.2021 Prem Painter PA-C   OFFICE NOTE from other specialist  N/A    *Only VASCULITIS or LUPUS gather office notes for the following N/A    *PULMONARY   N/A    *ENT N/A    *DERMATOLOGY N/A    *RHEUMATOLOGY N/A    DISCHARGE SUMMARY from hospital N/A    DISCHARGE REPORT from the ER N/A    MEDICATION LIST Internal    IMAGING  (NEED IMAGES AND REPORTS)     KIDNEY CT SCAN N/A    KIDNEY ULTRASOUND N/A    MR ABDOMEN N/A    NUCLEAR MEDICINE RENAL N/A    LABS     CBC Internal 06.15.2017   CMP Internal 06.15.2017   BMP Internal 05.12.2021   UA Future    URINE PROTEIN Future    RENAL PANEL Future    BIOPSY     KIDNEY BIOPSY  N/A

## 2021-09-20 ENCOUNTER — LAB (OUTPATIENT)
Dept: LAB | Facility: CLINIC | Age: 36
End: 2021-09-20
Payer: COMMERCIAL

## 2021-09-20 DIAGNOSIS — I10 ESSENTIAL HYPERTENSION: ICD-10-CM

## 2021-09-20 DIAGNOSIS — N18.32 STAGE 3B CHRONIC KIDNEY DISEASE (H): ICD-10-CM

## 2021-09-20 DIAGNOSIS — N28.9 RENAL INSUFFICIENCY: ICD-10-CM

## 2021-09-20 DIAGNOSIS — I50.22 CHRONIC SYSTOLIC CONGESTIVE HEART FAILURE (H): ICD-10-CM

## 2021-09-20 LAB
ALBUMIN SERPL-MCNC: 2.7 G/DL (ref 3.4–5)
ALBUMIN UR-MCNC: 300 MG/DL
ANION GAP SERPL CALCULATED.3IONS-SCNC: 7 MMOL/L (ref 3–14)
APPEARANCE UR: CLEAR
BACTERIA #/AREA URNS HPF: ABNORMAL /HPF
BILIRUB UR QL STRIP: NEGATIVE
BUN SERPL-MCNC: 27 MG/DL (ref 7–30)
CALCIUM SERPL-MCNC: 8.8 MG/DL (ref 8.5–10.1)
CHLORIDE BLD-SCNC: 103 MMOL/L (ref 94–109)
CO2 SERPL-SCNC: 27 MMOL/L (ref 20–32)
COLOR UR AUTO: ABNORMAL
CREAT SERPL-MCNC: 2.48 MG/DL (ref 0.66–1.25)
CREAT UR-MCNC: 108 MG/DL
CREAT UR-MCNC: 112 MG/DL
ERYTHROCYTE [DISTWIDTH] IN BLOOD BY AUTOMATED COUNT: 14.5 % (ref 10–15)
GFR SERPL CREATININE-BSD FRML MDRD: 32 ML/MIN/1.73M2
GLUCOSE BLD-MCNC: 173 MG/DL (ref 70–99)
GLUCOSE UR STRIP-MCNC: 150 MG/DL
HCT VFR BLD AUTO: 44.1 % (ref 40–53)
HGB BLD-MCNC: 13.9 G/DL (ref 13.3–17.7)
HGB UR QL STRIP: ABNORMAL
HYALINE CASTS: 52 /LPF
KETONES UR STRIP-MCNC: NEGATIVE MG/DL
LEUKOCYTE ESTERASE UR QL STRIP: NEGATIVE
MCH RBC QN AUTO: 27.6 PG (ref 26.5–33)
MCHC RBC AUTO-ENTMCNC: 31.5 G/DL (ref 31.5–36.5)
MCV RBC AUTO: 88 FL (ref 78–100)
MICROALBUMIN UR-MCNC: 5010 MG/L
MICROALBUMIN/CREAT UR: 4638.89 MG/G CR (ref 0–17)
MUCOUS THREADS #/AREA URNS LPF: PRESENT /LPF
NITRATE UR QL: NEGATIVE
PH UR STRIP: 6 [PH] (ref 5–7)
PHOSPHATE SERPL-MCNC: 4 MG/DL (ref 2.5–4.5)
PLATELET # BLD AUTO: 300 10E3/UL (ref 150–450)
POTASSIUM BLD-SCNC: 3.7 MMOL/L (ref 3.4–5.3)
PROT UR-MCNC: 7.36 G/L
PROT/CREAT 24H UR: 6.57 G/G CR (ref 0–0.2)
PTH-INTACT SERPL-MCNC: 175 PG/ML (ref 18–80)
RBC # BLD AUTO: 5.04 10E6/UL (ref 4.4–5.9)
RBC URINE: 3 /HPF
SODIUM SERPL-SCNC: 137 MMOL/L (ref 133–144)
SP GR UR STRIP: 1.02 (ref 1–1.03)
SQUAMOUS EPITHELIAL: <1 /HPF
UROBILINOGEN UR STRIP-MCNC: NORMAL MG/DL
WBC # BLD AUTO: 13 10E3/UL (ref 4–11)
WBC URINE: 5 /HPF

## 2021-09-20 PROCEDURE — 85027 COMPLETE CBC AUTOMATED: CPT

## 2021-09-20 PROCEDURE — 82040 ASSAY OF SERUM ALBUMIN: CPT

## 2021-09-20 PROCEDURE — 82306 VITAMIN D 25 HYDROXY: CPT

## 2021-09-20 PROCEDURE — 83970 ASSAY OF PARATHORMONE: CPT

## 2021-09-20 PROCEDURE — 36415 COLL VENOUS BLD VENIPUNCTURE: CPT

## 2021-09-20 PROCEDURE — 82043 UR ALBUMIN QUANTITATIVE: CPT

## 2021-09-20 PROCEDURE — 81001 URINALYSIS AUTO W/SCOPE: CPT

## 2021-09-20 PROCEDURE — 84156 ASSAY OF PROTEIN URINE: CPT

## 2021-09-21 LAB — DEPRECATED CALCIDIOL+CALCIFEROL SERPL-MC: 19 UG/L (ref 20–75)

## 2021-09-24 DIAGNOSIS — I10 ESSENTIAL HYPERTENSION: ICD-10-CM

## 2021-09-24 DIAGNOSIS — I50.22 CHRONIC SYSTOLIC CONGESTIVE HEART FAILURE (H): ICD-10-CM

## 2021-09-24 RX ORDER — FUROSEMIDE 20 MG
20 TABLET ORAL DAILY
Qty: 30 TABLET | Refills: 0 | Status: SHIPPED | OUTPATIENT
Start: 2021-09-24 | End: 2021-09-29

## 2021-09-24 RX ORDER — HYDRALAZINE HYDROCHLORIDE 25 MG/1
25 TABLET, FILM COATED ORAL 3 TIMES DAILY
Qty: 90 TABLET | Refills: 0 | Status: SHIPPED | OUTPATIENT
Start: 2021-09-24 | End: 2021-09-29

## 2021-09-24 NOTE — TELEPHONE ENCOUNTER
Received refill request for:  Hydralazine and furosemide  Last OV was: 5/12/21 with RENAY Vazquez  Labs/EKG: last Renal panel 9/20/2021  F/U scheduled: 10/6/2021 with RENAY Vazquez  New script sent to: [facility]  **Creatinine elevated to 2.48 from 2.11 on 5/12.  Reviewed with RENAY Vazquez, ok to refill 30 day supply.  Pt scheduled for follow up with Nephrology on 9/29/2021.  KMortimer RN

## 2021-09-27 ENCOUNTER — TELEPHONE (OUTPATIENT)
Dept: CARDIOLOGY | Facility: CLINIC | Age: 36
End: 2021-09-27

## 2021-09-27 NOTE — TELEPHONE ENCOUNTER
Message left for Javon reminding him of neph appt, 9/29. Also to call back with update.     Future Appointments   Date Time Provider Department Center   9/29/2021  3:00 PM Val Hughes MD [facility]   10/6/2021  8:10 AM Prem Painter PA-C [facility] [place] [facility]     Donna Sandoval, RN 1:35 PM 09/27/21

## 2021-09-27 NOTE — TELEPHONE ENCOUNTER
----- Message from Prem Painter PA-C sent at 9/23/2021  1:17 PM CDT -----  Results reviewed.   Seeing nephrology 9/29.   Can we make sure to have those notes when I see him in early Oct?  They should be in the chart as I think it is a [facility] provider.   Prem Painter PA-C 9/23/2021 1:17 PM

## 2021-09-29 ENCOUNTER — VIRTUAL VISIT (OUTPATIENT)
Dept: NEPHROLOGY | Facility: CLINIC | Age: 36
End: 2021-09-29
Attending: INTERNAL MEDICINE
Payer: COMMERCIAL

## 2021-09-29 ENCOUNTER — PRE VISIT (OUTPATIENT)
Dept: NEPHROLOGY | Facility: CLINIC | Age: 36
End: 2021-09-29

## 2021-09-29 DIAGNOSIS — I10 ESSENTIAL HYPERTENSION: ICD-10-CM

## 2021-09-29 DIAGNOSIS — E55.9 VITAMIN D DEFICIENCY: ICD-10-CM

## 2021-09-29 DIAGNOSIS — I50.22 CHRONIC SYSTOLIC CONGESTIVE HEART FAILURE (H): ICD-10-CM

## 2021-09-29 DIAGNOSIS — R82.90 ABNORMAL URINE SEDIMENT: ICD-10-CM

## 2021-09-29 DIAGNOSIS — R73.09 ELEVATED GLUCOSE: ICD-10-CM

## 2021-09-29 DIAGNOSIS — R80.9 PROTEINURIA, UNSPECIFIED TYPE: ICD-10-CM

## 2021-09-29 DIAGNOSIS — N18.32 STAGE 3B CHRONIC KIDNEY DISEASE (H): Primary | ICD-10-CM

## 2021-09-29 PROBLEM — N18.30 CHRONIC KIDNEY DISEASE, STAGE 3 (H): Status: ACTIVE | Noted: 2021-09-29

## 2021-09-29 PROCEDURE — 99205 OFFICE O/P NEW HI 60 MIN: CPT | Mod: 95 | Performed by: INTERNAL MEDICINE

## 2021-09-29 RX ORDER — FUROSEMIDE 40 MG
40 TABLET ORAL DAILY
Qty: 90 TABLET | Refills: 1 | Status: ON HOLD | OUTPATIENT
Start: 2021-09-29 | End: 2022-12-02

## 2021-09-29 RX ORDER — HYDRALAZINE HYDROCHLORIDE 50 MG/1
50 TABLET, FILM COATED ORAL 3 TIMES DAILY
Qty: 270 TABLET | Refills: 1 | Status: ON HOLD | OUTPATIENT
Start: 2021-09-29 | End: 2022-12-02

## 2021-09-29 RX ORDER — CHOLECALCIFEROL (VITAMIN D3) 50 MCG
1 TABLET ORAL DAILY
Qty: 90 TABLET | Refills: 1 | Status: SHIPPED | OUTPATIENT
Start: 2021-09-29 | End: 2022-12-22

## 2021-09-29 ASSESSMENT — PAIN SCALES - GENERAL: PAINLEVEL: NO PAIN (0)

## 2021-09-29 NOTE — PROGRESS NOTES
Javon is a 36 year old who is being evaluated via a billable video visit.      How would you like to obtain your AVS? MyChart  If the video visit is dropped, the invitation should be resent by: Text to cell phone: [phone]  Will anyone else be joining your video visit? No      Video-Visit Details    Type of service:  Video Visit    Video End Time: 3:10 PM     Video Start Time: 4:16 PM    Originating Location (pt. Location): Home    Distant Location (provider location):  [facility] [place]     Platform used for Video Visit: [employer]    September 29, 2021    Assessment/Plan:  CKD3b  Nephrotic range proteinuria  Cr was noted to be 0.8 in 2012 in [facility], then no data until 2017 when this was up to 1.5 and now up to ~2.5 on most recent check.   CKD likely in the setting of uncontrolled long-standing HTN (patient has had elevated BP as far back as 2010 per review of [facility]) with likely contribution from chronic NSAID use as well but patient has nephrotic range proteinuria and borderline hematuria on UA which suggests a GN process that needs further investigation.  Will start with serologic workup and also obtain renal US but may well need a kidney biopsy to reach a conclusive diagnosis and discussed this with patient who was agreeable.  Given body habitus will likely need to be performed by IR.    Orders Placed This Encounter   Procedures     US Renal Complete w Doppler Complete     HIV Antigen Antibody Combo     Hepatitis C antibody     Hepatitis B Surface Antibody     Hepatitis B surface antigen     Treponema Abs w Reflex to RPR and Titer     Happy Inspector; anti-PLA2R (Laboratory Miscellaneous Order)     DNA double stranded antibodies     Anti Nuclear Marleny IgG by IFA with Reflex     Complement C3     Complement C4     Hemoglobin A1c     - Will message clinic RN to arrange kidney biopsy per IR  - Will need to hold aspirin 5 days prior to and at least 5 days after  procedure depending on how the procedure goes    HTN, uncontrolled  Likely driven in part by uncontrolled MAURI, smoking. No obvious persistent electrolyte abnormality and syndrome not episodic which argues against a hormonal cause.  - Increase hydralazine to 50 mg TID  - Increase lasix to 40 mg daily  - Renal US with Doppler  - Will have RN check in with patient in 2 wks and likely up-titrate meds further  - Encouraged patient to quit smoking  - Encouraged patient to continue to follow with sleep specialist to address MAURI and CPAP use    Vit D deficiency  Start 2000 units daily    Follow-up 4 weeks    Discussed with Dr Cha Hughes MD  Renal Fellow  [phone]    I was asked to see this patient by Prem Painter PA-C    HPI: Brooks Mensah is a 36 year old male who presents for evaluation of Cr elevation and proteinuria. PMH includes HFpEF, MAURI not on CPAP, morbid obesity, current smoker, HTN at least since 2010.     Patient notes increasing frequency in the last few months and has been drinking much more water recently. Denies dysuria, hematuria, fevers/chills, SOB, chest pain, leg swelling, rashes, joint pain/stiffness/swelling. Does have FOSS while going up a flight of stairs. Also notes RLE swelling which comes and goes, usually develops by the end of the day and by the next day is usually gone. Energy is okay and appetite is good. Does miss doses on occasion, for example in the last week would have missed 3 doses.    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 Current daily smoker, occasional EtOH use usually twice a month.    No Known Allergies    amLODIPine  (NORVASC) 10 MG tablet, Take 1 tablet (10 mg) by mouth daily  aspirin EC 81 MG EC tablet, Take 1 tablet (81 mg) by mouth daily (Patient taking differently: Take 81 mg by mouth When remembers, approx x2 doses a week)  atorvastatin (LIPITOR) 10 MG tablet, Take 1 tablet (10 mg) by mouth every evening  carvedilol (COREG) 25 MG tablet, Take 1 tablet (25 mg) by mouth 2 times daily (with meals)  furosemide (LASIX) 20 MG tablet, Take 1 tablet (20 mg) by mouth daily  hydrALAZINE (APRESOLINE) 25 MG tablet, Take 1 tablet (25 mg) by mouth 3 times daily    No current facility-administered medications on file prior to visit.    Past Medical History:   Diagnosis Date     Acute on chronic systolic congestive heart failure (H) 8/9/2017     CAD (coronary artery disease)     Nonobstructive     Cardiomyopathy, unspecified (H) 8/9/2017     Congestive heart failure, unspecified congestive heart failure chronicity, unspecified congestive heart failure type 6/13/2017     HTN (hypertension), malignant      Hypertensive urgency 6/13/2017     Morbid obesity with BMI of 50.0-59.9, adult (H) 6/21/2017     NSTEMI (non-ST elevated myocardial infarction) (H) 6/13/2017     Renal insufficiency 6/13/2017     Sleep apnea      Tobacco abuse        No past surgical history on file.    Social History     Tobacco Use     Smoking status: Current Every Day Smoker     Packs/day: 0.25     Types: Cigarettes     Start date: 2002     Smokeless tobacco: Never Used   Substance Use Topics     Alcohol use: Yes     Comment: occassional     Drug use: No       Family History   Problem Relation Age of Onset     Hyperlipidemia Mother      Hyperlipidemia Father      ROS: A 12 point review of systems was negative other than noted above.    Exam:  There were no vitals taken for this visit.    GENERAL APPEARANCE: NAD  EYES:  No scleral icterus, EOMI  PULM: breathing non-labored, speaking in full sentences  NEURO: AOx3, speech normal  Psych: Affect normal    Results:   No  visits with results within 7 Day(s) from this visit.   Latest known visit with results is:   Lab on 09/20/2021   Component Date Value Ref Range Status     Vitamin D, Total (25-Hydroxy) 09/20/2021 19* 20 - 75 ug/L Final     Sodium 09/20/2021 137  133 - 144 mmol/L Final     Potassium 09/20/2021 3.7  3.4 - 5.3 mmol/L Final     Chloride 09/20/2021 103  94 - 109 mmol/L Final     Carbon Dioxide (CO2) 09/20/2021 27  20 - 32 mmol/L Final     Anion Gap 09/20/2021 7  3 - 14 mmol/L Final     Urea Nitrogen 09/20/2021 27  7 - 30 mg/dL Final     Creatinine 09/20/2021 2.48* 0.66 - 1.25 mg/dL Final     Calcium 09/20/2021 8.8  8.5 - 10.1 mg/dL Final     Glucose 09/20/2021 173* 70 - 99 mg/dL Final     Albumin 09/20/2021 2.7* 3.4 - 5.0 g/dL Final     Phosphorus 09/20/2021 4.0  2.5 - 4.5 mg/dL Final     GFR Estimate 09/20/2021 32* >60 mL/min/1.73m2 Final    As of July 11, 2021, eGFR is calculated by the CKD-EPI creatinine equation, without race adjustment. eGFR can be influenced by muscle mass, exercise, and diet. The reported eGFR is an estimation only and is only applicable if the renal function is stable.     Parathyroid Hormone Intact 09/20/2021 175* 18 - 80 pg/mL Final     WBC Count 09/20/2021 13.0* 4.0 - 11.0 10e3/uL Final     RBC Count 09/20/2021 5.04  4.40 - 5.90 10e6/uL Final     Hemoglobin 09/20/2021 13.9  13.3 - 17.7 g/dL Final     Hematocrit 09/20/2021 44.1  40.0 - 53.0 % Final     MCV 09/20/2021 88  78 - 100 fL Final     MCH 09/20/2021 27.6  26.5 - 33.0 pg Final     MCHC 09/20/2021 31.5  31.5 - 36.5 g/dL Final     RDW 09/20/2021 14.5  10.0 - 15.0 % Final     Platelet Count 09/20/2021 300  150 - 450 10e3/uL Final     Color Urine 09/20/2021 Light Yellow  Colorless, Straw, Light Yellow, Yellow Final     Appearance Urine 09/20/2021 Clear  Clear Final     Glucose Urine 09/20/2021 150 * Negative mg/dL Final     Bilirubin Urine 09/20/2021 Negative  Negative Final     Ketones Urine 09/20/2021 Negative  Negative mg/dL Final      Specific Gravity Urine 09/20/2021 1.017  1.003 - 1.035 Final     Blood Urine 09/20/2021 Moderate* Negative Final     pH Urine 09/20/2021 6.0  5.0 - 7.0 Final     Protein Albumin Urine 09/20/2021 300 * Negative mg/dL Final     Urobilinogen Urine 09/20/2021 Normal  Normal, 2.0 mg/dL Final     Nitrite Urine 09/20/2021 Negative  Negative Final     Leukocyte Esterase Urine 09/20/2021 Negative  Negative Final     Bacteria Urine 09/20/2021 Few* None Seen /HPF Final     Mucus Urine 09/20/2021 Present* None Seen /LPF Final     RBC Urine 09/20/2021 3* <=2 /HPF Final     WBC Urine 09/20/2021 5  <=5 /HPF Final     Squamous Epithelials Urine 09/20/2021 <1  <=1 /HPF Final     Hyaline Casts Urine 09/20/2021 52* <=2 /LPF Final     Total Protein Random Urine g/L 09/20/2021 7.36  g/L Final    The reference range has not been established for total protein in random urine samples.  The result should be integrated into the clinical context for interpretation.     Total Protein Urine g/gr Creatinine 09/20/2021 6.57* 0.00 - 0.20 g/g Cr Final     Creatinine Urine mg/dL 09/20/2021 112  mg/dL Final     Creatinine Urine mg/dL 09/20/2021 108  mg/dL Final     Albumin Urine mg/L 09/20/2021 5,010  mg/L Final     Albumin Urine mg/g Cr 09/20/2021 4,638.89* 0.00 - 17.00 mg/g Cr Final

## 2021-09-29 NOTE — LETTER
9/29/2021       RE: Brooks Mensah  [address] [facility] [address]     Dear Colleague,    Thank you for referring your patient, Brooks Mensah, to the [facility] [place] at [facility]. Please see a copy of my visit note below.    Javon is a 36 year old who is being evaluated via a billable video visit.      How would you like to obtain your AVS? MyChart  If the video visit is dropped, the invitation should be resent by: Text to cell phone: [phone]  Will anyone else be joining your video visit? No      Video-Visit Details    Type of service:  Video Visit    Video End Time: 3:10 PM     Video Start Time: 4:16 PM    Originating Location (pt. Location): Home    Distant Location (provider location):  [facility] [place]     Platform used for Video Visit: Seedf[employer]    September 29, 2021    Assessment/Plan:  CKD3b  Nephrotic range proteinuria  Cr was noted to be 0.8 in 2012 in [facility], then no data until 2017 when this was up to 1.5 and now up to ~2.5 on most recent check.   CKD likely in the setting of uncontrolled long-standing HTN (patient has had elevated BP as far back as 2010 per review of [facility]) with likely contribution from chronic NSAID use as well but patient has nephrotic range proteinuria and borderline hematuria on UA which suggests a GN process that needs further investigation.  Will start with serologic workup and also obtain renal US but may well need a kidney biopsy to reach a conclusive diagnosis and discussed this with patient who was agreeable.  Given body habitus will likely need to be performed by IR.    Orders Placed This Encounter   Procedures     US Renal Complete w Doppler Complete     HIV Antigen Antibody Combo     Hepatitis C antibody     Hepatitis B Surface Antibody     Hepatitis B surface antigen     Treponema Abs w Reflex to RPR and Titer     Sellvana;  anti-PLA2R (Laboratory Miscellaneous Order)     DNA double stranded antibodies     Anti Nuclear Marleny IgG by IFA with Reflex     Complement C3     Complement C4     Hemoglobin A1c     - Will message clinic RN to arrange kidney biopsy per IR  - Will need to hold aspirin 5 days prior to and at least 5 days after procedure depending on how the procedure goes    HTN, uncontrolled  Likely driven in part by uncontrolled MAURI, smoking. No obvious persistent electrolyte abnormality and syndrome not episodic which argues against a hormonal cause.  - Increase hydralazine to 50 mg TID  - Increase lasix to 40 mg daily  - Renal US with Doppler  - Will have RN check in with patient in 2 wks and likely up-titrate meds further  - Encouraged patient to quit smoking  - Encouraged patient to continue to follow with sleep specialist to address MAURI and CPAP use    Vit D deficiency  Start 2000 units daily    Follow-up 4 weeks    Discussed with Dr Cha Hughes MD  Renal Fellow  [phone]    I was asked to see this patient by Prem Painter PA-C    HPI: Brooks Mensah is a 36 year old male who presents for evaluation of Cr elevation and proteinuria. PMH includes HFpEF, MAURI not on CPAP, morbid obesity, current smoker, HTN at least since 2010.     Patient notes increasing frequency in the last few months and has been drinking much more water recently. Denies dysuria, hematuria, fevers/chills, SOB, chest pain, leg swelling, rashes, joint pain/stiffness/swelling. Does have FOSS while going up a flight of stairs. Also notes RLE swelling which comes and goes, usually develops by the end of the day and by the next day is usually gone. Energy is okay and appetite is good. Does miss doses on occasion, for example in the last week would have missed 3 doses.    Home BP: 190-200/90-100s    Kidney stones: none  Recurrent UTI: none  FMH: no h/o renal stones, cysts, or kidney disease otherwise, no one on dialysis  NSAID use: used to take Advil  3-4 pills every night until about a month ago, took consistently for about 3-4 months, previously was taking only occasionally  SH: Works for an [employer] company and works at a restaurant on the weekends. Current daily smoker, occasional EtOH use usually twice a month.    No Known Allergies    amLODIPine (NORVASC) 10 MG tablet, Take 1 tablet (10 mg) by mouth daily  aspirin EC 81 MG EC tablet, Take 1 tablet (81 mg) by mouth daily (Patient taking differently: Take 81 mg by mouth When remembers, approx x2 doses a week)  atorvastatin (LIPITOR) 10 MG tablet, Take 1 tablet (10 mg) by mouth every evening  carvedilol (COREG) 25 MG tablet, Take 1 tablet (25 mg) by mouth 2 times daily (with meals)  furosemide (LASIX) 20 MG tablet, Take 1 tablet (20 mg) by mouth daily  hydrALAZINE (APRESOLINE) 25 MG tablet, Take 1 tablet (25 mg) by mouth 3 times daily    No current facility-administered medications on file prior to visit.    Past Medical History:   Diagnosis Date     Acute on chronic systolic congestive heart failure (H) 8/9/2017     CAD (coronary artery disease)     Nonobstructive     Cardiomyopathy, unspecified (H) 8/9/2017     Congestive heart failure, unspecified congestive heart failure chronicity, unspecified congestive heart failure type 6/13/2017     HTN (hypertension), malignant      Hypertensive urgency 6/13/2017     Morbid obesity with BMI of 50.0-59.9, adult (H) 6/21/2017     NSTEMI (non-ST elevated myocardial infarction) (H) 6/13/2017     Renal insufficiency 6/13/2017     Sleep apnea      Tobacco abuse        No past surgical history on file.    Social History     Tobacco Use     Smoking status: Current Every Day Smoker     Packs/day: 0.25     Types: Cigarettes     Start date: 2002     Smokeless tobacco: Never Used   Substance Use Topics     Alcohol use: Yes     Comment: occassional     Drug use: No       Family History   Problem Relation Age of Onset     Hyperlipidemia Mother      Hyperlipidemia Father      ROS:  A 12 point review of systems was negative other than noted above.    Exam:  There were no vitals taken for this visit.    GENERAL APPEARANCE: NAD  EYES:  No scleral icterus, EOMI  PULM: breathing non-labored, speaking in full sentences  NEURO: AOx3, speech normal  Psych: Affect normal    Results:   No visits with results within 7 Day(s) from this visit.   Latest known visit with results is:   Lab on 09/20/2021   Component Date Value Ref Range Status     Vitamin D, Total (25-Hydroxy) 09/20/2021 19* 20 - 75 ug/L Final     Sodium 09/20/2021 137  133 - 144 mmol/L Final     Potassium 09/20/2021 3.7  3.4 - 5.3 mmol/L Final     Chloride 09/20/2021 103  94 - 109 mmol/L Final     Carbon Dioxide (CO2) 09/20/2021 27  20 - 32 mmol/L Final     Anion Gap 09/20/2021 7  3 - 14 mmol/L Final     Urea Nitrogen 09/20/2021 27  7 - 30 mg/dL Final     Creatinine 09/20/2021 2.48* 0.66 - 1.25 mg/dL Final     Calcium 09/20/2021 8.8  8.5 - 10.1 mg/dL Final     Glucose 09/20/2021 173* 70 - 99 mg/dL Final     Albumin 09/20/2021 2.7* 3.4 - 5.0 g/dL Final     Phosphorus 09/20/2021 4.0  2.5 - 4.5 mg/dL Final     GFR Estimate 09/20/2021 32* >60 mL/min/1.73m2 Final    As of July 11, 2021, eGFR is calculated by the CKD-EPI creatinine equation, without race adjustment. eGFR can be influenced by muscle mass, exercise, and diet. The reported eGFR is an estimation only and is only applicable if the renal function is stable.     Parathyroid Hormone Intact 09/20/2021 175* 18 - 80 pg/mL Final     WBC Count 09/20/2021 13.0* 4.0 - 11.0 10e3/uL Final     RBC Count 09/20/2021 5.04  4.40 - 5.90 10e6/uL Final     Hemoglobin 09/20/2021 13.9  13.3 - 17.7 g/dL Final     Hematocrit 09/20/2021 44.1  40.0 - 53.0 % Final     MCV 09/20/2021 88  78 - 100 fL Final     MCH 09/20/2021 27.6  26.5 - 33.0 pg Final     MCHC 09/20/2021 31.5  31.5 - 36.5 g/dL Final     RDW 09/20/2021 14.5  10.0 - 15.0 % Final     Platelet Count 09/20/2021 300  150 - 450 10e3/uL Final     Color  Urine 09/20/2021 Light Yellow  Colorless, Straw, Light Yellow, Yellow Final     Appearance Urine 09/20/2021 Clear  Clear Final     Glucose Urine 09/20/2021 150 * Negative mg/dL Final     Bilirubin Urine 09/20/2021 Negative  Negative Final     Ketones Urine 09/20/2021 Negative  Negative mg/dL Final     Specific Gravity Urine 09/20/2021 1.017  1.003 - 1.035 Final     Blood Urine 09/20/2021 Moderate* Negative Final     pH Urine 09/20/2021 6.0  5.0 - 7.0 Final     Protein Albumin Urine 09/20/2021 300 * Negative mg/dL Final     Urobilinogen Urine 09/20/2021 Normal  Normal, 2.0 mg/dL Final     Nitrite Urine 09/20/2021 Negative  Negative Final     Leukocyte Esterase Urine 09/20/2021 Negative  Negative Final     Bacteria Urine 09/20/2021 Few* None Seen /HPF Final     Mucus Urine 09/20/2021 Present* None Seen /LPF Final     RBC Urine 09/20/2021 3* <=2 /HPF Final     WBC Urine 09/20/2021 5  <=5 /HPF Final     Squamous Epithelials Urine 09/20/2021 <1  <=1 /HPF Final     Hyaline Casts Urine 09/20/2021 52* <=2 /LPF Final     Total Protein Random Urine g/L 09/20/2021 7.36  g/L Final    The reference range has not been established for total protein in random urine samples.  The result should be integrated into the clinical context for interpretation.     Total Protein Urine g/gr Creatinine 09/20/2021 6.57* 0.00 - 0.20 g/g Cr Final     Creatinine Urine mg/dL 09/20/2021 112  mg/dL Final     Creatinine Urine mg/dL 09/20/2021 108  mg/dL Final     Albumin Urine mg/L 09/20/2021 5,010  mg/L Final     Albumin Urine mg/g Cr 09/20/2021 4,638.89* 0.00 - 17.00 mg/g Cr Final       Attestation signed by Jewels Eubanks MD at 10/27/2021  4:35 PM:  Attestation:  Brooks Mensah has been evaluated and examined by Jewels pacheco MD.  Discussed with the fellow or resident and agree with the findings and plan in this note.  I have reviewed Medications, Vital Signs, and Labs as well as provider notes.    Date of Service (when I saw the  patient): 9/29/2021  I was on video from 4:10 pm-4:16 pm    Jewels Eubanks MD  [facility]  Department of Medicine  Division of Renal Disease and Hypertension  [facility]  [employer] Web Console      Again, thank you for allowing me to participate in the care of your patient.      Sincerely,    Val Hughes MD

## 2021-10-06 ENCOUNTER — TELEPHONE (OUTPATIENT)
Dept: NEPHROLOGY | Facility: CLINIC | Age: 36
End: 2021-10-06

## 2021-10-08 DIAGNOSIS — N18.32 STAGE 3B CHRONIC KIDNEY DISEASE (H): Primary | ICD-10-CM

## 2021-10-08 DIAGNOSIS — R80.9 PROTEINURIA, UNSPECIFIED TYPE: ICD-10-CM

## 2021-10-08 NOTE — PROGRESS NOTES
Outpatient IR Biopsy Referral    Patient is a 37 y/o male with a PMH of morbid obesity, tobacco use since 2002, HTN, CHF, NSTEMI, MAURI not on CPAP, CKD probably due to longstanding HTN 2010 and chronic NSAID usage. IR has been asked to biopsy a kidney for proteinuria and elevated creatinine level. Creatinine 2.48 9/20/21 prior creatinine 0.8 in 2012, 1.5 in 2017 per care everywhere.  Last documented weight in Epic is 389 lbs 5/12/21.     Renal US ordered not scheduled. I have reached out to referring team to assist with scheduling renal US prior to scheduling random renal biopsy.     10/19/21 Referring team has made multiple attempts to contact patient to coordinate US. IR asks referring team to reach out once US has been schedule to expedite review for appropriate scheduling.     ASA will need to be held for 5 days prior to scheduled biopsy.     Procedure order and surgical pathology order placed.    If requesting team would like sample sent for anything else please enter prior to scheduled procedure.     Primary team Dr. Hughes made aware of IR recommendations via epic messaging.     YUNIER Reyes CNP  Interventional Radiology   IR on-call pager: [phone]

## 2021-10-08 NOTE — PROGRESS NOTES
Kidney biopsy ordered per Dr. Hughes to go through IR for proteinuria and elevated Cr.   Is on aspirin.    Await to hear from IR      Nadira Lewis LPN  Nephrology  [phone]

## 2021-10-19 ENCOUNTER — TELEPHONE (OUTPATIENT)
Dept: NEPHROLOGY | Facility: CLINIC | Age: 36
End: 2021-10-19

## 2021-10-19 NOTE — TELEPHONE ENCOUNTER
Attempted to call patient regarding US scheduling needs to proceed with kidney biopsy. My chart also sent to message who hasnt read it yet. Unable to leave message for patient at this time.update to Dr. Hughes and Dr. Eubanks.  Nadira Lewis, KEVIN  Nephrology  [phone]

## 2021-10-23 ENCOUNTER — HEALTH MAINTENANCE LETTER (OUTPATIENT)
Age: 36
End: 2021-10-23

## 2022-02-04 ENCOUNTER — VIRTUAL VISIT (OUTPATIENT)
Dept: SLEEP MEDICINE | Facility: CLINIC | Age: 37
End: 2022-02-04
Payer: COMMERCIAL

## 2022-02-04 VITALS — WEIGHT: 315 LBS | HEIGHT: 71 IN | BODY MASS INDEX: 44.1 KG/M2

## 2022-02-04 DIAGNOSIS — G47.10 HYPERSOMNIA: ICD-10-CM

## 2022-02-04 DIAGNOSIS — G47.33 OBSTRUCTIVE SLEEP APNEA: Primary | ICD-10-CM

## 2022-02-04 PROCEDURE — 99213 OFFICE O/P EST LOW 20 MIN: CPT | Mod: 95 | Performed by: INTERNAL MEDICINE

## 2022-02-04 ASSESSMENT — SLEEP AND FATIGUE QUESTIONNAIRES
HOW LIKELY ARE YOU TO NOD OFF OR FALL ASLEEP WHILE SITTING AND TALKING TO SOMEONE: WOULD NEVER DOZE
HOW LIKELY ARE YOU TO NOD OFF OR FALL ASLEEP WHILE SITTING AND READING: MODERATE CHANCE OF DOZING
HOW LIKELY ARE YOU TO NOD OFF OR FALL ASLEEP WHILE WATCHING TV: HIGH CHANCE OF DOZING
HOW LIKELY ARE YOU TO NOD OFF OR FALL ASLEEP WHILE SITTING QUIETLY AFTER LUNCH WITHOUT ALCOHOL: MODERATE CHANCE OF DOZING
HOW LIKELY ARE YOU TO NOD OFF OR FALL ASLEEP IN A CAR, WHILE STOPPED FOR A FEW MINUTES IN TRAFFIC: HIGH CHANCE OF DOZING
HOW LIKELY ARE YOU TO NOD OFF OR FALL ASLEEP WHILE LYING DOWN TO REST IN THE AFTERNOON WHEN CIRCUMSTANCES PERMIT: SLIGHT CHANCE OF DOZING
HOW LIKELY ARE YOU TO NOD OFF OR FALL ASLEEP WHEN YOU ARE A PASSENGER IN A CAR FOR AN HOUR WITHOUT A BREAK: HIGH CHANCE OF DOZING
HOW LIKELY ARE YOU TO NOD OFF OR FALL ASLEEP WHILE SITTING INACTIVE IN A PUBLIC PLACE: MODERATE CHANCE OF DOZING

## 2022-02-04 ASSESSMENT — MIFFLIN-ST. JEOR: SCORE: 2743.84

## 2022-02-04 NOTE — PROGRESS NOTES
"No BP to report allergies and meds checked upon e jaci Alejandro is a 36 year old who is being evaluated via a billable video visit.      How would you like to obtain your AVS? MyChart  If the video visit is dropped, the invitation should be resent by: Text to cell phone: [phone]  Will anyone else be joining your video visit? No    The patient has been notified of following:      \"This telephone visit will be conducted via a call between you and your physician/provider. We have found that certain health care needs can be provided without the need for a physical exam.  This service lets us provide the care you need with a short phone conversation.  If a prescription is necessary we can send it directly to your pharmacy.  If lab work is needed we can place an order for that and you can then stop by our lab to have the test done at a later time.     Telephone visits are billed at different rates depending on your insurance coverage. During this emergency period, for some insurers they may be billed the same as an in-person visit.  Please reach out to your insurance provider with any questions.     If during the course of the call the physician/provider feels a telephone visit is not appropriate, you will not be charged for this service.\"     Patient has given verbal consent to a Telephone visit? Yes     Telephone Visit Details:     Telephone Visit Start Time: 10:09 AM    Telephone Visit End Time:10:21 AM    Video was not available for this visit.    Thank you for the opportunity to participate in the care of Brooks Mensah.     He is a 36 year old y/o male patient who comes to the sleep medicine clinic for follow up.  The patient was diagnosed with MAURI on 05/13/19(YMU=460.7). The patient never got started on CPAP. As a result, he stilled suffered from excessive daytime sleepiness. He would like to re-establish care to get a CPAP machine.     Assessment and Plan:  In summary Brooks Mensah is a 36 year old year old " male who is here for follow up.    1. Obstructive sleep apnea  Since we already have a copy of the patient's sleep study in our charts, there is no need to repeat the sleep study. I will write a prescription for the patient to receive a new CPAP machine.  - COMPREHENSIVE DME    2. Hypersomnia  Worsening.     Sleep-Wake Cycle:    The patient likes to initiate sleep at around 9PM to 1 AM with a sleep latency of more than 20 minutes. The patient has 4-6 nocturnal awakenings. Final wake up time is around 6:30-7 AM.    FREYA:  FREYA Total Score: 18  Total score - [place]: 16 (2/4/2022  7:48 AM)        Patient Active Problem List   Diagnosis     Congestive heart failure, unspecified congestive heart failure chronicity, unspecified congestive heart failure type     Hypertensive emergency     Hypertensive urgency     Renal insufficiency     NSTEMI (non-ST elevated myocardial infarction) (H)     Morbid obesity with BMI of 50.0-59.9, adult (H)     Acute on chronic systolic congestive heart failure (H)     Cardiomyopathy, unspecified (H)     Chronic kidney disease, stage 3       Past Medical History:   Diagnosis Date     Acute on chronic systolic congestive heart failure (H) 8/9/2017     CAD (coronary artery disease)     Nonobstructive     Cardiomyopathy, unspecified (H) 8/9/2017     Congestive heart failure, unspecified congestive heart failure chronicity, unspecified congestive heart failure type 6/13/2017     HTN (hypertension), malignant      Hypertensive urgency 6/13/2017     Morbid obesity with BMI of 50.0-59.9, adult (H) 6/21/2017     NSTEMI (non-ST elevated myocardial infarction) (H) 6/13/2017     Renal insufficiency 6/13/2017     Sleep apnea      Tobacco abuse        History reviewed. No pertinent surgical history.    Social History     Socioeconomic History     Marital status: Single     Spouse name: Not on file     Number of children: Not on file     Years of education: Not on file     Highest education level: Not on  file   Occupational History     Not on file   Tobacco Use     Smoking status: Current Every Day Smoker     Packs/day: 0.25     Types: Cigarettes     Start date: 2002     Smokeless tobacco: Never Used   Substance and Sexual Activity     Alcohol use: Yes     Comment: occassional     Drug use: No     Sexual activity: Not on file   Other Topics Concern     Parent/sibling w/ CABG, MI or angioplasty before 65F 55M? Not Asked   Social History Narrative     Not on file     Social Determinants of Health     Financial Resource Strain: Not on file   Food Insecurity: Not on file   Transportation Needs: Not on file   Physical Activity: Not on file   Stress: Not on file   Social Connections: Not on file   Intimate Partner Violence: Not on file   Housing Stability: Not on file       Current Outpatient Medications   Medication Sig Dispense Refill     amLODIPine (NORVASC) 10 MG tablet Take 1 tablet (10 mg) by mouth daily 90 tablet 3     aspirin EC 81 MG EC tablet Take 1 tablet (81 mg) by mouth daily (Patient taking differently: Take 81 mg by mouth When remembers, approx x2 doses a week) 30 tablet 3     atorvastatin (LIPITOR) 10 MG tablet Take 1 tablet (10 mg) by mouth every evening 90 tablet 3     carvedilol (COREG) 25 MG tablet Take 1 tablet (25 mg) by mouth 2 times daily (with meals) 180 tablet 3     furosemide (LASIX) 40 MG tablet Take 1 tablet (40 mg) by mouth daily 90 tablet 1     hydrALAZINE (APRESOLINE) 50 MG tablet Take 1 tablet (50 mg) by mouth 3 times daily 270 tablet 1     vitamin D3 (CHOLECALCIFEROL) 50 mcg (2000 units) tablet Take 1 tablet (50 mcg) by mouth daily 90 tablet 1       No Known Allergies    Labs/Studies:      Lab Results   Component Value Date    PH 7.40 05/13/2019    PO2 65 (L) 05/13/2019    PCO2 47 (H) 05/13/2019    HCO3 29 (H) 05/13/2019    ABHISHEK 3.1 05/13/2019     No results found for: TSH  Lab Results   Component Value Date     (H) 09/20/2021     (H) 05/12/2021     Lab Results   Component  Value Date    HGB 13.9 09/20/2021    HGB 11.7 (L) 06/16/2017     Lab Results   Component Value Date    BUN 27 09/20/2021    BUN 24 05/12/2021    CR 2.48 (H) 09/20/2021    CR 2.11 (H) 05/12/2021     Lab Results   Component Value Date    AST 30 06/14/2017    ALT 21 03/05/2021    ALT 47 06/14/2017    ALKPHOS 48 06/14/2017    BILITOTAL 0.6 06/14/2017     No results found for: UAMP, UBARB, BENZODIAZEUR, UCANN, UCOC, OPIT, UPCP    Recent Labs   Lab Test 09/20/21  1430 05/12/21  1347    136   POTASSIUM 3.7 3.6   CHLORIDE 103 105   CO2 27 27   ANIONGAP 7 4   * 157*   BUN 27 24   CR 2.48* 2.11*   GAYLE 8.8 9.1       No results found for: NIKKI    I reviewed the efficacy and compliance report from his device. Data summarized on the HPI and the PAP compliance flow sheet.     Patient verbalized understanding of these issues, agrees with the plan and all questions were answered today. Patient was given an opportuntity to voice any other symptoms or concerns not listed above. Patient did not have any other symptoms or concerns.      Latrell Moreau DO  Board Certified in Internal Medicine and Sleep Medicine    (Note created with Dragon voice recognition and unintended spelling errors and word substitutions may occur)     Audio and visual devices were used for this virtual clinic visit with permission from patient.

## 2022-02-04 NOTE — PATIENT INSTRUCTIONS
Equipment Instructions    We will process your PAP order and send it to a Durable Medical Equipment (DME) provider.    The medical equipment company should call you within 7 days.  If you have not heard from the company, please contact them to see if they received your order and are planning to call you.    Please call us at [phone] if you are unable to contact the medical equipment company or if they do not have the order.    If you are starting a new PAP machine, please call us after you use it the first night to let us know how it went. This call also helps us know that you received your equipment and that everything is ready. Please use our central phone number [phone]    Contact information for [employer] company:    [facility] Tel: [phone]

## 2022-02-16 ENCOUNTER — DOCUMENTATION ONLY (OUTPATIENT)
Dept: SLEEP MEDICINE | Facility: CLINIC | Age: 37
End: 2022-02-16
Payer: COMMERCIAL

## 2022-02-16 NOTE — PROGRESS NOTES
Patient was offered choice of vendor and chose [facility].  Patient Brooks Mensah was set up at [facility] on February 16, 2022. Patient received a Resmed Airsense 11 Pressures were set at 15 cm H2O.   Patient s ramp is 5 cm H2O for Auto and FLEX/EPR is 2.  Patient received a Resmed Mask name: AIRFIT F20  Full Face mask size Medium, heated tubing and heated humidifier.  Patient does need to meet compliance. Patient has a follow up on 5/17/2022 with Dr. Sun.    Jasmyne Matute

## 2022-02-21 ENCOUNTER — DOCUMENTATION ONLY (OUTPATIENT)
Dept: SLEEP MEDICINE | Facility: CLINIC | Age: 37
End: 2022-02-21
Payer: COMMERCIAL

## 2022-02-21 NOTE — PROGRESS NOTES
3 day Sleep therapy management telephone visit    Diagnostic AHI: 110.7  PSG    Confirmed with patient at time of call- N/A Patient is still interested in STM service       Message left for patient to return call        Objective data      CPAP fixed 15         CPAP fixed 15.0     EPR Setting TWO     Assessment: Minimal usage      Action plan: Patient to have 14 day STM visit. Patient has a follow up visit scheduled:   yes within 61-90 days of set up    Replacement device: No  STM ordered by provider: Yes     Total time spent on accessing and  interpreting remote patient PAP therapy data  10 minutes    Total time spent counseling, coaching  and reviewing PAP therapy data with patient  1 minutes    [id number] no

## 2022-03-03 ENCOUNTER — DOCUMENTATION ONLY (OUTPATIENT)
Dept: SLEEP MEDICINE | Facility: CLINIC | Age: 37
End: 2022-03-03
Payer: COMMERCIAL

## 2022-03-03 NOTE — PROGRESS NOTES
14  DAY STM VISIT    Diagnostic AHI: 110.7  PSG    Message left for patient to return call     Assessment: Pt not meeting objective benchmarks for compliance       Action plan: pt to have 30 day STM visit.      Device type: Auto-CPAP    PAP settings:      CPAP fixed 15.0 cm  H20      95th% pressure 13.9 cm  H20        RESMED EPR level Setting: TWO    RESMED Soft response setting:      Mask type:  Full Face Mask    Objective measures: 14 day rolling measures      Compliance  0 %      Leak  57.77  lpm  last  upload      AHI 3.77   last  upload      Average number of minutes 44      Objective measure goal  Compliance   Goal >70%  Leak   Goal < 24 lpm  AHI  Goal < 5  Usage  Goal >240        Total time spent on accessing and interpreting remote patient PAP therapy data  10 minutes    Total time spent counseling, coaching  and reviewing PAP therapy data with patient  1 minutes    [id number]  [id number]  no (3 day STM)

## 2022-03-18 NOTE — PROGRESS NOTES
CARDIOLOGY CLINIC PROGRESS NOTE    DOS: 2019      Brooks Mensah  : 1985, 33 year old  MRN: [id number]      History:  It was a pleasure following up with Brooks (also goes by Javon) today in the [facility].     Brooks is a very pleasant 33-year-old gentleman with history significant for prior tobacco use, morbid obesity, history of congestive heart failure from hypertensive cardiomyopathy.      In 2017 he was admitted with congestive heart failure and moderate to severe left ventricular systolic dysfunction.  His blood pressure was as high as 240/140.  Echo 2017 showed severe LVH with septal and posterior wall measurements of approximately 2.5 cm with 1.5 cm being the upper limits of normal.  There was diastolic dysfunction of the left ventricle and the LVEF was 30-35%.  Left atrium was not particularly dilated.   He underwent coronary angiography 6/15/2017, which did show trivial coronary artery disease of the right coronary artery with a stenosis of 10-20%, otherwise his coronary arteries were large and free of disease.    He was treated initially with nitrates, Carvedilol, ACE inhibitor, Lasix for diuresis.  With these therapies, his blood pressures came down nicely.     During his admission, it was noted that he likely has severe sleep apnea as he had some pauses on telemetry and outpatient sleep study was recommended.    On telemetry there were some brief episodes of what looked intermittent 3rd degree AV block in the middle of the night during sleep.  He was asymptomatic.  His Carvedilol was decreased from 50 mg twice daily to 25 mg twice daily.  Further monitoring showed sinus rhythm.      He did weill in follow up without sxs.     He was lost to follow up until he saw Dr. Mcdonald 3/22/19.  He was seen as he ran out of medications.  He did tell Dr. Mcdonald that he only takes his medications intermittently.  Not surprisingly, his blood pressure was 158/100.  Dr. Mcdonald renewed his medications.   An  Speech Treatment Note     Today's date: 3/18/2022  Patient name: Nils Evans  : 2020  MRN: [id number]  Referring provider: Earlene Cole MD  Dx:   Encounter Diagnosis     ICD-10-CM    1  Speech delay  F80 9        Start Time: 930  Stop Time:   Total time in clinic (min): 45 minutes     Visit Number: 16 in    Intervention certification from:   Intervention certification to: 8072    Subjective/Behavioral:  Pt arrived on time to session with mom  Mom present and active during session  Mom brought in toys from home  Good transitions  Pt vocalizing often today  Utilized finger painting and iPad ([employer]) to elicit imitative speech or variety of sound combinations  Good attention towards activities  *use iPad for songs    *trial finger painting   *trial use of straw with cotton balls to improve lip closure     Short Term Goals:  1  Patient will imitate action with objects >8x across 3 sessions  PARTIALLY MET   Imitated painting dots on paper and stacking blocks  3   Patient will imitate environmental sounds or animal sounds in 4/5 opp  PARTIALLY MET   Modeled farm animal sounds with iPad video- no imitations but good attention  He was noted to round lips for "shh"  4   Patient will pair vocalization with greetings for hi/bye in 4/5 opportunities across 3 sessions  PARTIALLY MET     +high five +waving  6  Patient will utilize early developing phonemes /p,b,m,h,t,d,n/ in CV, CVCV, or VC word structure  Continued to use PROMPT cues for bilabials /b,m,p/ with parameter PROMPTS for jaw stabilization  Provided models of "mm", "oh no", "oh", "woah", "ah", "oo", "wee", and "uh oh" during play  Produced "go" consistently  Produced "dadada", "haha", "ah" throughout play with unintelligible jargon  Continuing to hear more varied sound combinations with open-mouthed vocalizations  7  Patient will follow simple 1-2 step directions/sequence in 4/5 opp     Stacked blocks "up" >8x  Put paint "on" paper post model  Found named farm animal when verbalized in 2/3 opp  8  Patient will increase communication attempts in any modality (gestures, verbalization, sign, pictures) to >25/session across 3 sessions  Signed "more", "open" throughout play >10x  Imitated "all done" sign  Modeled sign for "ball" often, no imitations  He is frequently pointing to what he wants across the table or up on the counter with vocalization  Placing lips together for /m/ but no vocalization  Long Term Goals:  1  Patient will improve receptive language skills to within age appropriate limits by discharge  PARTIALLY MET   2  Patient will improve expressive language skills to within age appropriate limits by discharge  PARTIALLY MET     Other:Patient's family member was present was present during today's session  and Patient was provided with home exercises/ activies to target goals in plan of care    Recommendations:Continue with Plan of Care echo was ordered, and a BMP.     The echo was done 4/22/19 and showed improvement in LVEF to 50-55%.  He still had LVH.    BMP 4/22/19 showed improvement in creatinine from his admission to 1.29.     He presents today for followup.    He continues to do well without sxs of CHF.  No symptoms of shortness of breath, orthopnea, PND, edema.  He has no chest pain or palpitations.  He also has no lightheadedness, dizziness, near syncope or syncope.  He says he is tolerating his medications without any adverse side effects.  He is meeting sleep medicine this week.   Since he saw Dr. Mcdonald, he quit smoking.  He is trying to limit sodium in his diet.   His BP today is improved at 144/92.  This is not completely controlled, though he only took his medications one hour before this appointment.       ROS:  Skin:  Negative     Eyes:  Negative    ENT:  Negative    Respiratory:  Negative    Cardiovascular:  Negative    Gastroenterology: Negative    Genitourinary:  Negative    Musculoskeletal:  Negative    Neurologic:  Negative    Psychiatric:  Positive for sleep disturbances  Heme/Lymph/Imm:  Negative    Endocrine:  Negative      PAST MEDICAL HISTORY:  Past Medical History:   Diagnosis Date     Acute on chronic systolic congestive heart failure (H) 8/9/2017     CAD (coronary artery disease)     Nonobstructive     Cardiomyopathy, unspecified (H) 8/9/2017     Congestive heart failure, unspecified congestive heart failure chronicity, unspecified congestive heart failure type 6/13/2017     HTN (hypertension), malignant      Hypertensive urgency 6/13/2017     Morbid obesity with BMI of 50.0-59.9, adult (H) 6/21/2017     NSTEMI (non-ST elevated myocardial infarction) (H) 6/13/2017     Renal insufficiency 6/13/2017     Sleep apnea      Tobacco abuse        PAST SURGICAL HISTORY:  No past surgical history on file.    SOCIAL HISTORY:  Social History     Socioeconomic History     Marital status: Single     Spouse name: None     Number of  children: None     Years of education: None     Highest education level: None   Occupational History     None   Social Needs     Financial resource strain: None     Food insecurity:     Worry: None     Inability: None     Transportation needs:     Medical: None     Non-medical: None   Tobacco Use     Smoking status: Current Every Day Smoker     Types: Cigarettes     Smokeless tobacco: Never Used   Substance and Sexual Activity     Alcohol use: Yes     Comment: occassional     Drug use: No     Sexual activity: None   Lifestyle     Physical activity:     Days per week: None     Minutes per session: None     Stress: None   Relationships     Social connections:     Talks on phone: None     Gets together: None     Attends [employer] service: None     Active member of club or organization: None     Attends meetings of clubs or organizations: None     Relationship status: None     Intimate partner violence:     Fear of current or ex partner: None     Emotionally abused: None     Physically abused: None     Forced sexual activity: None   Other Topics Concern     Parent/sibling w/ CABG, MI or angioplasty before 65F 55M? Not Asked   Social History Narrative     None       FAMILY HISTORY:  No family history on file.    MEDS:   Current Outpatient Medications on File Prior to Visit:  carvedilol (COREG) 25 MG tablet Take 1 tablet (25 mg) by mouth 2 times daily (with meals)   furosemide (LASIX) 20 MG tablet Take 1 tablet (20 mg) by mouth daily   lisinopril (PRINIVIL/ZESTRIL) 20 MG tablet Take 1 tablet (20 mg) by mouth 2 times daily   spironolactone (ALDACTONE) 25 MG tablet Take 0.5 tablets (12.5 mg) by mouth daily   aspirin EC 81 MG EC tablet Take 1 tablet (81 mg) by mouth daily (Patient not taking: Reported on 4/24/2019)   MELATONIN PO Take 10 mg by mouth nightly as needed    TRAZODONE HCL PO Take 50 mg by mouth nightly as needed for sleep     No current facility-administered medications on file prior to visit.     ALLERGIES: No  "Known Allergies    PHYSICAL EXAM:  Vitals: BP (!) 144/92 (BP Location: Right arm, Patient Position: Chair, Cuff Size: Adult Large)   Pulse 70   Ht 1.803 m (5' 11\")   Wt (!) 184.8 kg (407 lb 4.8 oz)   SpO2 92%   BMI 56.81 kg/m    Constitutional:  cooperative, alert and oriented, well developed, well nourished, in no acute distress morbidly obese      Skin:  warm and dry to the touch        Head:  normocephalic, no masses or lesions        Eyes:  pupils equal and round;conjunctivae and lids unremarkable;sclera white;no xanthalasma        ENT:  no pallor or cyanosis        Neck:      difficult to assess given neck size, but no apparent JVD    Respiratory:      decreased BS    Cardiac: regular rhythm;apical impulse not displaced   distant heart sounds         no S3 or S4 appreciated, no murmur appreciated     GI:  abdomen soft;BS normoactive;non-tender obese      Vascular: pulses full and equal                                      Extremities and Musculoskeletal:  no deformities, clubbing, cyanosis, erythema observed;no edema   chronic venous stasis changes    Neurological:  no gross motor deficits;affect appropriate            LABS/DATA:  I reviewed the following:  Echo 4/22/19:  Interpretation Summary     Left ventricular systolic function is low normal.  The visual ejection fraction is estimated at 50-55%.  Small area of apical, distal anteroseptal hypokinesis.  EF higher compared to study from 6/17. LV wall thickness still increased,  however, less pronounced than prior study. The study was technically  Difficult.      Component      Latest Ref Rng & Units 4/22/2019   Sodium      133 - 144 mmol/L 137   Potassium      3.4 - 5.3 mmol/L 3.9   Chloride      94 - 109 mmol/L 102   Carbon Dioxide      20 - 32 mmol/L 28   Anion Gap      3 - 14 mmol/L 7   Glucose      70 - 99 mg/dL 132 (H)   Urea Nitrogen      7 - 30 mg/dL 19   Creatinine      0.66 - 1.25 mg/dL 1.29 (H)   GFR Estimate      >60 mL/min/1.73:m2 72   GFR " Estimate If Black      >60 mL/min/1.73:m2 83   Calcium      8.5 - 10.1 mg/dL 9.4         ASSESSMENT:  1. History of tobacco abuse.   - Quit 3/2019.     2.  Morbid obesity.  - Needs weight loss.   - Not interested in gastric bypass.     3.  Suspected obstructive sleep apnea.   - Referral has been made.  Seeing sleep medicine this week.     4.  Mild renal insufficiency.   - Improved from his admission.   - Noted, he is on Lasix, spironolactone, lisinopril.     5.  Trivial nonobstructive coronary artery disease.     - No anginal sxs.    - On ASA, statin.      6.  Hypertensive cardiomyopathy.    - Echo 6/13/17: LVEF 30-35%, grade III diastolic dysfunction.    - Cor angio 6/25/17 without significant obstructive CAD.   - Echo 4/22/19: LVEF 50-55%, LVH still but improved.    - No CHF sxs.   - Continue BB, ACEi, spironolactone, Lasix.  - Needs sleep study as noted above.   - Sodium restriction.   - Daily weights.      7.  Acute systolic and diastolic congestive heart failure.    - Currently NYHA functional class II.     8.  Hypertension, malignant.   - BP improved.    - Will see RN in 1 month to check BP as he only took meds 1 hour ago today.     9.  Transient high-grade AV block during sleep, and on Coreg 50mg BID. Decreased to 25mg BID and no recurrence.   - Sleep medicine referral as noted.          Follow up:  RN in 1 [facility] to check BP.   Dr. Mcdonald 9/2019.     Prem Painter PA-C

## 2022-03-21 ENCOUNTER — DOCUMENTATION ONLY (OUTPATIENT)
Dept: SLEEP MEDICINE | Facility: CLINIC | Age: 37
End: 2022-03-21
Payer: COMMERCIAL

## 2022-03-21 NOTE — PROGRESS NOTES
30 DAY [facility] VISIT    Diagnostic AHI: 110.7 PSG    Message left for patient to return call     Assessment: Pt not meeting objective benchmarks for compliance     Action plan: waiting for patient to return call.   Patient has scheduled a follow up visit with Dr. Sun on 5/17/2022.   Device type: Auto-CPAP  PAP settings:     CPAP fixed 15.0 cm  H20    95th% pressure 15 cm  H20      RESMED EPR level Setting: TWO  Mask type:  Full Face Mask  Objective measures: 14 day rolling measures      Compliance  28 %      Leak  61.68 lpm  last  upload      AHI 2.17   last  upload      Average number of minutes 180      Objective measure goal  Compliance   Goal >70%  Leak   Goal < 24 lpm  AHI  Goal < 5  Usage  Goal >240        Total time spent on accessing and interpreting remote patient PAP therapy data  10 minutes    Total time spent counseling, coaching  and reviewing PAP therapy data with patient  1 minutes     [id number] this call  [id number] no  at 3 or 14 day [facility]

## 2022-03-24 ENCOUNTER — TELEPHONE (OUTPATIENT)
Dept: SLEEP MEDICINE | Facility: CLINIC | Age: 37
End: 2022-03-24
Payer: COMMERCIAL

## 2022-05-17 ENCOUNTER — DOCUMENTATION ONLY (OUTPATIENT)
Dept: SLEEP MEDICINE | Facility: CLINIC | Age: 37
End: 2022-05-17

## 2022-05-17 ENCOUNTER — VIRTUAL VISIT (OUTPATIENT)
Dept: SLEEP MEDICINE | Facility: CLINIC | Age: 37
End: 2022-05-17
Payer: COMMERCIAL

## 2022-05-17 VITALS — BODY MASS INDEX: 44.1 KG/M2 | HEIGHT: 71 IN | WEIGHT: 315 LBS

## 2022-05-17 DIAGNOSIS — G47.33 OSA (OBSTRUCTIVE SLEEP APNEA): Primary | ICD-10-CM

## 2022-05-17 PROCEDURE — 99204 OFFICE O/P NEW MOD 45 MIN: CPT | Mod: 95 | Performed by: INTERNAL MEDICINE

## 2022-05-17 ASSESSMENT — SLEEP AND FATIGUE QUESTIONNAIRES
HOW LIKELY ARE YOU TO NOD OFF OR FALL ASLEEP IN A CAR, WHILE STOPPED FOR A FEW MINUTES IN TRAFFIC: WOULD NEVER DOZE
HOW LIKELY ARE YOU TO NOD OFF OR FALL ASLEEP WHILE SITTING QUIETLY AFTER LUNCH WITHOUT ALCOHOL: SLIGHT CHANCE OF DOZING
HOW LIKELY ARE YOU TO NOD OFF OR FALL ASLEEP WHILE LYING DOWN TO REST IN THE AFTERNOON WHEN CIRCUMSTANCES PERMIT: MODERATE CHANCE OF DOZING
HOW LIKELY ARE YOU TO NOD OFF OR FALL ASLEEP WHILE SITTING INACTIVE IN A PUBLIC PLACE: WOULD NEVER DOZE
HOW LIKELY ARE YOU TO NOD OFF OR FALL ASLEEP WHILE SITTING AND TALKING TO SOMEONE: WOULD NEVER DOZE
HOW LIKELY ARE YOU TO NOD OFF OR FALL ASLEEP WHILE WATCHING TV: SLIGHT CHANCE OF DOZING
HOW LIKELY ARE YOU TO NOD OFF OR FALL ASLEEP WHEN YOU ARE A PASSENGER IN A CAR FOR AN HOUR WITHOUT A BREAK: HIGH CHANCE OF DOZING
HOW LIKELY ARE YOU TO NOD OFF OR FALL ASLEEP WHILE SITTING AND READING: MODERATE CHANCE OF DOZING

## 2022-05-17 NOTE — PROGRESS NOTES
Javon is a 36 year old who is being evaluated via a billable video visit.      How would you like to obtain your AVS? MyChart  If the video visit is dropped, the invitation should be resent by: Send to e-mail at: [email or web address]  Will anyone else be joining your video visit? No      Video Start Time: 9 AM  Video-Visit Details    Type of service:  Video Visit    Video End Time:9:34 AM    Originating Location (pt. Location): Home    Distant Location (provider location):  [facility]     Platform used for Video Visit: [employer]

## 2022-05-17 NOTE — PROGRESS NOTES
[facility]   Outpatient Sleep Medicine Follow-up Visit  May 17, 2022    Name: Brooks Mensah MRN# [id number]   Age: 36 year old YOB: 1985     Date of Consultation: May 17, 2022  Consultation is requested by: No referring provider defined for this encounter.  Primary care provider: Eva Kent           Assessment and Plan:     Sleep Diagnoses:     Severe obstructive sleep apnea currently and effectively treated    Comorbid conditions:    Chronically disease stage III    Hypertension, poorly controlled    Hypertensive cardiomyopathy with ejection fraction 50-55% 2019 and possible increased pulmonary congestion recently in the setting of poor blood pressure control      Summary Recommendations:    We discussed your breathing difficulty lying flat and indications for doing an echocardiogram to reevaluate your heart function-we will be in touch with your primary care physician in this regard    We discussed use of a nasal mask to improve seal and tolerance to wearing your CPAP and would recommend that you sleep in a recliner to help with your breathing    If you can log a few nights of at least 4 hours sitting in your recliner with your new mask we may also get information regarding your breathing difficulties they will help improve our management strategies.             History of Present Illness:     Brooks Mensah is a 36 year old male with severe obstructive sleep apnea in the setting of extreme obesity with mild daytime hypoventilation (PaCO2 47 mmHg 2019) currently on fixed CPAP of 10 with inadequate use but controlled disease during application.  His main complaint is inability to lie flat he needs to sleep while sitting up otherwise he awakens after 3 to 4 hours of sleep and feels poorly rested during the day.  He is experienced increasing dyspnea on exertion with recently poor blood pressure control and had an increased left ventricular end-diastolic  pressure during previous heart catheterization in 2017.    PREVIOUS SLEEP STUDIES:  Date: 2019  Diagnostic PSG  Sleep Architecture: Severe sleep fragmentation due to respiratory events. Both stages N3 and REM sleep were not observed.  The total recording time of the polysomnogram was 154.4 minutes. The total sleep time was 142.5 minutes. Sleep latency was decreased at 0.0 minutes with the use of a sleep aid (10mg Zolpidem). REM sleep was not observed. Arousal index was increased at 103.6 arousals per hour. Sleep efficiency was normal at 92.3%. Wake after sleep onset was 8.0 minutes. The patient spent 1.1% of total sleep time in Stage N1, 98.9% in Stage N2, 0.0% in Stage N3, and 0.0% in REM.      Respiration: Severe obstructive sleep apnea with sleep associated hypoxemia. Sleep associated hypoventilation could not be assessed due to the questionable reliability of the TCM during the study.  However, there was evidence of both awake hypoxemia and hypoventilation based on the arterial blood gas analysis pre sleep study.  Events ? The polysomnogram revealed a presence of 43 obstructive, - central, and 4 mixed apneas resulting in an apnea index of 19.8 events per hour. There were 214 obstructive hypopneas and 1 central hypopneas resulting in an obstructive hypopnea index of 90.1 and central hypopnea index of 0.4 events per hour. The combined apnea/hypopnea index was 110.7 events per hour (central apnea/hypopnea index was 0.4 events per hour).  The REM AHI could not be assessed since REM sleep was not observed. The supine AHI was 117.7 events per hour. The RERA index was 0.8 events per hour. The RDI was 111.6 events per hour.    Snoring - was reported as loud.    Respiratory rate and pattern - was notable for normal respiratory rate and pattern.    Sustained Sleep Associated Hypoventilation - Transcutaneous carbon dioxide monitoring was used, with questionable reliability.    Sleep Associated Hypoxemia - (Greater than 5  minutes O2 sat at or below 88%) was present. Baseline oxygen saturation was low at 88.1%. Lowest oxygen saturation was 63.9%. Time spent less than or equal to 88% was 71.5 minutes. Time spent less than or equal to 89% was 81.1 minutes.     ABG pre sleep study: pH 7.40, pCO2 47, pO2 65, Bicarbonate 29, Oxyhemoglobin 92 %.     Treatment PSG  Sleep Architecture: Significant improvement in sleep architecture with CPAP treatment including reduction in arousals and rebound increases in both stages N3 and REM sleep.  At 02:08:34 AM the patient was placed on PAP treatment. The total recording time of the treatment portion of the study was 231.3 minutes. The total sleep time was 228.5 minutes. During the treatment portion of the study the sleep latency was 1.5 minutes. REM latency was 3.5 minutes. Arousal index was normal at 10.5 arousals per hour. Sleep efficiency was increased at 98.8%. Wake after sleep onset was 1.5 minutes. The patient spent 1.1% of total sleep time in Stage N1, 34.6% in Stage N2, 41.6% in Stage N3, and 22.8% in REM. Time in REM supine was 35.5 minutes.      Respiration: Optimum CPAP titration was achieved    CPAP was titrated at pressures ranging from CPAP 7 cmH2O up to CPAP 15 cmH2O using Simplus full face mask/Medium.  The final pressure was CPAP 15 cmH2O with an AHI of - events per hour. Time in REM supine on final pressure was 4.0 minutes.  Hypoxemia resolved with PAP treatment with oxygen saturations were  in higher 90s at the final pressure settings including REM sleep.    This titration was considered:    Optimal (residual AHI < 5 events per hour and including REM?supine sleep at final pressure).      Movement Activity: There were no significant periodic limb movements. Abnormal sleep related behaviors were not noted.    Periodic Limb Movements  ? During the diagnostic portion of the study, there were - PLMs recorded. The PLM index was - movements per hour. The PLM Arousal Index was - per  hour.  ? During the treatment portion of the study, there were 11 PLMs recorded. The PLM index was 2.9 movements per hour. The PLM Arousal Index was 1.3 per hour.    REM EMG Activity - Excessive transient/sustained muscle activity was not present.    Nocturnal Behavior - Abnormal sleep related behaviors were not noted during/arising out of NREM / REM sleep.      Bruxism - None apparent.     Cardiac Summary: Normal sinus rhythm was noted (10 second epoch shown below).  During the diagnostic portion of the study, the average pulse rate was 77.3 bpm. The minimum pulse rate was 57.0 bpm while the maximum pulse rate was 92.2 bpm.         Most Recent SCALES:    EPWORTH SLEEPINESS SCALE WITHIN 1 YEAR    Sitting and reading Moderate chance of dozing    Watching TV High chance of dozing    Sitting, inactive in a public place (theatre or mtg.) Moderate chance of dozing     As a passenger in a car High chance of dozing    Lying down to rest in the afternoon when circumstance permit Slight chance of dozing    Sitting and talking to someone Would never doze    Sitting quietly after lunch without alcohol Moderate chance of dozing    In a car, while stopped for a few minutes in traffic High chance of dozing    TOTAL SCORE 16    Normal < 11         0--none    1--mild    2--moderate  3--severe      INSOMNIA SEVERITY INDEX WITHIN 1 YEAR   Difficulty falling asleep Severe   Difficult staying asleep Severe   Problems waking up to early Mild   How SATISFIED/DISSATISFIED are you with your CURRENT sleep pattern? Very Dissatisfied   How NOTICEABLE to others do you think your sleep pattern is in terms of your quality of life? Somewhat   How WORRIED/DISTRESSED are you about your current sleep pattern? Much   To what extent do you consider your sleep problem to INTERFERE with your daily fuctioning(e.g. daytime fatigue, mood, ability to function at work/daily chores, concentration, mood,etc.) CURRENTLY? Somewhat   INSOMNIA SEVERITY INDEX TOTAL  SCORE 18    --absence of insomnia (0-7); sub-threshold insomnia (8-14); moderate insomnia (15-21); and severe insomnia (22-28)--            Objective:  CPAP Compliance Targets:   >70% days > 4 hours AHI < 5   30 days ending May 17, 2022  Mask type:  Full Face Mask   ResMed   CPAP 15.0 cmH2O 30 day usage data:  10% of days with > 4 hours of use. 11/30 days with no use.   Average use 87 minutes per day.   95%ile Leak 81.09 L/min.   AHI 1.59 events per hour.                       Medications:     Current Outpatient Medications   Medication Sig     amLODIPine (NORVASC) 10 MG tablet Take 1 tablet (10 mg) by mouth daily     aspirin EC 81 MG EC tablet Take 1 tablet (81 mg) by mouth daily (Patient taking differently: Take 81 mg by mouth When remembers, approx x2 doses a week)     atorvastatin (LIPITOR) 10 MG tablet Take 1 tablet (10 mg) by mouth every evening     carvedilol (COREG) 25 MG tablet Take 1 tablet (25 mg) by mouth 2 times daily (with meals)     furosemide (LASIX) 40 MG tablet Take 1 tablet (40 mg) by mouth daily     hydrALAZINE (APRESOLINE) 50 MG tablet Take 1 tablet (50 mg) by mouth 3 times daily     vitamin D3 (CHOLECALCIFEROL) 50 mcg (2000 units) tablet Take 1 tablet (50 mcg) by mouth daily     No current facility-administered medications for this visit.        No Known Allergies         Problem List:     Patient Active Problem List   Diagnosis     Congestive heart failure, unspecified congestive heart failure chronicity, unspecified congestive heart failure type     Hypertensive emergency     Hypertensive urgency     Renal insufficiency     NSTEMI (non-ST elevated myocardial infarction) (H)     Morbid obesity with BMI of 50.0-59.9, adult (H)     Acute on chronic systolic congestive heart failure (H)     Cardiomyopathy, unspecified (H)     Chronic kidney disease, stage 3            Past Medical History:     Does not need 02 supplement at night   Past Medical History:   Diagnosis Date     Acute on chronic  systolic congestive heart failure (H) 8/9/2017     CAD (coronary artery disease)     Nonobstructive     Cardiomyopathy, unspecified (H) 8/9/2017     Congestive heart failure, unspecified congestive heart failure chronicity, unspecified congestive heart failure type 6/13/2017     HTN (hypertension), malignant      Hypertensive urgency 6/13/2017     Morbid obesity with BMI of 50.0-59.9, adult (H) 6/21/2017     NSTEMI (non-ST elevated myocardial infarction) (H) 6/13/2017     Renal insufficiency 6/13/2017     Sleep apnea      Tobacco abuse              Past Surgical History:    No h/o  upper airway surgery  No past surgical history on file.           Physical Examination:     Reported vitals:  There were no vitals taken for this visit.   GENERAL: Healthy, alert and no distress  EYES: Eyes grossly normal to inspection.  No discharge or erythema, or obvious scleral/conjunctival abnormalities.  RESP: No audible wheeze, cough, or visible cyanosis.  No visible retractions or increased work of breathing.    SKIN: Visible skin clear. No significant rash, abnormal pigmentation or lesions.  NEURO: Cranial nerves grossly intact.  Mentation and speech appropriate for age.  PSYCH: Mentation appears normal, affect normal/bright, judgement and insight intact, normal speech and appearance well-groomed.      Echocardiogram 2019:  Interpretation Summary     Left ventricular systolic function is low normal.  The visual ejection fraction is estimated at 50-55%.  Small area of apical, distal anteroseptal hypokinesis.  EF higher compared to study from 6/17. LV wall thickness still increased,  however, less pronounced than prior study. The study was technically  difficult.    Copy to: Eva Kent MD 5/17/2022         Total time spent reviewing medical records including previous testing and interpretation as well as direct patient contact and documentation on this date: 45 minutes

## 2022-06-08 NOTE — NURSING NOTE
Pt called and message left for pt to call back to schedule Echo.  Pt also given the number to Echo to call and schedule directly.    SEB Go

## 2022-06-20 ENCOUNTER — TELEPHONE (OUTPATIENT)
Dept: SLEEP MEDICINE | Facility: CLINIC | Age: 37
End: 2022-06-20
Payer: COMMERCIAL

## 2022-06-20 NOTE — TELEPHONE ENCOUNTER
LVM LETTING THE PT KNOW THAT HE IS STILL NOT MEETING COMPLIANCE AND IT LOOKS LIKE HE MISSED HIS MASK CONSULT BACK ON 5/25/22. LET HIM KNOW THAT WE WOULD NEED TO HAVE HIM EITHER MEET COMPLIANCE ASAP OR RETURN THE MACHINE AS HIS INSURANCE IS NOT PAYING FOR HIS MACHINE.

## 2022-10-09 ENCOUNTER — HEALTH MAINTENANCE LETTER (OUTPATIENT)
Age: 37
End: 2022-10-09

## 2022-11-26 ENCOUNTER — HEALTH MAINTENANCE LETTER (OUTPATIENT)
Age: 37
End: 2022-11-26

## 2022-11-28 ENCOUNTER — HOSPITAL ENCOUNTER (INPATIENT)
Facility: CLINIC | Age: 37
LOS: 4 days | Discharge: HOME | End: 2022-12-02
Attending: EMERGENCY MEDICINE | Admitting: INTERNAL MEDICINE
Payer: COMMERCIAL

## 2022-11-28 DIAGNOSIS — N17.9 ACUTE KIDNEY INJURY (H): ICD-10-CM

## 2022-11-28 DIAGNOSIS — K92.2 ACUTE UPPER GI BLEED: ICD-10-CM

## 2022-11-28 DIAGNOSIS — I10 ESSENTIAL HYPERTENSION: ICD-10-CM

## 2022-11-28 DIAGNOSIS — I50.22 CHRONIC SYSTOLIC CONGESTIVE HEART FAILURE (H): ICD-10-CM

## 2022-11-28 DIAGNOSIS — K25.4 GASTROINTESTINAL HEMORRHAGE ASSOCIATED WITH GASTRIC ULCER: Primary | ICD-10-CM

## 2022-11-28 LAB
ABO/RH(D): NORMAL
ALBUMIN SERPL BCG-MCNC: 3.4 G/DL (ref 3.5–5.2)
ALP SERPL-CCNC: 82 U/L (ref 40–129)
ALT SERPL W P-5'-P-CCNC: 20 U/L (ref 10–50)
ANION GAP SERPL CALCULATED.3IONS-SCNC: 14 MMOL/L (ref 7–15)
ANTIBODY SCREEN: NEGATIVE
AST SERPL W P-5'-P-CCNC: 9 U/L (ref 10–50)
BASOPHILS # BLD AUTO: 0 10E3/UL (ref 0–0.2)
BASOPHILS NFR BLD AUTO: 0 %
BILIRUB SERPL-MCNC: 0.2 MG/DL
BLD PROD TYP BPU: NORMAL
BLOOD COMPONENT TYPE: NORMAL
BUN SERPL-MCNC: 62.9 MG/DL (ref 6–20)
CALCIUM SERPL-MCNC: 8.1 MG/DL (ref 8.6–10)
CHLORIDE SERPL-SCNC: 105 MMOL/L (ref 98–107)
CODING SYSTEM: NORMAL
CREAT SERPL-MCNC: 5.94 MG/DL (ref 0.67–1.17)
CROSSMATCH: NORMAL
DEPRECATED HCO3 PLAS-SCNC: 17 MMOL/L (ref 22–29)
EOSINOPHIL # BLD AUTO: 0.2 10E3/UL (ref 0–0.7)
EOSINOPHIL NFR BLD AUTO: 2 %
ERYTHROCYTE [DISTWIDTH] IN BLOOD BY AUTOMATED COUNT: 21.3 % (ref 10–15)
GFR SERPL CREATININE-BSD FRML MDRD: 12 ML/MIN/1.73M2
GLUCOSE SERPL-MCNC: 104 MG/DL (ref 70–99)
HCT VFR BLD AUTO: 13.6 % (ref 40–53)
HGB BLD-MCNC: 3.7 G/DL (ref 13.3–17.7)
HOLD SPECIMEN: NORMAL
IMM GRANULOCYTES # BLD: 0.2 10E3/UL
IMM GRANULOCYTES NFR BLD: 2 %
ISSUE DATE AND TIME: NORMAL
LYMPHOCYTES # BLD AUTO: 2.3 10E3/UL (ref 0.8–5.3)
LYMPHOCYTES NFR BLD AUTO: 17 %
MCH RBC QN AUTO: 23 PG (ref 26.5–33)
MCHC RBC AUTO-ENTMCNC: 27.2 G/DL (ref 31.5–36.5)
MCV RBC AUTO: 85 FL (ref 78–100)
MONOCYTES # BLD AUTO: 1.1 10E3/UL (ref 0–1.3)
MONOCYTES NFR BLD AUTO: 8 %
NEUTROPHILS # BLD AUTO: 9.6 10E3/UL (ref 1.6–8.3)
NEUTROPHILS NFR BLD AUTO: 71 %
NRBC # BLD AUTO: 0.1 10E3/UL
NRBC BLD AUTO-RTO: 0 /100
PLATELET # BLD AUTO: 377 10E3/UL (ref 150–450)
POTASSIUM SERPL-SCNC: 4.2 MMOL/L (ref 3.4–5.3)
PROT SERPL-MCNC: 6.2 G/DL (ref 6.4–8.3)
RBC # BLD AUTO: 1.61 10E6/UL (ref 4.4–5.9)
SARS-COV-2 RNA RESP QL NAA+PROBE: NEGATIVE
SODIUM SERPL-SCNC: 136 MMOL/L (ref 136–145)
SPECIMEN EXPIRATION DATE: NORMAL
UNIT ABO/RH: NORMAL
UNIT NUMBER: NORMAL
UNIT STATUS: NORMAL
UNIT TYPE ISBT: 5100
WBC # BLD AUTO: 13.4 10E3/UL (ref 4–11)

## 2022-11-28 PROCEDURE — 96374 THER/PROPH/DIAG INJ IV PUSH: CPT

## 2022-11-28 PROCEDURE — P9016 RBC LEUKOCYTES REDUCED: HCPCS | Performed by: EMERGENCY MEDICINE

## 2022-11-28 PROCEDURE — 80053 COMPREHEN METABOLIC PANEL: CPT | Performed by: EMERGENCY MEDICINE

## 2022-11-28 PROCEDURE — 86704 HEP B CORE ANTIBODY TOTAL: CPT | Performed by: INTERNAL MEDICINE

## 2022-11-28 PROCEDURE — 120N000001 HC R&B MED SURG/OB

## 2022-11-28 PROCEDURE — 86901 BLOOD TYPING SEROLOGIC RH(D): CPT | Performed by: EMERGENCY MEDICINE

## 2022-11-28 PROCEDURE — 86780 TREPONEMA PALLIDUM: CPT | Performed by: INTERNAL MEDICINE

## 2022-11-28 PROCEDURE — 82040 ASSAY OF SERUM ALBUMIN: CPT | Performed by: EMERGENCY MEDICINE

## 2022-11-28 PROCEDURE — 86923 COMPATIBILITY TEST ELECTRIC: CPT | Performed by: INTERNAL MEDICINE

## 2022-11-28 PROCEDURE — U0005 INFEC AGEN DETEC AMPLI PROBE: HCPCS | Performed by: EMERGENCY MEDICINE

## 2022-11-28 PROCEDURE — C9113 INJ PANTOPRAZOLE SODIUM, VIA: HCPCS | Performed by: EMERGENCY MEDICINE

## 2022-11-28 PROCEDURE — 85025 COMPLETE CBC W/AUTO DIFF WBC: CPT | Performed by: EMERGENCY MEDICINE

## 2022-11-28 PROCEDURE — C9803 HOPD COVID-19 SPEC COLLECT: HCPCS

## 2022-11-28 PROCEDURE — 86850 RBC ANTIBODY SCREEN: CPT | Performed by: EMERGENCY MEDICINE

## 2022-11-28 PROCEDURE — U0003 INFECTIOUS AGENT DETECTION BY NUCLEIC ACID (DNA OR RNA); SEVERE ACUTE RESPIRATORY SYNDROME CORONAVIRUS 2 (SARS-COV-2) (CORONAVIRUS DISEASE [COVID-19]), AMPLIFIED PROBE TECHNIQUE, MAKING USE OF HIGH THROUGHPUT TECHNOLOGIES AS DESCRIBED BY CMS-2020-01-R: HCPCS | Performed by: EMERGENCY MEDICINE

## 2022-11-28 PROCEDURE — 86038 ANTINUCLEAR ANTIBODIES: CPT | Performed by: INTERNAL MEDICINE

## 2022-11-28 PROCEDURE — 250N000011 HC RX IP 250 OP 636: Performed by: EMERGENCY MEDICINE

## 2022-11-28 PROCEDURE — 36415 COLL VENOUS BLD VENIPUNCTURE: CPT | Performed by: EMERGENCY MEDICINE

## 2022-11-28 PROCEDURE — 83520 IMMUNOASSAY QUANT NOS NONAB: CPT | Performed by: INTERNAL MEDICINE

## 2022-11-28 PROCEDURE — 86160 COMPLEMENT ANTIGEN: CPT | Performed by: INTERNAL MEDICINE

## 2022-11-28 PROCEDURE — 86923 COMPATIBILITY TEST ELECTRIC: CPT | Performed by: EMERGENCY MEDICINE

## 2022-11-28 PROCEDURE — 99291 CRITICAL CARE FIRST HOUR: CPT | Mod: 25

## 2022-11-28 PROCEDURE — 86036 ANCA SCREEN EACH ANTIBODY: CPT | Performed by: INTERNAL MEDICINE

## 2022-11-28 RX ADMIN — PANTOPRAZOLE SODIUM 40 MG: 40 INJECTION, POWDER, FOR SOLUTION INTRAVENOUS at 22:50

## 2022-11-28 ASSESSMENT — ENCOUNTER SYMPTOMS
BLOOD IN STOOL: 1
ABDOMINAL PAIN: 1
SHORTNESS OF BREATH: 1

## 2022-11-28 ASSESSMENT — ACTIVITIES OF DAILY LIVING (ADL): ADLS_ACUITY_SCORE: 35

## 2022-11-28 NOTE — LETTER
[facility] OBSERVATION DEPT  [address]  [facility] [address]  [phone] December 2, 2022    RE:  Brooks Mensah                                                                                                                                                       [address] [facility] [address]      To whom it may concern:    Brooks Mensah was admitted to the hospital from 11/28/2022 to 12/02/2022. He is being discharged to home, and will need to continue to recover there. He will have multiple outpatient follow-up appointments and may not be physically ready to work a full 12 hour shift for a couple weeks. Otherwise, he is able to return to work without restrictions.    Sincerely,            Valeriy Collado MD

## 2022-11-28 NOTE — LETTER
November 29, 2022      To Whom It May Concern:      Brooks Mensah was seen in our Emergency Department today, 11/29/22. He is admitted to the hospital. Please excuse sister from work to care for brother.    Sincerely,        Kimi Tsang RN

## 2022-11-29 ENCOUNTER — APPOINTMENT (OUTPATIENT)
Dept: ULTRASOUND IMAGING | Facility: CLINIC | Age: 37
End: 2022-11-29
Attending: INTERNAL MEDICINE
Payer: COMMERCIAL

## 2022-11-29 ENCOUNTER — APPOINTMENT (OUTPATIENT)
Dept: GENERAL RADIOLOGY | Facility: CLINIC | Age: 37
End: 2022-11-29
Attending: INTERNAL MEDICINE
Payer: COMMERCIAL

## 2022-11-29 LAB
ALBUMIN UR-MCNC: 50 MG/DL
ANION GAP SERPL CALCULATED.3IONS-SCNC: 11 MMOL/L (ref 7–15)
ANION GAP SERPL CALCULATED.3IONS-SCNC: 14 MMOL/L (ref 7–15)
APPEARANCE UR: CLEAR
BILIRUB UR QL STRIP: NEGATIVE
BLD PROD TYP BPU: NORMAL
BLOOD COMPONENT TYPE: NORMAL
BUN SERPL-MCNC: 57.4 MG/DL (ref 6–20)
BUN SERPL-MCNC: 60.9 MG/DL (ref 6–20)
CALCIUM SERPL-MCNC: 8.3 MG/DL (ref 8.6–10)
CALCIUM SERPL-MCNC: 8.5 MG/DL (ref 8.6–10)
CHLORIDE SERPL-SCNC: 109 MMOL/L (ref 98–107)
CHLORIDE SERPL-SCNC: 110 MMOL/L (ref 98–107)
CODING SYSTEM: NORMAL
COLOR UR AUTO: ABNORMAL
CREAT SERPL-MCNC: 5.83 MG/DL (ref 0.67–1.17)
CREAT SERPL-MCNC: 6 MG/DL (ref 0.67–1.17)
CROSSMATCH: NORMAL
DEPRECATED HCO3 PLAS-SCNC: 15 MMOL/L (ref 22–29)
DEPRECATED HCO3 PLAS-SCNC: 18 MMOL/L (ref 22–29)
ERYTHROCYTE [DISTWIDTH] IN BLOOD BY AUTOMATED COUNT: 19.6 % (ref 10–15)
GFR SERPL CREATININE-BSD FRML MDRD: 12 ML/MIN/1.73M2
GFR SERPL CREATININE-BSD FRML MDRD: 12 ML/MIN/1.73M2
GLUCOSE SERPL-MCNC: 101 MG/DL (ref 70–99)
GLUCOSE SERPL-MCNC: 99 MG/DL (ref 70–99)
GLUCOSE UR STRIP-MCNC: NEGATIVE MG/DL
HBA1C MFR BLD: <4.2 %
HCT VFR BLD AUTO: 17.7 % (ref 40–53)
HGB BLD-MCNC: 3.6 G/DL (ref 13.3–17.7)
HGB BLD-MCNC: 5.2 G/DL (ref 13.3–17.7)
HGB BLD-MCNC: 5.7 G/DL (ref 13.3–17.7)
HGB BLD-MCNC: 6.5 G/DL (ref 13.3–17.7)
HGB UR QL STRIP: ABNORMAL
IRON BINDING CAPACITY (ROCHE): 294 UG/DL (ref 240–430)
IRON SATN MFR SERPL: 6 % (ref 15–46)
IRON SERPL-MCNC: 18 UG/DL (ref 61–157)
ISSUE DATE AND TIME: NORMAL
KETONES UR STRIP-MCNC: NEGATIVE MG/DL
LEUKOCYTE ESTERASE UR QL STRIP: ABNORMAL
MCH RBC QN AUTO: 25.9 PG (ref 26.5–33)
MCHC RBC AUTO-ENTMCNC: 29.4 G/DL (ref 31.5–36.5)
MCV RBC AUTO: 88 FL (ref 78–100)
NITRATE UR QL: NEGATIVE
PH UR STRIP: 5.5 [PH] (ref 5–7)
PLATELET # BLD AUTO: 296 10E3/UL (ref 150–450)
POTASSIUM SERPL-SCNC: 4.4 MMOL/L (ref 3.4–5.3)
POTASSIUM SERPL-SCNC: 4.6 MMOL/L (ref 3.4–5.3)
RBC # BLD AUTO: 2.01 10E6/UL (ref 4.4–5.9)
RBC URINE: 1 /HPF
SODIUM SERPL-SCNC: 138 MMOL/L (ref 136–145)
SODIUM SERPL-SCNC: 139 MMOL/L (ref 136–145)
SP GR UR STRIP: 1.01 (ref 1–1.03)
SQUAMOUS EPITHELIAL: <1 /HPF
UNIT ABO/RH: NORMAL
UNIT NUMBER: NORMAL
UNIT STATUS: NORMAL
UNIT TYPE ISBT: 5100
UROBILINOGEN UR STRIP-MCNC: NORMAL MG/DL
WBC # BLD AUTO: 13.8 10E3/UL (ref 4–11)
WBC URINE: 11 /HPF

## 2022-11-29 PROCEDURE — 258N000003 HC RX IP 258 OP 636: Performed by: INTERNAL MEDICINE

## 2022-11-29 PROCEDURE — 120N000001 HC R&B MED SURG/OB

## 2022-11-29 PROCEDURE — 76770 US EXAM ABDO BACK WALL COMP: CPT

## 2022-11-29 PROCEDURE — 99233 SBSQ HOSP IP/OBS HIGH 50: CPT | Performed by: INTERNAL MEDICINE

## 2022-11-29 PROCEDURE — P9016 RBC LEUKOCYTES REDUCED: HCPCS | Performed by: EMERGENCY MEDICINE

## 2022-11-29 PROCEDURE — 250N000013 HC RX MED GY IP 250 OP 250 PS 637: Performed by: INTERNAL MEDICINE

## 2022-11-29 PROCEDURE — 85018 HEMOGLOBIN: CPT | Performed by: INTERNAL MEDICINE

## 2022-11-29 PROCEDURE — 86803 HEPATITIS C AB TEST: CPT | Performed by: INTERNAL MEDICINE

## 2022-11-29 PROCEDURE — C9113 INJ PANTOPRAZOLE SODIUM, VIA: HCPCS | Performed by: INTERNAL MEDICINE

## 2022-11-29 PROCEDURE — 36415 COLL VENOUS BLD VENIPUNCTURE: CPT | Performed by: INTERNAL MEDICINE

## 2022-11-29 PROCEDURE — 99223 1ST HOSP IP/OBS HIGH 75: CPT | Mod: AI | Performed by: INTERNAL MEDICINE

## 2022-11-29 PROCEDURE — 80048 BASIC METABOLIC PNL TOTAL CA: CPT | Performed by: INTERNAL MEDICINE

## 2022-11-29 PROCEDURE — 83550 IRON BINDING TEST: CPT | Performed by: INTERNAL MEDICINE

## 2022-11-29 PROCEDURE — 250N000011 HC RX IP 250 OP 636: Performed by: INTERNAL MEDICINE

## 2022-11-29 PROCEDURE — 250N000009 HC RX 250: Performed by: INTERNAL MEDICINE

## 2022-11-29 PROCEDURE — P9016 RBC LEUKOCYTES REDUCED: HCPCS | Performed by: INTERNAL MEDICINE

## 2022-11-29 PROCEDURE — 83036 HEMOGLOBIN GLYCOSYLATED A1C: CPT | Performed by: INTERNAL MEDICINE

## 2022-11-29 PROCEDURE — 81001 URINALYSIS AUTO W/SCOPE: CPT | Performed by: INTERNAL MEDICINE

## 2022-11-29 PROCEDURE — 71045 X-RAY EXAM CHEST 1 VIEW: CPT

## 2022-11-29 PROCEDURE — 87086 URINE CULTURE/COLONY COUNT: CPT | Performed by: INTERNAL MEDICINE

## 2022-11-29 PROCEDURE — 87389 HIV-1 AG W/HIV-1&-2 AB AG IA: CPT | Performed by: INTERNAL MEDICINE

## 2022-11-29 RX ORDER — PROCHLORPERAZINE MALEATE 5 MG
10 TABLET ORAL EVERY 6 HOURS PRN
Status: DISCONTINUED | OUTPATIENT
Start: 2022-11-29 | End: 2022-12-02 | Stop reason: HOSPADM

## 2022-11-29 RX ORDER — METOPROLOL TARTRATE 1 MG/ML
5 INJECTION, SOLUTION INTRAVENOUS EVERY 4 HOURS
Status: DISCONTINUED | OUTPATIENT
Start: 2022-11-29 | End: 2022-11-29

## 2022-11-29 RX ORDER — ONDANSETRON 2 MG/ML
4 INJECTION INTRAMUSCULAR; INTRAVENOUS EVERY 6 HOURS PRN
Status: DISCONTINUED | OUTPATIENT
Start: 2022-11-29 | End: 2022-12-02 | Stop reason: HOSPADM

## 2022-11-29 RX ORDER — HYDRALAZINE HYDROCHLORIDE 20 MG/ML
5 INJECTION INTRAMUSCULAR; INTRAVENOUS ONCE
Status: COMPLETED | OUTPATIENT
Start: 2022-11-29 | End: 2022-11-29

## 2022-11-29 RX ORDER — SODIUM CHLORIDE 9 MG/ML
INJECTION, SOLUTION INTRAVENOUS CONTINUOUS
Status: DISCONTINUED | OUTPATIENT
Start: 2022-11-29 | End: 2022-11-30

## 2022-11-29 RX ORDER — ACETAMINOPHEN 325 MG/1
650 TABLET ORAL EVERY 6 HOURS PRN
Status: DISCONTINUED | OUTPATIENT
Start: 2022-11-29 | End: 2022-12-02 | Stop reason: HOSPADM

## 2022-11-29 RX ORDER — HYDRALAZINE HYDROCHLORIDE 20 MG/ML
10 INJECTION INTRAMUSCULAR; INTRAVENOUS 4 TIMES DAILY
Status: DISCONTINUED | OUTPATIENT
Start: 2022-11-29 | End: 2022-11-29

## 2022-11-29 RX ORDER — LIDOCAINE 40 MG/G
CREAM TOPICAL
Status: DISCONTINUED | OUTPATIENT
Start: 2022-11-29 | End: 2022-12-02 | Stop reason: HOSPADM

## 2022-11-29 RX ORDER — ACETAMINOPHEN 650 MG/1
650 SUPPOSITORY RECTAL EVERY 6 HOURS PRN
Status: DISCONTINUED | OUTPATIENT
Start: 2022-11-29 | End: 2022-12-02 | Stop reason: HOSPADM

## 2022-11-29 RX ORDER — AMLODIPINE BESYLATE 5 MG/1
5 TABLET ORAL 2 TIMES DAILY
Status: DISCONTINUED | OUTPATIENT
Start: 2022-11-29 | End: 2022-11-30

## 2022-11-29 RX ORDER — HYDRALAZINE HYDROCHLORIDE 50 MG/1
50 TABLET, FILM COATED ORAL 4 TIMES DAILY
Status: DISCONTINUED | OUTPATIENT
Start: 2022-11-29 | End: 2022-12-02 | Stop reason: HOSPADM

## 2022-11-29 RX ORDER — DIPHENHYDRAMINE HYDROCHLORIDE 50 MG/ML
25 INJECTION INTRAMUSCULAR; INTRAVENOUS EVERY 6 HOURS PRN
Status: DISCONTINUED | OUTPATIENT
Start: 2022-11-29 | End: 2022-12-02 | Stop reason: HOSPADM

## 2022-11-29 RX ORDER — ONDANSETRON 4 MG/1
4 TABLET, ORALLY DISINTEGRATING ORAL EVERY 6 HOURS PRN
Status: DISCONTINUED | OUTPATIENT
Start: 2022-11-29 | End: 2022-12-02 | Stop reason: HOSPADM

## 2022-11-29 RX ORDER — METOPROLOL TARTRATE 1 MG/ML
5 INJECTION, SOLUTION INTRAVENOUS EVERY 6 HOURS
Status: DISCONTINUED | OUTPATIENT
Start: 2022-11-29 | End: 2022-11-29

## 2022-11-29 RX ORDER — HYDRALAZINE HYDROCHLORIDE 20 MG/ML
10 INJECTION INTRAMUSCULAR; INTRAVENOUS EVERY 4 HOURS PRN
Status: DISCONTINUED | OUTPATIENT
Start: 2022-11-29 | End: 2022-11-30

## 2022-11-29 RX ORDER — HYDROMORPHONE HCL IN WATER/PF 6 MG/30 ML
0.2 PATIENT CONTROLLED ANALGESIA SYRINGE INTRAVENOUS
Status: DISCONTINUED | OUTPATIENT
Start: 2022-11-29 | End: 2022-12-02 | Stop reason: HOSPADM

## 2022-11-29 RX ORDER — CARVEDILOL 25 MG/1
25 TABLET ORAL 2 TIMES DAILY WITH MEALS
Status: DISCONTINUED | OUTPATIENT
Start: 2022-11-29 | End: 2022-11-30

## 2022-11-29 RX ORDER — PROCHLORPERAZINE 25 MG
25 SUPPOSITORY, RECTAL RECTAL EVERY 12 HOURS PRN
Status: DISCONTINUED | OUTPATIENT
Start: 2022-11-29 | End: 2022-12-02 | Stop reason: HOSPADM

## 2022-11-29 RX ADMIN — AMLODIPINE BESYLATE 5 MG: 5 TABLET ORAL at 08:26

## 2022-11-29 RX ADMIN — DIPHENHYDRAMINE HYDROCHLORIDE 25 MG: 50 INJECTION, SOLUTION INTRAMUSCULAR; INTRAVENOUS at 02:02

## 2022-11-29 RX ADMIN — PANTOPRAZOLE SODIUM 40 MG: 40 INJECTION, POWDER, FOR SOLUTION INTRAVENOUS at 08:25

## 2022-11-29 RX ADMIN — SODIUM CHLORIDE: 9 INJECTION, SOLUTION INTRAVENOUS at 01:17

## 2022-11-29 RX ADMIN — HYDRALAZINE HYDROCHLORIDE 5 MG: 20 INJECTION, SOLUTION INTRAMUSCULAR; INTRAVENOUS at 03:08

## 2022-11-29 RX ADMIN — CARVEDILOL 25 MG: 25 TABLET, FILM COATED ORAL at 16:35

## 2022-11-29 RX ADMIN — HYDRALAZINE HYDROCHLORIDE 10 MG: 20 INJECTION, SOLUTION INTRAMUSCULAR; INTRAVENOUS at 08:26

## 2022-11-29 RX ADMIN — AMLODIPINE BESYLATE 5 MG: 5 TABLET ORAL at 19:55

## 2022-11-29 RX ADMIN — HYDRALAZINE HYDROCHLORIDE 50 MG: 50 TABLET, FILM COATED ORAL at 19:55

## 2022-11-29 RX ADMIN — HYDRALAZINE HYDROCHLORIDE 10 MG: 20 INJECTION, SOLUTION INTRAMUSCULAR; INTRAVENOUS at 12:51

## 2022-11-29 RX ADMIN — HYDRALAZINE HYDROCHLORIDE 50 MG: 50 TABLET, FILM COATED ORAL at 16:35

## 2022-11-29 RX ADMIN — METOPROLOL TARTRATE 5 MG: 1 INJECTION, SOLUTION INTRAVENOUS at 11:26

## 2022-11-29 RX ADMIN — METOPROLOL TARTRATE 5 MG: 1 INJECTION, SOLUTION INTRAVENOUS at 01:18

## 2022-11-29 RX ADMIN — PANTOPRAZOLE SODIUM 40 MG: 40 INJECTION, POWDER, FOR SOLUTION INTRAVENOUS at 19:56

## 2022-11-29 RX ADMIN — METOPROLOL TARTRATE 5 MG: 1 INJECTION, SOLUTION INTRAVENOUS at 07:06

## 2022-11-29 RX ADMIN — ONDANSETRON 4 MG: 2 INJECTION INTRAMUSCULAR; INTRAVENOUS at 21:00

## 2022-11-29 ASSESSMENT — ACTIVITIES OF DAILY LIVING (ADL)
ADLS_ACUITY_SCORE: 35
ADLS_ACUITY_SCORE: 18
ADLS_ACUITY_SCORE: 35
ADLS_ACUITY_SCORE: 18
ADLS_ACUITY_SCORE: 18

## 2022-11-29 NOTE — ED PROVIDER NOTES
"  History   Chief Complaint:  low hemoglobin and Shortness of Breath       The history is provided by the patient.      Broosk Menash is a 37 year old male with history of hypertension, congestive heart failure, and CKD who presents with low hemoglobin and shortness of breath. Patient reports experiencing worsening shortness of breath the past couple of days. He is not able to walk a few feet without feeling shortness of breath, which is not normal for him. Patient also reports having bloody stool for the past week. He states that his stool was black about a week ago and has become more brown the past couple of days with blood still visible. Patient shares that he is experiencing abdominal pain when he stands and feels like he needs to make a bowel movement. Patient has hypertension but reports that he does not consistently take his medications.     Review of Systems   Respiratory: Positive for shortness of breath.    Gastrointestinal: Positive for abdominal pain and blood in stool.   All other systems reviewed and are negative.    Allergies:  No Known Allergies    Medications:  Amlodipine  Atorvastatin  Carvedilol  Furosemide  Lisinopril  Spironolactone    Past Medical History:     Congestive heart failure  Hypertensive emergency  Hypertensive urgency  Renal insufficiency  CKD  Tobacco use disorder  Overweight  Elevated blood pressure     Past Surgical History:    Epidermoid cystectomy     Family History:    Mother - hyperlipidemia, hypertension  Father - hyperlipidemia, hypertension    Social History:  Presents with family member.  Presents via private vehicle.   PCP: Eva Kent     Physical Exam     Patient Vitals for the past 24 hrs:   BP Temp Temp src Pulse Resp SpO2 Height Weight   11/28/22 2121 -- -- -- -- -- -- 1.803 m (5' 11\") (!) 183.7 kg (405 lb)   11/28/22 2116 (!) 201/102 99.7  F (37.6  C) Temporal 84 20 98 % -- --       Physical Exam  Vitals reviewed.   Constitutional:       Appearance: " He is obese.   HENT:      Head: Normocephalic.      Mouth/Throat:      Mouth: Mucous membranes are moist.   Eyes:      Pupils: Pupils are equal, round, and reactive to light.   Cardiovascular:      Rate and Rhythm: Normal rate.   Pulmonary:      Effort: Pulmonary effort is normal.   Abdominal:      General: Bowel sounds are normal.   Musculoskeletal:      Cervical back: Normal range of motion.   Skin:     Capillary Refill: Capillary refill takes 2 to 3 seconds.      Coloration: Skin is pale.   Neurological:      General: No focal deficit present.      Mental Status: He is alert.   Psychiatric:         Mood and Affect: Mood normal.           Emergency Department Course   ECG  ECG results from 06/13/17   EKG 12 lead     Value    Interpretation ECG Click View Image link to view waveform and result   EKG 12 lead     Value    Interpretation ECG Click View Image link to view waveform and result       Imaging:  US Renal Complete Non-Vascular   Final Result   IMPRESSION:   1.  Normal renal size, no hydronephrosis.      2.  Poor renal visualization likely related to echogenicity suggesting medical renal disease.      XR Chest Port 1 View   Final Result   IMPRESSION: Stable cardiomegaly. Normal pulmonary vascularity. Lungs appear largely clear. No pneumothorax.        Report per radiology    Laboratory:  Labs Ordered and Resulted from Time of ED Arrival to Time of ED Departure   COMPREHENSIVE METABOLIC PANEL - Abnormal       Result Value    Sodium 136      Potassium 4.2      Chloride 105      Carbon Dioxide (CO2) 17 (*)     Anion Gap 14      Urea Nitrogen 62.9 (*)     Creatinine 5.94 (*)     Calcium 8.1 (*)     Glucose 104 (*)     Alkaline Phosphatase 82      AST 9 (*)     ALT 20      Protein Total 6.2 (*)     Albumin 3.4 (*)     Bilirubin Total 0.2      GFR Estimate 12 (*)    CBC WITH PLATELETS AND DIFFERENTIAL - Abnormal    WBC Count 13.4 (*)     RBC Count 1.61 (*)     Hemoglobin 3.7 (*)     Hematocrit 13.6 (*)     MCV 85       MCH 23.0 (*)     MCHC 27.2 (*)     RDW 21.3 (*)     Platelet Count 377      % Neutrophils 71      % Lymphocytes 17      % Monocytes 8      % Eosinophils 2      % Basophils 0      % Immature Granulocytes 2      NRBCs per 100 WBC 0      Absolute Neutrophils 9.6 (*)     Absolute Lymphocytes 2.3      Absolute Monocytes 1.1      Absolute Eosinophils 0.2      Absolute Basophils 0.0      Absolute Immature Granulocytes 0.2      Absolute NRBCs 0.1     COVID-19 VIRUS (CORONAVIRUS) BY PCR - Normal    SARS CoV2 PCR Negative     TYPE AND SCREEN, ADULT    ABO/RH(D) O POS      Antibody Screen Negative      SPECIMEN EXPIRATION DATE [id number]     PREPARE RED BLOOD CELLS (UNIT)   ABO/RH TYPE AND SCREEN      Emergency Department Course:     Reviewed:  I reviewed nursing notes, vitals, past medical history and Care Everywhere    Assessments:  2232 I obtained history and examined the patient as noted above.    I rechecked the patient and explained findings.     Consults:  2330 I consulted with Dr. Macias, Hospitalist, who accepts admission.     Interventions:  2250 Pantoprazole. 40 mg. IV.     Disposition:  The patient was admitted to the hospital under the care of Dr. Macias  .     Impression & Plan   Medical Decision Making:  Patient is a 37-year-old male who has a history of high blood pressure noncompliant on medications who presents with dyspnea on exertion.  Clinical presentation and history is highly suspicious for symptomatic anemia.  Patient did have dark black stool a week ago this is since improved.  His hemoglobin is only 3.4.  Care was discussed with the family feel obligated with transfusion due to young age and worsening renal failure in the setting of uncontrolled hypertension recommend admission for transfusion GI and renal consultation and likely endoscopy to definitively diagnose cause for upper GI bleeding.  Patient is given a dose of IV Protonix 2 units of blood were ordered care was discussed with the  hospitalist and was admitted in guarded condition due to patient's noncompliance new renal insufficiency in the setting of profound anemia.    Diagnosis:    ICD-10-CM    1. Acute upper GI bleed  K92.2       2. Acute kidney injury (H)  N17.9           Discharge Medications:  New Prescriptions    No medications on file       Scribe Disclosure:  Emily LLAMAS, am serving as a scribe at 10:30 PM on 11/28/2022 to document services personally performed by Cliff Burks MD based on my observations and the provider's statements to me.          Cliff Burks MD  11/29/22 1548

## 2022-11-29 NOTE — CONSULTS
[facility]    Nephrology Consultation     [facility]    Date of Admission:  11/28/2022    Assessment & Plan     <EMILY ON CKD OF UNKNOWN ETIOLOGY, WITH NEPHROTIC SYNDROME    ~Current EMILY likely related to bleeding with volume depletion. Pre-renal versus ATN from insult.     ~The cause of his chronic kidney disease is not clear. He has nearly always been hyperglycemia when checked, but his attendance to his own care has been poor.    ~Kidney biopsy has been recommended in the past but he did not follow through. Urine sediment does not have blood as in the past, but would be reasonable to continue with the glomerulopathy workup which had been recommended. Differential diagnosis would be diabetic kidney disease, FSGS, Membranous nephropathy. Less likely GN.     -Blood transfusion, IVF resuscitation.    -Trend I/O and renal function per usual.     -Ordered A1C, anti-PLA2R antibody, HIV, LIAM, ANCA, RPR, C3 4 to start.    -Renal ultrasound ordered.    <HYPERTENSION, SEVERE    ~Known history of hypertension, non-compliance, and likely anxiety exacerbating.     -Treat with as needed medications as you are. Regimen of amlodipine, metoprolol and prn hydralazine is reasonable for the time being.       Juice Michel MD  [facility], Nephrology  Cell:[phone]  Pager:[phone]    Securely message with the Vocera Web Console (learn more here)  Text page via [employer] Paging/Directory         /\/\/\/\/\/\/\/\/\/\/\/\/\/\/\/\/\/\/\/\/\/\/\/\/\/\    Reason for Consult     I was asked to see the patient for EMILY.    Primary Care Physician     Eva Kent    Chief Complaint     Weakness, shortness of air and melena.         History of Present Illness     Brooks Mensah is a 37 year old male who presented with the above symptoms which had been ongoing for the past several days. The melena and weakness have been associated with some migratory abdominal pain which is difficult for the  patient to localize exactly, but it is in the lower abdomen currently.     Hemoglobin yesterday was found to be 3.7 and he was sent to the ED.     Since receiving blood transfusions and some fluids he is feeling better but remains quite weak and with severemalaise.    Javon has seen Dr.s Koch (fellow) and Cha (attending at [facility]) in 2021. He was diagnosed with CKD with chronic nephrotic range proteinuria. An exact diagnosis was not clear at the time and some evaluation for GN was inititiated. At the time his creatinine was 2.5 with a few RBCs and 4+ protein. PTH was 175. Urine PCR was 6.57 with a UACR of 4.6. Kidney biopsy was recommended but the patient did not follow up.     Today the patient has a creatinine of 6 with a K of 3.7. UN is 61. Albumin low at 3.4. (had been 2.7 in 9/2021) The urine has no red cells today but hs 2+ protein.     Javon has family history of diabetes and thyroids disease. He has been a smoker in the past.       History is obtained from the patient and chart review.      Past Medical History   I have reviewed this patient's medical history and updated it with pertinent information if needed.   Past Medical History:   Diagnosis Date     Acute on chronic systolic congestive heart failure (H) 8/9/2017     CAD (coronary artery disease)     Nonobstructive     Cardiomyopathy, unspecified (H) 8/9/2017     Congestive heart failure, unspecified congestive heart failure chronicity, unspecified congestive heart failure type 6/13/2017     HTN (hypertension), malignant      Hypertensive urgency 6/13/2017     Morbid obesity with BMI of 50.0-59.9, adult (H) 6/21/2017     NSTEMI (non-ST elevated myocardial infarction) (H) 6/13/2017     Renal insufficiency 6/13/2017     Sleep apnea      Tobacco abuse        Past Surgical History   I have reviewed this patient's surgical history and updated it with pertinent information if needed.  No past surgical history on file.    Prior to Admission  Medications   Prior to Admission Medications   Prescriptions Last Dose Informant Patient Reported? Taking?   amLODIPine (NORVASC) 10 MG tablet 11/26/2022 at na  No No   Sig: Take 1 tablet (10 mg) by mouth daily   aspirin EC 81 MG EC tablet 11/26/2022 at na  No No   Sig: Take 1 tablet (81 mg) by mouth daily   Patient taking differently: Take 81 mg by mouth When remembers, approx x2 doses a week   atorvastatin (LIPITOR) 10 MG tablet 11/19/2022 at na  No No   Sig: Take 1 tablet (10 mg) by mouth every evening   carvedilol (COREG) 25 MG tablet 11/26/2022 at na  No No   Sig: Take 1 tablet (25 mg) by mouth 2 times daily (with meals)   furosemide (LASIX) 40 MG tablet 11/26/2022 at na  No No   Sig: Take 1 tablet (40 mg) by mouth daily   hydrALAZINE (APRESOLINE) 50 MG tablet 11/26/2022 at na  No No   Sig: Take 1 tablet (50 mg) by mouth 3 times daily   vitamin D3 (CHOLECALCIFEROL) 50 mcg (2000 units) tablet 11/26/2022 at na  No No   Sig: Take 1 tablet (50 mcg) by mouth daily      Facility-Administered Medications: None     [unfilled]  Allergies   No Known Allergies    Social History   I have reviewed this patient's social history and updated it with pertinent information if needed. Brooks Mensah  reports that he has been smoking cigarettes. He started smoking about 20 years ago. He has been smoking an average of .25 packs per day. He has never used smokeless tobacco. He reports current alcohol use. He reports that he does not use drugs. The patient's usual occupation: He works as a  and also in an office. Working about 60 hours weekly currently.     Family History   I have reviewed this patient's family history and updated it with pertinent information if needed. No family history of kidney disease.   Family History   Problem Relation Age of Onset     Hyperlipidemia Mother      Hyperlipidemia Father        Review of Systems   The 14 point Review of Systems is negative other than noted in the HPI.      Physical Exam   Temp: 98  F (36.7  C) Temp src: Oral BP: (!) 175/101 Pulse: 72   Resp: 22 SpO2: 99 % O2 Device: None (Room air)    Vital Signs with Ranges  Temp:  [97.6  F (36.4  C)-99.7  F (37.6  C)] 98  F (36.7  C)  Pulse:  [71-96] 72  Resp:  [20-25] 22  BP: (161-223)/() 175/101  SpO2:  [92 %-100 %] 99 %  405 lbs 0 oz    GENERAL APPEARANCE:  Fatigued, but awake, cooperative, oriented x self, place and recent events.   EYES:  EOM, lids, pupils and irises normal, sclera clear and conjunctiva normal  ENT:  Mouth normal, moist mucous membranes, nose normal without drainage or crusting,   hearing acuity grossly intact  NECK: Supple, symmetrical, trachea midline, No JVD apparent but exam limited by body habitus.   RESP:  Respiratory effort normal,no respiratory distress, Lung sounds clear   CV:  Auscultation of heart done, rate and rhythm controlled and regular, no murmur, no rub or gallop.   ABDOMEN:  Soft, nontender, no palpable masses or organomegaly.  EXT: Edema none bilateral lower extremities.  No gross deformities.  ACCESS:  SKIN: Pale.  skin on extremities warm, dry and intact without rashes  NEURO: cranial nerves grossly intact, no facial asymmetry, no speech deficits and able to follow directions, moves all extremities symmetrically  PSYCH:  insight and judgement and memory appear intact, affect and mood normal    Data   BMP  Recent Labs   Lab 11/29/22  0733 11/28/22  2133    136   POTASSIUM 4.6 4.2   CHLORIDE 109* 105   GAYLE 8.3* 8.1*   CO2 18* 17*   BUN 60.9* 62.9*   CR 6.00* 5.94*   GLC 99 104*     Phos@[email or web address](phos:4)  CBC)  Recent Labs   Lab 11/29/22  1158 11/29/22  0733 11/29/22  0213 11/28/22  2133   WBC  --  13.8*  --  13.4*   HGB 5.7* 5.2* 3.6* 3.7*   HCT  --  17.7*  --  13.6*   MCV  --  88  --  85   PLT  --  296  --  377     Recent Labs   Lab 11/28/22  2133   AST 9*   ALT 20   ALKPHOS 82   BILITOTAL 0.2     No lab results found in last 7 days.  No results found for: D2VIT, D3VIT,  DTOT  Recent Labs   Lab 11/29/22  1158 11/29/22  0733   HGB 5.7* 5.2*   HCT  --  17.7*   MCV  --  88     No results for input(s): PTHI in the last 168 hours.

## 2022-11-29 NOTE — PHARMACY-ADMISSION MEDICATION HISTORY
Admission medication history interview status for this patient is complete. See [facility] admission navigator for allergy information, prior to admission medications and immunization status.     Medication history interview done, indicate source(s): Patient and [facility] pharmacy  Medication history resources (including written lists, pill bottles, clinic record):MARIBEL elliott from [facility]  Pharmacy: [facility]    Changes made to PTA medication list:  Added: none  Changed: none  Reported as Not Taking: most of meds  Removed: none    Actions taken by pharmacist (provider contacted, etc):spoke to the pt, called [facility] pharmacy     Additional medication history information: pt reports very poor adherence to his medications. He doesn't know the names, strengths of his meds. He did say he took all of his meds Saturday 11/26.  He reports that medications cause side effects such as dehydration and bad muscle spasms which makes pt stop his medications. He only resumes them when symptomatic(edema, etc) educated pt on potential side effects of his statin. Of note patient last picked up his medications 01/15/2022    Medication reconciliation/reorder completed by provider prior to medication history?  yes         Prior to Admission medications    Medication Sig Last Dose Taking? Auth Provider Long Term End Date   amLODIPine (NORVASC) 10 MG tablet Take 1 tablet (10 mg) by mouth daily 11/26/2022 at Prem Li PA-C Yes    aspirin EC 81 MG EC tablet Take 1 tablet (81 mg) by mouth daily  Patient taking differently: Take 81 mg by mouth When remembers, approx x2 doses a week 11/26/2022 at Alex Graves,      atorvastatin (LIPITOR) 10 MG tablet Take 1 tablet (10 mg) by mouth every evening 11/19/2022 at Cliff Tate MD Yes    carvedilol (COREG) 25 MG tablet Take 1 tablet (25 mg) by mouth 2 times daily (with meals) 11/26/2022 at Cliff Tate MD Yes    furosemide (LASIX) 40 MG tablet Take 1 tablet (40 mg) by  mouth daily 11/26/2022 at Jewels Paiz MD Yes    hydrALAZINE (APRESOLINE) 50 MG tablet Take 1 tablet (50 mg) by mouth 3 times daily 11/26/2022 at Jewels Paiz MD Yes    vitamin D3 (CHOLECALCIFEROL) 50 mcg (2000 units) tablet Take 1 tablet (50 mcg) by mouth daily 11/26/2022 at Jewels Paiz MD

## 2022-11-29 NOTE — ED NOTES
"[facility]  ED Nurse Handoff Report    Brooks Mensah is a 37 year old male   ED Chief complaint: low hemoglobin and Shortness of Breath  . ED Diagnosis:   Final diagnoses:   None     Allergies: No Known Allergies    Code Status: Full Code  Activity level - Baseline/Home:  Independent. Activity Level - Current:   Stand by Assist. Lift room needed: No. Bariatric: No   Needed: No   Isolation: No. Infection: Not Applicable.     Vital Signs:   Vitals:    11/28/22 2116 11/28/22 2121 11/28/22 2245 11/28/22 2250   BP: (!) 201/102  (!) 177/96    Pulse: 84  78 82   Resp: 20      Temp: 99.7  F (37.6  C)      TempSrc: Temporal      SpO2: 98%  99% 98%   Weight:  (!) 183.7 kg (405 lb)     Height:  1.803 m (5' 11\")         Cardiac Rhythm:  ,      Pain level:    Patient confused: No. Patient Falls Risk: Yes.   Elimination Status: Has voided   Patient Report - Initial Complaint: Low hemoglobin, SOB. Focused Assessment: Chief Complaint:  low hemoglobin and Shortness of Breath        The history is provided by the patient.      Brooks Mensah is a 37 year old male with history of hypertension, congestive heart failure, and CKD who presents with low hemoglobin and shortness of breath. Patient reports experiencing worsening shortness of breath the past couple of days. He is not able to walk a few feet without feeling shortness of breath, which is not normal for him. Patient also reports having bloody stool for the past week. He states that his stool was black about a week ago and has become more brown the past couple of days with blood still visible. Patient shares that he is experiencing abdominal pain when he stands and feels like he needs to make a bowel movement. Patient has hypertension but reports that he does not consistently take his medications.      Review of Systems   Respiratory: Positive for shortness of breath.    Gastrointestinal: Positive for abdominal pain and blood in stool.   All other systems " reviewed and are negative.      Tests Performed: Labs. Abnormal Results:   Labs Ordered and Resulted from Time of ED Arrival to Time of ED Departure   COMPREHENSIVE METABOLIC PANEL - Abnormal       Result Value    Sodium 136      Potassium 4.2      Chloride 105      Carbon Dioxide (CO2) 17 (*)     Anion Gap 14      Urea Nitrogen 62.9 (*)     Creatinine 5.94 (*)     Calcium 8.1 (*)     Glucose 104 (*)     Alkaline Phosphatase 82      AST 9 (*)     ALT 20      Protein Total 6.2 (*)     Albumin 3.4 (*)     Bilirubin Total 0.2      GFR Estimate 12 (*)    CBC WITH PLATELETS AND DIFFERENTIAL - Abnormal    WBC Count 13.4 (*)     RBC Count 1.61 (*)     Hemoglobin 3.7 (*)     Hematocrit 13.6 (*)     MCV 85      MCH 23.0 (*)     MCHC 27.2 (*)     RDW 21.3 (*)     Platelet Count 377      % Neutrophils 71      % Lymphocytes 17      % Monocytes 8      % Eosinophils 2      % Basophils 0      % Immature Granulocytes 2      NRBCs per 100 WBC 0      Absolute Neutrophils 9.6 (*)     Absolute Lymphocytes 2.3      Absolute Monocytes 1.1      Absolute Eosinophils 0.2      Absolute Basophils 0.0      Absolute Immature Granulocytes 0.2      Absolute NRBCs 0.1     COVID-19 VIRUS (CORONAVIRUS) BY PCR - Normal    SARS CoV2 PCR Negative     TYPE AND SCREEN, ADULT    ABO/RH(D) O POS      Antibody Screen Negative      SPECIMEN EXPIRATION DATE [id number]     PREPARE RED BLOOD CELLS (UNIT)    Blood Component Type Red Blood Cells      Product Code Q0445D94      Unit Status Ready for issue      Unit Number [id number]      CROSSMATCH Compatible      CODING SYSTEM RSHR137     PREPARE RED BLOOD CELLS (UNIT)    Blood Component Type Red Blood Cells      Product Code R8245T37      Unit Status Ready for issue      Unit Number [id number]      CROSSMATCH Compatible      CODING SYSTEM UYXA634     PREPARE RED BLOOD CELLS (UNIT)    Blood Component Type Red Blood Cells      Product Code C5347K00      Unit Status Issued      Unit Number [id number]       CROSSMATCH Compatible      CODING SYSTEM HPCI502      ISSUE DATE AND TIME [id number]      UNIT ABO/RH O+      UNIT TYPE ISBT 5100     PREPARE RED BLOOD CELLS (UNIT)    Blood Component Type Red Blood Cells      Product Code U9526N58      Unit Status Ready for issue      Unit Number [id number]      CROSSMATCH Compatible      CODING SYSTEM [id number]     PREPARE RED BLOOD CELLS (UNIT)   TRANSFUSE RED BLOOD CELLS (UNIT)   ABO/RH TYPE AND SCREEN     .   Treatments provided: See MAR. Frequent monitoring of pt.  Family Comments: Pt's sister at bedside. Involved and supportive in pt plan of care.  OBS brochure/video discussed/provided to patient:  No  ED Medications:   Medications   pantoprazole (PROTONIX) IV push injection 40 mg (40 mg Intravenous Given 11/28/22 2250)     Drips infusing:  Yes  For the majority of the shift, the patient's behavior Green. Interventions performed were N/a.    Sepsis treatment initiated: No     Patient tested for COVID 19 prior to admission: YES    ED Nurse Name/Phone Number: Kimi Tsang RN,   11:20 PM   RECEIVING UNIT ED HANDOFF REVIEW    Above ED Nurse Handoff Report was reviewed: Yes  Reviewed by: Heidy Guerra RN on November 29, 2022 at 4:03 PM

## 2022-11-29 NOTE — H&P
[facility]    History and Physical - Hospitalist Service       Date of Admission:  11/28/2022    Assessment & Plan      Brooks Mensah is a 37 year old male admitted on 11/28/2022. He has a past medical history significant for hypertension, sleep apnea, hyperlipidemia, and chronic kidney disease.  He presented to emergency room due to weakness, shortness of breath, and dark-colored stools.  Found to have severe anemia.    Severe acute blood loss anemia.  Suspected upper gastrointestinal hemorrhage.  -Hemoglobin initially 3.7.  -Receiving 2 units of packed red blood cells in the ER.  -Recheck serial hemoglobins.  -Transfuse as needed for hemoglobin less than 7.  -IV pantoprazole 40 mg twice a day.  -NPO.  -Gastroenterology consult.    Hypertensive urgency.  -Start scheduled metoprolol 5 mg IV every 6 hours while NPO.  -IV hydralazine as needed.    Acute kidney injury on chronic kidney disease stage IIIb.  -Avoid nephrotoxins as able.  -Hold furosemide and lisinopril.  -IV fluids overnight.  -Nephrology consult.    Hyperlipidemia.  -Hold atorvastatin while NPO.    Dyspnea.  -Suspect due to severe anemia.  -Check chest x-ray.    Tobacco use disorder.  -I did advise smoking cessation.  -Nicotine gum if needed.     Diet: NPO for Medical/Clinical Reasons Except for: No Exceptions    DVT Prophylaxis: Pneumatic Compression Devices  Sol Catheter: Not present  Central Lines: None  Cardiac Monitoring: None  Code Status: Full Code      Clinically Significant Risk Factors Present on Admission          # Hypocalcemia: Lowest Ca = 8.1 mg/dL (Ref range: 8.5 - 10.1 mg/dL) in last 2 days, will monitor and replace as appropriate     # Hypoalbuminemia: Lowest albumin = 3.4 g/dL (Ref range: 3.5-5.2) at 11/28/2022  9:33 PM, will monitor as appropriate     # Hypertension: home medication list includes antihypertensive(s)     # Severe Obesity: Estimated body mass index is 56.49 kg/m  as calculated from the  "following:    Height as of this encounter: 1.803 m (5' 11\").    Weight as of this encounter: 183.7 kg (405 lb).           Disposition Plan      Expected Discharge Date: 11/30/2022                    Chun Macias DO  Hospitalist Service  [facility]  Securely message with the Vocera Web Console (learn more here)  Text page via [employer] Paging/Directory         ______________________________________________________________________    Chief Complaint   Weakness, shortness of breath, black-colored stools.    History is obtained from the patient    History of Present Illness   Brooks Mensah is a 37 year old male who has a past medical history significant for hypertension, sleep apnea, hyperlipidemia, and chronic kidney disease.  He has been noticing black-colored stools for the past week.  Has been feeling weak.  Has been short of breath with activity.  Has been dizzy at times.  He did feel like he was having significant chills 2 weeks ago.  Has not had continued chills anytime recently.  Has not had a cough with this.  No chest pain.  Does not remember feeling short of breath or weak like this previously.  He does note that he has not been taking his medications on a regular basis.  Takes only sporadically.  He does occasionally smoke.  He decided that because of above-noted symptoms that did not seem to be going away, he present to emergency room for further evaluation.  No other acute complaints.    Review of Systems    The 10 point Review of Systems is negative other than noted in the HPI     Past Medical History    I have reviewed this patient's medical history and updated it with pertinent information if needed.   Past Medical History:   Diagnosis Date     Acute on chronic systolic congestive heart failure (H) 8/9/2017     CAD (coronary artery disease)     Nonobstructive     Cardiomyopathy, unspecified (H) 8/9/2017     Congestive heart failure, unspecified congestive heart failure " chronicity, unspecified congestive heart failure type 6/13/2017     HTN (hypertension), malignant      Hypertensive urgency 6/13/2017     Morbid obesity with BMI of 50.0-59.9, adult (H) 6/21/2017     NSTEMI (non-ST elevated myocardial infarction) (H) 6/13/2017     Renal insufficiency 6/13/2017     Sleep apnea      Tobacco abuse        Past Surgical History   I have reviewed this patient's surgical history and updated it with pertinent information if needed.  No past surgical history on file.    Social History   I have reviewed this patient's social history and updated it with pertinent information if needed.  Social History     Tobacco Use     Smoking status: Every Day     Packs/day: 0.25     Types: Cigarettes     Start date: 2002     Smokeless tobacco: Never     Tobacco comments:     1 pack per week   Substance Use Topics     Alcohol use: Yes     Comment: occassional     Drug use: No       Family History   I have reviewed this patient's family history and updated it with pertinent information if needed.  Family History   Problem Relation Age of Onset     Hyperlipidemia Mother      Hyperlipidemia Father        Prior to Admission Medications   Prior to Admission Medications   Prescriptions Last Dose Informant Patient Reported? Taking?   amLODIPine (NORVASC) 10 MG tablet   No No   Sig: Take 1 tablet (10 mg) by mouth daily   aspirin EC 81 MG EC tablet   No No   Sig: Take 1 tablet (81 mg) by mouth daily   Patient taking differently: Take 81 mg by mouth When remembers, approx x2 doses a week   atorvastatin (LIPITOR) 10 MG tablet   No No   Sig: Take 1 tablet (10 mg) by mouth every evening   carvedilol (COREG) 25 MG tablet   No No   Sig: Take 1 tablet (25 mg) by mouth 2 times daily (with meals)   furosemide (LASIX) 40 MG tablet   No No   Sig: Take 1 tablet (40 mg) by mouth daily   hydrALAZINE (APRESOLINE) 50 MG tablet   No No   Sig: Take 1 tablet (50 mg) by mouth 3 times daily   vitamin D3 (CHOLECALCIFEROL) 50 mcg (2000  units) tablet   No No   Sig: Take 1 tablet (50 mcg) by mouth daily      Facility-Administered Medications: None     Allergies   No Known Allergies    Physical Exam   Vital Signs: Temp: 98.5  F (36.9  C) Temp src: Temporal BP: (!) 201/101 Pulse: 84   Resp: 22 SpO2: 99 % O2 Device: None (Room air)    Weight: 405 lbs 0 oz    Gen:  NAD, A&Ox3.  Eyes:  PERRL, sclera anicteric.  OP:  MMM, no lesions.  Neck:  Supple.  CV:  Regular, no murmurs.  Lung:  CTA b/l, normal effort.  Ab:  +BS, soft.  Skin:  Warm, dry to touch.  No rash.  Ext:  No pitting edema LE b/l.      Data   Data reviewed today: I reviewed all medications, new labs and imaging results over the last 24 hours.    Recent Labs   Lab 11/28/22  2133   WBC 13.4*   HGB 3.7*   MCV 85         POTASSIUM 4.2   CHLORIDE 105   CO2 17*   BUN 62.9*   CR 5.94*   ANIONGAP 14   GAYLE 8.1*   *   ALBUMIN 3.4*   PROTTOTAL 6.2*   BILITOTAL 0.2   ALKPHOS 82   ALT 20   AST 9*

## 2022-11-29 NOTE — PROGRESS NOTES
[facility]  Hospitalist Progress Note  Name: Brooks Mensah    MRN: [id number]  Physician:  Hemanth Bay DO, FHM (Text Page)  Securely message with the Vocera Web Console (learn more here)    Summary of Stay: Brooks Mensah is a 37 year old male with known CKD and HTN who was admitted on 11/28/2022 with marked anemia and EMILY.  He had seen some dark stools for at least 1-2 weeks.  Initial hgb was under 4.    Assessment & Plan    Severe anemia, suspect blood loss with GI bleed (may have been gradual over quite some time):  -  Transfusing, aiming for hgb near 7.  Giving 5th unit this afternoon as hgb only around 6 after 4 units.  He has not had any vomiting or stooling today suggestive of ongoing bleeding.  -  Continue PPI  -  GI saw him and wants to wait for EGD until at least tomorrow when hgb is further improved.  Appreciate their assistance.    As no overt bleeding at moment and he is not nauseated he will be given clear liquids with a plan to be NPO after midnight.    EMILY superimposed on CKD:  -  Unclear definitive etiology, may be multifactorial.  Correcting anemia as perfusion may have contributed.  He prior though had creatinines well over 2 and has not been compliant with follow-up/treating his HTN.  Uncontrolled long standing BP's may also have contributed.  Appreciate nephrology consultation.    -  Renal US  -  He is urinating frequently, not anuric.    Uncontrolled HTN in setting of baseline non-compliance with HTN treatment:  -  Initially using IV lopressor and IV hydralazine.  As clears started, I have started QID oral hydralazine + coreg oral BID.  Also will continue norvasc.    Hyperlipidemia:  -  Holding statin    Multifactorial dyspnea:  -  Suspect largely related to anemia.  Patient feels improved today.    Morbid obesity:  -  Complicates care    Tobacco use disorder:  -  Cessation to be encouraged this admission         COVID Status:  COVID-19 PCR Results    COVID-19 PCR Results  11/28/22   SARS CoV2 PCR Negative      Comments are available for some flowsheets but are not being displayed.         COVID-19 Antibody Results, Testing for Immunity    COVID-19 Antibody Results, Testing for Immunity   No data to display.            Diet: Clear Liquid Diet  NPO per Anesthesia Guidelines for Procedure/Surgery Except for: Meds    DVT Prophylaxis: Pneumatic Compression Devices  Sol Catheter: Not present    Cardiac Monitoring: None    Code Status: Full Code        Disposition Plan   Expect at least a couple more days in the hospital.     Entered: Hemanth Bay,  11/29/2022, 3:53 PM       Interval History   Assumed care, history reviewed.  Mr. Mensah feels much better today after the initial transfusions.  He isn't sure when he first started noticing the darker stools but feels it may have been at least 2 weeks.  No BM's today.  No nausea today, he feels hungry.  No chest pain.  Prior FOSS improved.  Denied dizziness when up today.      -Data reviewed today: I reviewed all new labs and imaging reports over the last 24 hours. I personally reviewed no images or EKG's today.    Physical Exam   Temp: 98.3  F (36.8  C) Temp src: Oral BP: (!) 198/103 Pulse: 79   Resp: 22 SpO2: 99 % O2 Device: None (Room air)    Vitals:    11/28/22 2121   Weight: (!) 183.7 kg (405 lb)     Vital Signs with Ranges  Temp:  [97.6  F (36.4  C)-99.7  F (37.6  C)] 98.3  F (36.8  C)  Pulse:  [71-96] 79  Resp:  [16-25] 22  BP: (161-223)/() 198/103  SpO2:  [92 %-100 %] 99 %  I/O last 3 completed shifts:  In: 1236.5   Out: -     GEN:  Alert, oriented x 3, appears ill but comfortable, no overt distress.  HEENT:  Normocephalic/atraumatic, no scleral icterus, no nasal discharge, mouth moist.  CV:  Regular rate and rhythm, distant.  LUNGS:  Clear to auscultation bilaterally without rales/rhonchi/wheezing/retractions.  Symmetric chest rise on inhalation noted.  ABD:  Active bowel sounds, soft, non-tender/non-distended.  Obese.  No  rebound/guarding/rigidity.  EXT:  Trace edema.  No cyanosis.  No acute joint synovitis noted.  SKIN:  Dry to touch, no exanthems noted in the visualized areas.    Medications     sodium chloride 75 mL/hr at 11/29/22 1350       amLODIPine  5 mg Oral BID     carvedilol  25 mg Oral BID w/meals     hydrALAZINE  50 mg Oral 4x Daily     pantoprazole  40 mg Intravenous BID     sodium chloride (PF)  3 mL Intracatheter Q8H     Data     Recent Labs   Lab 11/29/22  1158 11/29/22  0733 11/29/22  0213 11/28/22  2133   WBC  --  13.8*  --  13.4*   HGB 5.7* 5.2* 3.6* 3.7*   HCT  --  17.7*  --  13.6*   MCV  --  88  --  85   PLT  --  296  --  377     7-Day Micro Results     Collected Updated Procedure Result Status      11/29/2022 0727 11/29/2022 0804 Urine Culture [[id number]]   Urine, Clean Catch    In process Component Value   No component results               11/28/2022 2135 11/28/2022 2221 Asymptomatic COVID-19 Virus (Coronavirus) by PCR Nasopharyngeal [[id number]]    Swab from Nasopharyngeal    Final result Component Value   SARS CoV2 PCR Negative   NEGATIVE: SARS-CoV-2 (COVID-19) RNA not detected, presumed negative.                Recent Labs   Lab 11/29/22  0733 11/28/22  2133    136   POTASSIUM 4.6 4.2   CHLORIDE 109* 105   CO2 18* 17*   ANIONGAP 11 14   GLC 99 104*   BUN 60.9* 62.9*   CR 6.00* 5.94*   GFRESTIMATED 12* 12*   GAYLE 8.3* 8.1*   PROTTOTAL  --  6.2*   ALBUMIN  --  3.4*   BILITOTAL  --  0.2   ALKPHOS  --  82   AST  --  9*   ALT  --  20     Recent Labs   Lab 11/29/22  0727   COLOR Straw   APPEARANCE Clear   URINEGLC Negative   URINEBILI Negative   URINEKETONE Negative   SG 1.009   UBLD Trace*   URINEPH 5.5   PROTEIN 50*   NITRITE Negative   LEUKEST Small*   RBCU 1   WBCU 11*       Recent Results (from the past 24 hour(s))   XR Chest Port 1 View    Narrative    EXAM: XR CHEST PORT 1 VIEW  LOCATION: [facility]  DATE/TIME: 11/29/2022 6:03 AM    INDICATION: dyspnea  COMPARISON:  06/13/2017.      Impression    IMPRESSION: Stable cardiomegaly. Normal pulmonary vascularity. Lungs appear largely clear. No pneumothorax.

## 2022-11-29 NOTE — PLAN OF CARE
ROOM # 203    Living Situation (if not independent, order SW consult):  Facility name:  : [facility]    Activity level at baseline:Indep  Activity level on admit: Corina    Who will be transporting you at discharge: [facility] [phone]    Patient registered to observation; given Patient Bill of Rights; given the opportunity to ask questions about observation status and their plan of care.  Patient has been oriented to the observation room, bathroom and call light is in place.    Discussed discharge goals and expectations with patient/family.

## 2022-11-29 NOTE — ED TRIAGE NOTES
Pt sent from urgent care due to low hemoglobin. HEMOGLOBIN 3.7    Pt had gone to urgent care for abdominal pain, shortness of breath and blood in stool for one week.   Patient does not appear in distress and is pale in color.      Triage Assessment     Row Name 11/28/22 4543       Triage Assessment (Adult)    Airway WDL WDL       Respiratory WDL    Respiratory WDL X  sob       Skin Circulation/Temperature WDL    Skin Circulation/Temperature WDL WDL       Cardiac WDL    Cardiac WDL X;chest pain       Peripheral/Neurovascular WDL    Peripheral Neurovascular WDL WDL       Cognitive/Neuro/Behavioral WDL    Cognitive/Neuro/Behavioral WDL WDL

## 2022-11-29 NOTE — CONSULTS
GASTROENTEROLOGY CONSULTATION      Brooks Mensah  [address] [facility] [id number]  37 year old male     Admission Date/Time: 11/28/2022  Primary Care Provider: Eva Kent  Referring / Attending Physician:  Dr. Macias     We were asked to see the patient in consultation by Dr. Chun Macias DO for evaluation of anemia and melena.        HPI:  Brooks Mensah is a 37 year old male with medical history of hypertension, CHF, CKD, obesity who presented to the emergency room from urgent care with findings of a hemoglobin of 3.7.  Patient went to urgent care with symptoms of generalized weakness, shortness of breath, and dark-colored stool.    Patient is a good historian.  He notes that for the past 9 to 10 days he has had black bowel movements.  He passes 1 to 2/day and they are somewhat loose.  Occasionally he will get some abdominal discomfort in his lower abdomen but this resolves spontaneously.  He denies hematochezia.  No nausea or vomiting.  He has had little appetite.  He does not typically struggle with heartburn.  Looking back, he thinks he may have had black stool about 6 months ago.  He has never had issues with a low hemoglobin.  He denies regular NSAID use.  He estimates smoking 2 to 3 cigarettes/day.  He has not had any alcohol for the past month.  He drinks only occasionally.  Last bowel movement was yesterday morning.    He is typically noncompliant with his home medications.  He does not use any blood thinners.  He does use aspirin 81 mg if he remembers to take his home medicines.  No previous surgeries on his abdomen or stomach.  To his knowledge there is no family history of GI conditions or cancers.  Patient has never required an EGD or colonoscopy.    Hemoglobin was 3.7 at the urgent care.  MCV was 83.  He has been given 2 units of packed red cells with hemoglobin of 5.2.  He is to receive 1 more unit.       PAST MEDICAL HISTORY:  Patient Active Problem List    Diagnosis Date  Noted     Acute upper GI bleed 11/28/2022     Priority: Medium     Acute kidney injury (H) 11/28/2022     Priority: Medium     Chronic kidney disease, stage 3 09/29/2021     Priority: Medium     Acute on chronic systolic congestive heart failure (H) 08/09/2017     Priority: Medium     Cardiomyopathy, unspecified (H) 08/09/2017     Priority: Medium     Morbid obesity with BMI of 50.0-59.9, adult (H) 06/21/2017     Priority: Medium     Congestive heart failure, unspecified congestive heart failure chronicity, unspecified congestive heart failure type 06/13/2017     Priority: Medium     Hypertensive emergency 06/13/2017     Priority: Medium     Hypertensive urgency 06/13/2017     Priority: Medium     Renal insufficiency 06/13/2017     Priority: Medium     NSTEMI (non-ST elevated myocardial infarction) (H) 06/13/2017     Priority: Medium     Tobacco use disorder 09/29/2010     Priority: Medium     Finding of above normal blood pressure 09/29/2010     Priority: Medium          ROS: A comprehensive ten point review of systems was negative aside from those in mentioned in the HPI.       MEDICATIONS:   Prior to Admission medications    Medication Sig Start Date End Date Taking? Authorizing Provider   amLODIPine (NORVASC) 10 MG tablet Take 1 tablet (10 mg) by mouth daily 4/28/21   Prem Painter PA-C   aspirin EC 81 MG EC tablet Take 1 tablet (81 mg) by mouth daily  Patient taking differently: Take 81 mg by mouth When remembers, approx x2 doses a week 6/16/17   Alex Saenz DO   atorvastatin (LIPITOR) 10 MG tablet Take 1 tablet (10 mg) by mouth every evening 3/12/21   IpCliff MD   carvedilol (COREG) 25 MG tablet Take 1 tablet (25 mg) by mouth 2 times daily (with meals) 3/12/21   Cliff Mcdonald MD   furosemide (LASIX) 40 MG tablet Take 1 tablet (40 mg) by mouth daily 9/29/21   Jewels Eubanks MD   hydrALAZINE (APRESOLINE) 50 MG tablet Take 1 tablet (50 mg) by mouth 3 times daily 9/29/21    "Jewels Eubanks MD   vitamin D3 (CHOLECALCIFEROL) 50 mcg (2000 units) tablet Take 1 tablet (50 mcg) by mouth daily 9/29/21   Jewels Eubanks MD        ALLERGIES: No Known Allergies     SOCIAL HISTORY:  Social History     Tobacco Use     Smoking status: Every Day     Packs/day: 0.25     Types: Cigarettes     Start date: 2002     Smokeless tobacco: Never     Tobacco comments:     1 pack per week   Substance Use Topics     Alcohol use: Yes     Comment: occassional     Drug use: No        FAMILY HISTORY:  Family History   Problem Relation Age of Onset     Hyperlipidemia Mother      Hyperlipidemia Father         PHYSICAL EXAM:     BP (!) 203/138   Pulse 80   Temp 98.5  F (36.9  C) (Oral)   Resp 20   Ht 1.803 m (5' 11\")   Wt (!) 183.7 kg (405 lb)   SpO2 92%   BMI 56.49 kg/m       PHYSICAL EXAM:  GENERAL:  NAD  SKIN: no suspicious lesions, rashes, jaundice  HEAD: Normocephalic. Atraumatic.  NECK: Neck supple. No adenopathy.   EYES: No scleral icterus  GASTROINTESTINAL: obese, soft, non tender, non distended, no guarding/rebound  JOINT/EXTREMITIES:  no gross deformities noted, normal muscle tone  NEURO: CN 2-12 grossly intact, no focal deficits  PSYCH: Normal affect       ADDITIONAL COMMENTS:   I reviewed the patient's new clinical lab test results.     Recent Labs   Lab 11/29/22 0733 11/29/22 0213 11/28/22 2133   WBC 13.8*  --  13.4*   RBC 2.01*  --  1.61*   HGB 5.2*   < > 3.7*   HCT 17.7*  --  13.6*   MCV 88  --  85   MCH 25.9*  --  23.0*   MCHC 29.4*  --  27.2*   RDW 19.6*  --  21.3*     --  377    < > = values in this interval not displayed.     Recent Labs   Lab Test 11/29/22  0733 11/28/22 2133 09/20/21  1430   POTASSIUM 4.6 4.2 3.7   CHLORIDE 109* 105 103   CO2 18* 17* 27   BUN 60.9* 62.9* 27   ANIONGAP 11 14 7     Recent Labs   Lab Test 11/29/22  0727 11/28/22 2133 09/20/21  1435 09/20/21  1430 03/05/21  0931 06/14/17  0605   ALBUMIN  --  3.4*  --  2.7*  --  2.5*   BILITOTAL  --  0.2  -- "   --   --  0.6   ALT  --  20  --   --  21 47   AST  --  9*  --   --   --  30   PROTEIN 50*  --  300*  --   --   --         CONSULTATION ASSESSMENT AND PLAN:    Brooks Mensah is a 37 year old with medical history of hypertension, CHF, CKD, obesity who presented to the emergency room from urgent care with findings of a hemoglobin of 3.7.  Patient went to urgent care with symptoms of generalized weakness, shortness of breath, and dark-colored stool.    1.  Melena /severe normocytic anemia: Concern for upper GI source.  Suspect slow chronic bleed related to the hemoglobin of 3.7.  He is symptomatic and will be receiving 1/3 unit of packed red cells this morning most recent hemoglobin check is 5.2.  He has not passed a bowel movement since yesterday morning.  Patient does have chronic kidney disease with worsening renal function. Ddx: Concern for erosive esophagitis, peptic ulcer disease, gastritis/duodenitis, vascular lesions, portal HTN,     -- IV PPI twice a day  -- N.p.o.  -- Would ideally need hemoglobin 8 or above for endoscopic procedure.  Patient does not have any current melena (last BM over 24 hours ago).   -- Can plan for upper endoscopy tomorrow if remains hemodynamically stable. Would likely also need an colonoscopy at some point as well.     I discussed the patient plan with Dr. Warner, GI staff physician. Thank you for asking us to participate in the care of this patient.    Approximately 40 min of total time was spent providing patient care, including patient evaluation, reviewing documentation/ test results, and .     Berenice Padilla PA-C  [facility] ( [facility])       Physician Attestation    I, Dm Warner, saw and evaluated Brooks Mensah and I have reviewed and discussed with the advanced practice provider their history, physical and plan.    I personally reviewed the vital signs, medications, labs and imaging.    Exam:  Vital signs:  Temp: 97.8  F (36.6  C) Temp src:  "Oral BP: (!) 167/92 Pulse: 71   Resp: 22 SpO2: 99 % O2 Device: None (Room air)   Height: 180.3 cm (5' 11\") Weight: (!) 183.7 kg (405 lb)  Estimated body mass index is 56.49 kg/m  as calculated from the following:    Height as of this encounter: 1.803 m (5' 11\").    Weight as of this encounter: 183.7 kg (405 lb).    Impression:  -Melena with concomitant significant normocytic anemia (hemoglobin 3.7) plan 37-year-old gentleman with complex medical history as outlined above including chronic kidney disease stage III, chronic congestive heart failure, morbid obesity, chronic lung disease and a history of myocardial infarction.  Differential diagnosis includes esophagitis, gastritis, duodenitis, peptic ulcer disease, nonsteroidal induced gastropathy, AVMs of the stomach colon or small bowel, lower GI bleeding unlikely.  Small bowel bleeding cannot be excluded.  Hemodynamics are stable.  No further bleeding clinically in the last 24 hours.  Currently on 50 unit of red cell mass.  Last measured hemoglobin 5.7.  Esophageal variceal bleeding unlikely with normal platelets and no other obvious evidence for cirrhosis.    Recommendations:  -Transfused red cell mass till hemoglobin is greater than 8.0.  -Monitor hemoglobin hematocrit.    -Clear liquid diet n.p.o. for midnight for EGD tomorrow afternoon 1 PM.  -Please notify GI service if patient has acute exacerbation of GI bleeding overnight.  -Continue PPI as current.  -Discussed EGD with the patient along with risk-benefit alternative agrees to proceed.  -Case and findings discussed with nursing staff.  -Page out to discuss with hospitalist team.    Thank you for allowing me to participate in this patient's healthcare. Please call any further questions.    Dm Warner MD, FACG  [facility]  [phone]                      "

## 2022-11-30 ENCOUNTER — ANESTHESIA EVENT (OUTPATIENT)
Dept: MEDSURG UNIT | Facility: CLINIC | Age: 37
End: 2022-11-30
Payer: COMMERCIAL

## 2022-11-30 ENCOUNTER — ANESTHESIA (OUTPATIENT)
Dept: MEDSURG UNIT | Facility: CLINIC | Age: 37
End: 2022-11-30
Payer: COMMERCIAL

## 2022-11-30 LAB
ANA SER QL IF: NEGATIVE
ANCA AB PATTERN SER IF-IMP: NORMAL
APTT PPP: 21 SECONDS (ref 22–38)
BACTERIA UR CULT: NORMAL
BLD PROD TYP BPU: NORMAL
BLOOD COMPONENT TYPE: NORMAL
C-ANCA TITR SER IF: NORMAL {TITER}
C3 SERPL-MCNC: 101 MG/DL (ref 81–157)
C4 SERPL-MCNC: 27 MG/DL (ref 13–39)
CODING SYSTEM: NORMAL
CROSSMATCH: NORMAL
HBV CORE AB SERPL QL IA: NONREACTIVE
HBV SURFACE AB SERPL IA-ACNC: 0.16 M[IU]/ML
HBV SURFACE AB SERPL IA-ACNC: NONREACTIVE M[IU]/ML
HBV SURFACE AG SERPL QL IA: NONREACTIVE
HCV AB SERPL QL IA: NONREACTIVE
HGB BLD-MCNC: 6.6 G/DL (ref 13.3–17.7)
HGB BLD-MCNC: 6.8 G/DL (ref 13.3–17.7)
HGB BLD-MCNC: 6.9 G/DL (ref 13.3–17.7)
HGB BLD-MCNC: 7.2 G/DL (ref 13.3–17.7)
HIV 1+2 AB+HIV1 P24 AG SERPL QL IA: NONREACTIVE
INR PPP: 1.01 (ref 0.85–1.15)
ISSUE DATE AND TIME: NORMAL
T PALLIDUM AB SER QL: NONREACTIVE
UNIT ABO/RH: NORMAL
UNIT NUMBER: NORMAL
UNIT STATUS: NORMAL
UNIT TYPE ISBT: 5100

## 2022-11-30 PROCEDURE — 85018 HEMOGLOBIN: CPT | Performed by: INTERNAL MEDICINE

## 2022-11-30 PROCEDURE — 36415 COLL VENOUS BLD VENIPUNCTURE: CPT | Performed by: INTERNAL MEDICINE

## 2022-11-30 PROCEDURE — 250N000013 HC RX MED GY IP 250 OP 250 PS 637: Performed by: HOSPITALIST

## 2022-11-30 PROCEDURE — 36415 COLL VENOUS BLD VENIPUNCTURE: CPT | Performed by: HOSPITALIST

## 2022-11-30 PROCEDURE — 250N000011 HC RX IP 250 OP 636: Performed by: HOSPITALIST

## 2022-11-30 PROCEDURE — 250N000013 HC RX MED GY IP 250 OP 250 PS 637: Performed by: INTERNAL MEDICINE

## 2022-11-30 PROCEDURE — C9113 INJ PANTOPRAZOLE SODIUM, VIA: HCPCS | Performed by: INTERNAL MEDICINE

## 2022-11-30 PROCEDURE — P9016 RBC LEUKOCYTES REDUCED: HCPCS | Performed by: INTERNAL MEDICINE

## 2022-11-30 PROCEDURE — 87340 HEPATITIS B SURFACE AG IA: CPT | Performed by: INTERNAL MEDICINE

## 2022-11-30 PROCEDURE — 250N000011 HC RX IP 250 OP 636: Performed by: INTERNAL MEDICINE

## 2022-11-30 PROCEDURE — 85730 THROMBOPLASTIN TIME PARTIAL: CPT | Performed by: HOSPITALIST

## 2022-11-30 PROCEDURE — 99233 SBSQ HOSP IP/OBS HIGH 50: CPT | Performed by: HOSPITALIST

## 2022-11-30 PROCEDURE — 120N000001 HC R&B MED SURG/OB

## 2022-11-30 PROCEDURE — 370N000003 HC ANESTHESIA WARD SERVICE

## 2022-11-30 PROCEDURE — 85610 PROTHROMBIN TIME: CPT | Performed by: HOSPITALIST

## 2022-11-30 RX ORDER — AMLODIPINE BESYLATE 10 MG/1
10 TABLET ORAL DAILY
Status: DISCONTINUED | OUTPATIENT
Start: 2022-12-01 | End: 2022-12-02 | Stop reason: HOSPADM

## 2022-11-30 RX ORDER — SODIUM CHLORIDE AND POTASSIUM CHLORIDE 150; 900 MG/100ML; MG/100ML
INJECTION, SOLUTION INTRAVENOUS CONTINUOUS
Status: DISCONTINUED | OUTPATIENT
Start: 2022-11-30 | End: 2022-12-01

## 2022-11-30 RX ORDER — NALOXONE HYDROCHLORIDE 0.4 MG/ML
0.2 INJECTION, SOLUTION INTRAMUSCULAR; INTRAVENOUS; SUBCUTANEOUS
Status: DISCONTINUED | OUTPATIENT
Start: 2022-11-30 | End: 2022-12-02 | Stop reason: HOSPADM

## 2022-11-30 RX ORDER — NALOXONE HYDROCHLORIDE 0.4 MG/ML
0.4 INJECTION, SOLUTION INTRAMUSCULAR; INTRAVENOUS; SUBCUTANEOUS
Status: DISCONTINUED | OUTPATIENT
Start: 2022-11-30 | End: 2022-12-02 | Stop reason: HOSPADM

## 2022-11-30 RX ORDER — HYDRALAZINE HYDROCHLORIDE 50 MG/1
50 TABLET, FILM COATED ORAL 3 TIMES DAILY
Status: DISCONTINUED | OUTPATIENT
Start: 2022-11-30 | End: 2022-11-30

## 2022-11-30 RX ORDER — ATORVASTATIN CALCIUM 10 MG/1
10 TABLET, FILM COATED ORAL EVERY EVENING
Status: DISCONTINUED | OUTPATIENT
Start: 2022-11-30 | End: 2022-12-02 | Stop reason: HOSPADM

## 2022-11-30 RX ORDER — LABETALOL 100 MG/1
100 TABLET, FILM COATED ORAL 3 TIMES DAILY PRN
Status: DISCONTINUED | OUTPATIENT
Start: 2022-11-30 | End: 2022-12-02 | Stop reason: HOSPADM

## 2022-11-30 RX ORDER — FUROSEMIDE 40 MG
40 TABLET ORAL DAILY
Status: DISCONTINUED | OUTPATIENT
Start: 2022-11-30 | End: 2022-12-02 | Stop reason: HOSPADM

## 2022-11-30 RX ORDER — CARVEDILOL 25 MG/1
25 TABLET ORAL 2 TIMES DAILY WITH MEALS
Status: DISCONTINUED | OUTPATIENT
Start: 2022-11-30 | End: 2022-12-02 | Stop reason: HOSPADM

## 2022-11-30 RX ADMIN — HYDRALAZINE HYDROCHLORIDE 50 MG: 50 TABLET, FILM COATED ORAL at 08:52

## 2022-11-30 RX ADMIN — PANTOPRAZOLE SODIUM 40 MG: 40 INJECTION, POWDER, FOR SOLUTION INTRAVENOUS at 09:04

## 2022-11-30 RX ADMIN — ATORVASTATIN CALCIUM 10 MG: 10 TABLET, FILM COATED ORAL at 20:35

## 2022-11-30 RX ADMIN — HYDRALAZINE HYDROCHLORIDE 50 MG: 50 TABLET, FILM COATED ORAL at 20:35

## 2022-11-30 RX ADMIN — HYDRALAZINE HYDROCHLORIDE 50 MG: 50 TABLET, FILM COATED ORAL at 17:19

## 2022-11-30 RX ADMIN — AMLODIPINE BESYLATE 5 MG: 5 TABLET ORAL at 08:52

## 2022-11-30 RX ADMIN — CARVEDILOL 25 MG: 25 TABLET, FILM COATED ORAL at 08:52

## 2022-11-30 RX ADMIN — CARVEDILOL 25 MG: 25 TABLET, FILM COATED ORAL at 17:20

## 2022-11-30 RX ADMIN — POTASSIUM CHLORIDE AND SODIUM CHLORIDE: 900; 150 INJECTION, SOLUTION INTRAVENOUS at 20:35

## 2022-11-30 RX ADMIN — HYDRALAZINE HYDROCHLORIDE 50 MG: 50 TABLET, FILM COATED ORAL at 12:48

## 2022-11-30 RX ADMIN — HYDROMORPHONE HYDROCHLORIDE 0.2 MG: 0.2 INJECTION, SOLUTION INTRAMUSCULAR; INTRAVENOUS; SUBCUTANEOUS at 03:10

## 2022-11-30 RX ADMIN — PANTOPRAZOLE SODIUM 40 MG: 40 INJECTION, POWDER, FOR SOLUTION INTRAVENOUS at 20:35

## 2022-11-30 RX ADMIN — ONDANSETRON 4 MG: 4 TABLET, ORALLY DISINTEGRATING ORAL at 08:52

## 2022-11-30 RX ADMIN — HYDRALAZINE HYDROCHLORIDE 10 MG: 20 INJECTION, SOLUTION INTRAMUSCULAR; INTRAVENOUS at 03:09

## 2022-11-30 RX ADMIN — PROCHLORPERAZINE EDISYLATE 10 MG: 5 INJECTION INTRAMUSCULAR; INTRAVENOUS at 03:09

## 2022-11-30 RX ADMIN — HYDROMORPHONE HYDROCHLORIDE 0.2 MG: 0.2 INJECTION, SOLUTION INTRAMUSCULAR; INTRAVENOUS; SUBCUTANEOUS at 08:58

## 2022-11-30 ASSESSMENT — ACTIVITIES OF DAILY LIVING (ADL)
ADLS_ACUITY_SCORE: 18

## 2022-11-30 NOTE — PROGRESS NOTES
Care Management Note    Discharge Disposition: Home    Handoff Referral Completed: Yes    Additional Information:  Pt identified as a Service Bundle #1. No needs or assessment needed at this time. Please consult CM/SW  if discharge needs should arise.    PRAVIN Kumar, [facility]   Inpatient Care Coordination  [facility]   [phone]

## 2022-11-30 NOTE — PLAN OF CARE
Received critical results of Hgb 6.5. pt has conditional order for blood transfusion which has been released. SUSAN Baer notified. Will continue to monitor and assess pt.

## 2022-11-30 NOTE — PROGRESS NOTES
[facility]  Hospitalist Progress Note  Name: Brooks Mensah    MRN: [id number]  Physician:  Valeriy Collado MD    Summary of Stay: Brooks Mensah is a 37 year old male with known obesity, CKD and HTN who was admitted on 11/28/2022 with marked anemia and EMILY.  He had seen some dark stools for at least 1-2 weeks. +NSAID use. Initial hgb was under 4.    Assessment & Plan      Severe anemia, suspect blood loss with GI bleed (may have been gradual over quite some time)    - s/p 5 units PRBC    - getting his 6th as soon as we get a line    - goal Hb for endoscopy >7.5    - Q6hr Hb checks: transfuse to >7.5    - cont IV PPI BID    - spoke with GI: EGD tomorrow if Hb >7.5    - full liquid diet, NPO after midnight    EMILY superimposed on CKD    - unclear definitive etiology, may be multifactorial    - seen by Nephrology    - renal US: medical renal disease    - uncontrolled high BP likely contributed    - IVF were going at 10cc/hr?? will increase to 100cc/hr x 12 hours as he is getting blood and tolerating PO as well    Uncontrolled HTN in setting of baseline non-compliance with HTN treatment:    - resume home meds: amlodipine 10mg    - hydralaizine (increased from TID to QID)    - holding lasix due to ARF    - cont carvedilol    - added PRN oral labetalol    Hyperlipidemia    - cont statin    Multifactorial dyspnea    -  suspect largely related to anemia/obesity    Morbid obesity    - complicates care    Tobacco use disorder    - cessation to be encouraged this admission    Family was in room as I entered. They left. Patient declined to have me call anyone    Addendum: paged by blood bank to check coags. Worried about hemodilution?. Will check Pt/Ptt/INR. Transfuse the current unit while we wait for coags       COVID Status:  COVID-19 PCR Results    COVID-19 PCR Results 11/28/22   SARS CoV2 PCR Negative      Comments are available for some flowsheets but are not being displayed.         COVID-19 Antibody  Results, Testing for Immunity    COVID-19 Antibody Results, Testing for Immunity   No data to display.            Diet: Full Liquid Diet  NPO per Anesthesia Guidelines for Procedure/Surgery Except for: Meds    DVT Prophylaxis: Pneumatic Compression Devices  Sol Catheter: Not present    Cardiac Monitoring: None    Code Status: Full Code      Disposition Plan   Expect at least a couple more days in the hospital.     Entered: Valeriy Collado MD 11/30/2022, 12:37 PM       Interval History   Assumed care, history reviewed.  Patient today is feeling hungry.  He states he still has some epigastric discomfort.  He states yesterday he had some shortness of breath.  Today he denies any chest pain or shortness of breath.  He had to brown/black stools that were firm.    -Data reviewed today: I reviewed all new labs and imaging reports over the last 24 hours. I personally reviewed no images or EKG's today.    Physical Exam   Temp: 98.1  F (36.7  C) Temp src: Oral BP: (!) 174/94 Pulse: 86   Resp: 18 SpO2: 95 % O2 Device: None (Room air)    Vitals:    11/28/22 2121 11/29/22 1655   Weight: (!) 183.7 kg (405 lb) (!) 159.1 kg (350 lb 12.8 oz)     Vital Signs with Ranges  Temp:  [97.5  F (36.4  C)-99.2  F (37.3  C)] 98.1  F (36.7  C)  Pulse:  [66-89] 86  Resp:  [16-22] 18  BP: (127-198)/() 174/94  SpO2:  [95 %-99 %] 95 %  I/O last 3 completed shifts:  In: 1344 [P.O.:240]  Out: -     GEN:  Alert, oriented x 3, appears ill but comfortable, no overt distress.  HEENT:  Normocephalic/atraumatic, no scleral icterus, no nasal discharge, mouth moist.  CV:  Regular rate and rhythm, distant.  LUNGS:  Clear to auscultation bilaterally without rales/rhonchi/wheezing/retractions.  Symmetric chest rise on inhalation noted.  ABD:  Active bowel sounds, soft, non-tender/non-distended.  Obese.  No rebound/guarding/rigidity.  EXT:  Trace edema.  No cyanosis.  No acute joint synovitis noted.  SKIN:  Dry to touch, no exanthems noted in the  visualized areas.    Medications       [START ON 12/1/2022] amLODIPine  10 mg Oral Daily     atorvastatin  10 mg Oral QPM     carvedilol  25 mg Oral BID w/meals     [Held by provider] furosemide  40 mg Oral Daily     hydrALAZINE  50 mg Oral 4x Daily     pantoprazole  40 mg Intravenous BID     sodium chloride (PF)  3 mL Intracatheter Q8H     Data     Recent Labs   Lab 11/30/22  0812 11/30/22  0027 11/29/22  1855 11/29/22  1158 11/29/22  0733 11/29/22 0213 11/28/22 2133   WBC  --   --   --   --  13.8*  --  13.4*   HGB 6.9* 7.2* 6.5*   < > 5.2*   < > 3.7*   HCT  --   --   --   --  17.7*  --  13.6*   MCV  --   --   --   --  88  --  85   PLT  --   --   --   --  296  --  377    < > = values in this interval not displayed.     7-Day Micro Results     Collected Updated Procedure Result Status      11/29/2022 0727 11/30/2022 1155 Urine Culture [[id number]]   Urine, Clean Catch    Final result Component Value   Culture <10,000 CFU/mL Urogenital kristian               11/28/2022 2135 11/28/2022 2221 Asymptomatic COVID-19 Virus (Coronavirus) by PCR Nasopharyngeal [[id number]]    Swab from Nasopharyngeal    Final result Component Value   SARS CoV2 PCR Negative   NEGATIVE: SARS-CoV-2 (COVID-19) RNA not detected, presumed negative.                Recent Labs   Lab 11/29/22  1855 11/29/22  0733 11/28/22 2133    138 136   POTASSIUM 4.4 4.6 4.2   CHLORIDE 110* 109* 105   CO2 15* 18* 17*   ANIONGAP 14 11 14   * 99 104*   BUN 57.4* 60.9* 62.9*   CR 5.83* 6.00* 5.94*   GFRESTIMATED 12* 12* 12*   GAYLE 8.5* 8.3* 8.1*   PROTTOTAL  --   --  6.2*   ALBUMIN  --   --  3.4*   BILITOTAL  --   --  0.2   ALKPHOS  --   --  82   AST  --   --  9*   ALT  --   --  20     Recent Labs   Lab 11/29/22  0727   COLOR Straw   APPEARANCE Clear   URINEGLC Negative   URINEBILI Negative   URINEKETONE Negative   SG 1.009   UBLD Trace*   URINEPH 5.5   PROTEIN 50*   NITRITE Negative   LEUKEST Small*   RBCU 1   WBCU 11*       Recent Results (from the  past 24 hour(s))   US Renal Complete Non-Vascular    Narrative    EXAM: US RENAL COMPLETE NON-VASCULAR  LOCATION: [facility] [place]  DATE/TIME: 11/29/2022 4:30 PM    INDICATION: acute on chronic kidney injury. Eval for obstruction, kidney size and echotexture  COMPARISON: None.  TECHNIQUE: Routine Bilateral Renal and Bladder Ultrasound.    FINDINGS:    RIGHT KIDNEY: 14.4 cm. Normal without hydronephrosis or masses. Echogenicity suggests medical renal disease, suboptimal visualization.    LEFT KIDNEY: 12.6 cm. Normal without hydronephrosis or masses. Simple renal cyst requiring no follow-up.    BLADDER: Normal.      Impression    IMPRESSION:  1.  Normal renal size, no hydronephrosis.    2.  Poor renal visualization likely related to echogenicity suggesting medical renal disease.

## 2022-11-30 NOTE — PROGRESS NOTES
Call from Blood Bank Director regarding order for PRBCs - recommended check of coags and consideration of plasma vs PRBCs at hospitalist's discretion. Provider notified, coags ordered, PRBCs to be given.    Addendum: coags WNL for transfution, PTT 21 and INR 1.01    Alex Staples RN on 11/30/2022

## 2022-11-30 NOTE — PROGRESS NOTES
[facility]  Gastroenterology Chart Check    Dm Warner MD    Patient Name: Brooks Mensah MRN# [id number]   YOB: 1985 Age: 37 year old      Date of Admission:  11/28/2022  Today's Date: 11/30/2022        Interval History:        Globin 6.9 this morning.  Currently undergoing more transfusion to bring hemoglobin above 8.  No further GI bleeding from a clinical perspective after chart review and discussion with Dr. Valeriy Collado.  History of significant nonsteroidal use recently now more evident.  No significant ongoing melena.         Assessment and Plan:        -Severe normocytic anemia with history of melena and significant nonsteroidal use consistent with multifactorial anemia including anemia of chronic disease, kidney disease and likely upper GI or small bowel bleeding.  Clinically no bleeding at this time.  On appropriate PPI therapy.  Hemoglobin this morning only 6.9.  -Multiple medical issues as outlined in H&P and other consults.  Significant heart failure as well as stage III kidney disease.    Recommendations:  -Postpone EGD for 24 hours.  This was discussed with Dr. Valeriy Collado.  -EGD rescheduled for tomorrow at 1 PM.  -Transfuse to hemoglobin greater than 8.0.  Given the patient's history of heart failure and lack of ongoing clinical bleeding, would like to have the extra oxygen carrying capacity of hemoglobin over 8.0 for conscious sedation and EGD.  -Full liquid diet today with n.p.o. after midnight.  -If EGD negative for cause, patient likely can be discharged on PPI therapy with outpatient colonoscopy as well as likely follow-up small bowel PillCam.  Alternatively, this can be performed as an inpatient the patient manifest ongoing clinical bleeding or hemoglobin does not rise to appropriate levels.  -Continue treatment of other medical problems per admitting team and other providers.  -Please call with any further questions.         Medications:          [START  ON 12/1/2022] amLODIPine  10 mg Oral Daily     atorvastatin  10 mg Oral QPM     carvedilol  25 mg Oral BID w/meals     [Held by provider] furosemide  40 mg Oral Daily     hydrALAZINE  50 mg Oral 4x Daily     pantoprazole  40 mg Intravenous BID     sodium chloride (PF)  3 mL Intracatheter Q8H     PRN Meds: acetaminophen **OR** acetaminophen, diphenhydrAMINE, hydrALAZINE, HYDROmorphone, lidocaine 4%, lidocaine (buffered or not buffered), naloxone **OR** naloxone **OR** naloxone **OR** naloxone, nicotine, ondansetron **OR** ondansetron, prochlorperazine **OR** prochlorperazine **OR** prochlorperazine, sodium chloride (PF)         Data:      All new lab and imaging data was reviewed.  .  Recent Labs   Lab Test 11/30/22  0812 11/30/22  0027 11/29/22  1855 11/29/22  1158 11/29/22  0733 11/29/22  0213 11/28/22 2133 09/20/21  1430   WBC  --   --   --   --  13.8*  --  13.4* 13.0*   HGB 6.9* 7.2* 6.5*   < > 5.2*   < > 3.7* 13.9   MCV  --   --   --   --  88  --  85 88   PLT  --   --   --   --  296  --  377 300    < > = values in this interval not displayed.     Recent Labs   Lab Test 11/29/22 1855 11/29/22  0733 11/28/22 2133    138 136   POTASSIUM 4.4 4.6 4.2   CHLORIDE 110* 109* 105   CO2 15* 18* 17*   BUN 57.4* 60.9* 62.9*   CR 5.83* 6.00* 5.94*   ANIONGAP 14 11 14     Recent Labs   Lab Test 11/29/22  0727 11/28/22  2133 09/20/21  1435 09/20/21  1430 03/05/21  0931 06/14/17  0605   ALBUMIN  --  3.4*  --  2.7*  --  2.5*   BILITOTAL  --  0.2  --   --   --  0.6   ALT  --  20  --   --  21 47   AST  --  9*  --   --   --  30   ALKPHOS  --  82  --   --   --  48   PROTEIN 50*  --  300*  --   --   --        Dm Warner MD, [facility]  [phone]

## 2022-11-30 NOTE — PROVIDER NOTIFICATION
DATE:  11/30/2022   TIME OF RECEIPT FROM LAB:  4503  LAB TEST:  hemoglobin  LAB VALUE:  6.8  RESULTS GIVEN WITH READ-BACK TO (PROVIDER):  Dr Valeriy Collado  TIME LAB VALUE REPORTED TO PROVIDER:   1339    Bedside RN notified.

## 2022-11-30 NOTE — PLAN OF CARE
"INPATIENT NOTE [id number]    Admit Date:   Admitting Diagnosis: EMILY  Pertinent History:     Neuro: Alert and Oriented x4  Activity:  independent with no assistive devices   Telemetry Monitoring: No  Pain: denying pain at present. Previously rated pain 3-4/10, 1x dose IV dilaudid given this shift  Labs / Tests: Hgb Q6, INR, PTT  GI: EGD scheduled for tomorrow 12/1, previous bloody stools - pt reports no bloody stools today  : WNL  Medications: coreg restarted, scheduled hydralazine  LDA's: Peripheral  Fluids: blood running @ 125mLs/hour.  SL in between.  Diet: Clear liquid and NPO at midnight pending procedure  Living Situation:  Home, independent  Consults: GI and Nephrology    Plan of Care: goal of maintaining Hgb > 7.5 in order to safely perform EGD tomorrow. Patient has had total 6 units of blood transfused (including unit currently running) this admission - see progress note, next Hgb recheck tonight at 0000. Continued Hgb monitoring.    /76 (BP Location: Left arm)   Pulse 73   Temp 97.8  F (36.6  C) (Oral)   Resp 18   Ht 1.803 m (5' 10.98\")   Wt (!) 159.1 kg (350 lb 12.8 oz)   SpO2 97%   BMI 48.95 kg/m      Alex Staples RN on 11/30/2022      "

## 2022-11-30 NOTE — PLAN OF CARE
Pt is alert and oriented x4. Pt denies pain. Pt has critical lab of hgb 6.5. pt conditional order was released and one unit of PRBC transfusing. NP Chema notified. Pt was given Zofran for nausea. VSS. No acute distress noted. Pt is on IVF NS @ 10ml/hr. Pt is independent in transfer and cares. Call light within reach.

## 2022-11-30 NOTE — PLAN OF CARE
"PRIMARY DIAGNOSIS: GI BLEED    OUTPATIENT/OBSERVATION GOALS TO BE MET BEFORE DISCHARGE  1. Orthostatic performed: No    2. Stable Hgb No.   Recent Labs   Lab Test 11/29/22  1158 11/29/22  0733 11/29/22  0213   HGB 5.7* 5.2* 3.6*       3. Resolved or declined bleeding episodes: Yes Last episode: No bleeding noted.    4. Appropriate testing complete: Yes    5. Cleared for discharge by consultants (if involved): No    6. Safe discharge environment identified: Yes    Discharge Planner Nurse   Safe discharge environment identified: Yes  Barriers to discharge: Yes       Entered by: Heidy Guerra RN 11/29/2022 6:09 PM      Pt alert and oriented X 4, dyspnea with exertion. Pt denies nausea and vomiting. GI discomfort not reported at this time. IV infusing 10 ml/hr. PRN CPAP use at home per patient. Hgb and BMP lab draw at 1800 today. Pt did received units of  Blood at the ED. Currently clear liquid diet. At 0000 NPO. GI and nephrology following. BP has been elevated, Hydralazine + Coreg oral BID, Norvasc in use.   BP (!) 176/100 (BP Location: Left arm)   Pulse 81   Temp 97.6  F (36.4  C) (Oral)   Resp 16   Ht 1.803 m (5' 10.98\")   Wt (!) 159.1 kg (350 lb 12.8 oz)   SpO2 98%   BMI 48.95 kg/m      Please review provider order for any additional goals.   Nurse to notify provider when observation goals have been met and patient is ready for discharge.    "

## 2022-11-30 NOTE — PLAN OF CARE
Pt blood transfusion is still running. Endorsed to incoming nurse to complete the transfusion and get an order for Hgb.

## 2022-11-30 NOTE — PLAN OF CARE
"[id number]    Inpatient Progress Note:    Blood pressure (!) 181/99, pulse 77, temperature 97.7  F (36.5  C), temperature source Oral, resp. rate 20, height 1.803 m (5' 10.98\"), weight (!) 159.1 kg (350 lb 12.8 oz), SpO2 97 %.     Orientation: alert and oriented x4  Neuro: intact   Pain status: Reported 6/10 pain, managed with ID dilaudid  Activity: Up ad sophia, independent  Resp: Denies SOB, On RA  Cardiac: denies chest pain   GI: intermittent nausea, gas discomfort, dry heaving; managed with Zofran and Compazine  :  voiding without issues  Skin: intact  LDA: @ PIVs on BUE  Infusions: NS at 10ml/hr. Blood transfusion completed at midnight. Hydralazine given for elevated BP    Pertinent Labs: Stable Hgb   Recent Labs   Lab Test 11/29/22  1158 11/29/22  1855 11/30/22  0027   HGB 5.7* 6.5* 7.2*       Diet: NPO after Midnight  Consults: GI following. Has EGD in AM  Discharge Plan: TBD    Will continue to monitor and provide cares.     Cindy Torres RN        "

## 2022-11-30 NOTE — PROVIDER NOTIFICATION
DATE:  11/30/2022   TIME OF RECEIPT FROM LAB:  0838  LAB TEST:  hgb  LAB VALUE:  6.9  RESULTS GIVEN WITH READ-BACK TO (PROVIDER):  Dr Collado  TIME LAB VALUE REPORTED TO PROVIDER:  0838    Bedside RN Alex MONTENEGRO and Lorenza  notified.

## 2022-12-01 LAB
ANION GAP SERPL CALCULATED.3IONS-SCNC: 12 MMOL/L (ref 7–15)
BASOPHILS # BLD AUTO: 0 10E3/UL (ref 0–0.2)
BASOPHILS NFR BLD AUTO: 0 %
BUN SERPL-MCNC: 56.9 MG/DL (ref 6–20)
CALCIUM SERPL-MCNC: 8.1 MG/DL (ref 8.6–10)
CHLORIDE SERPL-SCNC: 107 MMOL/L (ref 98–107)
CREAT SERPL-MCNC: 5.92 MG/DL (ref 0.67–1.17)
DEPRECATED HCO3 PLAS-SCNC: 16 MMOL/L (ref 22–29)
EOSINOPHIL # BLD AUTO: 0.2 10E3/UL (ref 0–0.7)
EOSINOPHIL NFR BLD AUTO: 1 %
ERYTHROCYTE [DISTWIDTH] IN BLOOD BY AUTOMATED COUNT: 19.1 % (ref 10–15)
GFR SERPL CREATININE-BSD FRML MDRD: 12 ML/MIN/1.73M2
GLUCOSE SERPL-MCNC: 88 MG/DL (ref 70–99)
HCT VFR BLD AUTO: 23.7 % (ref 40–53)
HGB BLD-MCNC: 6.8 G/DL (ref 13.3–17.7)
HGB BLD-MCNC: 6.9 G/DL (ref 13.3–17.7)
HGB BLD-MCNC: 6.9 G/DL (ref 13.3–17.7)
HGB BLD-MCNC: 7.7 G/DL (ref 13.3–17.7)
IMM GRANULOCYTES # BLD: 0.1 10E3/UL
IMM GRANULOCYTES NFR BLD: 1 %
LYMPHOCYTES # BLD AUTO: 1.2 10E3/UL (ref 0.8–5.3)
LYMPHOCYTES NFR BLD AUTO: 7 %
MCH RBC QN AUTO: 26.3 PG (ref 26.5–33)
MCHC RBC AUTO-ENTMCNC: 29.1 G/DL (ref 31.5–36.5)
MCV RBC AUTO: 91 FL (ref 78–100)
MONOCYTES # BLD AUTO: 1.8 10E3/UL (ref 0–1.3)
MONOCYTES NFR BLD AUTO: 11 %
NEUTROPHILS # BLD AUTO: 14.1 10E3/UL (ref 1.6–8.3)
NEUTROPHILS NFR BLD AUTO: 80 %
NRBC # BLD AUTO: 0 10E3/UL
NRBC BLD AUTO-RTO: 0 /100
PLATELET # BLD AUTO: 259 10E3/UL (ref 150–450)
POTASSIUM SERPL-SCNC: 4.8 MMOL/L (ref 3.4–5.3)
RBC # BLD AUTO: 2.62 10E6/UL (ref 4.4–5.9)
SODIUM SERPL-SCNC: 135 MMOL/L (ref 136–145)
UPPER GI ENDOSCOPY: NORMAL
WBC # BLD AUTO: 17.4 10E3/UL (ref 4–11)

## 2022-12-01 PROCEDURE — 43239 EGD BIOPSY SINGLE/MULTIPLE: CPT | Performed by: INTERNAL MEDICINE

## 2022-12-01 PROCEDURE — 88305 TISSUE EXAM BY PATHOLOGIST: CPT | Mod: 26 | Performed by: PATHOLOGY

## 2022-12-01 PROCEDURE — 250N000011 HC RX IP 250 OP 636: Performed by: INTERNAL MEDICINE

## 2022-12-01 PROCEDURE — 85018 HEMOGLOBIN: CPT | Performed by: INTERNAL MEDICINE

## 2022-12-01 PROCEDURE — 99233 SBSQ HOSP IP/OBS HIGH 50: CPT | Performed by: HOSPITALIST

## 2022-12-01 PROCEDURE — 120N000001 HC R&B MED SURG/OB

## 2022-12-01 PROCEDURE — 0DB98ZX EXCISION OF DUODENUM, VIA NATURAL OR ARTIFICIAL OPENING ENDOSCOPIC, DIAGNOSTIC: ICD-10-PCS | Performed by: INTERNAL MEDICINE

## 2022-12-01 PROCEDURE — 250N000009 HC RX 250: Performed by: INTERNAL MEDICINE

## 2022-12-01 PROCEDURE — 0DB68ZX EXCISION OF STOMACH, VIA NATURAL OR ARTIFICIAL OPENING ENDOSCOPIC, DIAGNOSTIC: ICD-10-PCS | Performed by: INTERNAL MEDICINE

## 2022-12-01 PROCEDURE — 85018 HEMOGLOBIN: CPT | Performed by: HOSPITALIST

## 2022-12-01 PROCEDURE — G0500 MOD SEDAT ENDO SERVICE >5YRS: HCPCS | Performed by: INTERNAL MEDICINE

## 2022-12-01 PROCEDURE — 36415 COLL VENOUS BLD VENIPUNCTURE: CPT | Performed by: INTERNAL MEDICINE

## 2022-12-01 PROCEDURE — 80048 BASIC METABOLIC PNL TOTAL CA: CPT | Performed by: HOSPITALIST

## 2022-12-01 PROCEDURE — 88305 TISSUE EXAM BY PATHOLOGIST: CPT | Mod: TC | Performed by: INTERNAL MEDICINE

## 2022-12-01 PROCEDURE — 36415 COLL VENOUS BLD VENIPUNCTURE: CPT | Performed by: HOSPITALIST

## 2022-12-01 PROCEDURE — P9016 RBC LEUKOCYTES REDUCED: HCPCS | Performed by: INTERNAL MEDICINE

## 2022-12-01 PROCEDURE — 99207 PR NO BILLABLE SERVICE THIS VISIT: CPT | Performed by: HOSPITALIST

## 2022-12-01 PROCEDURE — 250N000013 HC RX MED GY IP 250 OP 250 PS 637: Performed by: HOSPITALIST

## 2022-12-01 PROCEDURE — 250N000013 HC RX MED GY IP 250 OP 250 PS 637: Performed by: INTERNAL MEDICINE

## 2022-12-01 PROCEDURE — C9113 INJ PANTOPRAZOLE SODIUM, VIA: HCPCS | Performed by: INTERNAL MEDICINE

## 2022-12-01 PROCEDURE — 88341 IMHCHEM/IMCYTCHM EA ADD ANTB: CPT | Mod: 26 | Performed by: PATHOLOGY

## 2022-12-01 PROCEDURE — 88342 IMHCHEM/IMCYTCHM 1ST ANTB: CPT | Mod: 26 | Performed by: PATHOLOGY

## 2022-12-01 RX ORDER — NALOXONE HYDROCHLORIDE 0.4 MG/ML
0.2 INJECTION, SOLUTION INTRAMUSCULAR; INTRAVENOUS; SUBCUTANEOUS
Status: DISCONTINUED | OUTPATIENT
Start: 2022-12-01 | End: 2022-12-01 | Stop reason: HOSPADM

## 2022-12-01 RX ORDER — NALOXONE HYDROCHLORIDE 0.4 MG/ML
0.4 INJECTION, SOLUTION INTRAMUSCULAR; INTRAVENOUS; SUBCUTANEOUS
Status: DISCONTINUED | OUTPATIENT
Start: 2022-12-01 | End: 2022-12-01 | Stop reason: HOSPADM

## 2022-12-01 RX ORDER — EPINEPHRINE 1 MG/ML
0.1 INJECTION, SOLUTION INTRAMUSCULAR; SUBCUTANEOUS
Status: DISCONTINUED | OUTPATIENT
Start: 2022-12-01 | End: 2022-12-01 | Stop reason: HOSPADM

## 2022-12-01 RX ORDER — ATROPINE SULFATE 0.1 MG/ML
1 INJECTION INTRAVENOUS
Status: DISCONTINUED | OUTPATIENT
Start: 2022-12-01 | End: 2022-12-01 | Stop reason: HOSPADM

## 2022-12-01 RX ORDER — DIPHENHYDRAMINE HYDROCHLORIDE 50 MG/ML
25-50 INJECTION INTRAMUSCULAR; INTRAVENOUS
Status: DISCONTINUED | OUTPATIENT
Start: 2022-12-01 | End: 2022-12-01 | Stop reason: HOSPADM

## 2022-12-01 RX ORDER — LIDOCAINE 40 MG/G
CREAM TOPICAL
Status: DISCONTINUED | OUTPATIENT
Start: 2022-12-01 | End: 2022-12-01 | Stop reason: HOSPADM

## 2022-12-01 RX ORDER — FLUMAZENIL 0.1 MG/ML
0.2 INJECTION, SOLUTION INTRAVENOUS
Status: DISCONTINUED | OUTPATIENT
Start: 2022-12-01 | End: 2022-12-01 | Stop reason: HOSPADM

## 2022-12-01 RX ORDER — SIMETHICONE 40MG/0.6ML
133 SUSPENSION, DROPS(FINAL DOSAGE FORM)(ML) ORAL
Status: DISCONTINUED | OUTPATIENT
Start: 2022-12-01 | End: 2022-12-01 | Stop reason: HOSPADM

## 2022-12-01 RX ORDER — FENTANYL CITRATE 0.05 MG/ML
50-100 INJECTION, SOLUTION INTRAMUSCULAR; INTRAVENOUS EVERY 5 MIN PRN
Status: DISCONTINUED | OUTPATIENT
Start: 2022-12-01 | End: 2022-12-01 | Stop reason: HOSPADM

## 2022-12-01 RX ADMIN — PANTOPRAZOLE SODIUM 40 MG: 40 INJECTION, POWDER, FOR SOLUTION INTRAVENOUS at 08:21

## 2022-12-01 RX ADMIN — CARVEDILOL 25 MG: 25 TABLET, FILM COATED ORAL at 19:26

## 2022-12-01 RX ADMIN — HYDRALAZINE HYDROCHLORIDE 50 MG: 50 TABLET, FILM COATED ORAL at 20:48

## 2022-12-01 RX ADMIN — MIDAZOLAM HYDROCHLORIDE 1 MG: 1 INJECTION, SOLUTION INTRAMUSCULAR; INTRAVENOUS at 13:06

## 2022-12-01 RX ADMIN — FENTANYL CITRATE 100 MCG: 50 INJECTION INTRAMUSCULAR; INTRAVENOUS at 13:00

## 2022-12-01 RX ADMIN — DARBEPOETIN ALFA 100 MCG: 100 INJECTION, SOLUTION INTRAVENOUS; SUBCUTANEOUS at 19:27

## 2022-12-01 RX ADMIN — CARVEDILOL 25 MG: 25 TABLET, FILM COATED ORAL at 08:21

## 2022-12-01 RX ADMIN — AMLODIPINE BESYLATE 10 MG: 10 TABLET ORAL at 08:22

## 2022-12-01 RX ADMIN — MIDAZOLAM HYDROCHLORIDE 2 MG: 1 INJECTION, SOLUTION INTRAMUSCULAR; INTRAVENOUS at 12:59

## 2022-12-01 RX ADMIN — HYDRALAZINE HYDROCHLORIDE 50 MG: 50 TABLET, FILM COATED ORAL at 08:22

## 2022-12-01 RX ADMIN — HYDRALAZINE HYDROCHLORIDE 50 MG: 50 TABLET, FILM COATED ORAL at 14:34

## 2022-12-01 RX ADMIN — ATORVASTATIN CALCIUM 10 MG: 10 TABLET, FILM COATED ORAL at 20:48

## 2022-12-01 RX ADMIN — HYDRALAZINE HYDROCHLORIDE 50 MG: 50 TABLET, FILM COATED ORAL at 16:18

## 2022-12-01 RX ADMIN — TOPICAL ANESTHETIC 0.5 ML: 200 SPRAY DENTAL; PERIODONTAL at 12:59

## 2022-12-01 ASSESSMENT — ACTIVITIES OF DAILY LIVING (ADL)
ADLS_ACUITY_SCORE: 18

## 2022-12-01 NOTE — DISCHARGE INSTRUCTIONS
The patient has received a copy of the Provation report the doctor has written and discharge instructions have been discussed with the patient and responsible adult.  All questions were addressed and answered prior to patient discharge.      Your hospital follow up appointment has been schedule for you with Dr. Jurado at [facility] for Tuesday 12/13/22 at 2:30pm. Please wear a mask to the appointment. Please call the clinic at [phone] if you need to reschedule.    [address]  [phone]

## 2022-12-01 NOTE — PROVIDER NOTIFICATION
DATE:  12/1/2022   TIME OF RECEIPT FROM LAB:  0300  LAB TEST:  Hgb  LAB VALUE:  6.8  RESULTS GIVEN WITH READ-BACK TO (PROVIDER):  On-call provider  TIME LAB VALUE REPORTED TO PROVIDER:   0300

## 2022-12-01 NOTE — PROGRESS NOTES
Care Management Follow Up    Length of Stay (days): 3    Expected Discharge Date: 12/01/2022     Concerns to be Addressed:       Patient plan of care discussed at interdisciplinary rounds: Yes    Anticipated Discharge Disposition: Home       Additional Information:  Hospital follow up appointment has been scheduled for the patient. AVS updated.    Your hospital follow up appointment has been schedule for you with Dr. Jurado at [facility] for Tuesday 12/13/22 at 2:30pm. Please wear a mask to the appointment. Please call the clinic at [phone] if you need to reschedule.    [facility]  [address] [place] [address]  [phone]    Reena Groves RN, BSN CTS  Care Coordinator  [facility]

## 2022-12-01 NOTE — PROGRESS NOTES
[facility]    Nephrology Progress Note    Assessment & Plan      <EMILY ON CKD    ~Suspect ATN on chronic kidney disease with nephrotic syndrome the cause of which is not totally clear. Has had microhematuria in the past with a few white cells. A few WBC on this UA at this admission as well.     -GN labs negative including hepatitis, complements, JONATAN, ANCA. PLA2R autoantibody test pending for membranous nephropathy.     ~Some NSAID history but of unclear significance as nephrotic syndrome has been longstanding. Did not follow up for biopsy in the past.     - Ongoing supportive care with blood transfusion.    -Agree with ongoing fluids between transfusions.     -No indication for acute dialysis at this point    <HYPERTENSION    ~Given bleeding not necessary to be overly aggressive. Current management appropriate. As was noted to not have been taking his medications before admission, may have significant response as the scheduled meds get to steady state.     -Would continue current regimen, carvedilol amlodipine, hydralazine prn labetolol.    -Would let current scheduled meds a few days to show effect and temporize with prns for now..     Interval History     Ongoing need for blood transfusion. Anticipating EGD tomorrow morning.     Renal function remains poor although creatinine came down a little this morning to 5.8, but unclear how much can be interpreted given the amount of blood he has been given.     Blood pressure remains high. On his amlodipine, carvedilol and prn hydralazine. Not on the lisinopril he had been prescribed as an outpatient but apparently not taking.       Temp: 97.8  F (36.6  C) Temp src: Oral BP: 133/76 Pulse: 73   Resp: 18 SpO2: 97 % O2 Device: None (Room air)      Vitals:    11/28/22 2121 11/29/22 1655   Weight: (!) 183.7 kg (405 lb) (!) 159.1 kg (350 lb 12.8 oz)       Vital Signs with Ranges    Temp:  [97.5  F (36.4  C)-98.3  F (36.8  C)] 97.8  F (36.6  C)  Pulse:   [66-89] 73  Resp:  [16-22] 18  BP: (122-181)/(63-99) 133/76  SpO2:  [95 %-98 %] 97 %    I/O last 3 completed shifts:  In: 790 [P.O.:240]  Out: -     Physical Exam    BP Readings from Last 5 Encounters:   11/30/22 133/76   05/12/21 (!) 154/100   04/28/21 (!) 142/94   03/12/21 (!) 190/110   10/25/19 (!) 155/98        Wt Readings from Last 10 Encounters:   11/29/22 (!) 159.1 kg (350 lb 12.8 oz)   05/17/22 (!) 174.6 kg (385 lb)   02/04/22 (!) 179.2 kg (395 lb)   05/12/21 (!) 176.8 kg (389 lb 11.2 oz)   04/28/21 (!) 176.7 kg (389 lb 9.6 oz)   03/12/21 (!) 176.8 kg (389 lb 11.2 oz)   10/25/19 (!) 189.9 kg (418 lb 11.2 oz)   04/26/19 (!) 184.6 kg (407 lb)   04/24/19 (!) 184.8 kg (407 lb 4.8 oz)   03/22/19 (!) 183.1 kg (403 lb 11.2 oz)       GENERAL:      [] Alert, in no apparent distress.     [] Intubated, sedated  [] Speech fluent, attentive.  HEENT:    []  Normocephalic. No gross abnormalities.    []The mouth moist.    [] Icteric  NECK:     The jugular venous pressure is [] Normal  [] Elevated [x] Not visible    CV:      [x] RRR [] IRR S1 S2 No murmur or rub detected.    RESP:     []  Decreased breath sounds bilaterally with no audible crackle.   [x]  Breathing normal, unlabored.    GI:    []  Abdomen soft/non-tender/non-distended.   []  No masses, organomegaly    MUSCULOSKELETAL:     []  Extremities normal - no gross deformities noted.     Edema: Trace edema in feet.  [] None []  1+ [] 2+ []  3+ [] 4+ edema. [] Anasarca    ACCESS:     SKIN:     []  No new lesions or rashes, dry to touch.   [x]  Pale   []  Jaundiced    NEURO:      []  No overt deficit. Speech and attention normal.    PSYCH:     [] mood good, affect appropriate.   []  Unable to assess    Medications       0.9% sodium chloride + KCl 20 mEq/L           [START ON 12/1/2022] amLODIPine  10 mg Oral Daily     atorvastatin  10 mg Oral QPM     carvedilol  25 mg Oral BID w/meals     [Held by provider] furosemide  40 mg Oral Daily     hydrALAZINE  50 mg Oral 4x  Daily     pantoprazole  40 mg Intravenous BID     sodium chloride (PF)  3 mL Intracatheter Q8H       Data     UA RESULTS:  Recent Labs   Lab Test 11/29/22 0727   COLOR Straw   APPEARANCE Clear   URINEGLC Negative   URINEBILI Negative   URINEKETONE Negative   SG 1.009   UBLD Trace*   URINEPH 5.5   PROTEIN 50*   NITRITE Negative   LEUKEST Small*   RBCU 1   WBCU 11*      BMP  Recent Labs   Lab 11/29/22  1855 11/29/22  0733 11/28/22 2133    138 136   POTASSIUM 4.4 4.6 4.2   CHLORIDE 110* 109* 105   GAYLE 8.5* 8.3* 8.1*   CO2 15* 18* 17*   BUN 57.4* 60.9* 62.9*   CR 5.83* 6.00* 5.94*   * 99 104*     Phos@LABNTIPR(phos:4)  CBC)  Recent Labs   Lab 11/30/22 1758 11/30/22  1305 11/30/22  0812 11/30/22  0027 11/29/22  1158 11/29/22  0733 11/29/22  0213 11/28/22 2133   WBC  --   --   --   --   --  13.8*  --  13.4*   HGB 6.6* 6.8* 6.9* 7.2*   < > 5.2*   < > 3.7*   HCT  --   --   --   --   --  17.7*  --  13.6*   MCV  --   --   --   --   --  88  --  85   PLT  --   --   --   --   --  296  --  377    < > = values in this interval not displayed.     Lab Results   Component Value Date    ALBUMIN 3.4 11/28/2022    ALBUMIN 2.7 09/20/2021    ALBUMIN 2.5 06/14/2017        Recent Labs   Lab 11/30/22 1758 11/29/22  1158 11/29/22  0733   HGB 6.6*   < > 5.2*   HCT  --   --  17.7*   MCV  --   --  88   IRON  --   --  18*   IRONSAT  --   --  6*   FEB  --   --  294    < > = values in this interval not displayed.       Recent Labs   Lab 11/28/22 2133   AST 9*   ALT 20   ALKPHOS 82   BILITOTAL 0.2     Recent Labs   Lab 11/30/22  1528   INR 1.01     No results found for: D2VIT, D3VIT, DTOT  No results for input(s): PTHI in the last 168 hours.    Attestation:   I have reviewed today's relevant vital signs, notes, medications, labs and imaging.

## 2022-12-01 NOTE — PROGRESS NOTES
[facility]    Nephrology Progress Note    Assessment & Plan     <EMILY ON CKD    ~Renal function remains stable, but very poor, stage V.    ~Given the acute illness, he could have ATN and may recover some renal function but I discussed with Javon that the only way to find out is to continue following the renal function as an outpatient.      ~No indication for renal replacement therapy right now. I did not go into extensive detail about dialysis etc, but gave him the warning shot that it may be something he has to face imminently.     ~Still suspect ATN on chronic kidney disease with nephrotic syndrome the cause of which is not totally clear. Has had microhematuria in the past with a few white cells. A few WBC on this UA at this admission as well.     -GN labs negative including hepatitis, complements, JONATAN, ANCA. PLA2R autoantibody test pending for membranous nephropathy.     ~Some NSAID history but of unclear significance as nephrotic syndrome has been longstanding. Did not follow up for biopsy in the past.     -I'm fine with discharge. He will need close follow up.     -I spoke with NARENDRA Raman with whom I work and she is going to contact the patient tomorrow to set up follow up.     -Currently planning on a follow up visit with me next week and weekly labs for a few weeks.     -No indication for acute dialysis at this point    -Avoid NSAID and would nold off on resuming his ACEI.    <HYPERTENSION    ~Blood pressures acceptable on the current inpatient oral regimen.      -Would continue current regimen, carvedilol amlodipine, hydralazine for discharge.     <ACUTE BLOOD LOSS ANEMIA, PROBABLY ANEMIA OF RENAL DISEASE    ~May be unable to recover fully from his anemia given his adnavnce renal disease.     ~With blood transfusion, likely has more than adequate iron stores at this point.     - Will start HEAVENLY. Darbepoetin subhash 100mcg subcutaneous monthly.     -Will arrange from outpatient  injections through clinic as outpatient.               Juice Michel MD  Nephrology  [facility]  Cell:[phone]  On [place]   Pager:[phone]  Office: [phone]        Interval History     Javon tells me he is feeling about 85% of his baseline currently. Much better than on admission.     He is not longer short of air.    EGD showed some DU and changes consistent with NSAID use as noted.     HGB was stable overnight, albeit remains quite low.     The renal function remains stable, but poor with an eGFR of about 12. It has been hanging around 6.           Temp: 97.7  F (36.5  C) Temp src: Oral BP: 132/73 Pulse: 75   Resp: 18 SpO2: 97 % O2 Device: None (Room air) Oxygen Delivery: 2 LPM    Vitals:    11/28/22 2121 11/29/22 1655   Weight: (!) 183.7 kg (405 lb) (!) 159.1 kg (350 lb 12.8 oz)       Vital Signs with Ranges    Temp:  [97.3  F (36.3  C)-98.6  F (37  C)] 97.7  F (36.5  C)  Pulse:  [] 75  Resp:  [13-32] 18  BP: (130-155)/(61-89) 132/73  SpO2:  [92 %-98 %] 97 %    I/O last 3 completed shifts:  In: 1701.67 [P.O.:1140]  Out: 1250 [Urine:1250]    Physical Exam    BP Readings from Last 5 Encounters:   12/01/22 132/73   05/12/21 (!) 154/100   04/28/21 (!) 142/94   03/12/21 (!) 190/110   10/25/19 (!) 155/98        Wt Readings from Last 10 Encounters:   11/29/22 (!) 159.1 kg (350 lb 12.8 oz)   05/17/22 (!) 174.6 kg (385 lb)   02/04/22 (!) 179.2 kg (395 lb)   05/12/21 (!) 176.8 kg (389 lb 11.2 oz)   04/28/21 (!) 176.7 kg (389 lb 9.6 oz)   03/12/21 (!) 176.8 kg (389 lb 11.2 oz)   10/25/19 (!) 189.9 kg (418 lb 11.2 oz)   04/26/19 (!) 184.6 kg (407 lb)   04/24/19 (!) 184.8 kg (407 lb 4.8 oz)   03/22/19 (!) 183.1 kg (403 lb 11.2 oz)       GENERAL:      [] Alert, in no apparent distress.     [] Intubated, sedated  [] Speech fluent, attentive.  HEENT:    []  Normocephalic. No gross abnormalities.    []The mouth moist.    [] Icteric  NECK:     The jugular venous pressure is [] Normal  [] Elevated []  Not visible    CV:      [] RRR [] IRR S1 S2 No murmur or rub detected.    RESP:     []  Decreased breath sounds bilaterally with no audible crackle.   []  Breathing normal, unlabored.    GI:    []  Abdomen soft/non-tender/non-distended.   []  No masses, organomegaly    MUSCULOSKELETAL:     []  Extremities normal - no gross deformities noted.     Edema: [] None []  1+ [] 2+ []  3+ [] 4+ edema. [] Anasarca    ACCESS:     SKIN:     []  No new lesions or rashes, dry to touch.   []  Pale   []  Jaundiced    NEURO:      []  No overt deficit. Speech and attention normal.    PSYCH:     [] mood good, affect appropriate.   []  Unable to assess    Medications           amLODIPine  10 mg Oral Daily     atorvastatin  10 mg Oral QPM     carvedilol  25 mg Oral BID w/meals     [Held by provider] furosemide  40 mg Oral Daily     hydrALAZINE  50 mg Oral 4x Daily     pantoprazole  40 mg Intravenous BID     sodium chloride (PF)  3 mL Intracatheter Q8H       Data     UA RESULTS:  Recent Labs   Lab Test 11/29/22 0727   COLOR Straw   APPEARANCE Clear   URINEGLC Negative   URINEBILI Negative   URINEKETONE Negative   SG 1.009   UBLD Trace*   URINEPH 5.5   PROTEIN 50*   NITRITE Negative   LEUKEST Small*   RBCU 1   WBCU 11*      BMP  Recent Labs   Lab 12/01/22  0522 11/29/22  1855 11/29/22  0733 11/28/22  2133   * 139 138 136   POTASSIUM 4.8 4.4 4.6 4.2   CHLORIDE 107 110* 109* 105   GAYLE 8.1* 8.5* 8.3* 8.1*   CO2 16* 15* 18* 17*   BUN 56.9* 57.4* 60.9* 62.9*   CR 5.92* 5.83* 6.00* 5.94*   GLC 88 101* 99 104*     Phos@LABRCNTIPR(phos:4)  CBC)  Recent Labs   Lab 12/01/22  1206 12/01/22  0522 12/01/22  0115 11/30/22  1758 11/29/22  1158 11/29/22  0733 11/29/22  0213 11/28/22  2133   WBC  --  17.4*  --   --   --  13.8*  --  13.4*   HGB 7.7* 6.9*  6.9* 6.8* 6.6*   < > 5.2*   < > 3.7*   HCT  --  23.7*  --   --   --  17.7*  --  13.6*   MCV  --  91  --   --   --  88  --  85   PLT  --  259  --   --   --  296  --  377    < > = values in this  interval not displayed.     Lab Results   Component Value Date    ALBUMIN 3.4 11/28/2022    ALBUMIN 2.7 09/20/2021    ALBUMIN 2.5 06/14/2017        Recent Labs   Lab 12/01/22  1206 12/01/22  0522 11/29/22  1158 11/29/22  0733   HGB 7.7* 6.9*  6.9*   < > 5.2*   HCT  --  23.7*  --  17.7*   MCV  --  91  --  88   IRON  --   --   --  18*   IRONSAT  --   --   --  6*   FEB  --   --   --  294    < > = values in this interval not displayed.       Recent Labs   Lab 11/28/22  2133   AST 9*   ALT 20   ALKPHOS 82   BILITOTAL 0.2     Recent Labs   Lab 11/30/22  1528   INR 1.01     No results found for: D2VIT, D3VIT, DTOT  No results for input(s): PTHI in the last 168 hours.    Attestation:   I have reviewed today's relevant vital signs, notes, medications, labs and imaging.

## 2022-12-01 NOTE — PLAN OF CARE
"[id number]    Pt is A&Ox4, VSS on RA, able to make needs known. Up independently in the room. Denies dizziness, SOB, nausea & CP. Denies pain when asked, reports some abdominal discomfort. Q6h hgb checks, finished 1 unit RBCs at beginning of shift, critical hgb @ 0300 of 6.8, ordered another unit RBCs, running @ 125, next recheck was 6.9, will probably need another recheck post-transfusion. Otherwise fluids @ 100 ml/hr. PIV in R wrist. NPO for potential EGD today. BS active, previous bloody stools, reports no bloody stools overnight. Strict I&O's. Reported some N/T in L hand, resolved by end of shift. GI & Nephrology following. Will continue to monitor.    BP (!) 148/88   Pulse 75   Temp 98.2  F (36.8  C)   Resp 18   Ht 1.803 m (5' 10.98\")   Wt (!) 159.1 kg (350 lb 12.8 oz)   SpO2 94%   BMI 48.95 kg/m            "

## 2022-12-01 NOTE — PROGRESS NOTES
[facility]  Hospitalist Progress Note  Name: Brooks Mensah    MRN: [id number]  Physician:  Valeriy Collado MD    Summary of Stay: Brooks Mensah is a 37 year old male with known obesity, CKD and HTN who was admitted on 11/28/2022 with marked anemia and EMILY.  He had seen some dark stools for at least 1-2 weeks. +NSAID use. Initial hgb was under 4.    Assessment & Plan      Severe anemia, suspect blood loss with GI bleed (may have been gradual over quite some time)    - s/p 7 units PRBC    - cont IV PPI BID    - spoke with GI: EGD shows ulcers/changes consistent with NSAID use, but biopsies were taken and are pending    - no further black/tarry stools    - regular diet    - if stable labs in am will likely be able to discharge home    EMILY superimposed on CKD    - unclear definitive etiology, may be multifactorial    - seen by Nephrology    - renal US: medical renal disease    - uncontrolled high BP likely contributed, NSAID use    - stable    - will have follow-up with Nephrology (spoke with Dr. Michel)    Leukocytosis    - no signs or symptoms of infection    - this CBC was drawn with blood transfusing    - repeat in am    Uncontrolled HTN in setting of baseline non-compliance with HTN treatment:    - resume home meds: amlodipine 10mg    - hydralaizine (increased from TID to QID)    - holding lasix due to ARF    - cont carvedilol    - added PRN oral labetalol    Hyperlipidemia    - cont statin    Multifactorial dyspnea    -  suspect largely related to anemia/obesity    Morbid obesity    - complicates care    Tobacco use disorder    - cessation to be encouraged this admission    Sister and mother in room and updated       COVID Status:  COVID-19 PCR Results    COVID-19 PCR Results 11/28/22   SARS CoV2 PCR Negative      Comments are available for some flowsheets but are not being displayed.         COVID-19 Antibody Results, Testing for Immunity    COVID-19 Antibody Results, Testing for Immunity   No  data to display.            Diet: 2 Gram Sodium Diet    DVT Prophylaxis: Pneumatic Compression Devices  Sol Catheter: Not present    Cardiac Monitoring: None    Code Status: Full Code      Disposition Plan   Expect at least a couple more days in the hospital.     Entered: Valeriy Collado MD 12/01/2022, 2:57 PM       Interval History   Patient returned from EGD. Doing well. No complaints. No further black stools.     -Data reviewed today: I reviewed all new labs and imaging reports over the last 24 hours. I personally reviewed no images or EKG's today.    Physical Exam   Temp: 97.6  F (36.4  C) Temp src: Oral BP: (!) 145/84 Pulse: 74   Resp: 20 SpO2: 94 % O2 Device: None (Room air) Oxygen Delivery: 2 LPM  Vitals:    11/28/22 2121 11/29/22 1655   Weight: (!) 183.7 kg (405 lb) (!) 159.1 kg (350 lb 12.8 oz)     Vital Signs with Ranges  Temp:  [97.3  F (36.3  C)-98.6  F (37  C)] 97.6  F (36.4  C)  Pulse:  [] 74  Resp:  [13-32] 20  BP: (122-155)/(61-89) 145/84  SpO2:  [92 %-98 %] 94 %  I/O last 3 completed shifts:  In: 1701.67 [P.O.:1140]  Out: 850 [Urine:850]    GEN:  Alert, oriented x 3, appears ill but comfortable, no overt distress.  HEENT:  Normocephalic/atraumatic, no scleral icterus, no nasal discharge, mouth moist.  CV:  Regular rate and rhythm, distant.  LUNGS:  Clear to auscultation bilaterally without rales/rhonchi/wheezing/retractions.  Symmetric chest rise on inhalation noted.  ABD:  Active bowel sounds, soft, non-tender/non-distended.  Obese.  No rebound/guarding/rigidity.  EXT:  Trace edema.  No cyanosis.  No acute joint synovitis noted.  SKIN:  Dry to touch, no exanthems noted in the visualized areas.    Medications       amLODIPine  10 mg Oral Daily     atorvastatin  10 mg Oral QPM     carvedilol  25 mg Oral BID w/meals     [Held by provider] furosemide  40 mg Oral Daily     hydrALAZINE  50 mg Oral 4x Daily     pantoprazole  40 mg Intravenous BID     sodium chloride (PF)  3 mL Intracatheter Q8H      Data     Recent Labs   Lab 12/01/22  1206 12/01/22  0522 12/01/22  0115 11/29/22  1158 11/29/22  0733 11/29/22 0213 11/28/22 2133   WBC  --  17.4*  --   --  13.8*  --  13.4*   HGB 7.7* 6.9*  6.9* 6.8*   < > 5.2*   < > 3.7*   HCT  --  23.7*  --   --  17.7*  --  13.6*   MCV  --  91  --   --  88  --  85   PLT  --  259  --   --  296  --  377    < > = values in this interval not displayed.     7-Day Micro Results     Collected Updated Procedure Result Status      11/29/2022 0727 11/30/2022 1155 Urine Culture [[id number]]   Urine, Clean Catch    Final result Component Value   Culture <10,000 CFU/mL Urogenital kristian               11/28/2022 2135 11/28/2022 2221 Asymptomatic COVID-19 Virus (Coronavirus) by PCR Nasopharyngeal [[id number]]    Swab from Nasopharyngeal    Final result Component Value   SARS CoV2 PCR Negative   NEGATIVE: SARS-CoV-2 (COVID-19) RNA not detected, presumed negative.                Recent Labs   Lab 12/01/22  0522 11/29/22  1855 11/29/22  0733 11/28/22 2133   * 139 138 136   POTASSIUM 4.8 4.4 4.6 4.2   CHLORIDE 107 110* 109* 105   CO2 16* 15* 18* 17*   ANIONGAP 12 14 11 14   GLC 88 101* 99 104*   BUN 56.9* 57.4* 60.9* 62.9*   CR 5.92* 5.83* 6.00* 5.94*   GFRESTIMATED 12* 12* 12* 12*   GAYLE 8.1* 8.5* 8.3* 8.1*   PROTTOTAL  --   --   --  6.2*   ALBUMIN  --   --   --  3.4*   BILITOTAL  --   --   --  0.2   ALKPHOS  --   --   --  82   AST  --   --   --  9*   ALT  --   --   --  20     Recent Labs   Lab 11/29/22 0727   COLOR Straw   APPEARANCE Clear   URINEGLC Negative   URINEBILI Negative   URINEKETONE Negative   SG 1.009   UBLD Trace*   URINEPH 5.5   PROTEIN 50*   NITRITE Negative   LEUKEST Small*   RBCU 1   WBCU 11*       No results found for this or any previous visit (from the past 24 hour(s)).

## 2022-12-01 NOTE — PROGRESS NOTES
Pt triggered lactic acid and vs q30 minutes x2. VOBR Dr Collado.  cancel lactic acid draw and vs.

## 2022-12-01 NOTE — PROVIDER NOTIFICATION
DATE:  11/30/2022   TIME OF RECEIPT FROM LAB:  1838  LAB TEST:  Hgb  LAB VALUE:  6.6 (blood transfusing)  RESULTS GIVEN WITH READ-BACK TO (PROVIDER):  Chema Austin  TIME LAB VALUE REPORTED TO PROVIDER:   1840

## 2022-12-01 NOTE — PROVIDER NOTIFICATION
DATE:  12/1/2022   TIME OF RECEIPT FROM LAB:  0618  LAB TEST:  Hgb  LAB VALUE:  6.9  RESULTS GIVEN WITH READ-BACK TO (PROVIDER):  On-call provider  TIME LAB VALUE REPORTED TO PROVIDER:   0618

## 2022-12-01 NOTE — PLAN OF CARE
Orientation:A&ox3  Vss afebrile bp ranged 130/140's/60-80's.  02:ra 94-95%  LS:clear, diminished.   GI:bs+, denies nausea. No stools this shift. Tolerating clear liq after EGD.   : voiding  Skin:intact  Activity:up indep in room  Pain: denies pain  Plan: PPI, monitor labs, GI/Renal following. Last hgb 7.7 at approx 1200.

## 2022-12-02 VITALS
DIASTOLIC BLOOD PRESSURE: 86 MMHG | TEMPERATURE: 98.4 F | BODY MASS INDEX: 44.1 KG/M2 | OXYGEN SATURATION: 98 % | HEIGHT: 71 IN | HEART RATE: 78 BPM | SYSTOLIC BLOOD PRESSURE: 156 MMHG | RESPIRATION RATE: 13 BRPM | WEIGHT: 315 LBS

## 2022-12-02 LAB
ANION GAP SERPL CALCULATED.3IONS-SCNC: 12 MMOL/L (ref 7–15)
BASOPHILS # BLD AUTO: 0 10E3/UL (ref 0–0.2)
BASOPHILS NFR BLD AUTO: 0 %
BUN SERPL-MCNC: 55.8 MG/DL (ref 6–20)
CALCIUM SERPL-MCNC: 7.9 MG/DL (ref 8.6–10)
CHLORIDE SERPL-SCNC: 107 MMOL/L (ref 98–107)
CREAT SERPL-MCNC: 5.97 MG/DL (ref 0.67–1.17)
DEPRECATED HCO3 PLAS-SCNC: 16 MMOL/L (ref 22–29)
EOSINOPHIL # BLD AUTO: 0.1 10E3/UL (ref 0–0.7)
EOSINOPHIL NFR BLD AUTO: 1 %
ERYTHROCYTE [DISTWIDTH] IN BLOOD BY AUTOMATED COUNT: 19.3 % (ref 10–15)
GFR SERPL CREATININE-BSD FRML MDRD: 12 ML/MIN/1.73M2
GLUCOSE SERPL-MCNC: 92 MG/DL (ref 70–99)
HCT VFR BLD AUTO: 23.6 % (ref 40–53)
HGB BLD-MCNC: 7.2 G/DL (ref 13.3–17.7)
IMM GRANULOCYTES # BLD: 0.1 10E3/UL
IMM GRANULOCYTES NFR BLD: 1 %
LYMPHOCYTES # BLD AUTO: 1.2 10E3/UL (ref 0.8–5.3)
LYMPHOCYTES NFR BLD AUTO: 8 %
MCH RBC QN AUTO: 26.9 PG (ref 26.5–33)
MCHC RBC AUTO-ENTMCNC: 30.5 G/DL (ref 31.5–36.5)
MCV RBC AUTO: 88 FL (ref 78–100)
MONOCYTES # BLD AUTO: 1.8 10E3/UL (ref 0–1.3)
MONOCYTES NFR BLD AUTO: 12 %
NEUTROPHILS # BLD AUTO: 11.8 10E3/UL (ref 1.6–8.3)
NEUTROPHILS NFR BLD AUTO: 78 %
NRBC # BLD AUTO: 0 10E3/UL
NRBC BLD AUTO-RTO: 0 /100
PLA2R IGG SER IA-ACNC: <2 RU/ML
PLATELET # BLD AUTO: 260 10E3/UL (ref 150–450)
POTASSIUM SERPL-SCNC: 4.7 MMOL/L (ref 3.4–5.3)
RBC # BLD AUTO: 2.68 10E6/UL (ref 4.4–5.9)
SODIUM SERPL-SCNC: 135 MMOL/L (ref 136–145)
WBC # BLD AUTO: 15.1 10E3/UL (ref 4–11)

## 2022-12-02 PROCEDURE — 99239 HOSP IP/OBS DSCHRG MGMT >30: CPT | Performed by: HOSPITALIST

## 2022-12-02 PROCEDURE — 250N000013 HC RX MED GY IP 250 OP 250 PS 637: Performed by: HOSPITALIST

## 2022-12-02 PROCEDURE — 85025 COMPLETE CBC W/AUTO DIFF WBC: CPT | Performed by: HOSPITALIST

## 2022-12-02 PROCEDURE — 80048 BASIC METABOLIC PNL TOTAL CA: CPT | Performed by: HOSPITALIST

## 2022-12-02 PROCEDURE — 250N000011 HC RX IP 250 OP 636: Performed by: INTERNAL MEDICINE

## 2022-12-02 PROCEDURE — 36415 COLL VENOUS BLD VENIPUNCTURE: CPT | Performed by: HOSPITALIST

## 2022-12-02 PROCEDURE — C9113 INJ PANTOPRAZOLE SODIUM, VIA: HCPCS | Performed by: INTERNAL MEDICINE

## 2022-12-02 PROCEDURE — 250N000013 HC RX MED GY IP 250 OP 250 PS 637: Performed by: INTERNAL MEDICINE

## 2022-12-02 RX ORDER — HYDRALAZINE HYDROCHLORIDE 50 MG/1
50 TABLET, FILM COATED ORAL 4 TIMES DAILY
Qty: 120 TABLET | Refills: 1 | Status: ON HOLD | OUTPATIENT
Start: 2022-12-02 | End: 2023-10-06

## 2022-12-02 RX ORDER — PANTOPRAZOLE SODIUM 40 MG/1
40 TABLET, DELAYED RELEASE ORAL 2 TIMES DAILY
Qty: 90 TABLET | Refills: 1 | Status: ON HOLD | OUTPATIENT
Start: 2022-12-02 | End: 2023-10-06

## 2022-12-02 RX ORDER — HYDRALAZINE HYDROCHLORIDE 50 MG/1
50 TABLET, FILM COATED ORAL 4 TIMES DAILY
Qty: 270 TABLET | Refills: 1 | COMMUNITY
Start: 2022-12-02 | End: 2022-12-02

## 2022-12-02 RX ADMIN — CARVEDILOL 25 MG: 25 TABLET, FILM COATED ORAL at 10:05

## 2022-12-02 RX ADMIN — PANTOPRAZOLE SODIUM 40 MG: 40 INJECTION, POWDER, FOR SOLUTION INTRAVENOUS at 10:05

## 2022-12-02 RX ADMIN — HYDRALAZINE HYDROCHLORIDE 50 MG: 50 TABLET, FILM COATED ORAL at 10:05

## 2022-12-02 RX ADMIN — PANTOPRAZOLE SODIUM 40 MG: 40 INJECTION, POWDER, FOR SOLUTION INTRAVENOUS at 04:00

## 2022-12-02 RX ADMIN — AMLODIPINE BESYLATE 10 MG: 10 TABLET ORAL at 10:05

## 2022-12-02 ASSESSMENT — ACTIVITIES OF DAILY LIVING (ADL)
ADLS_ACUITY_SCORE: 18

## 2022-12-02 NOTE — PROGRESS NOTES
"PRIMARY DIAGNOSIS: GI BLEED    GOALS TO BE MET BEFORE DISCHARGE  1. Orthostatic performed: N/A    2. Stable Hgb No.   Recent Labs   Lab Test 12/01/22  1206 12/01/22  0522 12/01/22  0115   HGB 7.7* 6.9*  6.9* 6.8*       3. Resolved or declined bleeding episodes: Yes Last episode: None since Admission    4. Appropriate testing complete: Yes    5. Cleared for discharge by consultants (if involved): No    6. Safe discharge environment identified: Yes    Discharge Planner Nurse   Safe discharge environment identified: Yes  Barriers to discharge: Yes       Entered by: Mauricio Castaneda RN 12/01/2022   /74 (BP Location: Left arm)   Pulse 84   Temp 98.6  F (37  C)   Resp 16   Ht 1.803 m (5' 10.98\")   Wt (!) 159.1 kg (350 lb 12.8 oz)   SpO2 94%   BMI 48.95 kg/m    VSS on RA. AxOx4. 2gm NA diet. PIV SL.  Lost IV, restored at 0400 - 2000 Protonix given late due to this. Denies pain. Plan to monitor I/O until labs result this morning, may discharge with stable Hgb. Pt is resting peacefully, will continue to provide supportive cares.  Please review provider order for any additional goals.   Nurse to notify provider when goals have been met and patient is ready for discharge.  "

## 2022-12-02 NOTE — PLAN OF CARE
Goal Outcome Evaluation: progressing.  Hgb lab is now 7.7.  Next check 0600.  Pt has no signs of bleeding: (RN cares [id number])  BP stable, pt on several BP meds.  Started subcutaneous aranesp. Started education on indication and timing.  Pt tolerates a 2gm Na diet.

## 2022-12-02 NOTE — PROGRESS NOTES
"[facility]  [facility]  Gastroenterology Progress note    Interval History:    Plan for discharge today. No evidence of ongoing GI bleeding. No abdominal pain.    Vital Signs:      BP (!) 145/82 (BP Location: Left arm)   Pulse 87   Temp 99.5  F (37.5  C) (Oral)   Resp 13   Ht 1.803 m (5' 10.98\")   Wt (!) 159.1 kg (350 lb 12.8 oz)   SpO2 93%   BMI 48.95 kg/m    Temp (24hrs), Av.4  F (36.9  C), Min:97.6  F (36.4  C), Max:99.5  F (37.5  C)    Patient Vitals for the past 72 hrs:   Weight   22 1655 (!) 159.1 kg (350 lb 12.8 oz)       Intake/Output Summary (Last 24 hours) at 2022 0931  Last data filed at 2022 0500  Gross per 24 hour   Intake --   Output 1150 ml   Net -1150 ml       Constitutional: NAD, comfortable  Cardiovascular: RRR   Respiratory: Non labored  Abdomen: soft, non-tender, nondistended, +BS    Additional Comments:  ROS, FH, SH: See initial GI consult for details.    Laboratory Data:  Recent Labs   Lab Test 22  1206 22  1758 22  1528 22  1158 22  0733   WBC 15.1*  --  17.4*  --   --   --  13.8*   HGB 7.2* 7.7* 6.9*  6.9*   < >  --    < > 5.2*   MCV 88  --  91  --   --   --  88     --  259  --   --   --  296   INR  --   --   --   --  1.01  --   --     < > = values in this interval not displayed.     Recent Labs   Lab Test 22  1855   * 135* 139   POTASSIUM 4.7 4.8 4.4   CHLORIDE 107 107 110*   CO2 16* 16* 15*   BUN 55.8* 56.9* 57.4*   CR 5.97* 5.92* 5.83*   ANIONGAP 12 12 14   GAYLE 7.9* 8.1* 8.5*     Recent Labs   Lab Test 22  0727 22  2133 21  1435 21  1430 21  0931 17  0605   ALBUMIN  --  3.4*  --  2.7*  --  2.5*   BILITOTAL  --  0.2  --   --   --  0.6   DBIL  --   --   --   --   --  0.1   ALT  --  20  --   --  21 47   AST  --  9*  --   --   --  30   ALKPHOS  --  82  --   --   --  48   PROTEIN 50*  " --  300*  --   --   --        EGD 12/1/22 ([facility]):  - Congested duodenal mucosa. Biopsied.   - Enlarged gastric folds with ulceration. May be due to NSAID use, but, cannot rule out a more sinister process pending biopsy results. Biopsied.   - Z-line regular, 50 cm from the incisors.     ASSESSMENT AND PLAN:    Brooks Mensah is a 37 year old with medical history of hypertension, CHF, CKD, obesity who presented to the emergency room from urgent care with findings of a hemoglobin of 3.7.  Patient went to urgent care with symptoms of generalized weakness, shortness of breath, and dark-colored stool.     1.  Melena /severe normocytic anemia: Concern for upper GI source.  Suspect slow chronic bleed related to the hemoglobin of 3.7. Has received total of 7 unites PRBC. Iron studies 11/29: iron at 18, iron saturation at 6%, evidence of MILAN. Patient does have chronic kidney disease with worsening renal function. Ddx: Concern for erosive esophagitis, peptic ulcer disease, gastritis/duodenitis, vascular lesions, portal HTN. History of significant nonsteroidal use recently now more evident.    EGD showed enlarged gastric folds with ulceration, this was biopsied and results pending. Congested duodenal mucosa also seen, biopsied.     12/2: Hemoglobin 7.7 --> 7.2. Patient has had 7 unites PRBC transfused.     Plan:  -- PPI BID x 2-3 months  -- Awaiting biopsy results  -- No ASA/NSAIDs long term  -- [facility] follow up in 6-8 weeks ([facility] will call to schedule).   -- GI will sign off, please call with questions.     Will discuss with Dr. Warner.    Winsome Diggs PA-C  [facility]  Cell: [phone] until 12PM  Office: [phone]

## 2022-12-02 NOTE — DISCHARGE SUMMARY
[facility]  Hospitalist Discharge Summary      Date of Admission:  11/28/2022  Date of Discharge:  12/2/2022  Discharging Provider: Valeriy Collado MD  Discharge Service: Hospitalist Service    Discharge Diagnoses   Acute on chronic renal failure. Suspect ATN. Acute on chronic anemia due to GI blood loss. Uncontrolled hypertension.    Follow-ups Needed After Discharge   Follow-up Appointments     Follow-up and recommended labs and tests       Follow up with primary care provider: an appointment has been made for   you with a new PCP. This appointment is listed elsewhere.  Follow-up with Nephrology (Dr. Juice Michel). [facility]:   [phone]. His office should be reaching out to you for close follow-up   within one week. You will need repeat labs including CBC and Chem 7.  Follow-up with Gastroenterology ([facility]:   [phone]). Their office should be reaching out to you. Dr. Warner   performed your EGD and will have biopsy results for you.           Unresulted Labs Ordered in the Past 30 Days of this Admission     Date and Time Order Name Status Description    12/1/2022  1:16 PM Surgical Pathology Exam In process     11/29/2022  1:28 PM Phospholipase A2 Receptor Monitoring In process     11/28/2022 10:38 PM Prepare red blood cells (unit) Preliminary       These results will be followed up by Nephrology    Discharge Disposition   Discharged to home  Condition at discharge: Stable    Hospital Course   Summary of Stay: Brooks Mensah is a 37 year old male with known obesity, CKD and HTN who was admitted on 11/28/2022 with marked anemia and EMILY.  He had seen some dark stools for at least 1-2 weeks. +NSAID use. Initial hgb was under 4.        Assessment & Plan         Severe anemia, suspect blood loss with GI bleed (may have been gradual over quite some time)    - s/p 7 units PRBC    - cont IV PPI BID    - spoke with GI: EGD shows ulcers/changes consistent with  NSAID use, but biopsies were taken and are pending    - no further black/tarry stools    - regular diet    - if stable labs in am will likely be able to discharge home     EMILY superimposed on CKD    - unclear definitive etiology, may be multifactorial    - seen by Nephrology    - renal US: medical renal disease    - uncontrolled high BP likely contributed, NSAID use    - stable    - will have follow-up with Nephrology (spoke with Dr. Michel)     Leukocytosis    - no signs or symptoms of infection    - this CBC was drawn with blood transfusing    - repeat in am     Uncontrolled HTN in setting of baseline non-compliance with HTN treatment:    - resume home meds: amlodipine 10mg    - hydralaizine (increased from TID to QID)    - holding lasix due to ARF    - cont carvedilol    - added PRN oral labetalol     Hyperlipidemia    - cont statin     Multifactorial dyspnea    -  suspect largely related to anemia/obesity     Morbid obesity    - complicates care     Tobacco use disorder    - cessation to be encouraged this admission    Patient is doing well today.  No black stools for the last 48 hours.  He has no complaints today he is ready for discharge home.  I have spoken to the gastroenterology team as well as nephrology.  Both are in agreement with his discharge.  He will have close follow-up with nephrology within the next week to follow his labs.  He has been made a primary care doctor appointment with the [facility] near his home.  Gastroenterology will reach out to them to schedule follow-up as well.  He has been educated on stopping all NSAID use and making sure that he follows up with all his providers.  In terms of his blood pressure, his Lasix has been stopped due to his renal function.  His hydralazine has been increased to 4 times a day.  Family has been kept updated during his stay.    Consultations This Hospital Stay   NEPHROLOGY IP CONSULT  GASTROENTEROLOGY IP CONSULT    Code Status   Full Code    Time  Spent on this Encounter   I, Valeriy Collado MD, personally saw the patient today and spent greater than 30 minutes discharging this patient.       Valeriy Collado MD  [facility] OBSERVATION DEPT  [address] [place] [address]  Phone: [phone]  ______________________________________________________________________    Physical Exam   Vital Signs: Temp: 99.5  F (37.5  C) Temp src: Oral BP: (!) 145/82 Pulse: 87   Resp: 13 SpO2: 93 % O2 Device: None (Room air) Oxygen Delivery: 2 LPM  Weight: 350 lbs 12.8 oz  Constitutional: awake, alert, cooperative, no apparent distress, and appears stated age  Eyes: Lids and lashes normal, pupils equal, round and reactive to light, extra ocular muscles intact, sclera clear, conjunctiva normal  ENT: Normocephalic, without obvious abnormality, atraumatic, sinuses nontender on palpation, external ears without lesions, oral pharynx with moist mucous membranes, tonsils without erythema or exudates, gums normal and good dentition.  Respiratory: No increased work of breathing, good air exchange, clear to auscultation bilaterally, no crackles or wheezing  Cardiovascular: Normal apical impulse, regular rate and rhythm, normal S1 and S2, no S3 or S4, and no murmur noted  GI: No scars, normal bowel sounds, soft, non-distended, non-tender, no masses palpated, no hepatosplenomegally       Primary Care Physician   Eva Kent    Discharge Orders      Primary Care - Care Coordination Referral      Reason for your hospital stay    Acute on chronic renal failure. Acute on chronic anemia due to gastrointestinal bleeding. Uncontrolled hypertension.     Follow-up and recommended labs and tests     Follow up with primary care provider: an appointment has been made for you with a new PCP. This appointment is listed elsewhere.  Follow-up with Nephrology (Dr. Juice Michel). [facility]: [phone]. His office should be reaching out to you for close  follow-up within one week. You will need repeat labs including CBC and Chem 7.  Follow-up with Gastroenterology ([facility]: [phone]). Their office should be reaching out to you. Dr. Warner performed your EGD and will have biopsy results for you.     Activity    Your activity upon discharge: activity as tolerated     Discharge Instructions    You need to avoid all NSAIDs (non-steroidal anti-inflammatory medications). These include: Motrin, ibuprofen, Advil, Aleve, naprosyn.  You will be on pantoprazole 40mg twice per day until directed otherwise by Gastroenterology. This medication will help heal and prevent ulcers.     Diet    Follow this diet upon discharge: Orders Placed This Encounter      2 Gram Sodium Diet       Significant Results and Procedures   Most Recent 3 CBC's:Recent Labs   Lab Test 12/02/22  0513 12/01/22  1206 12/01/22  0522 11/29/22  1158 11/29/22  0733   WBC 15.1*  --  17.4*  --  13.8*   HGB 7.2* 7.7* 6.9*  6.9*   < > 5.2*   MCV 88  --  91  --  88     --  259  --  296    < > = values in this interval not displayed.     Most Recent 3 BMP's:Recent Labs   Lab Test 12/02/22  0513 12/01/22  0522 11/29/22  1855   * 135* 139   POTASSIUM 4.7 4.8 4.4   CHLORIDE 107 107 110*   CO2 16* 16* 15*   BUN 55.8* 56.9* 57.4*   CR 5.97* 5.92* 5.83*   ANIONGAP 12 12 14   GAYLE 7.9* 8.1* 8.5*   GLC 92 88 101*     Most Recent 2 LFT's:Recent Labs   Lab Test 11/28/22  2133 03/05/21  0931 06/14/17  0605   AST 9*  --  30   ALT 20 21 47   ALKPHOS 82  --  48   BILITOTAL 0.2  --  0.6   ,   Results for orders placed or performed during the hospital encounter of 11/28/22   XR Chest Port 1 View    Narrative    EXAM: XR CHEST PORT 1 VIEW  LOCATION: [facility] [place]  DATE/TIME: 11/29/2022 6:03 AM    INDICATION: dyspnea  COMPARISON: 06/13/2017.      Impression    IMPRESSION: Stable cardiomegaly. Normal pulmonary vascularity. Lungs appear largely clear. No pneumothorax.   US Renal  Complete Non-Vascular    Narrative    EXAM: US RENAL COMPLETE NON-VASCULAR  LOCATION: [facility]  DATE/TIME: 11/29/2022 4:30 PM    INDICATION: acute on chronic kidney injury. Eval for obstruction, kidney size and echotexture  COMPARISON: None.  TECHNIQUE: Routine Bilateral Renal and Bladder Ultrasound.    FINDINGS:    RIGHT KIDNEY: 14.4 cm. Normal without hydronephrosis or masses. Echogenicity suggests medical renal disease, suboptimal visualization.    LEFT KIDNEY: 12.6 cm. Normal without hydronephrosis or masses. Simple renal cyst requiring no follow-up.    BLADDER: Normal.      Impression    IMPRESSION:  1.  Normal renal size, no hydronephrosis.    2.  Poor renal visualization likely related to echogenicity suggesting medical renal disease.       Discharge Medications   Current Discharge Medication List      START taking these medications    Details   pantoprazole (PROTONIX) 40 MG EC tablet Take 1 tablet (40 mg) by mouth 2 times daily  Qty: 90 tablet, Refills: 1    Associated Diagnoses: Acute upper GI bleed; Gastrointestinal hemorrhage associated with gastric ulcer         CONTINUE these medications which have CHANGED    Details   hydrALAZINE (APRESOLINE) 50 MG tablet Take 1 tablet (50 mg) by mouth 4 times daily  Qty: 120 tablet, Refills: 1    Associated Diagnoses: Essential hypertension; Chronic systolic congestive heart failure (H)         CONTINUE these medications which have NOT CHANGED    Details   amLODIPine (NORVASC) 10 MG tablet Take 1 tablet (10 mg) by mouth daily  Qty: 90 tablet, Refills: 3    Associated Diagnoses: Chronic systolic congestive heart failure (H); Other cardiomyopathy (H); Essential hypertension      atorvastatin (LIPITOR) 10 MG tablet Take 1 tablet (10 mg) by mouth every evening  Qty: 90 tablet, Refills: 3    Associated Diagnoses: NSTEMI (non-ST elevated myocardial infarction) (H)      carvedilol (COREG) 25 MG tablet Take 1 tablet (25 mg) by mouth 2 times daily  (with meals)  Qty: 180 tablet, Refills: 3    Associated Diagnoses: Hypertensive emergency      vitamin D3 (CHOLECALCIFEROL) 50 mcg (2000 units) tablet Take 1 tablet (50 mcg) by mouth daily  Qty: 90 tablet, Refills: 1    Associated Diagnoses: Stage 3b chronic kidney disease (H); Vitamin D deficiency         STOP taking these medications       aspirin EC 81 MG EC tablet Comments:   Reason for Stopping:         furosemide (LASIX) 40 MG tablet Comments:   Reason for Stopping:             Allergies   No Known Allergies

## 2022-12-05 DIAGNOSIS — N18.32 STAGE 3B CHRONIC KIDNEY DISEASE (H): ICD-10-CM

## 2022-12-05 DIAGNOSIS — E55.9 VITAMIN D DEFICIENCY: ICD-10-CM

## 2022-12-05 LAB
PATH REPORT.COMMENTS IMP SPEC: NORMAL
PATH REPORT.COMMENTS IMP SPEC: NORMAL
PATH REPORT.FINAL DX SPEC: NORMAL
PATH REPORT.GROSS SPEC: NORMAL
PATH REPORT.MICROSCOPIC SPEC OTHER STN: NORMAL
PATH REPORT.MICROSCOPIC SPEC OTHER STN: NORMAL
PATH REPORT.RELEVANT HX SPEC: NORMAL
PHOTO IMAGE: NORMAL

## 2022-12-06 ENCOUNTER — MEDICAL CORRESPONDENCE (OUTPATIENT)
Dept: HEALTH INFORMATION MANAGEMENT | Facility: CLINIC | Age: 37
End: 2022-12-06

## 2022-12-06 ENCOUNTER — PATIENT OUTREACH (OUTPATIENT)
Dept: CARE COORDINATION | Facility: CLINIC | Age: 37
End: 2022-12-06

## 2022-12-06 RX ORDER — CHOLECALCIFEROL (VITAMIN D3) 50 MCG
TABLET ORAL
Qty: 120 TABLET | Refills: 0 | OUTPATIENT
Start: 2022-12-06

## 2022-12-06 NOTE — PROGRESS NOTES
Clinic Care Coordination Contact  [facility]: Post-Discharge Note  SITUATION                                                      Admission:    Admission Date: 11/28/22   Reason for Admission: 1. Acute upper GI bleed  K92.2       2. Acute kidney injury (H)  N17.9  Discharge:   Discharge Date: 12/02/22  Discharge Diagnosis: Acute on chronic renal failure. Acute on chronic anemia due to GI blood loss. Uncontrolled hypertension.    BACKGROUND                                                      Per hospital discharge summary and inpatient provider notes:    Brooks Mensah is a 37 year old male with history of hypertension, congestive heart failure, and CKD who presents with low hemoglobin and shortness of breath. Patient reports experiencing worsening shortness of breath the past couple of days. He is not able to walk a few feet without feeling shortness of breath, which is not normal for him. Patient also reports having bloody stool for the past week. He states that his stool was black about a week ago and has become more brown the past couple of days with blood still visible. Patient shares that he is experiencing abdominal pain when he stands and feels like he needs to make a bowel movement. Patient has hypertension but reports that he does not consistently take his medications.       ASSESSMENT           Discharge Assessment  How are you doing now that you are home?: Patient states he feels okay, sleepy. States symptoms are the same.  How are your symptoms? (Red Flag symptoms escalate to triage hotline per guidelines): Unchanged  Do you feel your condition is stable enough to be safe at home until your provider visit?: Yes  Does the patient have their discharge instructions? : Yes  Does the patient have questions regarding their discharge instructions? : No  Were you started on any new medications or were there changes to any of your previous medications? : Yes  Does the patient have all of their medications?:  Yes  Do you have questions regarding any of your medications? : No  Do you have all of your needed medical supplies or equipment (DME)?  (i.e. oxygen tank, CPAP, cane, etc.):  (N/A)  Discharge follow-up appointment scheduled within 14 calendar days? : Yes  Discharge Follow Up Appointment Date: 12/13/22  Discharge Follow Up Appointment Scheduled with?: Primary Care Provider         Post-op (Clinicians Only)  Did the patient have surgery or a procedure: Yes (Upper Endoscopy- No incision)  Fever: No  Chills: No  Eating & Drinking: eating and drinking without complaints/concerns  PO Intake: other (2 gram sodium diet)  Additional Symptoms:  (Denies)  Bowel Function: normal (Slight constipation)  Date of last BM: 12/06/22  Urinary Status: voiding without complaint/concerns        PLAN                                                      Outpatient Plan:   Follow up with primary care provider: an appointment has been made for   you with a new PCP. This appointment is listed elsewhere.    Follow-up with Nephrology (Dr. Juice Michel). [facility]:   [phone]. His office should be reaching out to you for close follow-up   within one week. You will need repeat labs including CBC and Chem 7.    Follow-up with Gastroenterology ([facility]:   [phone]). Their office should be reaching out to you. Dr. Warner   performed your EGD and will have biopsy results for you.           No future appointments.      For any urgent concerns, please contact our 24 hour nurse triage line: [phone] ([phone])         Dorota Motta RN

## 2022-12-07 DIAGNOSIS — I16.1 HYPERTENSIVE EMERGENCY: ICD-10-CM

## 2022-12-07 DIAGNOSIS — I50.9 CONGESTIVE HEART FAILURE (H): Primary | ICD-10-CM

## 2022-12-07 RX ORDER — CARVEDILOL 25 MG/1
25 TABLET ORAL 2 TIMES DAILY WITH MEALS
Qty: 180 TABLET | Refills: 0 | Status: ON HOLD | OUTPATIENT
Start: 2022-12-07 | End: 2023-10-06

## 2022-12-07 NOTE — TELEPHONE ENCOUNTER
[facility] Cardiology Refill Guideline reviewed. Recently discharged from [facility] 11/285-12/2/22 for Acute on chronic renal failure. Acute on chronic anemia due to GI blood loss. Uncontrolled hypertension.  No cardiology/CORE consult. No Follow-up with cardiology.   He is over due for Follow-up.     Refill for carvedilol sent to [facility].       Scheduling please shey Brooks and make MD appt with Dr Mcdonald (overdue for CORE CHARLIE so has to see MD prior) .       Updated order MD order    No future appointments.    Thanks!    Donna Sandoval, JEANNETTEN, RN 11:13 AM 12/07/22

## 2022-12-08 DIAGNOSIS — D63.1 ANEMIA IN CHRONIC KIDNEY DISEASE (CODE): ICD-10-CM

## 2022-12-08 DIAGNOSIS — N17.9 ACUTE KIDNEY FAILURE, UNSPECIFIED (H): Primary | ICD-10-CM

## 2022-12-19 ENCOUNTER — HOSPITAL ENCOUNTER (OUTPATIENT)
Dept: CT IMAGING | Facility: CLINIC | Age: 37
Discharge: HOME | End: 2022-12-19
Attending: INTERNAL MEDICINE | Admitting: INTERNAL MEDICINE
Payer: COMMERCIAL

## 2022-12-19 VITALS
DIASTOLIC BLOOD PRESSURE: 82 MMHG | SYSTOLIC BLOOD PRESSURE: 141 MMHG | RESPIRATION RATE: 18 BRPM | OXYGEN SATURATION: 99 % | HEART RATE: 77 BPM

## 2022-12-19 DIAGNOSIS — N18.30 CHRONIC KIDNEY DISEASE, STAGE III (MODERATE) (H): ICD-10-CM

## 2022-12-19 PROCEDURE — 88313 SPECIAL STAINS GROUP 2: CPT | Mod: 26 | Performed by: PATHOLOGY

## 2022-12-19 PROCEDURE — 77012 CT SCAN FOR NEEDLE BIOPSY: CPT

## 2022-12-19 PROCEDURE — 272N000431 CT RENAL BIOPSY PERCUTANEOUS

## 2022-12-19 PROCEDURE — 88305 TISSUE EXAM BY PATHOLOGIST: CPT | Mod: TC | Performed by: INTERNAL MEDICINE

## 2022-12-19 PROCEDURE — 88305 TISSUE EXAM BY PATHOLOGIST: CPT | Mod: 26 | Performed by: PATHOLOGY

## 2022-12-19 PROCEDURE — 250N000011 HC RX IP 250 OP 636: Performed by: NURSE PRACTITIONER

## 2022-12-19 PROCEDURE — 250N000009 HC RX 250: Performed by: NURSE PRACTITIONER

## 2022-12-19 RX ORDER — NALOXONE HYDROCHLORIDE 0.4 MG/ML
0.4 INJECTION, SOLUTION INTRAMUSCULAR; INTRAVENOUS; SUBCUTANEOUS
Status: DISCONTINUED | OUTPATIENT
Start: 2022-12-19 | End: 2022-12-20 | Stop reason: HOSPADM

## 2022-12-19 RX ORDER — NALOXONE HYDROCHLORIDE 0.4 MG/ML
0.2 INJECTION, SOLUTION INTRAMUSCULAR; INTRAVENOUS; SUBCUTANEOUS
Status: DISCONTINUED | OUTPATIENT
Start: 2022-12-19 | End: 2022-12-20 | Stop reason: HOSPADM

## 2022-12-19 RX ORDER — ACETAMINOPHEN 325 MG/1
650 TABLET ORAL EVERY 4 HOURS PRN
Status: DISCONTINUED | OUTPATIENT
Start: 2022-12-19 | End: 2022-12-20 | Stop reason: HOSPADM

## 2022-12-19 RX ORDER — FLUMAZENIL 0.1 MG/ML
0.2 INJECTION, SOLUTION INTRAVENOUS
Status: DISCONTINUED | OUTPATIENT
Start: 2022-12-19 | End: 2022-12-20 | Stop reason: HOSPADM

## 2022-12-19 RX ORDER — FENTANYL CITRATE 50 UG/ML
25-50 INJECTION, SOLUTION INTRAMUSCULAR; INTRAVENOUS EVERY 5 MIN PRN
Status: DISCONTINUED | OUTPATIENT
Start: 2022-12-19 | End: 2022-12-20 | Stop reason: HOSPADM

## 2022-12-19 RX ADMIN — MIDAZOLAM HYDROCHLORIDE 1 MG: 1 INJECTION, SOLUTION INTRAMUSCULAR; INTRAVENOUS at 09:40

## 2022-12-19 RX ADMIN — LIDOCAINE HYDROCHLORIDE 10 ML: 10 INJECTION, SOLUTION EPIDURAL; INFILTRATION; INTRACAUDAL; PERINEURAL at 09:42

## 2022-12-19 RX ADMIN — FENTANYL CITRATE 50 MCG: 50 INJECTION INTRAMUSCULAR; INTRAVENOUS at 09:40

## 2022-12-19 NOTE — PROCEDURES
[facility]    Procedure: IR Procedure Note    Date/Time: 12/19/2022 10:01 AM  Performed by: Tim Mensah MD  Authorized by: Tim Mensah MD       UNIVERSAL PROTOCOL   Site Marked: NA  Prior Images Obtained and Reviewed:  Yes  Required items: Required blood products, implants, devices and special equipment available    Patient identity confirmed:  Verbally with patient, arm band, provided demographic data and hospital-assigned identification number  Patient was reevaluated immediately before administering moderate or deep sedation or anesthesia  Confirmation Checklist:  Patient's identity using two indicators, relevant allergies, procedure was appropriate and matched the consent or emergent situation and correct equipment/implants were available  Time out: Immediately prior to the procedure a time out was called    Universal Protocol: the Joint Commission Universal Protocol was followed    Preparation: Patient was prepped and draped in usual sterile fashion    ESBL (mL):  5     ANESTHESIA    Anesthesia: Local infiltration  Local Anesthetic:  Lidocaine 1% without epinephrine  Anesthetic Total (mL):  10      SEDATION  Patient Sedated: Yes    Sedation Type:  Moderate (conscious) sedation  Vital signs: Vital signs monitored during sedation    Fluoroscopy Time: 1 minute(s)  See dictated procedure note for full details.  Findings: Successful CT guided left kidney biopsy.  Strict bedrest 4 hours.    Specimens: none    Complications: None    Condition: Stable    Plan: Successful CT guided left kidney biopsy.  Bedrest 4 hours.      PROCEDURE  Describe Procedure: Successful CT guided left kidney biopsy.  Bedrest 4 hours.  Patient Tolerance:  Patient tolerated the procedure well with no immediate complications  Length of time physician/provider present for 1:1 monitoring during sedation: 30

## 2022-12-19 NOTE — SEDATION DOCUMENTATION
Left renal biopsy done, patient tolerated well, no bleeding noted, specimen cores sent to lab in normal saline. Patient returned to holding room. Vital signs stable.

## 2022-12-22 ENCOUNTER — HOSPITAL ENCOUNTER (INPATIENT)
Facility: CLINIC | Age: 37
LOS: 1 days | Discharge: HOME | End: 2022-12-23
Attending: EMERGENCY MEDICINE | Admitting: STUDENT IN AN ORGANIZED HEALTH CARE EDUCATION/TRAINING PROGRAM
Payer: COMMERCIAL

## 2022-12-22 ENCOUNTER — APPOINTMENT (OUTPATIENT)
Dept: CT IMAGING | Facility: CLINIC | Age: 37
End: 2022-12-22
Attending: EMERGENCY MEDICINE
Payer: COMMERCIAL

## 2022-12-22 ENCOUNTER — LAB (OUTPATIENT)
Dept: LAB | Facility: CLINIC | Age: 37
End: 2022-12-22
Payer: COMMERCIAL

## 2022-12-22 DIAGNOSIS — K25.7 GASTRIC ULCER DUE TO HELICOBACTER PYLORI, CHRONIC: ICD-10-CM

## 2022-12-22 DIAGNOSIS — B96.81 GASTRIC ULCER DUE TO HELICOBACTER PYLORI, CHRONIC: ICD-10-CM

## 2022-12-22 DIAGNOSIS — N17.9 ACUTE KIDNEY FAILURE, UNSPECIFIED (H): ICD-10-CM

## 2022-12-22 DIAGNOSIS — D63.1 ANEMIA IN CHRONIC KIDNEY DISEASE (CODE): ICD-10-CM

## 2022-12-22 DIAGNOSIS — Z87.11 HISTORY OF GASTRIC ULCER: ICD-10-CM

## 2022-12-22 DIAGNOSIS — D64.9 ANEMIA, UNSPECIFIED TYPE: ICD-10-CM

## 2022-12-22 DIAGNOSIS — R74.8 ELEVATED LIPASE: ICD-10-CM

## 2022-12-22 DIAGNOSIS — N17.9 ACUTE KIDNEY INJURY (H): ICD-10-CM

## 2022-12-22 LAB
ABO/RH(D): NORMAL
ALBUMIN SERPL BCG-MCNC: 3.7 G/DL (ref 3.5–5.2)
ALBUMIN SERPL BCG-MCNC: 3.7 G/DL (ref 3.5–5.2)
ALP SERPL-CCNC: 102 U/L (ref 40–129)
ALT SERPL W P-5'-P-CCNC: 12 U/L (ref 10–50)
ANION GAP SERPL CALCULATED.3IONS-SCNC: 15 MMOL/L (ref 7–15)
ANION GAP SERPL CALCULATED.3IONS-SCNC: 17 MMOL/L (ref 7–15)
ANTIBODY SCREEN: NEGATIVE
AST SERPL W P-5'-P-CCNC: 11 U/L (ref 10–50)
BASOPHILS # BLD AUTO: 0 10E3/UL (ref 0–0.2)
BASOPHILS NFR BLD AUTO: 0 %
BILIRUB SERPL-MCNC: 0.2 MG/DL
BLD PROD TYP BPU: NORMAL
BLD PROD TYP BPU: NORMAL
BLOOD COMPONENT TYPE: NORMAL
BLOOD COMPONENT TYPE: NORMAL
BUN SERPL-MCNC: 59.4 MG/DL (ref 6–20)
BUN SERPL-MCNC: 59.5 MG/DL (ref 6–20)
CALCIUM SERPL-MCNC: 8.8 MG/DL (ref 8.6–10)
CALCIUM SERPL-MCNC: 9 MG/DL (ref 8.6–10)
CHLORIDE SERPL-SCNC: 101 MMOL/L (ref 98–107)
CHLORIDE SERPL-SCNC: 104 MMOL/L (ref 98–107)
CODING SYSTEM: NORMAL
CODING SYSTEM: NORMAL
CREAT SERPL-MCNC: 7 MG/DL (ref 0.67–1.17)
CREAT SERPL-MCNC: 7.09 MG/DL (ref 0.67–1.17)
CROSSMATCH: NORMAL
CROSSMATCH: NORMAL
DEPRECATED HCO3 PLAS-SCNC: 18 MMOL/L (ref 22–29)
DEPRECATED HCO3 PLAS-SCNC: 20 MMOL/L (ref 22–29)
EOSINOPHIL # BLD AUTO: 0.5 10E3/UL (ref 0–0.7)
EOSINOPHIL NFR BLD AUTO: 4 %
ERYTHROCYTE [DISTWIDTH] IN BLOOD BY AUTOMATED COUNT: 18.7 % (ref 10–15)
GFR SERPL CREATININE-BSD FRML MDRD: 10 ML/MIN/1.73M2
GFR SERPL CREATININE-BSD FRML MDRD: 9 ML/MIN/1.73M2
GLUCOSE SERPL-MCNC: 100 MG/DL (ref 70–99)
GLUCOSE SERPL-MCNC: 135 MG/DL (ref 70–99)
HCT VFR BLD AUTO: 21.2 % (ref 40–53)
HGB BLD-MCNC: 6.4 G/DL (ref 13.3–17.7)
HGB BLD-MCNC: 6.7 G/DL (ref 13.3–17.7)
HOLD SPECIMEN: NORMAL
IMM GRANULOCYTES # BLD: 0.1 10E3/UL
IMM GRANULOCYTES NFR BLD: 1 %
ISSUE DATE AND TIME: NORMAL
ISSUE DATE AND TIME: NORMAL
LIPASE SERPL-CCNC: 1362 U/L (ref 13–60)
LYMPHOCYTES # BLD AUTO: 1.5 10E3/UL (ref 0.8–5.3)
LYMPHOCYTES NFR BLD AUTO: 11 %
MCH RBC QN AUTO: 25.9 PG (ref 26.5–33)
MCHC RBC AUTO-ENTMCNC: 30.2 G/DL (ref 31.5–36.5)
MCV RBC AUTO: 86 FL (ref 78–100)
MONOCYTES # BLD AUTO: 1.1 10E3/UL (ref 0–1.3)
MONOCYTES NFR BLD AUTO: 8 %
NEUTROPHILS # BLD AUTO: 10 10E3/UL (ref 1.6–8.3)
NEUTROPHILS NFR BLD AUTO: 76 %
NRBC # BLD AUTO: 0 10E3/UL
NRBC BLD AUTO-RTO: 0 /100
PATH REPORT.COMMENTS IMP SPEC: NORMAL
PATH REPORT.FINAL DX SPEC: NORMAL
PATH REPORT.GROSS SPEC: NORMAL
PATH REPORT.MICROSCOPIC SPEC OTHER STN: NORMAL
PATH REPORT.RELEVANT HX SPEC: NORMAL
PHOSPHATE SERPL-MCNC: 6.2 MG/DL (ref 2.5–4.5)
PHOTO IMAGE: NORMAL
PLATELET # BLD AUTO: 306 10E3/UL (ref 150–450)
POTASSIUM SERPL-SCNC: 4.6 MMOL/L (ref 3.4–5.3)
POTASSIUM SERPL-SCNC: 4.6 MMOL/L (ref 3.4–5.3)
PROT SERPL-MCNC: 7.7 G/DL (ref 6.4–8.3)
RBC # BLD AUTO: 2.47 10E6/UL (ref 4.4–5.9)
SODIUM SERPL-SCNC: 137 MMOL/L (ref 136–145)
SODIUM SERPL-SCNC: 138 MMOL/L (ref 136–145)
SPECIMEN EXPIRATION DATE: NORMAL
UNIT ABO/RH: NORMAL
UNIT ABO/RH: NORMAL
UNIT NUMBER: NORMAL
UNIT NUMBER: NORMAL
UNIT STATUS: NORMAL
UNIT STATUS: NORMAL
UNIT TYPE ISBT: 5100
UNIT TYPE ISBT: 5100
WBC # BLD AUTO: 13.2 10E3/UL (ref 4–11)

## 2022-12-22 PROCEDURE — 86923 COMPATIBILITY TEST ELECTRIC: CPT | Performed by: EMERGENCY MEDICINE

## 2022-12-22 PROCEDURE — 120N000001 HC R&B MED SURG/OB

## 2022-12-22 PROCEDURE — 96374 THER/PROPH/DIAG INJ IV PUSH: CPT

## 2022-12-22 PROCEDURE — P9016 RBC LEUKOCYTES REDUCED: HCPCS | Performed by: EMERGENCY MEDICINE

## 2022-12-22 PROCEDURE — 99285 EMERGENCY DEPT VISIT HI MDM: CPT | Mod: 25

## 2022-12-22 PROCEDURE — 250N000013 HC RX MED GY IP 250 OP 250 PS 637: Performed by: STUDENT IN AN ORGANIZED HEALTH CARE EDUCATION/TRAINING PROGRAM

## 2022-12-22 PROCEDURE — 36430 TRANSFUSION BLD/BLD COMPNT: CPT

## 2022-12-22 PROCEDURE — 80053 COMPREHEN METABOLIC PANEL: CPT | Performed by: EMERGENCY MEDICINE

## 2022-12-22 PROCEDURE — 86901 BLOOD TYPING SEROLOGIC RH(D): CPT | Performed by: EMERGENCY MEDICINE

## 2022-12-22 PROCEDURE — 36415 COLL VENOUS BLD VENIPUNCTURE: CPT

## 2022-12-22 PROCEDURE — 99223 1ST HOSP IP/OBS HIGH 75: CPT | Mod: AI | Performed by: STUDENT IN AN ORGANIZED HEALTH CARE EDUCATION/TRAINING PROGRAM

## 2022-12-22 PROCEDURE — 84100 ASSAY OF PHOSPHORUS: CPT

## 2022-12-22 PROCEDURE — 86850 RBC ANTIBODY SCREEN: CPT | Performed by: EMERGENCY MEDICINE

## 2022-12-22 PROCEDURE — 36415 COLL VENOUS BLD VENIPUNCTURE: CPT | Performed by: EMERGENCY MEDICINE

## 2022-12-22 PROCEDURE — 85025 COMPLETE CBC W/AUTO DIFF WBC: CPT

## 2022-12-22 PROCEDURE — 250N000011 HC RX IP 250 OP 636: Performed by: EMERGENCY MEDICINE

## 2022-12-22 PROCEDURE — 74176 CT ABD & PELVIS W/O CONTRAST: CPT

## 2022-12-22 PROCEDURE — 83690 ASSAY OF LIPASE: CPT | Performed by: EMERGENCY MEDICINE

## 2022-12-22 PROCEDURE — 85018 HEMOGLOBIN: CPT | Performed by: EMERGENCY MEDICINE

## 2022-12-22 PROCEDURE — 86923 COMPATIBILITY TEST ELECTRIC: CPT | Performed by: STUDENT IN AN ORGANIZED HEALTH CARE EDUCATION/TRAINING PROGRAM

## 2022-12-22 PROCEDURE — C9113 INJ PANTOPRAZOLE SODIUM, VIA: HCPCS | Performed by: EMERGENCY MEDICINE

## 2022-12-22 RX ORDER — CARVEDILOL 25 MG/1
25 TABLET ORAL 2 TIMES DAILY WITH MEALS
Status: DISCONTINUED | OUTPATIENT
Start: 2022-12-22 | End: 2022-12-23 | Stop reason: HOSPADM

## 2022-12-22 RX ORDER — DOXYCYCLINE 100 MG/1
100 CAPSULE ORAL EVERY 12 HOURS SCHEDULED
Status: DISCONTINUED | OUTPATIENT
Start: 2022-12-22 | End: 2022-12-23 | Stop reason: HOSPADM

## 2022-12-22 RX ORDER — METRONIDAZOLE 500 MG/1
500 TABLET ORAL 3 TIMES DAILY
Status: DISCONTINUED | OUTPATIENT
Start: 2022-12-22 | End: 2022-12-23 | Stop reason: HOSPADM

## 2022-12-22 RX ORDER — ONDANSETRON 4 MG/1
4 TABLET, ORALLY DISINTEGRATING ORAL EVERY 6 HOURS PRN
Status: DISCONTINUED | OUTPATIENT
Start: 2022-12-22 | End: 2022-12-23 | Stop reason: HOSPADM

## 2022-12-22 RX ORDER — BISMUTH SUBSALICYLATE 262 MG/1
262 TABLET, CHEWABLE ORAL
Status: DISCONTINUED | OUTPATIENT
Start: 2022-12-22 | End: 2022-12-23 | Stop reason: HOSPADM

## 2022-12-22 RX ORDER — CLARITHROMYCIN 500 MG
500 TABLET ORAL EVERY 12 HOURS SCHEDULED
Status: DISCONTINUED | OUTPATIENT
Start: 2022-12-22 | End: 2022-12-22

## 2022-12-22 RX ORDER — ERGOCALCIFEROL 1.25 MG/1
50000 CAPSULE, LIQUID FILLED ORAL WEEKLY
COMMUNITY
End: 2023-09-29

## 2022-12-22 RX ORDER — ACETAMINOPHEN 325 MG/1
650 TABLET ORAL EVERY 6 HOURS PRN
Status: DISCONTINUED | OUTPATIENT
Start: 2022-12-22 | End: 2022-12-23 | Stop reason: HOSPADM

## 2022-12-22 RX ORDER — ONDANSETRON 2 MG/ML
4 INJECTION INTRAMUSCULAR; INTRAVENOUS EVERY 6 HOURS PRN
Status: DISCONTINUED | OUTPATIENT
Start: 2022-12-22 | End: 2022-12-23 | Stop reason: HOSPADM

## 2022-12-22 RX ORDER — ATORVASTATIN CALCIUM 10 MG/1
10 TABLET, FILM COATED ORAL EVERY EVENING
Status: DISCONTINUED | OUTPATIENT
Start: 2022-12-22 | End: 2022-12-23 | Stop reason: HOSPADM

## 2022-12-22 RX ORDER — AMOXICILLIN 250 MG
2 CAPSULE ORAL 2 TIMES DAILY PRN
Status: DISCONTINUED | OUTPATIENT
Start: 2022-12-22 | End: 2022-12-23 | Stop reason: HOSPADM

## 2022-12-22 RX ORDER — TRAZODONE HYDROCHLORIDE 50 MG/1
50-100 TABLET, FILM COATED ORAL AT BEDTIME
COMMUNITY
End: 2023-09-29

## 2022-12-22 RX ORDER — PANTOPRAZOLE SODIUM 40 MG/1
40 TABLET, DELAYED RELEASE ORAL
Status: DISCONTINUED | OUTPATIENT
Start: 2022-12-23 | End: 2022-12-22

## 2022-12-22 RX ORDER — AMLODIPINE BESYLATE 10 MG/1
10 TABLET ORAL DAILY
Status: DISCONTINUED | OUTPATIENT
Start: 2022-12-23 | End: 2022-12-23 | Stop reason: HOSPADM

## 2022-12-22 RX ORDER — AMOXICILLIN 250 MG
1 CAPSULE ORAL 2 TIMES DAILY PRN
Status: DISCONTINUED | OUTPATIENT
Start: 2022-12-22 | End: 2022-12-23 | Stop reason: HOSPADM

## 2022-12-22 RX ORDER — VITAMIN B COMPLEX
50 TABLET ORAL DAILY
Status: DISCONTINUED | OUTPATIENT
Start: 2022-12-23 | End: 2022-12-23

## 2022-12-22 RX ORDER — AMOXICILLIN 500 MG/1
1000 CAPSULE ORAL EVERY 12 HOURS SCHEDULED
Status: DISCONTINUED | OUTPATIENT
Start: 2022-12-22 | End: 2022-12-22

## 2022-12-22 RX ORDER — HYDRALAZINE HYDROCHLORIDE 50 MG/1
50 TABLET, FILM COATED ORAL 4 TIMES DAILY
Status: DISCONTINUED | OUTPATIENT
Start: 2022-12-22 | End: 2022-12-23 | Stop reason: HOSPADM

## 2022-12-22 RX ORDER — LIDOCAINE 40 MG/G
CREAM TOPICAL
Status: DISCONTINUED | OUTPATIENT
Start: 2022-12-22 | End: 2022-12-23 | Stop reason: HOSPADM

## 2022-12-22 RX ORDER — PANTOPRAZOLE SODIUM 40 MG/1
40 TABLET, DELAYED RELEASE ORAL
Status: DISCONTINUED | OUTPATIENT
Start: 2022-12-23 | End: 2022-12-23 | Stop reason: HOSPADM

## 2022-12-22 RX ORDER — FUROSEMIDE 40 MG
40 TABLET ORAL 2 TIMES DAILY
Status: ON HOLD | COMMUNITY
End: 2023-10-06

## 2022-12-22 RX ORDER — ACETAMINOPHEN 650 MG/1
650 SUPPOSITORY RECTAL EVERY 6 HOURS PRN
Status: DISCONTINUED | OUTPATIENT
Start: 2022-12-22 | End: 2022-12-23 | Stop reason: HOSPADM

## 2022-12-22 RX ADMIN — METRONIDAZOLE 500 MG: 500 TABLET ORAL at 20:22

## 2022-12-22 RX ADMIN — HYDRALAZINE HYDROCHLORIDE 50 MG: 50 TABLET, FILM COATED ORAL at 22:04

## 2022-12-22 RX ADMIN — CARVEDILOL 25 MG: 25 TABLET, FILM COATED ORAL at 22:04

## 2022-12-22 RX ADMIN — DOXYCYCLINE HYCLATE 100 MG: 100 CAPSULE ORAL at 20:22

## 2022-12-22 RX ADMIN — BISMUTH SUBSALICYLATE 262 MG: 262 TABLET, CHEWABLE ORAL at 20:22

## 2022-12-22 RX ADMIN — PANTOPRAZOLE SODIUM 80 MG: 40 INJECTION, POWDER, FOR SOLUTION INTRAVENOUS at 18:38

## 2022-12-22 RX ADMIN — ATORVASTATIN CALCIUM 10 MG: 10 TABLET, FILM COATED ORAL at 22:04

## 2022-12-22 ASSESSMENT — ACTIVITIES OF DAILY LIVING (ADL)
ADLS_ACUITY_SCORE: 35
FALL_HISTORY_WITHIN_LAST_SIX_MONTHS: NO
TOILETING_ISSUES: NO
DRESSING/BATHING_DIFFICULTY: NO
WALKING_OR_CLIMBING_STAIRS_DIFFICULTY: NO
CHANGE_IN_FUNCTIONAL_STATUS_SINCE_ONSET_OF_CURRENT_ILLNESS/INJURY: NO
CONCENTRATING,_REMEMBERING_OR_MAKING_DECISIONS_DIFFICULTY: NO
DIFFICULTY_COMMUNICATING: NO
ADLS_ACUITY_SCORE: 35
DOING_ERRANDS_INDEPENDENTLY_DIFFICULTY: NO
DIFFICULTY_EATING/SWALLOWING: NO
HEARING_DIFFICULTY_OR_DEAF: NO
WEAR_GLASSES_OR_BLIND: NO
ADLS_ACUITY_SCORE: 35

## 2022-12-22 ASSESSMENT — ENCOUNTER SYMPTOMS
BLOOD IN STOOL: 0
SHORTNESS OF BREATH: 1
HEADACHES: 0
VOMITING: 0
DYSURIA: 0
NUMBNESS: 0
WEAKNESS: 0

## 2022-12-22 NOTE — ED NOTES
Rapid Assessment Note    History:   The patient was at his clinic today for labs and was found to have a hemoglobin level of 6.7. He reports a history of stomach ulcers requiring hospitalization from 11/28-12/2/22. During his hospitalization he was given 7 units of blood.  He denies experiencing black stools but states that they recently have appeared dark green. He denies use of a blood thinner. He underwent left kidney biopsy 3 days ago. He complains soreness in the area.    Exam:   Nursing note and vitals reviewed.  Constitutional:  Appears well-developed and well-nourished.   HENT:   Head:    Atraumatic.   Mouth/Throat:   Oropharynx is clear and moist. No oropharyngeal exudate.   Eyes:    Pupils are equal, round, and reactive to light.   Neck:    Normal range of motion. Neck supple.      No tracheal deviation present. No thyromegaly present.   Cardiovascular:  Normal rate, regular rhythm, no murmur   Pulmonary/Chest: Breath sounds are clear and equal without wheezes or crackles.  Abdominal:   Soft. Bowel sounds are normal. Exhibits no distension and      no mass. There is no tenderness.      There is no rebound and no guarding.   Musculoskeletal:  Exhibits no edema.   Lymphadenopathy:  No cervical adenopathy.   Neurological:   Alert and oriented to person, place, and time.   Skin:    The wound on his back appears to be healing well. Skin is warm and dry. No rash noted. No pallor.       Plan of Care:   I evaluated the patient and developed an initial plan of care. I discussed this plan and explained that I, or one of my partners, would be returning to complete the evaluation.     I, Piyush Recinos, am serving as a scribe to document services personally performed by Ynes Weaver MD, based on my observations and the provider's statements to me.    11/5/2018  [employer]    Portions of this medical record were completed by a scribe. UPON MY REVIEW AND AUTHENTICATION BY  ELECTRONIC SIGNATURE, this confirms (a) I performed the applicable clinical services, and (b) the record is accurate.      Ynes Weaver MD  12/22/22 [id number]

## 2022-12-22 NOTE — ED TRIAGE NOTES
Pt was at his clinic today and had labs drawn. His hgb is 6.7. nephrology advised pt to come to ED. Pt endorses hx of stomach ulcers with bleeding in the past and adds that his stools have been darker. The only pain he is currently experiencing is left flank pain following a kidney biopsy that was completed on Monday.  Pt states he was hospitalized a few weeks ago and had a blood transfusion at that time.      Triage Assessment     Row Name 12/22/22 [id number]       Triage Assessment (Adult)    Airway WDL WDL       Respiratory WDL    Respiratory WDL WDL       Skin Circulation/Temperature WDL    Skin Circulation/Temperature WDL WDL       Cardiac WDL    Cardiac WDL WDL       Peripheral/Neurovascular WDL    Peripheral Neurovascular WDL WDL       Cognitive/Neuro/Behavioral WDL    Cognitive/Neuro/Behavioral WDL WDL

## 2022-12-23 VITALS
HEART RATE: 83 BPM | SYSTOLIC BLOOD PRESSURE: 172 MMHG | RESPIRATION RATE: 18 BRPM | HEIGHT: 70 IN | DIASTOLIC BLOOD PRESSURE: 94 MMHG | OXYGEN SATURATION: 93 % | BODY MASS INDEX: 45.1 KG/M2 | TEMPERATURE: 98.6 F | WEIGHT: 315 LBS

## 2022-12-23 LAB
ANION GAP SERPL CALCULATED.3IONS-SCNC: 15 MMOL/L (ref 7–15)
BLD PROD TYP BPU: NORMAL
BLOOD COMPONENT TYPE: NORMAL
BUN SERPL-MCNC: 59.7 MG/DL (ref 6–20)
CALCIUM SERPL-MCNC: 8.8 MG/DL (ref 8.6–10)
CHLORIDE SERPL-SCNC: 103 MMOL/L (ref 98–107)
CODING SYSTEM: NORMAL
CREAT SERPL-MCNC: 6.92 MG/DL (ref 0.67–1.17)
CROSSMATCH: NORMAL
DEPRECATED HCO3 PLAS-SCNC: 20 MMOL/L (ref 22–29)
ERYTHROCYTE [DISTWIDTH] IN BLOOD BY AUTOMATED COUNT: 18.4 % (ref 10–15)
GFR SERPL CREATININE-BSD FRML MDRD: 10 ML/MIN/1.73M2
GLUCOSE SERPL-MCNC: 97 MG/DL (ref 70–99)
HCT VFR BLD AUTO: 22.6 % (ref 40–53)
HGB BLD-MCNC: 6.9 G/DL (ref 13.3–17.7)
HGB BLD-MCNC: 6.9 G/DL (ref 13.3–17.7)
HGB BLD-MCNC: 7.2 G/DL (ref 13.3–17.7)
HGB BLD-MCNC: 8.2 G/DL (ref 13.3–17.7)
ISSUE DATE AND TIME: NORMAL
MCH RBC QN AUTO: 26.4 PG (ref 26.5–33)
MCHC RBC AUTO-ENTMCNC: 30.5 G/DL (ref 31.5–36.5)
MCV RBC AUTO: 87 FL (ref 78–100)
PLATELET # BLD AUTO: 280 10E3/UL (ref 150–450)
POTASSIUM SERPL-SCNC: 5 MMOL/L (ref 3.4–5.3)
RBC # BLD AUTO: 2.61 10E6/UL (ref 4.4–5.9)
SODIUM SERPL-SCNC: 138 MMOL/L (ref 136–145)
UNIT ABO/RH: NORMAL
UNIT NUMBER: NORMAL
UNIT STATUS: NORMAL
UNIT TYPE ISBT: 5100
WBC # BLD AUTO: 13.2 10E3/UL (ref 4–11)

## 2022-12-23 PROCEDURE — 80048 BASIC METABOLIC PNL TOTAL CA: CPT | Performed by: STUDENT IN AN ORGANIZED HEALTH CARE EDUCATION/TRAINING PROGRAM

## 2022-12-23 PROCEDURE — 99239 HOSP IP/OBS DSCHRG MGMT >30: CPT | Performed by: STUDENT IN AN ORGANIZED HEALTH CARE EDUCATION/TRAINING PROGRAM

## 2022-12-23 PROCEDURE — 85018 HEMOGLOBIN: CPT | Performed by: STUDENT IN AN ORGANIZED HEALTH CARE EDUCATION/TRAINING PROGRAM

## 2022-12-23 PROCEDURE — 36415 COLL VENOUS BLD VENIPUNCTURE: CPT | Performed by: STUDENT IN AN ORGANIZED HEALTH CARE EDUCATION/TRAINING PROGRAM

## 2022-12-23 PROCEDURE — 250N000011 HC RX IP 250 OP 636: Performed by: INTERNAL MEDICINE

## 2022-12-23 PROCEDURE — 85027 COMPLETE CBC AUTOMATED: CPT | Performed by: STUDENT IN AN ORGANIZED HEALTH CARE EDUCATION/TRAINING PROGRAM

## 2022-12-23 PROCEDURE — 250N000013 HC RX MED GY IP 250 OP 250 PS 637: Performed by: HOSPITALIST

## 2022-12-23 PROCEDURE — 250N000013 HC RX MED GY IP 250 OP 250 PS 637: Performed by: STUDENT IN AN ORGANIZED HEALTH CARE EDUCATION/TRAINING PROGRAM

## 2022-12-23 PROCEDURE — P9016 RBC LEUKOCYTES REDUCED: HCPCS | Performed by: STUDENT IN AN ORGANIZED HEALTH CARE EDUCATION/TRAINING PROGRAM

## 2022-12-23 RX ORDER — BISMUTH SUBSALICYLATE 262 MG/1
262 TABLET, CHEWABLE ORAL
Qty: 56 TABLET | Refills: 0 | Status: SHIPPED | OUTPATIENT
Start: 2022-12-23 | End: 2023-01-06

## 2022-12-23 RX ORDER — METRONIDAZOLE 500 MG/1
500 TABLET ORAL 3 TIMES DAILY
Qty: 42 TABLET | Refills: 0 | Status: SHIPPED | OUTPATIENT
Start: 2022-12-23 | End: 2023-01-06

## 2022-12-23 RX ORDER — DOXYCYCLINE 100 MG/1
100 CAPSULE ORAL EVERY 12 HOURS
Qty: 28 CAPSULE | Refills: 0 | Status: SHIPPED | OUTPATIENT
Start: 2022-12-23 | End: 2023-01-06

## 2022-12-23 RX ORDER — TRAZODONE HYDROCHLORIDE 50 MG/1
50-100 TABLET, FILM COATED ORAL AT BEDTIME
Status: DISCONTINUED | OUTPATIENT
Start: 2022-12-23 | End: 2022-12-23 | Stop reason: HOSPADM

## 2022-12-23 RX ADMIN — BISMUTH SUBSALICYLATE 262 MG: 262 TABLET, CHEWABLE ORAL at 09:47

## 2022-12-23 RX ADMIN — CARVEDILOL 25 MG: 25 TABLET, FILM COATED ORAL at 09:47

## 2022-12-23 RX ADMIN — DARBEPOETIN ALFA 100 MCG: 100 INJECTION, SOLUTION INTRAVENOUS; SUBCUTANEOUS at 17:01

## 2022-12-23 RX ADMIN — PANTOPRAZOLE SODIUM 40 MG: 40 TABLET, DELAYED RELEASE ORAL at 06:48

## 2022-12-23 RX ADMIN — DOXYCYCLINE HYCLATE 100 MG: 100 CAPSULE ORAL at 09:47

## 2022-12-23 RX ADMIN — HYDRALAZINE HYDROCHLORIDE 50 MG: 50 TABLET, FILM COATED ORAL at 09:47

## 2022-12-23 RX ADMIN — AMLODIPINE BESYLATE 10 MG: 10 TABLET ORAL at 09:47

## 2022-12-23 RX ADMIN — METRONIDAZOLE 500 MG: 500 TABLET ORAL at 09:47

## 2022-12-23 RX ADMIN — PANTOPRAZOLE SODIUM 40 MG: 40 TABLET, DELAYED RELEASE ORAL at 16:05

## 2022-12-23 RX ADMIN — TRAZODONE HYDROCHLORIDE 100 MG: 50 TABLET ORAL at 01:53

## 2022-12-23 RX ADMIN — METRONIDAZOLE 500 MG: 500 TABLET ORAL at 16:05

## 2022-12-23 RX ADMIN — BISMUTH SUBSALICYLATE 262 MG: 262 TABLET, CHEWABLE ORAL at 16:05

## 2022-12-23 RX ADMIN — HYDRALAZINE HYDROCHLORIDE 50 MG: 50 TABLET, FILM COATED ORAL at 16:05

## 2022-12-23 ASSESSMENT — ACTIVITIES OF DAILY LIVING (ADL)
ADLS_ACUITY_SCORE: 18

## 2022-12-23 NOTE — H&P
[facility]  Hospitalist Admission Note  Name: Brooks Mensah    MRN: [id number]  YOB: 1985    Age: 37 year old  Date of admission: 12/22/2022  Primary care provider: Eva Kent    Chief Complaint:  Anemia    Assessment and Plan:   Possible UGIB  H pylori infection  Acute on chronic anemia:  Recent hospitalization for abdominal pain, black stools, severe anemia.  He was diagnosed with gastric ulcerations.  Mucosa also appeared abnormal.  Multiple biopsies were taken and resulted negative for malignancy or dysplasia.  However, did result positive for H. pylori.  I do not see that this been treated.  Patient does endorse that his stools have been dark green recently but denies that there black.  Of note, he has been without his pantoprazole for a while given difficulty with refills.  Alternate consideration for anemia would be possible perinephric hemorrhage given recent kidney biopsy versus anemia related to chronic kidney disease.  -CT A/P with no contrast to rule out perinephric fluid collection as well is for further work-up of elevated lipase is below  -Patient received 1 unit packed red blood cells in the ED  -Recheck hemoglobin following transfusion  -Initiate quadruple therapy with pantoprazole 40 mg BID, doxycycline 100 mg BID, and flagyl 500 TID, and bismuth subsalicylate 262 QID  -As we already know there is presence of ulcerations/h pylori, I do not think that GI needs to be reconsulted at this time unless hemoglobin continues to drop persistently  -Patient declined Hemoccult stool testing  -We will defer any consideration for EPO to nephrology    ADDENDUM:  As I was concerned about, patient has a large retroperitoneal hematoma that is located left and posterior to the left kidney.  Most likely iatrogenic related to kidney biopsy completed 3 days ago.  Unfortunately we cannot evaluate for active bleeding with contrast given such reduced kidney function as it may  lead to ESRD.  Fortunately, it does not seem to be accumulating rapidly as his Hgb has only dropped 1 point since most recent admission.  I have had ED page out to IR so they are at least aware of the patient.  Anticipate monitoring for now but if he were to need intervention overnight, he would have to transfer to IR capable hospital.     Discussed with IR.  Suspect that bleeding has stopped since its been 3 days.  Recommend watching with serial Hgb checks transfusing as needed.     Elevated lipase:  Unclear etiology for elevated lipase.  The patient did have a recent bout of severe abdominal pain when he presented with the above upper GI bleed.  Is unclear if this was a bout of pancreatitis or if all the pain could have been attributed to the ulcerations.  The patient denies any epigastric pain at this time.    -We will obtain a CT abdomen pelvis to rule out any significant inflammation or anatomic changes to the pancreas  -We will avoid aggressive IV fluid resuscitation at this time given CKD and history of CHF    ADDENDUM:  CT a/p shows possible mild pancreatitis of the pancreatic tail.  Given HDS and lack of symptoms, CKD & CHF will avoid aggressive fluid resuscitation at this time.     EMILY on CKD III  Recent kidney biopsy (12/19):  Biopsy completed on 12/9 was unable to elicit a primary cause of his renal insufficiency with absolute certainty.  Creatinine of 7.0 on admission.  It appears that his creatinine was 5.8-6.0 on his last hospitalization.  Some of this may be attributed to acute anemia versus progression of chronic kidney disease.  We will allow him to receive blood transfusion in the emergency department.    -Recheck BMP in a.m.    Uncontrolled hypertension:  In the setting of CKD and baseline noncompliance with treatment.  -Resume PTA amlodipine 10 mg daily, hydralazine, carvedilol  -Hold Lasix given acute kidney injury    Tobacco use: Patient notes that he is quit smoking  HLD: PTA statin  Morbid  "obesity: Complicates cares  CHF:  No obvious evidence for CHF exacerbation at this time.      Clinically Significant Risk Factors Present on Admission                 # Acute Kidney Injury, unspecified: based on a >150% or 0.3 mg/dL increase in last creatinine compared to past 90 day average, will monitor renal function  # Hypertension: home medication list includes antihypertensive(s)      # Severe Obesity: Estimated body mass index is 48.95 kg/m  as calculated from the following:    Height as of 11/29/22: 1.803 m (5' 10.98\").    Weight as of 11/29/22: 159.1 kg (350 lb 12.8 oz).           DVT Prophylaxis: VTE Prophylaxis contraindicated due to GIB  Code Status: Full Code  Discharge Dispo: Anticipate discharge home pending stability of hemoglobin, further work-up for her lipase  Estimated Disch Date / # of Days until Disch: Anticipate discharge in 1 to 2 days pending further work-up of hemoglobin and lipase.      History of Present Illness:  Brooks Mensah is a 37 year old male with PMH including chronic kidney disease stage III, hypertension, severe obesity, CHF, sleep apnea, NSTEMI who presents with low hemoglobin.      The patient was recently hospitalized at the beginning of December for severe anemia.  At that point his hemoglobin was 3.  This was attributed to an upper GI bleed as he was found to have duodenal/gastric ulcers.  Pathology results are negative for any malignancy or dysplasia.  However, it is positive for H. pylori.  I do not believe that this is been treated.  He was transfused 7 units packed red blood cells with return of his hemoglobin to normal.  More recently has had a kidney biopsy for work-up of his chronic kidney disease.  This was completed on 1219.  He returned for follow-up today and was found to have a hemoglobin less than 7.  Therefore he was referred to the ED for blood transfusion and admission to the hospital.    The patient reports feeling just fine.  He reports some mild left flank " pain which is improved and nearly resolved since his kidney biopsy 3 days ago.  Otherwise, he endorses a couple of days of green/dark stool.  This is change from usual brown stools.  The stools are still formed.  He denies any significant abdominal pain, fevers, nausea/vomiting, lightheadedness, syncope, shortness of breath or chest pain.  He does endorse mild chills.    ED work-up was notable for stable vitals apart from hypertension to 170 systolic.  He is afebrile.  BMP is notable for creatinine of 7.0, BUN 59.4, anion gap 17, bicarbonate of 20.  LFTs are normal.  Lipase is elevated at 1362.  CBC is notable for white blood cell count 13.2, hemoglobin 6.4, platelet count 306.  I was asked admit the patient for anemia, EMILY, elevated lipase.  I did recommend obtaining a CT abdomen/pelvis without contrast prior to admission to the hospital to rule out perinephric hemorrhage and/or pancreatitis requiring intervention.     Past Medical History:  Past Medical History:   Diagnosis Date     Acute on chronic systolic congestive heart failure (H) 8/9/2017     CAD (coronary artery disease)     Nonobstructive     Cardiomyopathy, unspecified (H) 8/9/2017     Congestive heart failure, unspecified congestive heart failure chronicity, unspecified congestive heart failure type 6/13/2017     HTN (hypertension), malignant      Hypertensive urgency 6/13/2017     Morbid obesity with BMI of 50.0-59.9, adult (H) 6/21/2017     NSTEMI (non-ST elevated myocardial infarction) (H) 6/13/2017     Renal insufficiency 6/13/2017     Sleep apnea      Tobacco abuse      Past Surgical History:  Past Surgical History:   Procedure Laterality Date     ESOPHAGOSCOPY, GASTROSCOPY, DUODENOSCOPY (EGD), COMBINED N/A 12/1/2022    Procedure: ESOPHAGOGASTRODUODENOSCOPY, WITH BIOPSY;  Surgeon: Dm Warner MD;  Location:  GI     Social History:  Social History     Tobacco Use     Smoking status: Every Day     Packs/day: 0.25     Types:  Cigarettes     Start date: 2002     Smokeless tobacco: Never     Tobacco comments:     1 pack per week   Substance Use Topics     Alcohol use: Yes     Comment: occassional     Social History     Social History Narrative     Not on file     Family History:  Family History   Problem Relation Age of Onset     Hyperlipidemia Mother      Hyperlipidemia Father      Allergies:  No Known Allergies     Medications:  No current facility-administered medications on file prior to encounter.  amLODIPine (NORVASC) 10 MG tablet, Take 1 tablet (10 mg) by mouth daily  atorvastatin (LIPITOR) 10 MG tablet, Take 1 tablet (10 mg) by mouth every evening  carvedilol (COREG) 25 MG tablet, Take 1 tablet (25 mg) by mouth 2 times daily (with meals)  hydrALAZINE (APRESOLINE) 50 MG tablet, Take 1 tablet (50 mg) by mouth 4 times daily  pantoprazole (PROTONIX) 40 MG EC tablet, Take 1 tablet (40 mg) by mouth 2 times daily  vitamin D3 (CHOLECALCIFEROL) 50 mcg (2000 units) tablet, Take 1 tablet (50 mcg) by mouth daily      Review of Systems:  A Comprehensive greater than 10 system review of systems was carried out.  Pertinent positives and negatives are noted above.  Otherwise negative for contributory information.     Physical Exam:  Blood pressure (!) 171/85, pulse 84, temperature 98  F (36.7  C), temperature source Oral, resp. rate 16, SpO2 96 %.  Wt Readings from Last 1 Encounters:   11/29/22 (!) 159.1 kg (350 lb 12.8 oz)     Exam:  General: Alert, awake, no acute distress.  HEENT: NC/AT, eyes anicteric, external occular movements intact, face symmetric.   Cardiac: RRR, S1, S2.  No murmurs appreciated.  Pulmonary: Normal chest rise, normal work of breathing.  Lungs CTA BL  Abdomen: Obese, soft, non-tender, non-distended.  Bowel Sounds Present.  No guarding.  Mild swelling around the left kidney biopsy site.  No obvious hematoma or bruising.  Extremities: no deformities.  Warm, well perfused.  1+ pitting edema the lower extremities.  Skin: no  rashes or lesions noted.  Warm and Dry.  Neuro: No focal deficits noted.  Speech clear.  Coordination and strength grossly normal.  Psych: Appropriate affect.    Data:  EKG: NSR  Imaging:  No results found for this or any previous visit (from the past 48 hour(s)).  Labs:  Recent Labs   Lab 12/22/22  1721 12/22/22  1543   WBC 13.2*  --    HGB 6.4* 6.7*   HCT 21.2*  --    MCV 86  --      --           Lab Results   Component Value Date     12/22/2022     12/22/2022     12/02/2022     05/12/2021     04/28/2021     03/05/2021    Lab Results   Component Value Date    CHLORIDE 101 12/22/2022    CHLORIDE 104 12/22/2022    CHLORIDE 107 12/02/2022    CHLORIDE 103 09/20/2021    CHLORIDE 105 05/12/2021    CHLORIDE 107 04/28/2021    CHLORIDE 108 03/05/2021    Lab Results   Component Value Date    BUN 59.4 12/22/2022    BUN 59.5 12/22/2022    BUN 55.8 12/02/2022    BUN 27 09/20/2021    BUN 24 05/12/2021    BUN 26 04/28/2021    BUN 25 03/05/2021      Lab Results   Component Value Date    POTASSIUM 4.6 12/22/2022    POTASSIUM 4.6 12/22/2022    POTASSIUM 4.7 12/02/2022    POTASSIUM 3.7 09/20/2021    POTASSIUM 3.6 05/12/2021    POTASSIUM 4.4 04/28/2021    POTASSIUM 3.9 03/05/2021    Lab Results   Component Value Date    CO2 20 12/22/2022    CO2 18 12/22/2022    CO2 16 12/02/2022    CO2 27 09/20/2021    CO2 27 05/12/2021    CO2 28 04/28/2021    CO2 25 03/05/2021    Lab Results   Component Value Date    CR 7.00 12/22/2022    CR 7.09 12/22/2022    CR 5.97 12/02/2022    CR 2.11 05/12/2021    CR 2.19 04/28/2021    CR 1.91 03/05/2021            [facility]

## 2022-12-23 NOTE — ED PROVIDER NOTES
History   Chief Complaint:  Abnormal Labs       HPI   Brooks Mensah is a 37 year old male with history of NSTEMI, EMILY, stage 3 CKD, and CHF who was at his clinic today for labs and was found to have a hemoglobin level of 6.7. He states that the doctor after his labs came back advised him to go to the ED. The patient states he had a kidney biopsy 3 days ago and reports some tenderness at the biopsy site. He states that he is urinating normally, but reports dark green stools, but denies black stools. He states that the last 4 days his stools have gotten consistently darker. He reports slight shortness of breath when walking, but not as much as with his past episode. The patient reports feeling cold. He denies vomiting, witnessing blood loss, chest pain, abdominal pain, fever, dysuria, discharge, headache, vision or hearing changes, and numbness and weakness in his arms or legs. Of note, he denies blood thinner use and is not on NSAID's.     Review of Systems   Constitutional:        (+) low hemoglobin     HENT: Negative for hearing loss.    Eyes: Negative for visual disturbance.   Respiratory: Positive for shortness of breath.    Cardiovascular: Negative for chest pain.   Gastrointestinal: Negative for blood in stool and vomiting.   Genitourinary: Negative for dysuria and penile discharge.   Neurological: Negative for weakness, numbness and headaches.   All other systems reviewed and are negative.      Allergies:  The patient has no known allergies.     Medications:  Norvasc  Lipitor  Coreg  Apresoline  Protonix  Aldactone  Desyrel    Past Medical History:     CHF  Renal insufficiency  NSTEMI  Morbid obesity  Cardiomyopathy  Stage 3 CKD  Tobacco use disorder  EMILY  Acute upper GI bleed     Past Surgical History:    EGD  Epidermoid Cystectomy    Family History:    Hyperlipidemia  Hypertension  Type 2 diabetes  Thyroid disease    Social History:  The patient presents to the ED alone.  The patient arrived by private  vehicle.  PCP: Eva Kent     Physical Exam     Patient Vitals for the past 24 hrs:   BP Temp Temp src Pulse Resp SpO2   12/22/22 1845 (!) 164/94 97.5  F (36.4  C) -- -- -- 97 %   12/22/22 1837 (!) 171/85 -- -- 83 -- 95 %   12/22/22 1834 (!) 171/85 98  F (36.7  C) Oral 84 -- 96 %   12/22/22 1653 (!) 166/86 98.6  F (37  C) Temporal 79 16 99 %       Physical Exam  General: Sitting on the ED bed, no distress  HEENT: Normocephalic, atraumatic  Cardiac: Radial pulses 2+, regular rate and rhythm  Pulm: Breathing comfortably, no accessory muscle usage, no conversational dyspnea, and lungs clear bilaterally  GI: Abdomen soft, nontender, no rigidity or guarding  MSK: No deformities  Skin: Warm and dry, left flank kidney biopsy dressing clean/dry/intact  Neuro: Moves all extremities  Psych: Normal mood and affect      Emergency Department Course     Imaging:  Abd/pelvis CT no contrast - Stone Protocol    (Results Pending)     Report per radiology    Laboratory:  Labs Ordered and Resulted from Time of ED Arrival to Time of ED Departure   COMPREHENSIVE METABOLIC PANEL - Abnormal       Result Value    Sodium 138      Potassium 4.6      Chloride 101      Carbon Dioxide (CO2) 20 (*)     Anion Gap 17 (*)     Urea Nitrogen 59.4 (*)     Creatinine 7.00 (*)     Calcium 8.8      Glucose 100 (*)     Alkaline Phosphatase 102      AST 11      ALT 12      Protein Total 7.7      Albumin 3.7      Bilirubin Total 0.2      GFR Estimate 10 (*)    LIPASE - Abnormal    Lipase 1,362 (*)    CBC WITH PLATELETS AND DIFFERENTIAL - Abnormal    WBC Count 13.2 (*)     RBC Count 2.47 (*)     Hemoglobin 6.4 (*)     Hematocrit 21.2 (*)     MCV 86      MCH 25.9 (*)     MCHC 30.2 (*)     RDW 18.7 (*)     Platelet Count 306      % Neutrophils 76      % Lymphocytes 11      % Monocytes 8      % Eosinophils 4      % Basophils 0      % Immature Granulocytes 1      NRBCs per 100 WBC 0      Absolute Neutrophils 10.0 (*)     Absolute Lymphocytes 1.5       Absolute Monocytes 1.1      Absolute Eosinophils 0.5      Absolute Basophils 0.0      Absolute Immature Granulocytes 0.1      Absolute NRBCs 0.0     OCCULT BLOOD STOOL   TYPE AND SCREEN, ADULT    ABO/RH(D) O POS      Antibody Screen Negative      SPECIMEN EXPIRATION DATE [id number]     PREPARE RED BLOOD CELLS (UNIT)    Blood Component Type Red Blood Cells      Product Code U2389J81      Unit Status Ready for issue      Unit Number [id number]      CROSSMATCH Compatible      CODING SYSTEM [id number]     PREPARE RED BLOOD CELLS (UNIT)    Blood Component Type Red Blood Cells      Product Code C9241E18      Unit Status Transfused      Unit Number [id number]      CROSSMATCH Compatible      CODING SYSTEM [id number]      ISSUE DATE AND TIME       UNIT ABO/RH O+      UNIT TYPE ISBT 5100     PREPARE RED BLOOD CELLS (UNIT)   TRANSFUSE RED BLOOD CELLS (UNIT)   ABO/RH TYPE AND SCREEN          Emergency Department Course:       Reviewed:  I reviewed nursing notes, vitals, past medical history and Care Everywhere    Assessments:  1800 I obtained history and examined the patient as noted above.   185 I rechecked the patient and explained findings and discussed admission.     Consults:  184 I consulted with Dr. Lopes of the hospitalist team regarding the patient, who accepted the patient for admission.     Interventions:  1837 Transfuse Red blood cells, 100ml/hr  1838 Protonix, 80 mg IV  184 Transfuse Red blood cells, 150ml/hr    Disposition:  The patient was admitted to the hospital under the care of Dr. Lopes.     Impression & Plan     Medical Decision Makin-year-old male presents with acute on chronic anemia from renal clinic.  He describes chills and mild dyspnea on exertion that correlate with his anemia.  His vital signs are stable.  His stool has gone from light brown to dark green, likely representing melena.  Patient declines rectal exam which I believe is reasonable given his known  gastroduodenal disease and stable vital signs.  He has been ordered 2 units of for transfusion due to his hemoglobin of 6.4.  He was given Protonix.  The patient's lipase is also incidentally elevated, unclear etiology at this time.  I discussed this with Dr. Lopes, and a CT abdomen/pelvis was ordered to further evaluate.  On review of the patient's EGD results, it appears that he is H. pylori positive, meaning that his ulcer disease is not just due to NSAID use.  I discussed this with Dr. Lopes as well and he will initiate treatment.  Plan is for admission to the hospitalist service for further care.      Diagnosis:    ICD-10-CM    1. Acute kidney injury (H)  N17.9       2. Anemia, unspecified type  D64.9       3. History of gastric ulcer  Z87.11       4. Elevated lipase  R74.8       5. Gastric ulcer due to Helicobacter pylori, chronic  K25.7     B96.81           Scribe Disclosure:  I, Steev Mathias, am serving as a scribe at 6:00 PM on 12/22/2022 to document services personally performed by Ezra Moe MD, based on my observations and the provider's statements to me.          Ezra Moe MD  12/22/22 1931

## 2022-12-23 NOTE — PLAN OF CARE
Pt cleared for discharge if Hgb at 1600 was greater than 8. 1600 hgb 8.2.      Reviewed discharge instructions and medications with patient. Questions answered. Patient discharged to home with, discharge instructions, medications  and belongings. Goal Outcome Evaluation:      Plan of Care Reviewed With: patient

## 2022-12-23 NOTE — RESULT ENCOUNTER NOTE
Called patient at 4:00 PM to tell him to go to the ED related to his hemoglobin of 6.7. Has started having black stools again, although said they weren't quite the same as when he presented to the hospital last month.

## 2022-12-23 NOTE — PLAN OF CARE
Goal Outcome Evaluation:  Vitals are Temp: 98  F (36.7  C) Temp src: Oral BP: (!) 164/80 Pulse: 85   Resp: 16 SpO2: 95 %.    Pt A&Ox4. Hypertensive on RA. Given scheduled hydralazine. Up SBA. Blood infusing. Continuing to provide cares.

## 2022-12-23 NOTE — DISCHARGE SUMMARY
[facility]  Hospitalist Discharge Summary      Date of Admission:  12/22/2022  Date of Discharge:  12/23/2022  Discharging Provider: Seamus Raza MD  Discharge Service: Hospitalist Service    Discharge Diagnoses       Acute blood loss anemia with pre-existing chronic anemia secondary to retroperitoneal bleed due to renal biopsy    Recent GI bleed    Elevated lipase with mild pancreatitis.    CKD stage IV    Leukocytosis due to stress demargination    Follow-ups Needed After Discharge   Follow-up Appointments     Follow-up and recommended labs and tests       Follow up with primary care provider, Anabela Jurado, within 7   days for hospital follow- up.  The following labs/tests are recommended:   CBC and BMP.             Discharge Disposition   Discharged to home  Condition at discharge: Stable    Hospital Course   37-year-old morbidly obese male with history of CKD stage III, hypertension,, sleep apnea, CHF and NSTEMI who was hospitalized in the beginning of December with severe anemia (hemoglobin of 3).  EGD showed duodenal and gastric ulcers and was positive for H. pylori, unclear if he was treated for that.  He needed 7 units of PRBC during that hospitalization.     He had left kidney biopsy on 12/19 for his CKD.  At the follow-up visit hemoglobin was less than 7 and was referred back to ER.  He had mild left flank pain and couple of days of dark green stools.     He was hypertensive and nontachycardic on presentation.  Hemoglobin of 6.4 with BUN/creatinine of 54.9/7 with anion gap 17 bicarbonate 20.  Lipase was 1362.  CT abdomen showed left sided retroperitoneal hematoma, likely due to recent renal biopsy.        1. Acute blood loss anemia with pre-existing chronic anemia secondary to retroperitoneal bleed due to renal biopsy.    Hemoglobin 6.4--> PRBC x1--> 6.9--> PRBC x1--> 8.2    As hemoglobin has stabilized, patient will be discharged home.     2. Recent GI bleed.    Upper GI  endoscopy on 11/28 showed congested duodenal mucosa, enlarged gastric folds with ulceration.    He tested positive for H. pylori and will discharge on 2 weeks of quadruple therapy.     3. Elevated lipase    CT abdomen showed only mild peripancreatic thickening.  Patient is tolerating p.o. and does not complain of abdominal pain.  Overall clinical picture is at the most very mild pancreatitis.     4. CKD stage IV.    Creatinine on presentation was 7, remained stable on recheck    I discussed with nephrology and they were okay with him discharging home.    Will continue to have discussions regarding dialysis as an outpatient.     5. Leukocytosis.    WBC count of 13.2, likely stress demargination.    Consultations This Hospital Stay   None    Code Status   Full Code    Time Spent on this Encounter   I, Seamus Raza MD, personally saw the patient today and spent greater than 30 minutes discharging this patient.       Seamus Raza MD  [address] SURGICAL  [address] [place] [address]  Phone: [phone]  Fax: [phone]  ______________________________________________________________________    Physical Exam   Vital Signs: Temp: 98.6  F (37  C) Temp src: Oral BP: (!) 152/82 Pulse: 83   Resp: 18 SpO2: 93 % O2 Device: None (Room air)    Weight: 351 lbs 8 oz  General: Alert, awake, no acute distress.  HEENT: NC/AT, eyes anicteric, external occular movements intact, face symmetric.   Cardiac: RRR, S1, S2.  No murmurs appreciated.  Pulmonary: Normal chest rise, normal work of breathing.  Lungs CTA BL  Abdomen: Obese, soft, non-tender, non-distended.  Bowel Sounds Present.  No guarding.  Mild swelling around the left kidney biopsy site.  No obvious hematoma or bruising.  Extremities: no deformities.  Warm, well perfused.  1+ pitting edema the lower extremities.  Skin: no rashes or lesions noted.  Warm and Dry.  Neuro: No focal deficits noted.  Speech clear.  Coordination and strength  grossly normal.  Psych: Appropriate affect.       Primary Care Physician   Anabela Jurado    Discharge Orders      Reason for your hospital stay    Retroperitoneal hematoma     Follow-up and recommended labs and tests     Follow up with primary care provider, Anabela Jurado, within 7 days for hospital follow- up.  The following labs/tests are recommended: CBC and BMP.     Activity    Your activity upon discharge: activity as tolerated     Diet    Follow this diet upon discharge: Orders Placed This Encounter      Clear Liquid Diet       Significant Results and Procedures   Most Recent 3 CBC's:Recent Labs   Lab Test 12/23/22  1621 12/23/22  1205 12/23/22  0949 12/23/22  0002 12/22/22  1721 12/22/22  1543 12/02/22  0513   WBC  --   --  13.2*  --  13.2*  --  15.1*   HGB 8.2* 7.2* 6.9*   < > 6.4*   < > 7.2*   MCV  --   --  87  --  86  --  88   PLT  --   --  280  --  306  --  260    < > = values in this interval not displayed.     Most Recent 3 BMP's:Recent Labs   Lab Test 12/23/22  0949 12/22/22  1721 12/22/22  1543    138 137   POTASSIUM 5.0 4.6 4.6   CHLORIDE 103 101 104   CO2 20* 20* 18*   BUN 59.7* 59.4* 59.5*   CR 6.92* 7.00* 7.09*   ANIONGAP 15 17* 15   GAYLE 8.8 8.8 9.0   GLC 97 100* 135*     Most Recent 2 LFT's:Recent Labs   Lab Test 12/22/22  1721 11/28/22  2133   AST 11 9*   ALT 12 20   ALKPHOS 102 82   BILITOTAL 0.2 0.2     Most Recent 3 INR's:Recent Labs   Lab Test 11/30/22  1528   INR 1.01   ,   Results for orders placed or performed during the hospital encounter of 12/22/22   Abd/pelvis CT no contrast - Stone Protocol    Narrative    EXAM: CT ABDOMEN PELVIS W/O CONTRAST  LOCATION: [facility]  DATE/TIME: 12/22/2022 8:11 PM    INDICATION: Recent kidney biopsy, lipase elevation, acute on chronic anemia  COMPARISON: Renal ultrasound 11/29/2022  TECHNIQUE: CT scan of the abdomen and pelvis was performed without IV contrast. Multiplanar reformats were obtained. Dose  reduction techniques were used.  CONTRAST: None.    FINDINGS:   LOWER CHEST: Cardiomegaly.    HEPATOBILIARY: Normal.    PANCREAS: Minimal peripancreatic stranding at the tail.    SPLEEN: Normal.    ADRENAL GLANDS: Normal.    KIDNEYS/BLADDER: Retroperitoneal hematoma posterior and lateral to the left kidney which measures approximately 11.8 x 2.3 x 13.6 cm. Simple left renal cyst requiring no further follow-up. 5 mm nonobstructing calyceal calculus of the left inferior pole.   No hydronephrosis. Mild thickening of the urinary bladder and mild perivesical fat stranding.    BOWEL: Normal.    LYMPH NODES: Shotty retroperitoneal lymph nodes nonspecific but may be reactive.    VASCULATURE: No AAA.    PELVIC ORGANS: Prostate calcifications.    MUSCULOSKELETAL: No acute osseous abnormality. Multilevel degenerative changes of the thoracolumbar spine.      Impression    IMPRESSION:   1.  Retroperitoneal hematoma posterior and lateral to the left kidney which measures approximately 11.8 x 2.3 x 13.6 cm.  2.  5 mm nonobstructing calyceal calculus of the left inferior pole kidney.  3.  Mild thickening of the urinary bladder and mild perivesicle fat stranding, findings which may be seen with cystitis. Recommend correlation with urinalysis.  4.  Minimal peripancreatic stranding at the tail, may represent very mild or early acute pancreatitis.           Discharge Medications   Current Discharge Medication List      START taking these medications    Details   bismuth subsalicylate (PEPTO BISMOL) 262 MG chewable tablet Take 1 tablet (262 mg) by mouth 4 times daily (before meals and nightly) for 14 days  Qty: 56 tablet, Refills: 0    Associated Diagnoses: Gastric ulcer due to Helicobacter pylori, chronic      doxycycline hyclate (VIBRAMYCIN) 100 MG capsule Take 1 capsule (100 mg) by mouth every 12 hours for 14 days  Qty: 28 capsule, Refills: 0    Associated Diagnoses: Gastric ulcer due to Helicobacter pylori, chronic       metroNIDAZOLE (FLAGYL) 500 MG tablet Take 1 tablet (500 mg) by mouth 3 times daily for 14 days  Qty: 42 tablet, Refills: 0    Associated Diagnoses: Gastric ulcer due to Helicobacter pylori, chronic         CONTINUE these medications which have NOT CHANGED    Details   amLODIPine (NORVASC) 10 MG tablet Take 1 tablet (10 mg) by mouth daily  Qty: 90 tablet, Refills: 3    Associated Diagnoses: Chronic systolic congestive heart failure (H); Other cardiomyopathy (H); Essential hypertension      atorvastatin (LIPITOR) 10 MG tablet Take 1 tablet (10 mg) by mouth every evening  Qty: 90 tablet, Refills: 3    Associated Diagnoses: NSTEMI (non-ST elevated myocardial infarction) (H)      carvedilol (COREG) 25 MG tablet Take 1 tablet (25 mg) by mouth 2 times daily (with meals)  Qty: 180 tablet, Refills: 0    Comments: Brooks is overdue for cariology Follow-up, please remind him to make appt with Dr Mcdonald. Thanks! Only 30 days will be given  Associated Diagnoses: Hypertensive emergency      furosemide (LASIX) 40 MG tablet Take 40 mg by mouth 2 times daily      hydrALAZINE (APRESOLINE) 50 MG tablet Take 1 tablet (50 mg) by mouth 4 times daily  Qty: 120 tablet, Refills: 1    Associated Diagnoses: Essential hypertension; Chronic systolic congestive heart failure (H)      pantoprazole (PROTONIX) 40 MG EC tablet Take 1 tablet (40 mg) by mouth 2 times daily  Qty: 90 tablet, Refills: 1    Associated Diagnoses: Acute upper GI bleed; Gastrointestinal hemorrhage associated with gastric ulcer      traZODone (DESYREL) 50 MG tablet Take  mg by mouth At Bedtime      vitamin D2 (ERGOCALCIFEROL) 24998 units (1250 mcg) capsule Take 50,000 Units by mouth once a week Take on Mondays           Allergies   No Known Allergies

## 2022-12-23 NOTE — PLAN OF CARE
End of Shift Summary  For vital signs and complete assessments, please see documentation flowsheets.     Pertinent assessments: A&Ox4. VSS on room air, afebrile. Denies pain and SOB. Biopsy site noted in L lower back, covered with bandaid. No dizziness/ lightheaded- ness reported. No stools this shift, stool sample needing to be collected. Steady on feet, up independently.     Major Shift Events: Admitted to unit   Treatment Plan: Monitor HGB & transfuse as needed. Doxycycline. Flagyl. Protonix. Pepto bismol.   Bedside Nurse: Rosy Natarajan RN

## 2022-12-23 NOTE — PHARMACY-ADMISSION MEDICATION HISTORY
Admission medication history interview status for this patient is complete. See [facility] admission navigator for allergy information, prior to admission medications and immunization status.     Medication history interview done, indicate source(s): Patient  Medication history resources (including written lists, pill bottles, clinic record):None  Pharmacy: [facility]     Changes made to PTA medication list:  Added: trazodone, furosemide, ergocalciferol  Changed: none  Reported as Not Taking: none  Removed: vitamin D3    Actions taken by pharmacist (provider contacted, etc):Left note to provider that med rec is complete     Additional medication history information:None    Medication reconciliation/reorder completed by provider prior to medication history?  Y   (Y/N)     Prior to Admission medications    Medication Sig Last Dose Taking? Auth Provider Long Term End Date   amLODIPine (NORVASC) 10 MG tablet Take 1 tablet (10 mg) by mouth daily 12/22/2022 Yes Prem Painter PA-C Yes    atorvastatin (LIPITOR) 10 MG tablet Take 1 tablet (10 mg) by mouth every evening 12/22/2022 at pm Yes Cliff Mcdonald MD Yes    carvedilol (COREG) 25 MG tablet Take 1 tablet (25 mg) by mouth 2 times daily (with meals) 12/22/2022 at x2 Yes Tamara, Cliff Tamayo MD Yes    furosemide (LASIX) 40 MG tablet Take 40 mg by mouth 2 times daily 12/22/2022 at x2 Yes Unknown, Entered By History No    hydrALAZINE (APRESOLINE) 50 MG tablet Take 1 tablet (50 mg) by mouth 4 times daily 12/22/2022 Yes Valeriy Collado MD Yes    pantoprazole (PROTONIX) 40 MG EC tablet Take 1 tablet (40 mg) by mouth 2 times daily 12/22/2022 Yes Valeriy Collado MD     traZODone (DESYREL) 50 MG tablet Take  mg by mouth At Bedtime 12/21/2022 at pm Yes Unknown, Entered By History No    vitamin D2 (ERGOCALCIFEROL) 54615 units (1250 mcg) capsule Take 50,000 Units by mouth once a week Take on Mondays 12/19/2022 Yes Unknown, Entered By History

## 2022-12-23 NOTE — ED NOTES
[facility]  ED Nurse Handoff Report    Brooks Mensah is a 37 year old male   ED Chief complaint: Abnormal Labs  . ED Diagnosis:   Final diagnoses:   Acute kidney injury (H)   Anemia, unspecified type   History of gastric ulcer   Elevated lipase   Gastric ulcer due to Helicobacter pylori, chronic     Allergies: No Known Allergies    Code Status: Full Code  Activity level - Baseline/Home:  Independent. Activity Level - Current:   Independent. Lift room needed: No. Bariatric: No   Needed: No   Isolation: No. Infection: Not Applicable.     Vital Signs:   Vitals:    12/22/22 1653 12/22/22 1834 12/22/22 1837 12/22/22 1845   BP: (!) 166/86 (!) 171/85 (!) 171/85 (!) 164/94   Pulse: 79 84 83    Resp: 16      Temp: 98.6  F (37  C) 98  F (36.7  C)  97.5  F (36.4  C)   TempSrc: Temporal Oral     SpO2: 99% 96% 95% 97%       Cardiac Rhythm:  ,      Pain level:    Patient confused: No. Patient Falls Risk: Yes.   Elimination Status: Has voided   Patient Report - Initial Complaint: abnormal labs. Focused Assessment: The patient was at his clinic today for labs and was found to have a hemoglobin level of 6.7. He reports a history of stomach ulcers requiring hospitalization from 11/28-12/2/22. During his hospitalization he was given 7 units of blood.  He denies experiencing black stools but states that they recently have appeared dark green. He denies use of a blood thinner. He underwent left kidney biopsy 3 days ago. He complains soreness in the area.     Exam:   Nursing note and vitals reviewed.  Constitutional:             Appears well-developed and well-nourished.   HENT:   Head:                           Atraumatic.   Mouth/Throat:              Oropharynx is clear and moist. No oropharyngeal exudate.   Eyes:                           Pupils are equal, round, and reactive to light.   Neck:                           Normal range of motion. Neck supple.                                       No tracheal  deviation present. No thyromegaly present.   Cardiovascular:           Normal rate, regular rhythm, no murmur   Pulmonary/Chest:       Breath sounds are clear and equal without wheezes or crackles.  Abdominal:                  Soft. Bowel sounds are normal. Exhibits no distension and                                       no mass. There is no tenderness.                                       There is no rebound and no guarding.   Musculoskeletal:         Exhibits no edema.   Lymphadenopathy:     No cervical adenopathy.   Neurological:               Alert and oriented to person, place, and time.   Skin:                            The wound on his back appears to be healing well. Skin is warm and dry. No rash noted. No pallor.      1901  Respiratory  Respiratory  Respiratory (Adult) Airway WDL: WDL  JJ       1901  Cardiac  Cardiac  Cardiac (Adult) Cardiac WDL: .WDL except  (hx low hgb, on protonix. Hgb at clinic 6.7, fatigue. Denies SOB. Recent admission with transfusion)        Tests Performed: labs. Abnormal Results:   Labs Ordered and Resulted from Time of ED Arrival to Time of ED Departure   COMPREHENSIVE METABOLIC PANEL - Abnormal       Result Value    Sodium 138      Potassium 4.6      Chloride 101      Carbon Dioxide (CO2) 20 (*)     Anion Gap 17 (*)     Urea Nitrogen 59.4 (*)     Creatinine 7.00 (*)     Calcium 8.8      Glucose 100 (*)     Alkaline Phosphatase 102      AST 11      ALT 12      Protein Total 7.7      Albumin 3.7      Bilirubin Total 0.2      GFR Estimate 10 (*)    LIPASE - Abnormal    Lipase 1,362 (*)    CBC WITH PLATELETS AND DIFFERENTIAL - Abnormal    WBC Count 13.2 (*)     RBC Count 2.47 (*)     Hemoglobin 6.4 (*)     Hematocrit 21.2 (*)     MCV 86      MCH 25.9 (*)     MCHC 30.2 (*)     RDW 18.7 (*)     Platelet Count 306      % Neutrophils 76      % Lymphocytes 11      % Monocytes 8      % Eosinophils 4      % Basophils 0      % Immature Granulocytes 1      NRBCs per 100 WBC 0      Absolute  Neutrophils 10.0 (*)     Absolute Lymphocytes 1.5      Absolute Monocytes 1.1      Absolute Eosinophils 0.5      Absolute Basophils 0.0      Absolute Immature Granulocytes 0.1      Absolute NRBCs 0.0     OCCULT BLOOD STOOL   TYPE AND SCREEN, ADULT    ABO/RH(D) O POS      Antibody Screen Negative      SPECIMEN EXPIRATION DATE [id number]     PREPARE RED BLOOD CELLS (UNIT)    Blood Component Type Red Blood Cells      Product Code Y4258K38      Unit Status Ready for issue      Unit Number [id number]      CROSSMATCH Compatible      CODING SYSTEM [id number]     PREPARE RED BLOOD CELLS (UNIT)    Blood Component Type Red Blood Cells      Product Code R7264H07      Unit Status Transfused      Unit Number [id number]      CROSSMATCH Compatible      CODING SYSTEM [id number]      ISSUE DATE AND TIME 20221222181600      UNIT ABO/RH O+      UNIT TYPE ISBT 5100     PREPARE RED BLOOD CELLS (UNIT)   TRANSFUSE RED BLOOD CELLS (UNIT)   ABO/RH TYPE AND SCREEN     .   Treatments provided: RBC; blood 2 units  Family Comments: n/a per pt.  OBS brochure/video discussed/provided to patient:  No  ED Medications:   Medications   pantoprazole (PROTONIX) IV push injection 80 mg (80 mg Intravenous Given 12/22/22 1838)     Drips infusing:  No  For the majority of the shift, the patient's behavior Green. Interventions performed were n/a.    Sepsis treatment initiated: No     Patient tested for COVID 19 prior to admission:no    ED Nurse Name/Phone Number: Diamante Carrero RN,   7:02 PM    RECEIVING UNIT ED HANDOFF REVIEW    Above ED Nurse Handoff Report was reviewed: YES  Reviewed by: Rosy Natarajan RN on December 23, 2022 at 12:36 AM

## 2022-12-30 ENCOUNTER — MEDICAL CORRESPONDENCE (OUTPATIENT)
Dept: HEALTH INFORMATION MANAGEMENT | Facility: CLINIC | Age: 37
End: 2022-12-30

## 2022-12-30 DIAGNOSIS — N18.30 ANEMIA DUE TO STAGE 3 CHRONIC KIDNEY DISEASE TREATED WITH ERYTHROPOIETIN (H): Primary | ICD-10-CM

## 2022-12-30 DIAGNOSIS — D63.1 ANEMIA DUE TO STAGE 3 CHRONIC KIDNEY DISEASE TREATED WITH ERYTHROPOIETIN (H): Primary | ICD-10-CM

## 2022-12-30 DIAGNOSIS — N18.30 STAGE 3 CHRONIC KIDNEY DISEASE, UNSPECIFIED WHETHER STAGE 3A OR 3B CKD (H): ICD-10-CM

## 2022-12-30 RX ORDER — MEPERIDINE HYDROCHLORIDE 25 MG/ML
25 INJECTION INTRAMUSCULAR; INTRAVENOUS; SUBCUTANEOUS EVERY 30 MIN PRN
Status: CANCELLED | OUTPATIENT
Start: 2023-01-06

## 2022-12-30 RX ORDER — ALBUTEROL SULFATE 0.83 MG/ML
2.5 SOLUTION RESPIRATORY (INHALATION)
Status: CANCELLED | OUTPATIENT
Start: 2023-01-06

## 2022-12-30 RX ORDER — ALBUTEROL SULFATE 90 UG/1
1-2 AEROSOL, METERED RESPIRATORY (INHALATION)
Status: CANCELLED
Start: 2023-01-06

## 2022-12-30 RX ORDER — EPINEPHRINE 1 MG/ML
0.3 INJECTION, SOLUTION INTRAMUSCULAR; SUBCUTANEOUS EVERY 5 MIN PRN
Status: CANCELLED | OUTPATIENT
Start: 2023-01-06

## 2022-12-30 RX ORDER — DIPHENHYDRAMINE HYDROCHLORIDE 50 MG/ML
50 INJECTION INTRAMUSCULAR; INTRAVENOUS
Status: CANCELLED
Start: 2023-01-06

## 2022-12-30 RX ORDER — METHYLPREDNISOLONE SODIUM SUCCINATE 125 MG/2ML
125 INJECTION, POWDER, LYOPHILIZED, FOR SOLUTION INTRAMUSCULAR; INTRAVENOUS
Status: CANCELLED
Start: 2023-01-06

## 2023-01-05 ENCOUNTER — LAB (OUTPATIENT)
Dept: LAB | Facility: CLINIC | Age: 38
End: 2023-01-05
Payer: COMMERCIAL

## 2023-01-05 DIAGNOSIS — D63.1 ERYTHROPOIETIN DEFICIENCY ANEMIA: ICD-10-CM

## 2023-01-05 DIAGNOSIS — N17.9 ACUTE KIDNEY FAILURE, UNSPECIFIED (H): Primary | ICD-10-CM

## 2023-01-05 DIAGNOSIS — N18.9 ANEMIA OF CHRONIC RENAL FAILURE: ICD-10-CM

## 2023-01-05 DIAGNOSIS — D63.1 ANEMIA OF CHRONIC RENAL FAILURE: ICD-10-CM

## 2023-01-05 LAB
ALBUMIN SERPL BCG-MCNC: 3.7 G/DL (ref 3.5–5.2)
ANION GAP SERPL CALCULATED.3IONS-SCNC: 16 MMOL/L (ref 7–15)
BUN SERPL-MCNC: 57.4 MG/DL (ref 6–20)
CALCIUM SERPL-MCNC: 9.4 MG/DL (ref 8.6–10)
CHLORIDE SERPL-SCNC: 107 MMOL/L (ref 98–107)
CREAT SERPL-MCNC: 6.2 MG/DL (ref 0.67–1.17)
DEPRECATED HCO3 PLAS-SCNC: 16 MMOL/L (ref 22–29)
GFR SERPL CREATININE-BSD FRML MDRD: 11 ML/MIN/1.73M2
GLUCOSE SERPL-MCNC: 103 MG/DL (ref 70–99)
HGB BLD-MCNC: 9 G/DL (ref 13.3–17.7)
PHOSPHATE SERPL-MCNC: 6.7 MG/DL (ref 2.5–4.5)
POTASSIUM SERPL-SCNC: 4.2 MMOL/L (ref 3.4–5.3)
SODIUM SERPL-SCNC: 139 MMOL/L (ref 136–145)

## 2023-01-05 PROCEDURE — 85045 AUTOMATED RETICULOCYTE COUNT: CPT

## 2023-01-05 PROCEDURE — 36415 COLL VENOUS BLD VENIPUNCTURE: CPT

## 2023-01-05 PROCEDURE — 85018 HEMOGLOBIN: CPT

## 2023-01-05 PROCEDURE — 80069 RENAL FUNCTION PANEL: CPT

## 2023-01-06 ENCOUNTER — INFUSION THERAPY VISIT (OUTPATIENT)
Dept: INFUSION THERAPY | Facility: CLINIC | Age: 38
End: 2023-01-06
Attending: INTERNAL MEDICINE
Payer: COMMERCIAL

## 2023-01-06 VITALS
TEMPERATURE: 97.5 F | RESPIRATION RATE: 20 BRPM | OXYGEN SATURATION: 97 % | DIASTOLIC BLOOD PRESSURE: 80 MMHG | SYSTOLIC BLOOD PRESSURE: 151 MMHG | HEART RATE: 78 BPM

## 2023-01-06 DIAGNOSIS — N18.9 ANEMIA OF CHRONIC RENAL FAILURE: ICD-10-CM

## 2023-01-06 DIAGNOSIS — N18.30 ANEMIA DUE TO STAGE 3 CHRONIC KIDNEY DISEASE TREATED WITH ERYTHROPOIETIN (H): Primary | ICD-10-CM

## 2023-01-06 DIAGNOSIS — N17.9 ACUTE KIDNEY FAILURE, UNSPECIFIED (H): Primary | ICD-10-CM

## 2023-01-06 DIAGNOSIS — D63.1 ANEMIA OF CHRONIC RENAL FAILURE: ICD-10-CM

## 2023-01-06 DIAGNOSIS — N18.30 STAGE 3 CHRONIC KIDNEY DISEASE, UNSPECIFIED WHETHER STAGE 3A OR 3B CKD (H): ICD-10-CM

## 2023-01-06 DIAGNOSIS — D63.1 ANEMIA DUE TO STAGE 3 CHRONIC KIDNEY DISEASE TREATED WITH ERYTHROPOIETIN (H): Primary | ICD-10-CM

## 2023-01-06 LAB
RETICS # AUTO: 0.09 10E6/UL (ref 0.03–0.1)
RETICS/RBC NFR AUTO: 2.7 % (ref 0.5–2)

## 2023-01-06 PROCEDURE — 250N000011 HC RX IP 250 OP 636: Mod: EC | Performed by: INTERNAL MEDICINE

## 2023-01-06 PROCEDURE — 96372 THER/PROPH/DIAG INJ SC/IM: CPT | Performed by: INTERNAL MEDICINE

## 2023-01-06 RX ORDER — DIPHENHYDRAMINE HYDROCHLORIDE 50 MG/ML
50 INJECTION INTRAMUSCULAR; INTRAVENOUS
Status: CANCELLED
Start: 2023-02-03

## 2023-01-06 RX ORDER — ALBUTEROL SULFATE 90 UG/1
1-2 AEROSOL, METERED RESPIRATORY (INHALATION)
Status: CANCELLED
Start: 2023-02-03

## 2023-01-06 RX ORDER — METHYLPREDNISOLONE SODIUM SUCCINATE 125 MG/2ML
125 INJECTION, POWDER, LYOPHILIZED, FOR SOLUTION INTRAMUSCULAR; INTRAVENOUS
Status: CANCELLED
Start: 2023-02-03

## 2023-01-06 RX ORDER — ALBUTEROL SULFATE 0.83 MG/ML
2.5 SOLUTION RESPIRATORY (INHALATION)
Status: CANCELLED | OUTPATIENT
Start: 2023-02-03

## 2023-01-06 RX ORDER — MEPERIDINE HYDROCHLORIDE 25 MG/ML
25 INJECTION INTRAMUSCULAR; INTRAVENOUS; SUBCUTANEOUS EVERY 30 MIN PRN
Status: CANCELLED | OUTPATIENT
Start: 2023-02-03

## 2023-01-06 RX ORDER — EPINEPHRINE 1 MG/ML
0.3 INJECTION, SOLUTION INTRAMUSCULAR; SUBCUTANEOUS EVERY 5 MIN PRN
Status: CANCELLED | OUTPATIENT
Start: 2023-02-03

## 2023-01-06 RX ADMIN — DARBEPOETIN ALFA 100 MCG: 100 INJECTION, SOLUTION INTRAVENOUS; SUBCUTANEOUS at 14:49

## 2023-01-06 ASSESSMENT — PAIN SCALES - GENERAL: PAINLEVEL: NO PAIN (0)

## 2023-01-06 NOTE — PROGRESS NOTES
Infusion Nursing Note:  Brooks Mensah presents today for Aranesp.    Patient seen by provider today: No   present during visit today: Not Applicable.    Note: Denies fatigue or SOB.  Denies SE to Aranesp.    Intravenous Access:  No Intravenous access/labs at this visit.    Treatment Conditions:  Results reviewed, labs MET treatment parameters, ok to proceed with treatment.    Post Infusion Assessment:  Patient tolerated injection without incident.     Discharge Plan:   Discharge instructions reviewed with: Patient.  Patient discharged in stable condition accompanied by: self.  Departure Mode: Ambulatory.  Next lab and injection scheduled for 2/3/23.      GABRIEL PASCUAL RN

## 2023-02-03 ENCOUNTER — INFUSION THERAPY VISIT (OUTPATIENT)
Dept: INFUSION THERAPY | Facility: CLINIC | Age: 38
End: 2023-02-03
Attending: INTERNAL MEDICINE
Payer: COMMERCIAL

## 2023-02-03 VITALS
HEART RATE: 68 BPM | TEMPERATURE: 98 F | DIASTOLIC BLOOD PRESSURE: 79 MMHG | OXYGEN SATURATION: 98 % | RESPIRATION RATE: 18 BRPM | SYSTOLIC BLOOD PRESSURE: 150 MMHG

## 2023-02-03 DIAGNOSIS — D63.1 ERYTHROPOIETIN DEFICIENCY ANEMIA: ICD-10-CM

## 2023-02-03 DIAGNOSIS — N18.30 STAGE 3 CHRONIC KIDNEY DISEASE, UNSPECIFIED WHETHER STAGE 3A OR 3B CKD (H): ICD-10-CM

## 2023-02-03 DIAGNOSIS — N17.9 ACUTE KIDNEY FAILURE, UNSPECIFIED (H): Primary | ICD-10-CM

## 2023-02-03 DIAGNOSIS — N18.30 ANEMIA DUE TO STAGE 3 CHRONIC KIDNEY DISEASE TREATED WITH ERYTHROPOIETIN (H): ICD-10-CM

## 2023-02-03 DIAGNOSIS — D63.1 ANEMIA DUE TO STAGE 3 CHRONIC KIDNEY DISEASE TREATED WITH ERYTHROPOIETIN (H): ICD-10-CM

## 2023-02-03 LAB
ALBUMIN SERPL BCG-MCNC: 4.1 G/DL (ref 3.5–5.2)
ANION GAP SERPL CALCULATED.3IONS-SCNC: 14 MMOL/L (ref 7–15)
BUN SERPL-MCNC: 64.1 MG/DL (ref 6–20)
CALCIUM SERPL-MCNC: 9.1 MG/DL (ref 8.6–10)
CHLORIDE SERPL-SCNC: 104 MMOL/L (ref 98–107)
CREAT SERPL-MCNC: 6.36 MG/DL (ref 0.67–1.17)
DEPRECATED HCO3 PLAS-SCNC: 18 MMOL/L (ref 22–29)
GFR SERPL CREATININE-BSD FRML MDRD: 11 ML/MIN/1.73M2
GLUCOSE SERPL-MCNC: 120 MG/DL (ref 70–99)
HGB BLD-MCNC: 9.4 G/DL (ref 13.3–17.7)
PHOSPHATE SERPL-MCNC: 6.5 MG/DL (ref 2.5–4.5)
POTASSIUM SERPL-SCNC: 4.3 MMOL/L (ref 3.4–5.3)
SODIUM SERPL-SCNC: 136 MMOL/L (ref 136–145)

## 2023-02-03 PROCEDURE — 250N000011 HC RX IP 250 OP 636: Performed by: INTERNAL MEDICINE

## 2023-02-03 PROCEDURE — 96372 THER/PROPH/DIAG INJ SC/IM: CPT | Performed by: INTERNAL MEDICINE

## 2023-02-03 PROCEDURE — 85018 HEMOGLOBIN: CPT | Performed by: INTERNAL MEDICINE

## 2023-02-03 PROCEDURE — 36415 COLL VENOUS BLD VENIPUNCTURE: CPT

## 2023-02-03 PROCEDURE — 80069 RENAL FUNCTION PANEL: CPT | Performed by: INTERNAL MEDICINE

## 2023-02-03 RX ORDER — ALBUTEROL SULFATE 0.83 MG/ML
2.5 SOLUTION RESPIRATORY (INHALATION)
Status: CANCELLED | OUTPATIENT
Start: 2023-03-03

## 2023-02-03 RX ORDER — ALBUTEROL SULFATE 90 UG/1
1-2 AEROSOL, METERED RESPIRATORY (INHALATION)
Status: CANCELLED
Start: 2023-03-03

## 2023-02-03 RX ORDER — DIPHENHYDRAMINE HYDROCHLORIDE 50 MG/ML
50 INJECTION INTRAMUSCULAR; INTRAVENOUS
Status: CANCELLED
Start: 2023-03-03

## 2023-02-03 RX ORDER — MEPERIDINE HYDROCHLORIDE 25 MG/ML
25 INJECTION INTRAMUSCULAR; INTRAVENOUS; SUBCUTANEOUS EVERY 30 MIN PRN
Status: CANCELLED | OUTPATIENT
Start: 2023-03-03

## 2023-02-03 RX ORDER — METHYLPREDNISOLONE SODIUM SUCCINATE 125 MG/2ML
125 INJECTION, POWDER, LYOPHILIZED, FOR SOLUTION INTRAMUSCULAR; INTRAVENOUS
Status: CANCELLED
Start: 2023-03-03

## 2023-02-03 RX ORDER — EPINEPHRINE 1 MG/ML
0.3 INJECTION, SOLUTION INTRAMUSCULAR; SUBCUTANEOUS EVERY 5 MIN PRN
Status: CANCELLED | OUTPATIENT
Start: 2023-03-03

## 2023-02-03 RX ADMIN — DARBEPOETIN ALFA 100 MCG: 100 INJECTION, SOLUTION INTRAVENOUS; SUBCUTANEOUS at 15:44

## 2023-02-03 NOTE — PROGRESS NOTES
Infusion Nursing Note:  Brooks THOMAS Mensah presents today for labs, Aranesp.     present during visit today: Not Applicable.    Note: N/A.    Intravenous Access:  Lab draw site L AC, Needle type butterfly, Gauge 23.    Treatment Conditions:.  Lab Results   Component Value Date    WBC 13.2 12/23/2022    WBC 13.3 06/16/2017     Lab Results   Component Value Date    RBC 2.61 12/23/2022    RBC 4.04 06/16/2017     Lab Results   Component Value Date    HGB 9.4 02/03/2023    HGB 11.7 06/16/2017     Lab Results   Component Value Date    HCT 22.6 12/23/2022    HCT 37.2 06/16/2017     Lab Results   Component Value Date    MCV 87 12/23/2022    MCV 92 06/16/2017     Lab Results   Component Value Date    MCH 26.4 12/23/2022    MCH 29.0 06/16/2017     Lab Results   Component Value Date    MCHC 30.5 12/23/2022    MCHC 31.5 06/16/2017     Lab Results   Component Value Date    RDW 18.4 12/23/2022    RDW 16.6 06/16/2017     Lab Results   Component Value Date     12/23/2022     06/16/2017         Post Lab Assessment:  Patient tolerated injection   Patient tolerated blood collection        Discharge Plan:   Patient and/or family verbalized understanding of  instructions and all questions answered.  Patient  to lobby in stable condition accompanied by: self.  Patient to see provider today: No  Departure Mode: Ambulatory.  Sharifa Whitney, RN, RN

## 2023-03-03 ENCOUNTER — INFUSION THERAPY VISIT (OUTPATIENT)
Dept: INFUSION THERAPY | Facility: CLINIC | Age: 38
End: 2023-03-03
Attending: INTERNAL MEDICINE
Payer: COMMERCIAL

## 2023-03-03 VITALS — HEART RATE: 79 BPM | OXYGEN SATURATION: 97 % | DIASTOLIC BLOOD PRESSURE: 139 MMHG | SYSTOLIC BLOOD PRESSURE: 227 MMHG

## 2023-03-03 DIAGNOSIS — N18.30 ANEMIA DUE TO STAGE 3 CHRONIC KIDNEY DISEASE TREATED WITH ERYTHROPOIETIN (H): Primary | ICD-10-CM

## 2023-03-03 DIAGNOSIS — N17.9 ACUTE KIDNEY FAILURE, UNSPECIFIED (H): ICD-10-CM

## 2023-03-03 DIAGNOSIS — N18.30 STAGE 3 CHRONIC KIDNEY DISEASE, UNSPECIFIED WHETHER STAGE 3A OR 3B CKD (H): ICD-10-CM

## 2023-03-03 DIAGNOSIS — D63.1 ERYTHROPOIETIN DEFICIENCY ANEMIA: ICD-10-CM

## 2023-03-03 DIAGNOSIS — D63.1 ANEMIA DUE TO STAGE 3 CHRONIC KIDNEY DISEASE TREATED WITH ERYTHROPOIETIN (H): Primary | ICD-10-CM

## 2023-03-03 LAB
ALBUMIN SERPL BCG-MCNC: 3.6 G/DL (ref 3.5–5.2)
ANION GAP SERPL CALCULATED.3IONS-SCNC: 16 MMOL/L (ref 7–15)
BUN SERPL-MCNC: 62.5 MG/DL (ref 6–20)
CALCIUM SERPL-MCNC: 8.8 MG/DL (ref 8.6–10)
CHLORIDE SERPL-SCNC: 104 MMOL/L (ref 98–107)
CREAT SERPL-MCNC: 5.72 MG/DL (ref 0.67–1.17)
DEPRECATED HCO3 PLAS-SCNC: 16 MMOL/L (ref 22–29)
GFR SERPL CREATININE-BSD FRML MDRD: 12 ML/MIN/1.73M2
GLUCOSE SERPL-MCNC: 94 MG/DL (ref 70–99)
HGB BLD-MCNC: 10.9 G/DL (ref 13.3–17.7)
PHOSPHATE SERPL-MCNC: 6.8 MG/DL (ref 2.5–4.5)
POTASSIUM SERPL-SCNC: 4.9 MMOL/L (ref 3.4–5.3)
SODIUM SERPL-SCNC: 136 MMOL/L (ref 136–145)

## 2023-03-03 PROCEDURE — 85018 HEMOGLOBIN: CPT | Performed by: INTERNAL MEDICINE

## 2023-03-03 PROCEDURE — 80069 RENAL FUNCTION PANEL: CPT | Performed by: INTERNAL MEDICINE

## 2023-03-03 PROCEDURE — 36415 COLL VENOUS BLD VENIPUNCTURE: CPT | Performed by: INTERNAL MEDICINE

## 2023-03-03 RX ORDER — DIPHENHYDRAMINE HYDROCHLORIDE 50 MG/ML
50 INJECTION INTRAMUSCULAR; INTRAVENOUS
Start: 2023-03-31

## 2023-03-03 RX ORDER — METHYLPREDNISOLONE SODIUM SUCCINATE 125 MG/2ML
125 INJECTION, POWDER, LYOPHILIZED, FOR SOLUTION INTRAMUSCULAR; INTRAVENOUS
Start: 2023-03-31

## 2023-03-03 RX ORDER — EPINEPHRINE 1 MG/ML
0.3 INJECTION, SOLUTION INTRAMUSCULAR; SUBCUTANEOUS EVERY 5 MIN PRN
OUTPATIENT
Start: 2023-03-31

## 2023-03-03 RX ORDER — MEPERIDINE HYDROCHLORIDE 25 MG/ML
25 INJECTION INTRAMUSCULAR; INTRAVENOUS; SUBCUTANEOUS EVERY 30 MIN PRN
OUTPATIENT
Start: 2023-03-31

## 2023-03-03 RX ORDER — ALBUTEROL SULFATE 0.83 MG/ML
2.5 SOLUTION RESPIRATORY (INHALATION)
OUTPATIENT
Start: 2023-03-31

## 2023-03-03 RX ORDER — ALBUTEROL SULFATE 90 UG/1
1-2 AEROSOL, METERED RESPIRATORY (INHALATION)
Start: 2023-03-31

## 2023-03-03 NOTE — PROGRESS NOTES
Infusion Nursing Note:  Brooks Mensah presents today for labs; Aranesp HELD  Patient seen by provider today: No   present during visit today: Not Applicable.    Note: Pt's BP significantly elevated today; 223/126 upon arrival, 227/139 approx 25 minutes later  Pt reports he has not taken any of his BP medications today,not even the ones that are taken multiple times/day.  Denies any cardiac symptoms.  Pt does not qualify for Aranesp today based on both BP and Hgb of 10.9  Pt will take his BP medications as soon as he gets home.  Will seek evaluation in ED for any cardiac symptoms      Intravenous Access:  Lab draw site LAC, Needle type butterfly, Gauge 23.  Labs drawn with much difficulty - success with 4th attempt    Treatment Conditions:  Results reviewed, labs did NOT meet treatment parameters: Results faxed to Dr Michel per lab staff      Discharge Plan:   AVS to patient via [employer].  Patient will return in one month for labs/possible Aranesp for next appointment.   Patient discharged in stable condition accompanied by: self.  Departure Mode: Ambulatory.      Jennifer Soto RN

## 2023-09-29 ENCOUNTER — HOSPITAL ENCOUNTER (INPATIENT)
Facility: CLINIC | Age: 38
LOS: 7 days | Discharge: HOME | End: 2023-10-06
Attending: EMERGENCY MEDICINE | Admitting: INTERNAL MEDICINE
Payer: COMMERCIAL

## 2023-09-29 ENCOUNTER — APPOINTMENT (OUTPATIENT)
Dept: ULTRASOUND IMAGING | Facility: CLINIC | Age: 38
End: 2023-09-29
Attending: INTERNAL MEDICINE
Payer: COMMERCIAL

## 2023-09-29 ENCOUNTER — APPOINTMENT (OUTPATIENT)
Dept: CT IMAGING | Facility: CLINIC | Age: 38
End: 2023-09-29
Attending: EMERGENCY MEDICINE
Payer: COMMERCIAL

## 2023-09-29 DIAGNOSIS — I16.1 HYPERTENSIVE EMERGENCY: ICD-10-CM

## 2023-09-29 DIAGNOSIS — K92.2 UPPER GI BLEED: ICD-10-CM

## 2023-09-29 DIAGNOSIS — D62 ANEMIA DUE TO BLOOD LOSS, ACUTE: ICD-10-CM

## 2023-09-29 DIAGNOSIS — I50.22 CHRONIC SYSTOLIC CONGESTIVE HEART FAILURE (H): ICD-10-CM

## 2023-09-29 DIAGNOSIS — R10.9 ABDOMINAL PAIN, UNSPECIFIED ABDOMINAL LOCATION: ICD-10-CM

## 2023-09-29 DIAGNOSIS — K85.90 ACUTE PANCREATITIS, UNSPECIFIED COMPLICATION STATUS, UNSPECIFIED PANCREATITIS TYPE: ICD-10-CM

## 2023-09-29 DIAGNOSIS — I10 ESSENTIAL HYPERTENSION: ICD-10-CM

## 2023-09-29 DIAGNOSIS — N18.30 ANEMIA DUE TO STAGE 3 CHRONIC KIDNEY DISEASE TREATED WITH ERYTHROPOIETIN (H): Primary | ICD-10-CM

## 2023-09-29 DIAGNOSIS — E87.5 HYPERKALEMIA: ICD-10-CM

## 2023-09-29 DIAGNOSIS — Z87.11 HISTORY OF PEPTIC ULCER DISEASE: ICD-10-CM

## 2023-09-29 DIAGNOSIS — K25.4 GASTROINTESTINAL HEMORRHAGE ASSOCIATED WITH GASTRIC ULCER: ICD-10-CM

## 2023-09-29 DIAGNOSIS — D63.1 ANEMIA DUE TO STAGE 3 CHRONIC KIDNEY DISEASE TREATED WITH ERYTHROPOIETIN (H): Primary | ICD-10-CM

## 2023-09-29 DIAGNOSIS — K92.2 ACUTE UPPER GI BLEED: ICD-10-CM

## 2023-09-29 LAB
ABO/RH(D): NORMAL
ALBUMIN SERPL BCG-MCNC: 3.3 G/DL (ref 3.5–5.2)
ALP SERPL-CCNC: 65 U/L (ref 40–129)
ALT SERPL W P-5'-P-CCNC: 52 U/L (ref 0–70)
ANION GAP SERPL CALCULATED.3IONS-SCNC: 22 MMOL/L (ref 7–15)
ANTIBODY SCREEN: NEGATIVE
AST SERPL W P-5'-P-CCNC: 14 U/L (ref 0–45)
BASE EXCESS BLDV CALC-SCNC: -16.9 MMOL/L (ref -7.7–1.9)
BASOPHILS # BLD AUTO: 0 10E3/UL (ref 0–0.2)
BASOPHILS NFR BLD AUTO: 0 %
BILIRUB SERPL-MCNC: 0.2 MG/DL
BLD PROD TYP BPU: NORMAL
BLD PROD TYP BPU: NORMAL
BLOOD COMPONENT TYPE: NORMAL
BLOOD COMPONENT TYPE: NORMAL
BUN SERPL-MCNC: 154.9 MG/DL (ref 6–20)
CALCIUM SERPL-MCNC: 7.6 MG/DL (ref 8.6–10)
CHLORIDE SERPL-SCNC: 102 MMOL/L (ref 98–107)
CODING SYSTEM: NORMAL
CODING SYSTEM: NORMAL
CREAT SERPL-MCNC: 14.61 MG/DL (ref 0.67–1.17)
CROSSMATCH: NORMAL
CROSSMATCH: NORMAL
DEPRECATED HCO3 PLAS-SCNC: 9 MMOL/L (ref 22–29)
EGFRCR SERPLBLD CKD-EPI 2021: 4 ML/MIN/1.73M2
EOSINOPHIL # BLD AUTO: 0.1 10E3/UL (ref 0–0.7)
EOSINOPHIL NFR BLD AUTO: 1 %
ERYTHROCYTE [DISTWIDTH] IN BLOOD BY AUTOMATED COUNT: 18.6 % (ref 10–15)
GLUCOSE BLDC GLUCOMTR-MCNC: 129 MG/DL (ref 70–99)
GLUCOSE SERPL-MCNC: 135 MG/DL (ref 70–99)
HCO3 BLDV-SCNC: 11 MMOL/L (ref 21–28)
HCT VFR BLD AUTO: 17.5 % (ref 40–53)
HGB BLD-MCNC: 5.4 G/DL (ref 13.3–17.7)
HOLD SPECIMEN: NORMAL
IMM GRANULOCYTES # BLD: 0.5 10E3/UL
IMM GRANULOCYTES NFR BLD: 3 %
ISSUE DATE AND TIME: NORMAL
ISSUE DATE AND TIME: NORMAL
LACTATE SERPL-SCNC: <0.3 MMOL/L (ref 0.7–2)
LIPASE SERPL-CCNC: 1089 U/L (ref 13–60)
LYMPHOCYTES # BLD AUTO: 0.6 10E3/UL (ref 0.8–5.3)
LYMPHOCYTES NFR BLD AUTO: 4 %
MCH RBC QN AUTO: 28.1 PG (ref 26.5–33)
MCHC RBC AUTO-ENTMCNC: 30.9 G/DL (ref 31.5–36.5)
MCV RBC AUTO: 91 FL (ref 78–100)
MONOCYTES # BLD AUTO: 1.3 10E3/UL (ref 0–1.3)
MONOCYTES NFR BLD AUTO: 8 %
NEUTROPHILS # BLD AUTO: 14.6 10E3/UL (ref 1.6–8.3)
NEUTROPHILS NFR BLD AUTO: 84 %
NRBC # BLD AUTO: 0 10E3/UL
NRBC BLD AUTO-RTO: 0 /100
O2/TOTAL GAS SETTING VFR VENT: 0 %
PCO2 BLDV: 31 MM HG (ref 40–50)
PH BLDV: 7.14 [PH] (ref 7.32–7.43)
PLATELET # BLD AUTO: 148 10E3/UL (ref 150–450)
PO2 BLDV: 80 MM HG (ref 25–47)
POTASSIUM SERPL-SCNC: 5.4 MMOL/L (ref 3.4–5.3)
PROT SERPL-MCNC: 5.9 G/DL (ref 6.4–8.3)
RBC # BLD AUTO: 1.92 10E6/UL (ref 4.4–5.9)
SODIUM SERPL-SCNC: 133 MMOL/L (ref 135–145)
SPECIMEN EXPIRATION DATE: NORMAL
UNIT ABO/RH: NORMAL
UNIT ABO/RH: NORMAL
UNIT NUMBER: NORMAL
UNIT NUMBER: NORMAL
UNIT STATUS: NORMAL
UNIT STATUS: NORMAL
UNIT TYPE ISBT: 5100
UNIT TYPE ISBT: 5100
WBC # BLD AUTO: 17.2 10E3/UL (ref 4–11)

## 2023-09-29 PROCEDURE — 258N000003 HC RX IP 258 OP 636: Performed by: EMERGENCY MEDICINE

## 2023-09-29 PROCEDURE — 76705 ECHO EXAM OF ABDOMEN: CPT

## 2023-09-29 PROCEDURE — P9016 RBC LEUKOCYTES REDUCED: HCPCS | Performed by: EMERGENCY MEDICINE

## 2023-09-29 PROCEDURE — 96365 THER/PROPH/DIAG IV INF INIT: CPT

## 2023-09-29 PROCEDURE — 99291 CRITICAL CARE FIRST HOUR: CPT | Mod: 25

## 2023-09-29 PROCEDURE — 83605 ASSAY OF LACTIC ACID: CPT | Performed by: EMERGENCY MEDICINE

## 2023-09-29 PROCEDURE — 250N000009 HC RX 250: Performed by: EMERGENCY MEDICINE

## 2023-09-29 PROCEDURE — 36415 COLL VENOUS BLD VENIPUNCTURE: CPT | Performed by: EMERGENCY MEDICINE

## 2023-09-29 PROCEDURE — 87040 BLOOD CULTURE FOR BACTERIA: CPT | Performed by: EMERGENCY MEDICINE

## 2023-09-29 PROCEDURE — 86923 COMPATIBILITY TEST ELECTRIC: CPT | Performed by: EMERGENCY MEDICINE

## 2023-09-29 PROCEDURE — 85025 COMPLETE CBC W/AUTO DIFF WBC: CPT | Performed by: EMERGENCY MEDICINE

## 2023-09-29 PROCEDURE — 80053 COMPREHEN METABOLIC PANEL: CPT | Performed by: EMERGENCY MEDICINE

## 2023-09-29 PROCEDURE — C9113 INJ PANTOPRAZOLE SODIUM, VIA: HCPCS | Mod: JZ | Performed by: EMERGENCY MEDICINE

## 2023-09-29 PROCEDURE — 83690 ASSAY OF LIPASE: CPT | Performed by: EMERGENCY MEDICINE

## 2023-09-29 PROCEDURE — 82962 GLUCOSE BLOOD TEST: CPT

## 2023-09-29 PROCEDURE — 86923 COMPATIBILITY TEST ELECTRIC: CPT | Performed by: STUDENT IN AN ORGANIZED HEALTH CARE EDUCATION/TRAINING PROGRAM

## 2023-09-29 PROCEDURE — 99291 CRITICAL CARE FIRST HOUR: CPT | Performed by: INTERNAL MEDICINE

## 2023-09-29 PROCEDURE — 93005 ELECTROCARDIOGRAM TRACING: CPT

## 2023-09-29 PROCEDURE — 86901 BLOOD TYPING SEROLOGIC RH(D): CPT | Performed by: EMERGENCY MEDICINE

## 2023-09-29 PROCEDURE — 86850 RBC ANTIBODY SCREEN: CPT | Performed by: EMERGENCY MEDICINE

## 2023-09-29 PROCEDURE — 86923 COMPATIBILITY TEST ELECTRIC: CPT | Performed by: INTERNAL MEDICINE

## 2023-09-29 PROCEDURE — 250N000011 HC RX IP 250 OP 636: Mod: JZ | Performed by: EMERGENCY MEDICINE

## 2023-09-29 PROCEDURE — 96375 TX/PRO/DX INJ NEW DRUG ADDON: CPT

## 2023-09-29 PROCEDURE — 250N000013 HC RX MED GY IP 250 OP 250 PS 637: Performed by: EMERGENCY MEDICINE

## 2023-09-29 PROCEDURE — 74176 CT ABD & PELVIS W/O CONTRAST: CPT

## 2023-09-29 PROCEDURE — 82803 BLOOD GASES ANY COMBINATION: CPT | Performed by: EMERGENCY MEDICINE

## 2023-09-29 PROCEDURE — 200N000001 HC R&B ICU

## 2023-09-29 RX ORDER — DEXTROSE MONOHYDRATE 25 G/50ML
25-50 INJECTION, SOLUTION INTRAVENOUS
Status: DISCONTINUED | OUTPATIENT
Start: 2023-09-29 | End: 2023-10-06 | Stop reason: HOSPADM

## 2023-09-29 RX ORDER — PIPERACILLIN SODIUM, TAZOBACTAM SODIUM 3; .375 G/15ML; G/15ML
3.38 INJECTION, POWDER, LYOPHILIZED, FOR SOLUTION INTRAVENOUS ONCE
Status: COMPLETED | OUTPATIENT
Start: 2023-09-29 | End: 2023-09-29

## 2023-09-29 RX ORDER — ONDANSETRON 4 MG/1
4 TABLET, ORALLY DISINTEGRATING ORAL EVERY 6 HOURS PRN
Status: DISCONTINUED | OUTPATIENT
Start: 2023-09-29 | End: 2023-10-06 | Stop reason: HOSPADM

## 2023-09-29 RX ORDER — ONDANSETRON 2 MG/ML
4 INJECTION INTRAMUSCULAR; INTRAVENOUS
Status: DISCONTINUED | OUTPATIENT
Start: 2023-09-29 | End: 2023-09-29

## 2023-09-29 RX ORDER — ACETAMINOPHEN 325 MG/1
650 TABLET ORAL EVERY 6 HOURS PRN
Status: DISCONTINUED | OUTPATIENT
Start: 2023-09-29 | End: 2023-10-06 | Stop reason: HOSPADM

## 2023-09-29 RX ORDER — ACETAMINOPHEN 650 MG/1
650 SUPPOSITORY RECTAL EVERY 6 HOURS PRN
Status: DISCONTINUED | OUTPATIENT
Start: 2023-09-29 | End: 2023-10-06 | Stop reason: HOSPADM

## 2023-09-29 RX ORDER — NICOTINE POLACRILEX 4 MG
15-30 LOZENGE BUCCAL
Status: DISCONTINUED | OUTPATIENT
Start: 2023-09-29 | End: 2023-10-06 | Stop reason: HOSPADM

## 2023-09-29 RX ORDER — ONDANSETRON 2 MG/ML
4 INJECTION INTRAMUSCULAR; INTRAVENOUS EVERY 6 HOURS PRN
Status: DISCONTINUED | OUTPATIENT
Start: 2023-09-29 | End: 2023-10-06 | Stop reason: HOSPADM

## 2023-09-29 RX ADMIN — SODIUM ZIRCONIUM CYCLOSILICATE 10 G: 10 POWDER, FOR SUSPENSION ORAL at 18:17

## 2023-09-29 RX ADMIN — ONDANSETRON 4 MG: 2 INJECTION INTRAMUSCULAR; INTRAVENOUS at 16:43

## 2023-09-29 RX ADMIN — SODIUM BICARBONATE: 84 INJECTION, SOLUTION INTRAVENOUS at 18:36

## 2023-09-29 RX ADMIN — SODIUM CHLORIDE 8 MG/HR: 9 INJECTION, SOLUTION INTRAVENOUS at 19:06

## 2023-09-29 RX ADMIN — PANTOPRAZOLE SODIUM 80 MG: 40 INJECTION, POWDER, LYOPHILIZED, FOR SOLUTION INTRAVENOUS at 16:43

## 2023-09-29 RX ADMIN — PIPERACILLIN AND TAZOBACTAM 3.38 G: 3; .375 INJECTION, POWDER, FOR SOLUTION INTRAVENOUS at 18:17

## 2023-09-29 RX ADMIN — SODIUM BICARBONATE: 84 INJECTION, SOLUTION INTRAVENOUS at 19:41

## 2023-09-29 ASSESSMENT — ACTIVITIES OF DAILY LIVING (ADL)
ADLS_ACUITY_SCORE: 35
ADLS_ACUITY_SCORE: 18
ADLS_ACUITY_SCORE: 18
ADLS_ACUITY_SCORE: 35

## 2023-09-29 NOTE — LETTER
Dialysis Intake Checklist      Your Name: Tia Brown RN Contact Phone Number: [phone]     Fax Number and Email: [phone] Hospital/Practice: [facility]     Patient Name: Brooks Mensah   Referring Nephrologist: Nazanin Jamison Facility(s) or Zip Code: [address]     Patient Started Date of Dialysis: 10/2/2023      Estimated Outpatient Start Date of Dialysis: 10/06/2023 or 10/09/2023   Treatment Duration & Frequency: Three times weekly     Preferred Schedule: Monday, Wednesday , and Friday PM     Is the patient employed? Yes   Is there a working shift we can accommodate?  Yes   Is patient flexible?  Shift: Prefer [facility]/ third shift       Modality:   In-Center Hemo   Diagnosis:   End Stage Renal Disease (ESRD - Stage 5 Chronic Kidney Disease)     Access Type(s):   CVC    If only a CVC, is there an active AV Access Plan (e.g., Vessel Mapping, Surgeon Consult, etc.)?:   Not known         Where will this patient be discharged to? Home     Is this patient trach or vent dependent? No     Does this patient have an L-VAD or Life Vest? No     Does this patient have outpatient dialysis history? No     Does this patient receive continuous medication via infusion pumps? No     Daily Care - Activity Management/Activity assistance needed?   Activity Management: up to bedside commode, up in chair   Activity Assistance Provided: assistance, stand-by, assistance, 1 person   Assistive Device Utilized: gait belt, walker      Can this patient sit in a standard chair to dialyze? Yes:      Require treatment in a bed?  No     Is the patient HEP B positive? No     Can this patient sign their own legal consents? Yes:     Other special needs? none       Allergies: No Known Allergies     Medication List:   Current Facility-Administered Medications   Medication    [Auto Hold] - MEDICATION INSTRUCTIONS for Dialysis Patients -    [Auto Hold] acetaminophen (TYLENOL) tablet 650 mg     Or    [Auto Hold] acetaminophen (TYLENOL) Suppository 650 mg    [Auto Hold] amLODIPine (NORVASC) tablet 2.5 mg    [Auto Hold] bumetanide (BUMEX) injection 1 mg    [Auto Hold] calcitRIOL (ROCALTROL) capsule 0.25 mcg    [Auto Hold] calcium acetate (PHOSLO) capsule 1,334 mg    [Auto Hold] calcium acetate (PHOSLO) capsule 667 mg    [Auto Hold] calcium carbonate (TUMS) chewable tablet 500 mg    [Auto Hold] calcium carbonate 500 mg (elemental) (OSCAL) tablet 1,000 mg    [Auto Hold] carvedilol (COREG) tablet 6.25 mg    [Auto Hold] glucose gel 15-30 g    Or    [Auto Hold] dextrose 50 % injection 25-50 mL    Or    [Auto Hold] glucagon injection 1 mg    [Auto Hold] diazepam (VALIUM) tablet 5 mg    flumazenil (ROMAZICON) injection 0.2 mg    [Auto Hold] hydrALAZINE (APRESOLINE) injection 20 mg    [Auto Hold] hydrALAZINE (APRESOLINE) tablet 10 mg    lidocaine (LMX4) cream    [Auto Hold] lidocaine (LMX4) cream    lidocaine 1 % 0.1-1 mL    [Auto Hold] lidocaine 1 % 0.1-1 mL    medication instruction    [Auto Hold] melatonin tablet 1 mg    midazolam (VERSED) injection 0.5-2 mg    naloxone (NARCAN) injection 0.2 mg    Or    naloxone (NARCAN) injection 0.4 mg    Or    naloxone (NARCAN) injection 0.2 mg    Or    naloxone (NARCAN) injection 0.4 mg    [Auto Hold] ondansetron (ZOFRAN ODT) ODT tab 4 mg    Or    [Auto Hold] ondansetron (ZOFRAN) injection 4 mg    [Auto Hold] pantoprazole (PROTONIX) IV push injection 40 mg    sodium chloride (PF) 0.9% PF flush 3 mL    sodium chloride (PF) 0.9% PF flush 3 mL    sodium chloride (PF) 0.9% PF flush 3 mL    sodium chloride (PF) 0.9% PF flush 3 mL    [Auto Hold] sodium chloride (PF) 0.9% PF flush 3 mL    [Auto Hold] sodium chloride (PF) 0.9% PF flush 3 mL    sodium chloride 0.9% 1000 mL TABLE SOLN    [Auto Hold] sodium chloride 0.9% BOLUS 100-150 mL    sodium chloride 0.9% infusion    [Auto Hold] traZODone (DESYREL) tablet 50 mg          HEP Panel:  Hep B Antigen (HBsAG):   Lab Results   Component  Value Date    HEPBANG Nonreactive 09/30/2023     Hep B Surface Antibody (HBsAb):   Lab Results   Component Value Date    AUSAB 0.14 09/30/2023     Hep B Total Core Antibody:   Lab Results   Component Value Date    HBCAB Nonreactive 10/01/2023        Chest X-Ray:        PPD:  M TUBERCULOSIS RESULT: No results found for: TBRSLT  M TUBERCULOSIS ANTIGEN VALUE: No results found for: TBAGN

## 2023-09-29 NOTE — H&P
[facility]  Hospitalist H&P    Name: Brooks Mensah.      MRN: [id number]  YOB: 1985    Age: 38 year old.  Date of admission: 9/29/2023  Primary care provider: Anabela Jurado          Assessment and Plan:     Brooks Mensah is a 38 year old male with a PMH significant for chronic kidney disease stage IV, hypertension, morbid obesity, CHF, obstructive sleep apnea intolerant to CPAP, MI, GI bleeding due to gastric ulcer, chronically elevated lipase, anemia who presents with a complaint of generalized weakness, abdominal pain that felt similar to what he had during his prior gastric ulcer, decreased urine output, worsening lower extremity edema and being admitted to the hospital on 9/29/2023 with acute on chronic anemia and acute renal failure on chronic kidney disease.    Acute on chronic anemia secondary to upper GI bleeding.  Suspect underlying anemia of chronic kidney disease.  Melena  Abdominal pain, now better  -Patient with prior history of gastric ulcer, currently not on PPI.  -Last EDG was in December 2022.  -Currently has melena likely due to upper GI bleeding,  -Protonix 80 mg IV x1 given and started on 8 mg/h drip.  -We will keep him n.p.o. except meds and ice chips  -GI consulted, also patient awaiting to hear from them  -Patient will likely need repeat EGD.  -2 units packed red blood cell ordered in the emergency department.  -Hemoglobin 5.4, check hemoglobin posttransfusion.  -Transfuse for hemoglobin of less than 7.  -Prepare 2 more units of packed red blood cells to get it ready.    Acute renal failure on chronic kidney disease stage IV, approaching ESRD.  Hyperkalemia  Anasarca  Anion gap metabolic acidosis secondary to renal failure r/o other metabolic disorders.  -Creatinine is 14.6, BUN is 154, significant increase from 6 months ago when it was 62/5.72.  -Potassium is  5.4.  Anion gap, 22, bicarb is 9.  -VBG and lactic acid level pending, requested ED  physician to order it.  -Patient has extensive anasarca, +4 pedal, pretibial and ankle edema extending to his thighs.  -Nephrology is consulted.  -Reportedly he had renal in December 2022 which showed advanced global and segmental glomerulosclerosis.  -He had extensive work-up by nephrologist during his last admission to the hospital.  -Currently on bicarb drip which will be continued  -He was also given a dose of Lokelma 10 mg x 1  -IR consulted for hemodialysis catheter placement for tomorrow.  -Patient is in agreement with the plan to initiate hemodialysis.  -Currently getting IV fluids including bicarb drip and also blood, at risk of fluid overload and needs close monitoring, currently on room air.  -If he develops shortness of breath, becomes hypoxic, will consider challenging with high-dose IV Lasix while awaiting for hemodialysis which will be done tomorrow.  -I discussed with nephrologist    Morbid obesity  Obstructive sleep apnea intolerant to CPAP.  -Patient has MAURI but intolerant to CPAP.  -Overall the morbid obesity complicates his care.    Elevated lipase of unclear etiology.  Diffuse wall thickening of gallbladder  -Patient had prior history of elevated lipase.  -CT scan showed diffuse wall thickening of the gallbladder given fluid overload this is unlikely to be cholecystitis .  -We will get ultrasound of the right upper quadrant .  -Hold off antibiotic at this time .  -Blood cultures done, liver enzymes are normal.  -Check triglyceride in AM.    Left sided nephrolithiasis, non obstructing.    Leukocytosis, likely due to stress demargination  -No sign of infection.        Clinically Significant Risk Factors Present on Admission        # Hyperkalemia: Highest K = 5.4 mmol/L in last 2 days, will monitor as appropriate     # Hypocalcemia: Lowest Ca = 7.6 mg/dL in last 2 days, will monitor and replace as appropriate    # Anion Gap Metabolic Acidosis: Highest Anion Gap = 22 mmol/L in last 2 days, will  monitor and treat as appropriate  # Hypoalbuminemia: Lowest albumin = 3.3 g/dL at 9/29/2023  3:52 PM, will monitor as appropriate           # Hypertension: Noted on problem list                   Code status: Full code.  Admit to inpatient,ICU, needs close monitoring.  Prophylaxis: PCD's.    I discussed with patient at length the plan of care, his sister was at bedside at the time as well.  I discussed with nephrologist Dr. Brannon and also briefly with Dr. Ortiz, input appreciated  I called telemetry ICU and discussed with intensivist Dr. Lora  I asked ED to page on call GI to discuss and awaiting call back.    Total time critical care time face to face and coordinating his care is 70 minutes        Chief Complaint:     Weakness, melena, abdominal pain.         History of Present Illness:   Brooks Mensah is a 38 year old male with PMH significant for chronic kidney disease stage IV, hypertension, morbid obesity, CHF, obstructive sleep apnea intolerant to CPAP, MI, GI bleeding due to gastric ulcer, chronically elevated lipase, anemia who presents with a complaint of generalized weakness, abdominal pain that felt similar to what he had during his prior gastric ulcer. Patient had EGD in December which showed gastric ulcer and at that time he was started on Protonix, took it for couple of months and ran out and then stopped taking it.  Patient noted black tarry stool for the past several weeks intermittently, no bright red blood per rectum.  He had episodes of nausea and vomiting but no reported hematemesis.  He denied any chest pain, palpitation, dizziness or shortness of breath.  Patient has chronic kidney disease and used to follow-up with nephrologist as an outpatient but stopped and postponing the follow-up as he was told that he would likely need dialysis.  He noted decreased urination over the last several days, no flank pain, dysuria or hematuria.  No significantly worsening lower extremity edema and also  increasing weight.  He works as a , lives with his mother and sister, he does not smoke, does not drink alcohol or use illicit drugs.    In the emergency department evaluation showed, he was hemodynamically stable, potassium was 5.4, , creatinine is 14.6.  Hemoglobin came back 5.4, it was 10.9 about 6 months ago.  In ED patient was given IV Protonix and started on Protonix drip, nephrology was contacted and started on bicarb drip with a plan to have hemodialysis catheter tomorrow to initiate hemodialysis.  I asked on-call gastroenterologist to be paged and awaiting to hear from them.    History was obtained from my discussion with the patient at the bedside.  I also discussed the case with the ED provider.  The electronic medical record was also reviewed.          Past Medical History:     Past Medical History:   Diagnosis Date    Acute on chronic systolic congestive heart failure (H) 8/9/2017    CAD (coronary artery disease)     Nonobstructive    Cardiomyopathy, unspecified (H) 8/9/2017    Congestive heart failure, unspecified congestive heart failure chronicity, unspecified congestive heart failure type 6/13/2017    HTN (hypertension), malignant     Hypertensive urgency 6/13/2017    Morbid obesity with BMI of 50.0-59.9, adult (H) 6/21/2017    NSTEMI (non-ST elevated myocardial infarction) (H) 6/13/2017    Renal insufficiency 6/13/2017    Sleep apnea     Tobacco abuse              Past Surgical History:     Past Surgical History:   Procedure Laterality Date    ESOPHAGOSCOPY, GASTROSCOPY, DUODENOSCOPY (EGD), COMBINED N/A 12/1/2022    Procedure: ESOPHAGOGASTRODUODENOSCOPY, WITH BIOPSY;  Surgeon: Dm Warner MD;  Location:  GI             Social History:     Social History     Tobacco Use    Smoking status: Every Day     Packs/day: 0.25     Types: Cigarettes     Start date: 2002    Smokeless tobacco: Never    Tobacco comments:     1 pack per week   Substance Use Topics    Alcohol use:  Yes     Comment: occassional             Family History:   The family history was fully reviewed and non-contributory in this case.         Allergies:   No Known Allergies          Medications:     Prior to Admission medications    Medication Sig Last Dose Taking? Auth Provider Long Term End Date   amLODIPine (NORVASC) 10 MG tablet Take 1 tablet (10 mg) by mouth daily 9/28/2023 Yes Prem Painter PA-C Yes    atorvastatin (LIPITOR) 10 MG tablet Take 1 tablet (10 mg) by mouth every evening 9/28/2023 Yes Cliff Mcdonald MD Yes    carvedilol (COREG) 25 MG tablet Take 1 tablet (25 mg) by mouth 2 times daily (with meals) 9/29/2023 at am Yes Cliff Mcdonald MD Yes    furosemide (LASIX) 40 MG tablet Take 40 mg by mouth 2 times daily 9/29/2023 at am Yes Unknown, Entered By History No    hydrALAZINE (APRESOLINE) 50 MG tablet Take 1 tablet (50 mg) by mouth 4 times daily 9/29/2023 at x2 Yes Valeriy Collado MD Yes    pantoprazole (PROTONIX) 40 MG EC tablet Take 1 tablet (40 mg) by mouth 2 times daily  Yes Valeriy Collado MD               Review of Systems:     A Comprehensive greater than 10 system review of systems was carried out.  Pertinent positives and negatives are noted above.  Otherwise negative for contributory information.           Physical Exam:   Blood pressure (!) 146/81, pulse 69, temperature 97.6  F (36.4  C), resp. rate 18, SpO2 92 %.  Wt Readings from Last 1 Encounters:   12/23/22 (!) 159.4 kg (351 lb 8 oz)     Exam:  GENERAL: Awake and alert, not in distress.  HEENT: Normocephalic, atraumatic. Extraocular movements intact.  CARDIOVASCULAR: Regular rate and rhythm without murmurs or rubs. No S3.  PULMONARY: Distant breath sound, no crackles or rails, no wheezing.  ABD: Morbidly obese, soft, positive.   EXT: +4 bilateral lower extremity edema.  NEUROLOGICAL: Awake and alert, oriented, moves his extremities, no focal neurological deficits.  DERMATOLOGICAL: Scattered rashes on his abdomen where the  scabs, no bruising, nor jaundice.          Data:   EKG:  Personally reviewed.  Sinus rhythm at 67 bpm, QTc is 479.    Laboratory:  Recent Labs   Lab 09/29/23  1552   WBC 17.2*   HGB 5.4*   HCT 17.5*   MCV 91   *     Recent Labs   Lab 09/29/23  1552 09/29/23  1524   *  --    POTASSIUM 5.4*  --    CHLORIDE 102  --    CO2 9*  --    ANIONGAP 22*  --    * 129*   .9*  --    CR 14.61*  --    GFRESTIMATED 4*  --    GAYLE 7.6*  --      Recent Labs   Lab 09/29/23  1552 09/29/23  1524   * 129*     Recent Labs   Lab 09/29/23  1850   LACT <0.3*     Venous Blood Gas  Recent Labs   Lab 09/29/23  1850   PHV 7.14*   PCO2V 31*   PO2V 80*   HCO3V 11*   ASHLYN -16.9*   O2PER 0      No results for input(s): CULT in the last 168 hours.    Imaging:  Recent Results (from the past 24 hour(s))   CT Abdomen Pelvis w/o Contrast    Narrative    CT ABDOMEN AND PELVIS WITHOUT CONTRAST 9/29/2023 4:14 PM    CLINICAL HISTORY: Several weeks abdominal pain, history of renal  failure and gastric ulcers.    TECHNIQUE: CT scan of the abdomen and pelvis was performed without IV  contrast. Multiplanar reformats were obtained. Dose reduction  techniques were used.    CONTRAST: None.    COMPARISON: CT abdomen and pelvis 12/22/2022.    FINDINGS:   LOWER CHEST: Trace bibasilar pleural fluid.    HEPATOBILIARY: Diffuse gallbladder wall thickening. No specific acute  hepatic abnormality.    PANCREAS: Normal.    SPLEEN: Normal.    ADRENAL GLANDS: Normal.    KIDNEYS/BLADDER: No hydronephrosis. Stable nonobstructing stone lower  left kidney measuring 3 mm image 121. Stable left renal cyst without  specific imaging follow-up recommended. The bladder is unremarkable.    BOWEL: There is no bowel obstruction. No evidence for appendicitis. No  specific acute infiltrate change of the bowel. No free air identified.  No abscess.    LYMPH NODES: There are several mildly prominent lymph nodes identified  at the retroperitoneum. Stable left  para-aortic lymph node measuring  1.3 cm image 121.    VASCULATURE: Unremarkable.    PELVIC ORGANS: Small pelvic ascites.    OTHER: None.    MUSCULOSKELETAL: Diffuse anasarca. Edema throughout the mesentery.      Impression    IMPRESSION:   1.  Diffuse wall thickening of the gallbladder. Correlate with  cholecystitis. This could also be seen with fluid overload.  2.  Small ascites. Diffuse anasarca.  3.  Trace bibasilar pleural fluid.  4.  Stable nonobstructing small stone at the left kidney. No  hydronephrosis.  5.  A few mildly prominent retroperitoneal lymph nodes again noted.       Hong Escalante MD  [facility] Hospitalist  [address] [place] [address]  09/29/2023

## 2023-09-29 NOTE — ED NOTES
[facility]  ED Nurse Handoff Report    ED Chief complaint: Abdominal Pain  . ED Diagnosis:   Final diagnoses:   Anemia due to blood loss, acute   Upper GI bleed   Abdominal pain, unspecified abdominal location   History of peptic ulcer disease   Acute pancreatitis, unspecified complication status, unspecified pancreatitis type   Hyperkalemia       Allergies: No Known Allergies    Code Status: Full Code    Activity level - Baseline/Home:  independent.  Activity Level - Current:   standby.   Lift room needed: No.   Bariatric: Yes   Needed: No   Isolation: No.   Infection: Not Applicable.     Respiratory status: Room air    Vital Signs (within 30 minutes):   Vitals:    09/29/23 1800 09/29/23 1853 09/29/23 1909 09/29/23 1915   BP: 119/50 (!) 146/81 (!) 149/80    Pulse: 68 69 71 73   Resp: 22 18 20 12   Temp:  97.6  F (36.4  C) 97.7  F (36.5  C)    TempSrc:   Oral    SpO2: 92%   93%       Cardiac Rhythm:  ,      Pain level:    Patient confused: No.   Patient Falls Risk: patient and family education, activity supervised, and toileting schedule implemented.   Elimination Status:  Has not yet voided      Patient Report - Initial Complaint: Abdominal pain.   Focused Assessment: 38 year old male who presents with his sister for evaluation of several weeks of generalized abdominal pain that feels like his prior ulcers, these were diagnosed by upper endoscopy by [facility] in December.  He states he took antacids for a while but is not been on them for months, before resuming an over-the-counter antacid yesterday in light of his recurrent abdominal pain as well as black stool for the past several weeks, intermittent.  No red stool, no vomiting.  He does feel nauseous.  Used to drink alcohol but has not had any since last year.  He has been told he has severe kidney disease and will likely need dialysis, he reports decreased urination over the last few days as well.  No dysuria.   No flank pain.  He works as a .  He lives with his sister.      Abnormal Results:   Labs Ordered and Resulted from Time of ED Arrival to Time of ED Departure   GLUCOSE BY METER - Abnormal       Result Value    GLUCOSE BY METER POCT 129 (*)    COMPREHENSIVE METABOLIC PANEL - Abnormal    Sodium 133 (*)     Potassium 5.4 (*)     Carbon Dioxide (CO2) 9 (*)     Anion Gap 22 (*)     Urea Nitrogen 154.9 (*)     Creatinine 14.61 (*)     GFR Estimate 4 (*)     Calcium 7.6 (*)     Chloride 102      Glucose 135 (*)     Alkaline Phosphatase 65      AST 14      ALT 52      Protein Total 5.9 (*)     Albumin 3.3 (*)     Bilirubin Total 0.2     LIPASE - Abnormal    Lipase 1,089 (*)    CBC WITH PLATELETS AND DIFFERENTIAL - Abnormal    WBC Count 17.2 (*)     RBC Count 1.92 (*)     Hemoglobin 5.4 (*)     Hematocrit 17.5 (*)     MCV 91      MCH 28.1      MCHC 30.9 (*)     RDW 18.6 (*)     Platelet Count 148 (*)     % Neutrophils 84      % Lymphocytes 4      % Monocytes 8      % Eosinophils 1      % Basophils 0      % Immature Granulocytes 3      NRBCs per 100 WBC 0      Absolute Neutrophils 14.6 (*)     Absolute Lymphocytes 0.6 (*)     Absolute Monocytes 1.3      Absolute Eosinophils 0.1      Absolute Basophils 0.0      Absolute Immature Granulocytes 0.5 (*)     Absolute NRBCs 0.0     LACTIC ACID WHOLE BLOOD - Abnormal    Lactic Acid <0.3 (*)    ROUTINE UA WITH MICROSCOPIC   BLOOD GAS VENOUS   TYPE AND SCREEN, ADULT    ABO/RH(D) O POS      Antibody Screen Negative      SPECIMEN EXPIRATION DATE [id number]     PREPARE RED BLOOD CELLS (UNIT)    Blood Component Type Red Blood Cells      Product Code I1725O80      Unit Status Ready for issue      Unit Number [id number]      CROSSMATCH Compatible      CODING SYSTEM ODMH335     PREPARE RED BLOOD CELLS (UNIT)    Blood Component Type Red Blood Cells      Product Code V7456E85      Unit Status Transfused      Unit Number [id number]      CROSSMATCH Compatible      CODING SYSTEM  [id number]      ISSUE DATE AND TIME [id number]      UNIT ABO/RH O+      UNIT TYPE ISBT 5100     PREPARE RED BLOOD CELLS (UNIT)   BLOOD CULTURE   BLOOD CULTURE   TRANSFUSE RED BLOOD CELLS (UNIT)   ABO/RH TYPE AND SCREEN        CT Abdomen Pelvis w/o Contrast   Final Result   IMPRESSION:    1.  Diffuse wall thickening of the gallbladder. Correlate with   cholecystitis. This could also be seen with fluid overload.   2.  Small ascites. Diffuse anasarca.   3.  Trace bibasilar pleural fluid.   4.  Stable nonobstructing small stone at the left kidney. No   hydronephrosis.   5.  A few mildly prominent retroperitoneal lymph nodes again noted.      LEWIS STOVALL MD            SYSTEM ID:  [id number]          Treatments provided: see chart   Family Comments: sister at bedside   OBS brochure/video discussed/provided to patient:  N/A  ED Medications:   Medications   ondansetron (ZOFRAN) injection 4 mg (4 mg Intravenous $Given 9/29/23 1643)   sodium zirconium cyclosilicate (LOKELMA) packet 10 g (10 g Oral $Given 9/29/23 1817)     Followed by   sodium zirconium cyclosilicate (LOKELMA) packet 10 g (has no administration in time range)   D5W 1,000 mL with sodium bicarbonate 150 mEq/L infusion (has no administration in time range)   pantoprazole (PROTONIX) 80 mg in sodium chloride 0.9 % 100 mL infusion (8 mg/hr Intravenous $New Bag 9/29/23 1906)   pantoprazole (PROTONIX) IV push injection 80 mg (80 mg Intravenous $Given 9/29/23 1643)   piperacillin-tazobactam (ZOSYN) 3.375 g vial to attach to  mL bag (0 g Intravenous Stopped 9/29/23 1856)   D5W 1,000 mL with sodium bicarbonate 150 mEq/L infusion ( Intravenous $New Bag 9/29/23 1836)       Drips infusing:  pantoprazole, D5W w/ sodium bicarb, 1st unit of PRBCs  For the majority of the shift this patient was Green.   Interventions performed were n/a.    Sepsis treatment initiated: No    Cares/treatment/interventions/medications to be completed following ED care: see inpatient  orders    ED Nurse Name: Anh Mancini, NARENDRA  5:07 PM     RECEIVING UNIT ED HANDOFF REVIEW    Above ED Nurse Handoff Report was reviewed: Yes  Reviewed by: Alexus Johnson RN on September 29, 2023 at 7:32 PM

## 2023-09-29 NOTE — PHARMACY-ADMISSION MEDICATION HISTORY
Pharmacist Admission Medication History    Admission medication history is complete. The information provided in this note is only as accurate as the sources available at the time of the update.    Medication reconciliation/reorder completed by provider prior to medication history? Yes    Information Source(s): Patient via in-person    Pertinent Information: none    Changes made to PTA medication list:  Added: None  Deleted: desrel and vitamin D2  Changed: None    Medication Affordability:  Not including over the counter (OTC) medications, was there a time in the past 3 months when you did not take your medications as prescribed because of cost?: No    Allergies reviewed with patient and updates made in EHR: yes    Medication History Completed By: Alex Umana RPH 9/29/2023 5:40 PM    Prior to Admission medications    Medication Sig Last Dose Taking? Auth Provider Long Term End Date   amLODIPine (NORVASC) 10 MG tablet Take 1 tablet (10 mg) by mouth daily 9/28/2023 Yes Prem Painter PA-C Yes    atorvastatin (LIPITOR) 10 MG tablet Take 1 tablet (10 mg) by mouth every evening 9/28/2023 Yes Cliff Mcdonald MD Yes    carvedilol (COREG) 25 MG tablet Take 1 tablet (25 mg) by mouth 2 times daily (with meals) 9/29/2023 at am Yes Cliff Mcdonald MD Yes    furosemide (LASIX) 40 MG tablet Take 40 mg by mouth 2 times daily 9/29/2023 at am Yes Unknown, Entered By History No    hydrALAZINE (APRESOLINE) 50 MG tablet Take 1 tablet (50 mg) by mouth 4 times daily 9/29/2023 at x2 Yes Valeriy Collado MD Yes    pantoprazole (PROTONIX) 40 MG EC tablet Take 1 tablet (40 mg) by mouth 2 times daily  Yes Valeriy Collado MD

## 2023-09-29 NOTE — ED TRIAGE NOTES
Pt presents to the ED with complaint of abdominal pain 5/10 when sitting and 10/10 when standing. Pt has hx ulcers. Pt states he is nauseous but denies vomiting. Pt states he also has decrease urination. He states he was told 6 mo ago that he needed dialysis for kidney failure. He states he feels bloated. He was sent from  to ED.      Triage Assessment       Row Name 09/29/23 1525       Triage Assessment (Adult)    Airway WDL WDL       Respiratory WDL    Respiratory WDL X  labored breathing, SOB at rest       Skin Circulation/Temperature WDL    Skin Circulation/Temperature WDL WDL       Cardiac WDL    Cardiac WDL WDL       Peripheral/Neurovascular WDL    Peripheral Neurovascular WDL WDL       Cognitive/Neuro/Behavioral WDL    Cognitive/Neuro/Behavioral WDL WDL

## 2023-09-29 NOTE — ED PROVIDER NOTES
"History   Chief Complaint:  \"My ulcers\"    HPI   History supplemented by electronic chart review    Brooks Mensah is a 38 year old male who presents with his sister for evaluation of several weeks of generalized abdominal pain that feels like his prior ulcers, these were diagnosed by upper endoscopy by [facility] in December.  He states he took antacids for a while but is not been on them for months, before resuming an over-the-counter antacid yesterday in light of his recurrent abdominal pain as well as black stool for the past several weeks, intermittent.  No red stool, no vomiting.  He does feel nauseous.  Used to drink alcohol but has not had any since last year.  He has been told he has severe kidney disease and will likely need dialysis, he reports decreased urination over the last few days as well.  No dysuria.  No flank pain.  He works as a .  He lives with his sister.    Review of External Notes: I personally performed a chronic chart review, his hemoglobin was 10.9 on March 3 of this year.  Creatinine was 5-7 when last checked months ago.  I reviewed his upper endoscopy from December 1, that showed gastric ulceration, he was advised to use PPI.    Medications:    amLODIPine (NORVASC) 10 MG tablet  atorvastatin (LIPITOR) 10 MG tablet  carvedilol (COREG) 25 MG tablet  furosemide (LASIX) 40 MG tablet  hydrALAZINE (APRESOLINE) 50 MG tablet  pantoprazole (PROTONIX) 40 MG EC tablet  traZODone (DESYREL) 50 MG tablet  vitamin D2 (ERGOCALCIFEROL) 43806 units (1250 mcg) capsule    Past Medical History:    Past Medical History:   Diagnosis Date    Acute on chronic systolic congestive heart failure (H) 8/9/2017    CAD (coronary artery disease)     Cardiomyopathy, unspecified (H) 8/9/2017    Congestive heart failure, unspecified congestive heart failure chronicity, unspecified congestive heart failure type 6/13/2017    HTN (hypertension), malignant     Hypertensive urgency 6/13/2017    Morbid " obesity with BMI of 50.0-59.9, adult (H) 6/21/2017    NSTEMI (non-ST elevated myocardial infarction) (H) 6/13/2017    Renal insufficiency 6/13/2017    Sleep apnea     Tobacco abuse        Patient Active Problem List    Diagnosis Date Noted    Hyperkalemia 09/29/2023     Priority: Medium    Upper GI bleed 09/29/2023     Priority: Medium    History of peptic ulcer disease 09/29/2023     Priority: Medium    Anemia due to blood loss, acute 09/29/2023     Priority: Medium    Abdominal pain, unspecified abdominal location 09/29/2023     Priority: Medium    Acute pancreatitis, unspecified complication status, unspecified pancreatitis type 09/29/2023     Priority: Medium    Anemia due to stage 3 chronic kidney disease treated with erythropoietin (H) 12/30/2022     Priority: Medium    History of gastric ulcer 12/22/2022     Priority: Medium    Elevated lipase 12/22/2022     Priority: Medium    Anemia, unspecified type 12/22/2022     Priority: Medium    Gastric ulcer due to Helicobacter pylori, chronic 12/22/2022     Priority: Medium    Acute upper GI bleed 11/28/2022     Priority: Medium    Acute kidney injury (H) 11/28/2022     Priority: Medium    Chronic kidney disease, stage 3 09/29/2021     Priority: Medium    Acute on chronic systolic congestive heart failure (H) 08/09/2017     Priority: Medium    Cardiomyopathy, unspecified (H) 08/09/2017     Priority: Medium    Morbid obesity with BMI of 50.0-59.9, adult (H) 06/21/2017     Priority: Medium    Congestive heart failure, unspecified congestive heart failure chronicity, unspecified congestive heart failure type 06/13/2017     Priority: Medium    Hypertensive emergency 06/13/2017     Priority: Medium    Hypertensive urgency 06/13/2017     Priority: Medium    Renal insufficiency 06/13/2017     Priority: Medium    NSTEMI (non-ST elevated myocardial infarction) (H) 06/13/2017     Priority: Medium    Tobacco use disorder 09/29/2010     Priority: Medium    Finding of above  "normal blood pressure 09/29/2010     Priority: Medium        Physical Exam   Patient Vitals for the past 24 hrs:   BP Temp Temp src Pulse Resp SpO2 Height Weight   09/29/23 2113 (!) 161/69 98.2  F (36.8  C) Oral 77 20 96 % -- --   09/29/23 2048 (!) 147/71 97.6  F (36.4  C) Oral 75 24 92 % -- --   09/29/23 2006 (!) 162/74 97.8  F (36.6  C) Oral -- -- 96 % 1.778 m (5' 10\") (!) 168.6 kg (371 lb 11.2 oz)   09/29/23 1942 -- -- -- 75 10 96 % -- --   09/29/23 1927 (!) 156/83 -- -- 73 23 92 % -- --   09/29/23 1915 -- -- -- 73 12 93 % -- --   09/29/23 1912 (!) 160/84 -- -- 71 -- 95 % -- --   09/29/23 1909 (!) 149/80 97.7  F (36.5  C) Oral 71 20 -- -- --   09/29/23 1857 (!) 149/80 -- -- 70 11 94 % -- --   09/29/23 1853 (!) 146/81 97.6  F (36.4  C) -- 69 18 -- -- --   09/29/23 1800 119/50 -- -- 68 22 92 % -- --   09/29/23 1745 120/51 -- -- 67 18 92 % -- --   09/29/23 1730 113/52 -- -- 66 12 93 % -- --   09/29/23 1715 124/51 -- -- 67 20 96 % -- --   09/29/23 1700 119/56 98.7  F (37.1  C) Oral 67 21 94 % -- --   09/29/23 1645 -- -- -- 67 22 94 % -- --   09/29/23 1630 (!) 143/59 -- -- 66 22 -- -- --   09/29/23 1615 (!) 142/63 -- -- -- -- -- -- --   09/29/23 1600 109/52 -- -- 65 19 -- -- --   09/29/23 1538 -- -- -- 70 18 -- -- --   09/29/23 1537 130/60 -- -- 69 23 -- -- --   09/29/23 1524 125/59 97.9  F (36.6  C) Temporal 70 24 99 % -- --      Physical Exam  General: ill-appearing male recumbent in room 19  HENT: mucous membranes moist   CV: rate as above, significant symmetric lower extremity edema bilaterally  Resp: normal effort, speaks in full phrases, no stridor, no cough observed  GI: Abdomen soft, protuberant, mild diffuse tenderness but no discrete masses palpable, negative Khalil sign  MSK: no bony tenderness, no CVAT  Skin: appropriately warm and dry  Neuro: alert, clear speech, oriented   Psych: cooperative    Emergency Department Course   Electrocardiogram  ECG taken at 1617, ECG interpreted at 1618 by KAITLIN Mo, " MD  Sinus rhythm, nonspecific intraventricular conduction delay, no peak T waves  Rate 67 bpm. OR interval 190. QRS duration 118. QTc 479    Imaging:    CT Abdomen Pelvis w/o Contrast   Final Result   IMPRESSION:    1.  Diffuse wall thickening of the gallbladder. Correlate with   cholecystitis. This could also be seen with fluid overload.   2.  Small ascites. Diffuse anasarca.   3.  Trace bibasilar pleural fluid.   4.  Stable nonobstructing small stone at the left kidney. No   hydronephrosis.   5.  A few mildly prominent retroperitoneal lymph nodes again noted.      LEWIS STOVALL MD        Laboratory:  Labs Ordered and Resulted from Time of ED Arrival to Time of ED Departure   GLUCOSE BY METER - Abnormal       Result Value    GLUCOSE BY METER POCT 129 (*)    COMPREHENSIVE METABOLIC PANEL - Abnormal    Sodium 133 (*)     Potassium 5.4 (*)     Carbon Dioxide (CO2) 9 (*)     Anion Gap 22 (*)     Urea Nitrogen 154.9 (*)     Creatinine 14.61 (*)     GFR Estimate 4 (*)     Calcium 7.6 (*)     Chloride 102      Glucose 135 (*)     Alkaline Phosphatase 65      AST 14      ALT 52      Protein Total 5.9 (*)     Albumin 3.3 (*)     Bilirubin Total 0.2     LIPASE - Abnormal    Lipase 1,089 (*)    CBC WITH PLATELETS AND DIFFERENTIAL - Abnormal    WBC Count 17.2 (*)     RBC Count 1.92 (*)     Hemoglobin 5.4 (*)     Hematocrit 17.5 (*)     MCV 91      MCH 28.1      MCHC 30.9 (*)     RDW 18.6 (*)     Platelet Count 148 (*)     % Neutrophils 84      % Lymphocytes 4      % Monocytes 8      % Eosinophils 1      % Basophils 0      % Immature Granulocytes 3      NRBCs per 100 WBC 0      Absolute Neutrophils 14.6 (*)     Absolute Lymphocytes 0.6 (*)     Absolute Monocytes 1.3      Absolute Eosinophils 0.1      Absolute Basophils 0.0      Absolute Immature Granulocytes 0.5 (*)     Absolute NRBCs 0.0     BLOOD GAS VENOUS - Abnormal    pH Venous 7.14 (*)     pCO2 Venous 31 (*)     pO2 Venous 80 (*)     Bicarbonate Venous 11 (*)     Base  Excess/Deficit -16.9 (*)     FIO2 0     LACTIC ACID WHOLE BLOOD - Abnormal    Lactic Acid <0.3 (*)    ROUTINE UA WITH MICROSCOPIC   TYPE AND SCREEN, ADULT    ABO/RH(D) O POS      Antibody Screen Negative      SPECIMEN EXPIRATION DATE [id number]     PREPARE RED BLOOD CELLS (UNIT)    Blood Component Type Red Blood Cells      Product Code Y2288X63      Unit Status Issued      Unit Number [id number]      CROSSMATCH Compatible      CODING SYSTEM [id number]      ISSUE DATE AND TIME 63892372602593      UNIT ABO/RH O+      UNIT TYPE ISBT 5100     PREPARE RED BLOOD CELLS (UNIT)    Blood Component Type Red Blood Cells      Product Code U6000D56      Unit Status Transfused      Unit Number [id number]      CROSSMATCH Compatible      CODING SYSTEM [id number]      ISSUE DATE AND TIME [id number]      UNIT ABO/RH O+      UNIT TYPE ISBT 5100     PREPARE RED BLOOD CELLS (UNIT)   BLOOD CULTURE   BLOOD CULTURE   ABO/RH TYPE AND SCREEN      Emergency Department Course:  Reviewed:  I reviewed nursing notes, vitals, and past medical history    Assessments/Consultations/Discussion of Management or Tests :  I obtained history and examined the patient as noted above.   ED Course as of 09/29/23 2119   Fri Sep 29, 2023   1615 I spoke with RN regarding Hb 5.   1628 I rechecked patient, obtained consent for PRBC.   1708 I reviewed CT results, time zero for possible sepsis.  Abx ordered.   1758 I spoke with Dr. Maya, Nephrology.   1804 I spoke with Pharmacist regarding IVF.   1829 I spoke with Dr. Escalante, Hospitalist, who accepts care.   1834 I spoke with Dr. Escalante in person in ED.  Plan for [facility] bed.     Interventions:  Medications   sodium zirconium cyclosilicate (LOKELMA) packet 10 g (10 g Oral $Given 9/29/23 1817)     Followed by   sodium zirconium cyclosilicate (LOKELMA) packet 10 g (has no administration in time range)   pantoprazole (PROTONIX) 80 mg in sodium chloride 0.9 % 100 mL infusion (8 mg/hr Intravenous Rate/Dose Verify  9/29/23 2100)   melatonin tablet 1 mg (has no administration in time range)   acetaminophen (TYLENOL) tablet 650 mg (has no administration in time range)     Or   acetaminophen (TYLENOL) Suppository 650 mg (has no administration in time range)   ondansetron (ZOFRAN ODT) ODT tab 4 mg (has no administration in time range)     Or   ondansetron (ZOFRAN) injection 4 mg (has no administration in time range)   glucose gel 15-30 g (has no administration in time range)     Or   dextrose 50 % injection 25-50 mL (has no administration in time range)     Or   glucagon injection 1 mg (has no administration in time range)   D5W 1,000 mL with sodium bicarbonate 150 mEq/L infusion (has no administration in time range)   pantoprazole (PROTONIX) IV push injection 80 mg (80 mg Intravenous $Given 9/29/23 1643)   piperacillin-tazobactam (ZOSYN) 3.375 g vial to attach to  mL bag (0 g Intravenous Stopped 9/29/23 1856)   D5W 1,000 mL with sodium bicarbonate 150 mEq/L infusion ( Intravenous $New Bag 9/29/23 1941)   D5W 1,000 mL with sodium bicarbonate 150 mEq/L infusion ( Intravenous Stopped 9/29/23 1941)      Social Determinants of Health affecting care:   Healthcare Access/Compliance    Disposition:  Admit to Dr. Escalante    Impression & Plan    Medical Decision Making:  I think his presenting abdominal pain and melena is best explained by recurrent upper GI bleed, presumably from his gastric ulcers that were previously diagnosed and regarding which she has chosen to discontinue antacid medication until yesterday.  He has no peritonitis but given his habitus which made his abdominal exam less reliable, CT imaging was performed with results as above.  He does have abnormality around his gallbladder, though clinically I think this is less likely represent acute cholecystitis but given his comorbidities and initial dose of antibiotics was administered while awaiting further test results.  He was consented for packed red blood cell  transfusion, I think that this is necessary in light of his severe anemia presumably due to recurrent upper GI blood loss.  There are no immediately surgical findings.  However, he was noted to have life-threatening worsening of his previously diagnosed advanced renal disease.  I spoke with the on-call nephrologist regarding his hyperkalemia, he recommended IV fluids containing bicarbonate which was administered, I spoke with the pharmacist regarding the specific formulation and rate of administration.  He will require IR consultation for tunneled dialysis catheter access placement here in the hospital tomorrow and will need initiation of dialysis tomorrow as well, the nephrology team will follow along for this purpose.  The on-call nephrologist did not feel that administration of insulin or dextrose or other antihypercalcemic treatments were indicated at this time.  Lipase noted to be fairly elevated, though on chart review it was actually elevated to a similar degree in the past.  This will need to be followed and consideration given to further work-up of this as well.  Notify the patient and his sister of his severe illness and the multiple indications for hospitalization.  He agrees with this and I have arranged for him to be admitted to a monitored bed under the care of the hospitalist service.    Diagnosis:    ICD-10-CM    1. Anemia due to blood loss, acute  D62       2. Upper GI bleed  K92.2       3. Abdominal pain, unspecified abdominal location  R10.9       4. History of peptic ulcer disease  Z87.11       5. Acute pancreatitis, unspecified complication status, unspecified pancreatitis type  K85.90       6. Hyperkalemia  E87.5          Critical Care Time:  50 minutes, independent of procedures.  This included time spent obtaining a history and physical, reviewing the patient's chart, interpreting lab and imaging studies, re-examining the patient, coordinating care with other providers, and documenting ED  care provided.  He has evidence of acute GI blood loss and requires transfusion which was initiated in the ED.  He is initially found to have endorgan damage with diagnosis of end-stage renal disease and will require urgent initiation of dialysis while hospitalized, this will likely occur tomorrow.  He was resuscitated aggressively with bicarbonate containing IV fluids, antibiotics, and packed red blood cell transfusion.  His conditions carry high morbidity and mortality.      9/29/2023   MD Chepe Bacon Jeffrey Alan, MD  09/29/23 2123

## 2023-09-30 ENCOUNTER — APPOINTMENT (OUTPATIENT)
Dept: CARDIOLOGY | Facility: CLINIC | Age: 38
End: 2023-09-30
Attending: INTERNAL MEDICINE
Payer: COMMERCIAL

## 2023-09-30 LAB
ALBUMIN UR-MCNC: 100 MG/DL
ANION GAP SERPL CALCULATED.3IONS-SCNC: 21 MMOL/L (ref 7–15)
ANION GAP SERPL CALCULATED.3IONS-SCNC: 22 MMOL/L (ref 7–15)
ANION GAP SERPL CALCULATED.3IONS-SCNC: 23 MMOL/L (ref 7–15)
APPEARANCE UR: CLEAR
BASE EXCESS BLDV CALC-SCNC: -14.7 MMOL/L (ref -7.7–1.9)
BILIRUB UR QL STRIP: NEGATIVE
BLD PROD TYP BPU: NORMAL
BLOOD COMPONENT TYPE: NORMAL
BUN SERPL-MCNC: 148.1 MG/DL (ref 6–20)
BUN SERPL-MCNC: 151.7 MG/DL (ref 6–20)
BUN SERPL-MCNC: 153.5 MG/DL (ref 6–20)
CALCIUM SERPL-MCNC: 6.6 MG/DL (ref 8.6–10)
CALCIUM SERPL-MCNC: 7.5 MG/DL (ref 8.6–10)
CALCIUM SERPL-MCNC: 7.5 MG/DL (ref 8.6–10)
CHLORIDE SERPL-SCNC: 100 MMOL/L (ref 98–107)
CHLORIDE SERPL-SCNC: 100 MMOL/L (ref 98–107)
CHLORIDE SERPL-SCNC: 98 MMOL/L (ref 98–107)
CODING SYSTEM: NORMAL
COLOR UR AUTO: ABNORMAL
CREAT SERPL-MCNC: 14.25 MG/DL (ref 0.67–1.17)
CREAT SERPL-MCNC: 14.3 MG/DL (ref 0.67–1.17)
CREAT SERPL-MCNC: 14.43 MG/DL (ref 0.67–1.17)
CROSSMATCH: NORMAL
DEPRECATED HCO3 PLAS-SCNC: 11 MMOL/L (ref 22–29)
DEPRECATED HCO3 PLAS-SCNC: 12 MMOL/L (ref 22–29)
DEPRECATED HCO3 PLAS-SCNC: 13 MMOL/L (ref 22–29)
EGFRCR SERPLBLD CKD-EPI 2021: 4 ML/MIN/1.73M2
ERYTHROCYTE [DISTWIDTH] IN BLOOD BY AUTOMATED COUNT: 17.6 % (ref 10–15)
GLUCOSE BLDC GLUCOMTR-MCNC: 105 MG/DL (ref 70–99)
GLUCOSE BLDC GLUCOMTR-MCNC: 111 MG/DL (ref 70–99)
GLUCOSE BLDC GLUCOMTR-MCNC: 119 MG/DL (ref 70–99)
GLUCOSE BLDC GLUCOMTR-MCNC: 97 MG/DL (ref 70–99)
GLUCOSE BLDC GLUCOMTR-MCNC: 97 MG/DL (ref 70–99)
GLUCOSE SERPL-MCNC: 107 MG/DL (ref 70–99)
GLUCOSE SERPL-MCNC: 118 MG/DL (ref 70–99)
GLUCOSE SERPL-MCNC: 126 MG/DL (ref 70–99)
GLUCOSE UR STRIP-MCNC: 50 MG/DL
HBV SURFACE AB SERPL IA-ACNC: 0.14 M[IU]/ML
HBV SURFACE AB SERPL IA-ACNC: NONREACTIVE M[IU]/ML
HBV SURFACE AG SERPL QL IA: NONREACTIVE
HCO3 BLDV-SCNC: 12 MMOL/L (ref 21–28)
HCT VFR BLD AUTO: 21.4 % (ref 40–53)
HGB BLD-MCNC: 6.2 G/DL (ref 13.3–17.7)
HGB BLD-MCNC: 6.9 G/DL (ref 13.3–17.7)
HGB BLD-MCNC: 6.9 G/DL (ref 13.3–17.7)
HGB BLD-MCNC: 7.6 G/DL (ref 13.3–17.7)
HGB UR QL STRIP: ABNORMAL
ISSUE DATE AND TIME: NORMAL
KETONES UR STRIP-MCNC: NEGATIVE MG/DL
LEUKOCYTE ESTERASE UR QL STRIP: ABNORMAL
LVEF ECHO: NORMAL
MCH RBC QN AUTO: 29.2 PG (ref 26.5–33)
MCHC RBC AUTO-ENTMCNC: 32.2 G/DL (ref 31.5–36.5)
MCV RBC AUTO: 91 FL (ref 78–100)
MUCOUS THREADS #/AREA URNS LPF: PRESENT /LPF
NITRATE UR QL: NEGATIVE
O2/TOTAL GAS SETTING VFR VENT: 0 %
PCO2 BLDV: 32 MM HG (ref 40–50)
PH BLDV: 7.19 [PH] (ref 7.32–7.43)
PH UR STRIP: 5.5 [PH] (ref 5–7)
PLATELET # BLD AUTO: 134 10E3/UL (ref 150–450)
PO2 BLDV: 134 MM HG (ref 25–47)
POTASSIUM SERPL-SCNC: 4.6 MMOL/L (ref 3.4–5.3)
POTASSIUM SERPL-SCNC: 5.1 MMOL/L (ref 3.4–5.3)
POTASSIUM SERPL-SCNC: 5.4 MMOL/L (ref 3.4–5.3)
RBC # BLD AUTO: 2.36 10E6/UL (ref 4.4–5.9)
RBC URINE: 3 /HPF
SODIUM SERPL-SCNC: 132 MMOL/L (ref 135–145)
SODIUM SERPL-SCNC: 133 MMOL/L (ref 135–145)
SODIUM SERPL-SCNC: 135 MMOL/L (ref 135–145)
SP GR UR STRIP: 1.01 (ref 1–1.03)
SQUAMOUS EPITHELIAL: <1 /HPF
TRIGL SERPL-MCNC: 78 MG/DL
UNIT ABO/RH: NORMAL
UNIT NUMBER: NORMAL
UNIT STATUS: NORMAL
UNIT TYPE ISBT: 5100
UROBILINOGEN UR STRIP-MCNC: NORMAL MG/DL
WBC # BLD AUTO: 16.9 10E3/UL (ref 4–11)
WBC URINE: 16 /HPF

## 2023-09-30 PROCEDURE — 85018 HEMOGLOBIN: CPT | Performed by: INTERNAL MEDICINE

## 2023-09-30 PROCEDURE — 258N000003 HC RX IP 258 OP 636: Performed by: INTERNAL MEDICINE

## 2023-09-30 PROCEDURE — 80048 BASIC METABOLIC PNL TOTAL CA: CPT | Performed by: STUDENT IN AN ORGANIZED HEALTH CARE EDUCATION/TRAINING PROGRAM

## 2023-09-30 PROCEDURE — 99254 IP/OBS CNSLTJ NEW/EST MOD 60: CPT | Performed by: INTERNAL MEDICINE

## 2023-09-30 PROCEDURE — 250N000013 HC RX MED GY IP 250 OP 250 PS 637: Performed by: INTERNAL MEDICINE

## 2023-09-30 PROCEDURE — 200N000001 HC R&B ICU

## 2023-09-30 PROCEDURE — 86706 HEP B SURFACE ANTIBODY: CPT | Performed by: INTERNAL MEDICINE

## 2023-09-30 PROCEDURE — 999N000127 HC STATISTIC PERIPHERAL IV START W US GUIDANCE

## 2023-09-30 PROCEDURE — 81001 URINALYSIS AUTO W/SCOPE: CPT | Performed by: EMERGENCY MEDICINE

## 2023-09-30 PROCEDURE — 80048 BASIC METABOLIC PNL TOTAL CA: CPT | Performed by: INTERNAL MEDICINE

## 2023-09-30 PROCEDURE — 93306 TTE W/DOPPLER COMPLETE: CPT | Mod: 26 | Performed by: INTERNAL MEDICINE

## 2023-09-30 PROCEDURE — 87340 HEPATITIS B SURFACE AG IA: CPT | Performed by: INTERNAL MEDICINE

## 2023-09-30 PROCEDURE — P9016 RBC LEUKOCYTES REDUCED: HCPCS | Performed by: INTERNAL MEDICINE

## 2023-09-30 PROCEDURE — 255N000002 HC RX 255 OP 636: Performed by: INTERNAL MEDICINE

## 2023-09-30 PROCEDURE — 84478 ASSAY OF TRIGLYCERIDES: CPT | Performed by: INTERNAL MEDICINE

## 2023-09-30 PROCEDURE — 250N000009 HC RX 250: Performed by: INTERNAL MEDICINE

## 2023-09-30 PROCEDURE — 250N000011 HC RX IP 250 OP 636: Mod: JZ | Performed by: INTERNAL MEDICINE

## 2023-09-30 PROCEDURE — C9113 INJ PANTOPRAZOLE SODIUM, VIA: HCPCS | Mod: JZ | Performed by: INTERNAL MEDICINE

## 2023-09-30 PROCEDURE — 82803 BLOOD GASES ANY COMBINATION: CPT | Performed by: STUDENT IN AN ORGANIZED HEALTH CARE EDUCATION/TRAINING PROGRAM

## 2023-09-30 PROCEDURE — 36415 COLL VENOUS BLD VENIPUNCTURE: CPT | Performed by: STUDENT IN AN ORGANIZED HEALTH CARE EDUCATION/TRAINING PROGRAM

## 2023-09-30 PROCEDURE — 99233 SBSQ HOSP IP/OBS HIGH 50: CPT | Performed by: STUDENT IN AN ORGANIZED HEALTH CARE EDUCATION/TRAINING PROGRAM

## 2023-09-30 PROCEDURE — 82310 ASSAY OF CALCIUM: CPT | Performed by: INTERNAL MEDICINE

## 2023-09-30 PROCEDURE — 85018 HEMOGLOBIN: CPT | Performed by: STUDENT IN AN ORGANIZED HEALTH CARE EDUCATION/TRAINING PROGRAM

## 2023-09-30 PROCEDURE — 36415 COLL VENOUS BLD VENIPUNCTURE: CPT | Performed by: INTERNAL MEDICINE

## 2023-09-30 PROCEDURE — 250N000013 HC RX MED GY IP 250 OP 250 PS 637: Performed by: EMERGENCY MEDICINE

## 2023-09-30 RX ORDER — HYDRALAZINE HYDROCHLORIDE 10 MG/1
10 TABLET, FILM COATED ORAL 4 TIMES DAILY
Status: DISCONTINUED | OUTPATIENT
Start: 2023-09-30 | End: 2023-10-05

## 2023-09-30 RX ORDER — ZOLPIDEM TARTRATE 5 MG/1
5 TABLET ORAL
Status: DISCONTINUED | OUTPATIENT
Start: 2023-09-30 | End: 2023-10-01

## 2023-09-30 RX ORDER — AMLODIPINE BESYLATE 2.5 MG/1
2.5 TABLET ORAL DAILY
Status: DISCONTINUED | OUTPATIENT
Start: 2023-09-30 | End: 2023-10-04

## 2023-09-30 RX ORDER — BUMETANIDE 0.25 MG/ML
1 INJECTION INTRAMUSCULAR; INTRAVENOUS EVERY 8 HOURS
Status: DISCONTINUED | OUTPATIENT
Start: 2023-09-30 | End: 2023-10-04

## 2023-09-30 RX ORDER — CARVEDILOL 6.25 MG/1
6.25 TABLET ORAL 2 TIMES DAILY WITH MEALS
Status: DISCONTINUED | OUTPATIENT
Start: 2023-09-30 | End: 2023-10-06

## 2023-09-30 RX ORDER — LIDOCAINE 40 MG/G
CREAM TOPICAL
Status: DISCONTINUED | OUTPATIENT
Start: 2023-09-30 | End: 2023-10-06 | Stop reason: HOSPADM

## 2023-09-30 RX ADMIN — SODIUM CHLORIDE 8 MG/HR: 9 INJECTION, SOLUTION INTRAVENOUS at 14:34

## 2023-09-30 RX ADMIN — SODIUM ZIRCONIUM CYCLOSILICATE 10 G: 10 POWDER, FOR SUSPENSION ORAL at 08:05

## 2023-09-30 RX ADMIN — BUMETANIDE 1 MG: 0.25 INJECTION INTRAMUSCULAR; INTRAVENOUS at 20:30

## 2023-09-30 RX ADMIN — SODIUM BICARBONATE: 84 INJECTION, SOLUTION INTRAVENOUS at 12:14

## 2023-09-30 RX ADMIN — HUMAN ALBUMIN MICROSPHERES AND PERFLUTREN 6 ML: 10; .22 INJECTION, SOLUTION INTRAVENOUS at 09:20

## 2023-09-30 RX ADMIN — CARVEDILOL 6.25 MG: 6.25 TABLET, FILM COATED ORAL at 09:27

## 2023-09-30 RX ADMIN — ZOLPIDEM TARTRATE 5 MG: 5 TABLET ORAL at 00:41

## 2023-09-30 RX ADMIN — ZOLPIDEM TARTRATE 5 MG: 5 TABLET ORAL at 22:50

## 2023-09-30 RX ADMIN — AMLODIPINE BESYLATE 2.5 MG: 2.5 TABLET ORAL at 10:19

## 2023-09-30 RX ADMIN — CARVEDILOL 6.25 MG: 6.25 TABLET, FILM COATED ORAL at 18:17

## 2023-09-30 RX ADMIN — Medication 1 MG: at 22:49

## 2023-09-30 RX ADMIN — BUMETANIDE 1 MG: 0.25 INJECTION INTRAMUSCULAR; INTRAVENOUS at 12:14

## 2023-09-30 RX ADMIN — SODIUM CHLORIDE 8 MG/HR: 9 INJECTION, SOLUTION INTRAVENOUS at 04:06

## 2023-09-30 RX ADMIN — HYDRALAZINE HYDROCHLORIDE 10 MG: 10 TABLET, FILM COATED ORAL at 00:41

## 2023-09-30 RX ADMIN — HYDRALAZINE HYDROCHLORIDE 10 MG: 10 TABLET, FILM COATED ORAL at 22:49

## 2023-09-30 RX ADMIN — HYDRALAZINE HYDROCHLORIDE 10 MG: 10 TABLET, FILM COATED ORAL at 12:14

## 2023-09-30 RX ADMIN — HYDRALAZINE HYDROCHLORIDE 10 MG: 10 TABLET, FILM COATED ORAL at 18:17

## 2023-09-30 RX ADMIN — CARVEDILOL 6.25 MG: 6.25 TABLET, FILM COATED ORAL at 00:40

## 2023-09-30 RX ADMIN — HYDRALAZINE HYDROCHLORIDE 10 MG: 10 TABLET, FILM COATED ORAL at 09:27

## 2023-09-30 ASSESSMENT — ACTIVITIES OF DAILY LIVING (ADL)
ADLS_ACUITY_SCORE: 30
ADLS_ACUITY_SCORE: 26
ADLS_ACUITY_SCORE: 26
ADLS_ACUITY_SCORE: 30
ADLS_ACUITY_SCORE: 26
ADLS_ACUITY_SCORE: 26
ADLS_ACUITY_SCORE: 30
ADLS_ACUITY_SCORE: 32
ADLS_ACUITY_SCORE: 30
ADLS_ACUITY_SCORE: 30
ADLS_ACUITY_SCORE: 26
ADLS_ACUITY_SCORE: 26

## 2023-09-30 NOTE — CONSULTS
Nephrology Consultation     [facility]    Date of Admission:  9/29/2023    Assessment & Plan     <KIDNEY FAILURE FROM FSGS    ~Javon's kidneys have failed related to his underlying disease and I would not expect there to be improvement at this point.     ~He does need to initiate dialysis (We have discussed modality in the past and he really does not want to do PD)    ~Javon does not need emergent dialysis however. With the need to wait until Monday at [facility] as IR works out of [facility] on weekends, I recommended to Javon that we have him remain here and medically manage him until Monday at which point we can initiate dialysis.     -Bumetanide 1mg every 8 ordered for anasarca    -IR consulted for tunneled catheter on Monday and patient written for NPO after midnight on Sunday night.     <AG METABOLIC ACIDOSIS    ~Metabolic acidosis related to uremia. Will correct with dialysis. Will temporize with IV NaCO3    -IV Sodium bicarb infusion written    <HYPOCALCEMIA    ~Hypocalcemia, even when corrected for low albumin. Related to renal failure. Dialysis will assist in correction.     -Ordered oral calcium carbonate and calcitriol.    <CKD BMD    ~Allowing patient regular, low K diet for time being    -Phos ordered for AM labs.         Juice Michel MD  [facility], Nephrology  Cell:[phone]  Pager:[phone]    Securely message with the Vocera Web Console (learn more here)  Text page via [employer] Paging/Directory         /\/\/\/\/\/\/\/\/\/\/\/\/\/\/\/\/\/\/\/\/\/\/\/\/\/\    Reason for Consult     I was asked to see the patient for EMILY on CKD.    Primary Care Physician     Anabela Jurado    Chief Complaint     Weakness, malaise        History of Present Illness     Brooks Mensah is a 38 year old male who presented with generalized weakness, abdominal pain, edema on 9/29.    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 Follow up has been a  challenge and he has not had treatment for this, however, on the biopsy there was significant fibrosis without much parenchyma to salvage.    He was noted on admission to be profoundly anemia and suspected to be in part related to GI bleeding as he had that issue in the past. GI saw the patient and is deferring endoscopy for when he is more medically stable.    Aside from the edema and weakness and abdominal discomfort, Javon does not have uremic symptoms despite the very poor renal function. His appetite is good, and he is not having pulmonary congestion.     Javon has continued to work full time. He did have two jobs. 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     History is obtained from the patient and chart review.      Past Medical History   I have reviewed this patient's medical history and updated it with pertinent information if needed.   Past Medical History:   Diagnosis Date    Acute on chronic systolic congestive heart failure (H) 8/9/2017    CAD (coronary artery disease)     Nonobstructive    Cardiomyopathy, unspecified (H) 8/9/2017    Congestive heart failure, unspecified congestive heart failure chronicity, unspecified congestive heart failure type 6/13/2017    HTN (hypertension), malignant     Hypertensive urgency 6/13/2017    Morbid obesity with BMI of 50.0-59.9, adult (H) 6/21/2017    NSTEMI (non-ST elevated myocardial infarction) (H) 6/13/2017    Renal insufficiency 6/13/2017    Sleep apnea     Tobacco abuse        Past Surgical History   I have reviewed this patient's surgical history and updated it with pertinent information if needed.  Past Surgical History:   Procedure Laterality Date    ESOPHAGOSCOPY, GASTROSCOPY, DUODENOSCOPY (EGD), COMBINED N/A 12/1/2022    Procedure: ESOPHAGOGASTRODUODENOSCOPY, WITH BIOPSY;  Surgeon: Dm Warner MD;  Location:  GI        Prior to Admission Medications   Prior to Admission Medications   Prescriptions Last Dose Informant Patient Reported? Taking?   amLODIPine (NORVASC) 10 MG tablet 9/28/2023  No Yes   Sig: Take 1 tablet (10 mg) by mouth daily   atorvastatin (LIPITOR) 10 MG tablet 9/28/2023  No Yes   Sig: Take 1 tablet (10 mg) by mouth every evening   carvedilol (COREG) 25 MG tablet 9/29/2023 at am  No Yes   Sig: Take 1 tablet (25 mg) by mouth 2 times daily (with meals)   furosemide (LASIX) 40 MG tablet 9/29/2023 at am  Yes Yes   Sig: Take 40 mg by mouth 2 times daily   hydrALAZINE (APRESOLINE) 50 MG tablet 9/29/2023 at x2  No Yes   Sig: Take 1 tablet (50 mg) by mouth 4 times daily   pantoprazole (PROTONIX) 40 MG EC tablet   No Yes   Sig: Take 1 tablet (40 mg) by mouth 2 times daily      Facility-Administered Medications: None     [unfilled]  Allergies   No Known Allergies    Social History   I have reviewed this patient's social history and updated it with pertinent information if needed. Brooks Mensah  reports that he has been smoking cigarettes. He started smoking about 21 years ago. He has been smoking an average of .25 packs per day. He has never used smokeless tobacco. He reports current alcohol use. He reports that he does not use drugs. The patient's usual occupation:    Family History   I have reviewed this patient's family history and updated it with pertinent information if needed. No family history of kidney disease.   Family History   Problem Relation Age of Onset    Hyperlipidemia Mother     Hyperlipidemia Father        Review of Systems   The 14 point Review of Systems is negative other than noted in the HPI.     Physical Exam   Temp: 98.2  F (36.8  C) Temp src: Temporal BP: (!) 173/84 Pulse: 78   Resp: 26 SpO2: 96 % O2 Device: None (Room air)    Vital Signs with Ranges  Temp:  [97.4  F (36.3  C)-98.7  F (37.1  C)] 98.2  F (36.8  C)  Pulse:  [65-80] 78  Resp:  [10-32] 26  BP: (109-200)/()  173/84  SpO2:  [89 %-99 %] 96 %  339 lbs 1.08 oz    GENERAL APPEARANCE:  Alert, in no distress, pleasant, cooperative, oriented x self, place and recent events.   EYES:  EOM, lids, pupils and irises normal, sclera clear and conjunctiva pale  ENT:  Mouth normal, moist mucous membranes, nose normal without drainage or crusting,   hearing acuity grossly intact  NECK: Supple, symmetrical, trachea midline, No JVD visible  RESP:  Respiratory effort normal,no respiratory distress, Lung sounds clear but soft related to habitus.  CV:  Auscultation of heart done, rate and rhythm controlled and regular, no murmur, no rub or gallop.   ABDOMEN:  Soft, nontender, no palpable masses or organomegaly.  EXT: Edema 3+ bilateral lower extremities.  No gross deformities.  SKIN:  skin on extremities warm, dry and intact without rashes. Pale  NEURO: cranial nerves grossly intact, no facial asymmetry, no speech deficits and able to follow directions, moves all extremities symmetrically  PSYCH:  insight and judgement and memory appear intact, affect and mood normal    Data   BMP  Recent Labs   Lab 09/30/23  0800 09/30/23  0722 09/30/23  0411 09/30/23  0015 09/30/23  0014 09/29/23  1552   NA  --  135  --  132*  --  133*   POTASSIUM  --  5.1  --  5.4*  --  5.4*   CHLORIDE  --  100  --  100  --  102   GAYLE  --  7.5*  --  7.5*  --  7.6*   CO2  --  12*  --  11*  --  9*   BUN  --  153.5*  --  151.7*  --  154.9*   CR  --  14.30*  --  14.25*  --  14.61*   GLC 97 107* 97 118*   < > 135*    < > = values in this interval not displayed.     Phos@[email or web address](phos:4)  CBC)  Recent Labs   Lab 09/30/23  0722 09/30/23  0015 09/29/23  1552   WBC 16.9*  --  17.2*   HGB 6.9*  6.9* 6.2* 5.4*   HCT 21.4*  --  17.5*   MCV 91  --  91   *  --  148*     Recent Labs   Lab 09/29/23  1552   AST 14   ALT 52   ALKPHOS 65   BILITOTAL 0.2     No lab results found in last 7 days.  No results found for: D2VIT, D3VIT, DTOT  Recent Labs   Lab 09/30/23  0722   HGB 6.9*   6.9*   HCT 21.4*   MCV 91     No results for input(s): PTHI in the last 168 hours.    =========================

## 2023-09-30 NOTE — PROGRESS NOTES
Gastroenterology Consultation    Patient: Brooks Mensah   1985  Date of Consult:  9/30/2023  Admission Date/Time: 9/29/2023  3:28 PM  Primary Care Provider:  Anabela Jurado    I was asked to see this patient in consultation by Hong Escalante, * for evaluation of melena.    HPI:  Brooks Mensah is a 38 year old male with PMH significant for chronic kidney disease stage IV, hypertension, morbid obesity, CHF, obstructive sleep apnea intolerant to CPAP, MI, GI bleeding due to gastric ulcer (12/2022), chronically elevated lipase, anemia who presents with a complaint of generalized weakness, abdominal pain that felt similar to what he had during his prior gastric ulcer. Patient had EGD in December which showed gastric ulcer and at that time he was started on Protonix, took it for couple of months and ran out and then stopped taking it.  At that time he was also found to be Helicobacter pylori positive in which treated with quadruple therapy for 2 weeks.  Patient noted black tarry stool for the past several weeks intermittently, no bright red blood per rectum.  He had episodes of nausea and vomiting but no reported hematemesis.  He denied any chest pain, palpitation, dizziness or shortness of breath.  Patient has chronic kidney disease and used to follow-up with nephrologist as an outpatient but stopped and postponing the follow-up as he was told that he would likely need dialysis.  He noted decreased urination over the last several days, no flank pain, dysuria or hematuria.  No significantly worsening lower extremity edema and also increasing weight.  He works as a , lives with his mother and sister, he does not smoke, does not drink alcohol or use illicit drugs.     In the emergency department evaluation showed, he was hemodynamically stable, potassium was 5.4, , creatinine is 14.6.  Hemoglobin came back 5.4, it was 10.9 about 6 months ago.  In ED patient was given IV Protonix and started on  Protonix drip, nephrology was contacted and started on bicarb drip with a plan to have hemodialysis catheter to initiate hemodialysis.       I interviewed him at the bedside in the intensive care unit.  He reported that his abdominal pain has resolved.  He denied any discomfort.  He is awaiting placement of a hemodialysis catheter.  Patient's nurse reported that he has not had any melena since admission.    REVIEW OF SYSTEMS:  A comprehensive ten point review of systems was performed and was negative aside from those in mentioned in the HPI.      PAST MEDICAL HISTORY:  Past Medical History:   Diagnosis Date     Acute on chronic systolic congestive heart failure (H) 8/9/2017     CAD (coronary artery disease)     Nonobstructive     Cardiomyopathy, unspecified (H) 8/9/2017     Congestive heart failure, unspecified congestive heart failure chronicity, unspecified congestive heart failure type 6/13/2017     HTN (hypertension), malignant      Hypertensive urgency 6/13/2017     Morbid obesity with BMI of 50.0-59.9, adult (H) 6/21/2017     NSTEMI (non-ST elevated myocardial infarction) (H) 6/13/2017     Renal insufficiency 6/13/2017     Sleep apnea      Tobacco abuse        PAST SURGICAL HISTORY:  Past Surgical History:   Procedure Laterality Date     ESOPHAGOSCOPY, GASTROSCOPY, DUODENOSCOPY (EGD), COMBINED N/A 12/1/2022    Procedure: ESOPHAGOGASTRODUODENOSCOPY, WITH BIOPSY;  Surgeon: Dm Warner MD;  Location:  GI       MEDICATIONS:   :   Prior to Admission Medications   Prescriptions Last Dose Informant Patient Reported? Taking?   amLODIPine (NORVASC) 10 MG tablet 9/28/2023  No Yes   Sig: Take 1 tablet (10 mg) by mouth daily   atorvastatin (LIPITOR) 10 MG tablet 9/28/2023  No Yes   Sig: Take 1 tablet (10 mg) by mouth every evening   carvedilol (COREG) 25 MG tablet 9/29/2023 at am  No Yes   Sig: Take 1 tablet (25 mg) by mouth 2 times daily (with meals)   furosemide (LASIX) 40 MG tablet 9/29/2023 at am   "Yes Yes   Sig: Take 40 mg by mouth 2 times daily   hydrALAZINE (APRESOLINE) 50 MG tablet 9/29/2023 at x2  No Yes   Sig: Take 1 tablet (50 mg) by mouth 4 times daily   pantoprazole (PROTONIX) 40 MG EC tablet   No Yes   Sig: Take 1 tablet (40 mg) by mouth 2 times daily      Facility-Administered Medications: None       ALLERGIES:   : No Known Allergies    SOCIAL HISTORY:  Social History     Tobacco Use     Smoking status: Every Day     Packs/day: 0.25     Types: Cigarettes     Start date: 2002     Smokeless tobacco: Never     Tobacco comments:     1 pack per week   Substance Use Topics     Alcohol use: Yes     Comment: occassional     Drug use: No       FAMILY HISTORY:  No first degree relative with colon cancer, gastric cancer, crohn's disease, ulcerative colitis, or liver disease.     EXAM:  General Appearance: Alert, oriented x3, no acute distress.  BP (!) 194/102   Pulse 78   Temp 97.4  F (36.3  C)   Resp 24   Ht 1.778 m (5' 10\")   Wt (!) 153.8 kg (339 lb 1.1 oz)   SpO2 98%   BMI 48.65 kg/m    Eye: sclera anicteric.  CV: RRR.  Resp: CTA bilaterally.  GI: Obese,Soft, NABS, NT/ND, no masses.    Neuro: alert and non-focal.     Labs and imaging reviewed    Recent Labs   Lab Test 09/30/23  0722 09/30/23  0015 09/29/23  1552 12/23/22  1205 12/23/22  0949 11/30/22  1758 11/30/22  1528   WBC 16.9*  --  17.2*  --  13.2*   < >  --    HGB 6.9*  6.9* 6.2* 5.4*   < > 6.9*   < >  --    MCV 91  --  91  --  87   < >  --    *  --  148*  --  280   < >  --    INR  --   --   --   --   --   --  1.01    < > = values in this interval not displayed.     Recent Labs   Lab Test 09/30/23  0722 09/30/23  0015 09/29/23  1552   POTASSIUM 5.1 5.4* 5.4*   CHLORIDE 100 100 102   CO2 12* 11* 9*   .5* 151.7* 154.9*   ANIONGAP 23* 21* 22*     Recent Labs   Lab Test 09/30/23  0618 09/29/23  1552 03/03/23  1521 02/03/23  1519 01/05/23  1619 12/22/22  1721 12/22/22  1543 11/29/22  0727 11/28/22  2133 09/20/21  1435   ALBUMIN  --  " 3.3* 3.6 4.1   < > 3.7   < >  --  3.4*  --    BILITOTAL  --  0.2  --   --   --  0.2  --   --  0.2  --    ALT  --  52  --   --   --  12  --   --  20  --    AST  --  14  --   --   --  11  --   --  9*  --    PROTEIN 100*  --   --   --   --   --   --  50*  --  300*   LIPASE  --  1,089*  --   --   --  1,362*  --   --   --   --     < > = values in this interval not displayed.       ASSESSMENT AND PLAN:      This patient is a 38-year-old male who presents to the hospital with signs and symptoms of progressive renal failure.  His history documents poor compliance with medical recommendations and therapy.  He notes a history of dark stools intermittently for several weeks which is consistent with melena.  Patient had a prior gastric ulcer in December 2022.  He was also Helicobacter pylori positive at that time and treatment was initiated at the time of discharge.    Currently he reports that his abdominal pain has resolved.  He does not appear to have a significant active GI bleed.  At this time upper endoscopy will be deferred pending his treatment with hemodialysis and improvement in his renal and electrolyte status.  Continue PPI. We will follow with you.    45 minutes of total time was spent providing patient care, including patient evaluation, reviewing documentation/test results, and .          Chema Pandya MD  Thank you for the opportunity to participate in the care of this patient.   Please feel free to call with any questions or concerns.  [phone].       CC: [facility], MensahAnabela Valles

## 2023-09-30 NOTE — PLAN OF CARE
Patient alert and oriented. Tolerating diet. Up in chair with assist.   Patient got 1 unit of blood today. Bicarb drip.   Continue to monitor hemoglobin. Plan for dialysis line on Monday.   Discharge to home probable.

## 2023-09-30 NOTE — PROGRESS NOTES
[facility]    Medicine Progress Note - Hospitalist Service    Date of Admission:  9/29/2023    Assessment & Plan   Brooks Mensah is a 38 year old male with a PMH significant for chronic kidney disease stage IV, hypertension, morbid obesity, CHF, obstructive sleep apnea intolerant to CPAP, MI, GI bleeding due to gastric ulcer, chronically elevated lipase, anemia who presents with a complaint of generalized weakness, abdominal pain that felt similar to what he had during his prior gastric ulcer, decreased urine output, worsening lower extremity edema and being admitted to the hospital on 9/29/2023 with acute on chronic anemia and acute renal failure on chronic kidney disease.     Melena  Abdominal pain, now better  Acute on chronic anemia (of CKD) secondary to upper GI bleeding:  Patient with prior history of gastric ulcer, currently not on PPI.  Last EDG was in December 2022.  Currently has melena likely due to upper GI bleeding.  Hgb 5.4 on presentation to ED.  Received 2u pRBC in ED.  1 more unit on admission.   -GI consulted, appreciate recs   -Holding off on doing EGD given Hgb stability, no ongoing evidence of bleeding and significant electrolyte disturbance in the setting of renal failure  -Protonix 8 mg/h drip  -We will keep him n.p.o. except meds and ice chips  -Transfuse for hemoglobin of less than 7  -q8h Hgb check     Anasarca  Hyperkalemia  EMILY on CKD stage IV, approaching ESRD  AGMA secondary to renal failure:  Reportedly he had renal bx in December 2022 which showed advanced global and segmental glomerulosclerosis.  On admission, creatinine is 14.6, BUN is 154, significant increase from 6 months ago when it was 62/5.72.  Potassium is  5.4.  AG 22, bicarb is 9.  Lactic acid normal.  VBG shows pH 7.14, co2 31, Hco3 11.  He was started on a bicarb gtt.  Patient has extensive anasarca, +4 pedal, pretibial and ankle edema extending to his thighs.  Given a dose of lokelma in  ED.  -Nephrology is consulted   -Would like to start dialysis in the next 2 days   -Considered transfer today for dialysis catheter placement, however, patient declined    -Will monitor until Monday with catheter placement and HD at that time  -Defer Bumex and bicarb gtt to nephrology  -Continue lokelma daily  -IR consulted for HD catheter placement    -Discussed with them, they are not in house today to place   -Patient is in agreement with the plan to initiate hemodialysis.  -At risk of fluid overload and needs close monitoring, currently on room air  -Provider should be notified of new hypoxia  -I discussed with nephrologist     Morbid obesity  Obstructive sleep apnea intolerant to CPAP:  -Patient has MAURI but intolerant to CPAP  -Overall the morbid obesity complicates his care     Elevated lipase of unclear etiology  Diffuse wall thickening of gallbladder:  Patient had prior history of elevated lipase.  CT scan showed diffuse wall thickening of the gallbladder given fluid overload this is unlikely to be cholecystitis. RUQ US does not show evidence of cholecystitis.  Blood cultures done, liver enzymes are normal.  -Hold off antibiotic at this time     Left sided nephrolithiasis, non obstructing     Leukocytosis, likely due to stress demargination:  -No sign of infection       Diet: NPO for Medical/Clinical Reasons Except for: Meds, Ice Chips, Other; Specify: sips    DVT Prophylaxis: Pneumatic Compression Devices  Sol Catheter: Not present  Lines: None     Cardiac Monitoring: ACTIVE order. Indication: Electrolyte Imbalance (24 hours)- Magnesium <1.3 mg/ml; Potassium < =2.8 or > 5.5 mg/ml  Code Status: Full Code      Clinically Significant Risk Factors Present on Admission        # Hyperkalemia: Highest K = 5.4 mmol/L in last 2 days, will monitor as appropriate   # Hypocalcemia: Lowest Ca = 7.5 mg/dL in last 2 days, will monitor and replace as appropriate    # Anion Gap Metabolic Acidosis: Highest Anion Gap = 23  "mmol/L in last 2 days, will monitor and treat as appropriate  # Hypoalbuminemia: Lowest albumin = 3.3 g/dL at 9/29/2023  3:52 PM, will monitor as appropriate     # Hypertension: Noted on problem list      # Severe Obesity: Estimated body mass index is 48.65 kg/m  as calculated from the following:    Height as of this encounter: 1.778 m (5' 10\").    Weight as of this encounter: 153.8 kg (339 lb 1.1 oz).              Disposition Plan      Expected Discharge Date: 10/01/2023                  Basil Lopes DO  Hospitalist Service  [facility]  Securely message with [employer] (more info)  Text page via [employer] Paging/Directory   ______________________________________________________________________    Interval History   NAEO.  Patient denies any symptoms.  We discussed need for dialysis soon.  We discussed need for placement of dialysis catheter.  He is agreeable.     Physical Exam   Vital Signs: Temp: 98.2  F (36.8  C) Temp src: Temporal BP: (!) 170/80 Pulse: 78   Resp: 26 SpO2: 98 % O2 Device: None (Room air)    Weight: 339 lbs 1.08 oz    General Appearance: Awake and alert no apparent distress.  Appropriate.  Respiratory: Lungs are clear to auscultation in the anterior lung fields.  Body habitus makes it difficult to take a clear listen, however.  Look breathing appears mildly increased on room air, however patient reports this is his baseline.  Cardiovascular: Regular rate and rhythm.  No obvious murmurs rubs or gallops.  GI: Obese.  Benign.  No tenderness to palpation.  Bowel sounds are positive.    Skin: No obvious rashes or lesions on exposed skin.  Other: Distal extremities are warm and well-perfused.  Patient has generalized anasarca in all extremities.    Medical Decision Making       55 MINUTES SPENT BY ME on the date of service doing chart review, history, exam, documentation & further activities per the note.      Data   ------------------------- PAST 24 HR DATA REVIEWED " -----------------------------------------------    I have personally reviewed the following data over the past 24 hrs:    16.9 (H)  \   6.9 (LL); 6.9 (LL)   / 134 (L)     135 100 153.5 (H) /  97   5.1 12 (L) 14.30 (H) \     ALT: 52 AST: 14 AP: 65 TBILI: 0.2   ALB: 3.3 (L) TOT PROTEIN: 5.9 (L) LIPASE: 1,089 (H)     Procal: N/A CRP: N/A Lactic Acid: <0.3 (L)         Imaging results reviewed over the past 24 hrs:   Recent Results (from the past 24 hour(s))   CT Abdomen Pelvis w/o Contrast    Narrative    CT ABDOMEN AND PELVIS WITHOUT CONTRAST 9/29/2023 4:14 PM    CLINICAL HISTORY: Several weeks abdominal pain, history of renal  failure and gastric ulcers.    TECHNIQUE: CT scan of the abdomen and pelvis was performed without IV  contrast. Multiplanar reformats were obtained. Dose reduction  techniques were used.    CONTRAST: None.    COMPARISON: CT abdomen and pelvis 12/22/2022.    FINDINGS:   LOWER CHEST: Trace bibasilar pleural fluid.    HEPATOBILIARY: Diffuse gallbladder wall thickening. No specific acute  hepatic abnormality.    PANCREAS: Normal.    SPLEEN: Normal.    ADRENAL GLANDS: Normal.    KIDNEYS/BLADDER: No hydronephrosis. Stable nonobstructing stone lower  left kidney measuring 3 mm image 121. Stable left renal cyst without  specific imaging follow-up recommended. The bladder is unremarkable.    BOWEL: There is no bowel obstruction. No evidence for appendicitis. No  specific acute infiltrate change of the bowel. No free air identified.  No abscess.    LYMPH NODES: There are several mildly prominent lymph nodes identified  at the retroperitoneum. Stable left para-aortic lymph node measuring  1.3 cm image 121.    VASCULATURE: Unremarkable.    PELVIC ORGANS: Small pelvic ascites.    OTHER: None.    MUSCULOSKELETAL: Diffuse anasarca. Edema throughout the mesentery.      Impression    IMPRESSION:   1.  Diffuse wall thickening of the gallbladder. Correlate with  cholecystitis. This could also be seen with fluid  overload.  2.  Small ascites. Diffuse anasarca.  3.  Trace bibasilar pleural fluid.  4.  Stable nonobstructing small stone at the left kidney. No  hydronephrosis.  5.  A few mildly prominent retroperitoneal lymph nodes again noted.    LEWIS STOVALL MD         SYSTEM ID:  [id number]   US Abdomen Limited    Narrative    EXAM: US ABDOMEN LIMITED  LOCATION: [facility]  DATE: 2023    INDICATION: r o cholecystitis  COMPARISON: None.  TECHNIQUE: Limited abdominal ultrasound.    FINDINGS:    GALLBLADDER: Gallbladder polyp is noted measuring up to 4 mm in size. There is minimal pericholecystic free fluid without gallbladder wall thickening or sonographic Khalil sign.    BILE DUCTS: No biliary dilatation. The common duct measures 4 mm.    LIVER: Normal parenchyma with smooth contour. No focal mass.    RIGHT KIDNEY: No hydronephrosis.    PANCREAS: The visualized portions are normal.    No ascites.      Impression    IMPRESSION:  1.  No sonographic evidence of acute cholecystitis.       Echocardiogram Complete   Result Value    LVEF  55-60%    Narrative    [id number]  [id number]  [id number]  [id number]^KAUR^GUILLERMINA^SHIMON     [facility]  Echocardiography Laboratory  [address] [place] [address]     Name: BREA DO  MRN: [id number]  : 1985  Study Date: 2023 09:02 AM  Age: 38 yrs  Gender: Male  Patient Location: [facility]  Reason For Study: CHF  Ordering Physician: GUILLERMINA DIETRICH  Performed By: ILDA Fernandez     BSA: 2.6 m2  Height: 70 in  Weight: 339 lb  BP: 170/80 mmHg  ______________________________________________________________________________  Procedure  Complete Portable Echo Adult. Optison (NDC #[id number]) given intravenously.  ______________________________________________________________________________  Interpretation Summary     TDS     The visual ejection fraction is 55-60%.  There is severe concentric left ventricular hypertrophy.  The left  ventricle is borderline dilated.  There is mild apical wall hypokinesis.  The right ventricle is normal in structure, function and size.  The left atrium is mildly dilated.  There is mild (1+) aortic regurgitation.  There is no pericardial effusion.     LV wall thickness is more pronounced compared to 2019 study, otherwise no  significant changes.  ______________________________________________________________________________  Left Ventricle  The left ventricle is borderline dilated. There is severe concentric left  ventricular hypertrophy. The visual ejection fraction is 55-60%. There is mild  apical wall hypokinesis.     Right Ventricle  The right ventricle is normal in structure, function and size.     Atria  The left atrium is mildly dilated. Right atrial size is normal.     Mitral Valve  There is no mitral regurgitation noted.     Tricuspid Valve  There is trace tricuspid regurgitation.     Aortic Valve  There is mild (1+) aortic regurgitation.     Pulmonic Valve  The pulmonic valve is not well visualized.     Vessels  The aortic root is normal size. Normal size ascending aorta. The inferior vena  cava was dilated at 3.6 cm without respiratory variability, consistent with  increased right atrial pressure.     Pericardium  There is no pericardial effusion.     ______________________________________________________________________________  MMode/2D Measurements & Calculations  IVSd: 1.8 cm  LVIDd: 5.9 cm  LVIDs: 4.2 cm  LVPWd: 1.9 cm     FS: 29.3 %  LV mass(C)d: 568.1 grams  LV mass(C)dI: 217.5 grams/m2  Ao root diam: 3.5 cm  asc Aorta Diam: 3.9 cm  LVOT diam: 2.6 cm  LVOT area: 5.4 cm2  Ao root diam index Ht(cm/m): 2.0  Ao root diam index BSA (cm/m2): 1.4  Asc Ao diam index BSA (cm/m2): 1.5  Asc Ao diam index Ht(cm/m): 2.2  LA Volume (BP): 163.0 ml     LA Volume Index (BP): 62.5 ml/m2  RV Base: 4.8 cm  RWT: 0.64  TAPSE: 3.0 cm     Doppler Measurements & Calculations  MV E max ruth: 144.0 cm/sec  MV A max ruth:  69.1 cm/sec  MV E/A: 2.1  MV max P.8 mmHg  MV mean P.1 mmHg  MV V2 VTI: 36.9 cm  MV dec time: 0.16 sec  PA V2 max: 126.7 cm/sec  PA max P.4 mmHg  PA acc time: 0.13 sec  E/E' av.0  Lateral E/e': 18.4  Medial E/e': 23.6  RV S Chapin: 17.2 cm/sec     ______________________________________________________________________________  Report approved by: DIVINA Keller 2023 10:24 AM

## 2023-09-30 NOTE — PROGRESS NOTES
Patient up in chair eating lunch. Patient had tray from [employer] and family had brought patient cheeseburger and french fries from [employer]. Also brought patient a large cookie frappaccino from [employer].

## 2023-09-30 NOTE — PLAN OF CARE
Goal Outcome Evaluation:      Plan of Care Reviewed With: patient              ICU End of Shift Summary.  For vital signs and complete assessments, please see documentation flowsheets.      Pertinent assessments:   Neuro:A+O X 4.able to make needs known,Slept very little even after ambien  Cardiac:SR, BP remains elevated after PO meds  Resp:Good Sats on RA, has some sleep apnea.supposed to wear CPAP at home,He doesn't,and does not want to try it here  GI:Large abdomen,no BM lots of belching,denies pain  :Voided x1  Skin:Has a generalized blotchy rash  Lines:PIVs x3  Drips:Protonix gtt at 8mgs /hr    Major Shift Events: 3 units PRBCs given without incident, Abdominal US done    Plan (Upcoming Events): Placement of Hemodialysis catheter today  Discharge/Transfer Needs: TBD     Bedside Shift Report Completed : yes  Bedside Safety Check Completed: yes

## 2023-09-30 NOTE — PROGRESS NOTES
Met with patient regarding CPAP use. Patient reports that he does not use his CPAP and is not interested in using hospital equipment.    Alex Polo, RT on 9/29/2023 at 11:03 PM

## 2023-09-30 NOTE — PROGRESS NOTES
Tele-Intensivist note  Case discussed with Dr. Martinez and appreciate his report and fine care.     Current problem list  Acute UGI bleed with prior history  (Dec 2022) and currently on IV protonix infusion. Hb on admission 5.4 with normal INR and platelet count. I agree with 2 units RBC's and serial Hb's. If hb stabilizes may postpone EGD until more stable  Now with end stage renal disease and agree with plans for placement of HD cath and initiation of HD tomorrow. I suspect acidosis is due to renal failure.   HTN- I would recommend resuming his routine meds.   Morbid obesity with MAURI  History of left sided nephrolithiasis, non-obstructing     Overall compensated and tele-ICU will monitor with you.    Wanda lewis  September 29, 2023    Addendum  Will resume oral BP meds but at lower doses and staggered start times. arnold

## 2023-10-01 LAB
ALBUMIN SERPL BCG-MCNC: 3.3 G/DL (ref 3.5–5.2)
ANION GAP SERPL CALCULATED.3IONS-SCNC: 24 MMOL/L (ref 7–15)
BUN SERPL-MCNC: 159.3 MG/DL (ref 6–20)
CALCIUM SERPL-MCNC: 6.5 MG/DL (ref 8.6–10)
CHLORIDE SERPL-SCNC: 97 MMOL/L (ref 98–107)
CREAT SERPL-MCNC: 14.38 MG/DL (ref 0.67–1.17)
DEPRECATED HCO3 PLAS-SCNC: 13 MMOL/L (ref 22–29)
EGFRCR SERPLBLD CKD-EPI 2021: 4 ML/MIN/1.73M2
ERYTHROCYTE [DISTWIDTH] IN BLOOD BY AUTOMATED COUNT: 17.3 % (ref 10–15)
GLUCOSE BLDC GLUCOMTR-MCNC: 103 MG/DL (ref 70–99)
GLUCOSE BLDC GLUCOMTR-MCNC: 121 MG/DL (ref 70–99)
GLUCOSE BLDC GLUCOMTR-MCNC: 134 MG/DL (ref 70–99)
GLUCOSE BLDC GLUCOMTR-MCNC: 97 MG/DL (ref 70–99)
GLUCOSE BLDC GLUCOMTR-MCNC: 98 MG/DL (ref 70–99)
GLUCOSE BLDC GLUCOMTR-MCNC: 98 MG/DL (ref 70–99)
GLUCOSE SERPL-MCNC: 97 MG/DL (ref 70–99)
HCT VFR BLD AUTO: 23.1 % (ref 40–53)
HGB BLD-MCNC: 7.5 G/DL (ref 13.3–17.7)
MCH RBC QN AUTO: 29.1 PG (ref 26.5–33)
MCHC RBC AUTO-ENTMCNC: 32.5 G/DL (ref 31.5–36.5)
MCV RBC AUTO: 90 FL (ref 78–100)
PHOSPHATE SERPL-MCNC: 12.5 MG/DL (ref 2.5–4.5)
PLATELET # BLD AUTO: 141 10E3/UL (ref 150–450)
POTASSIUM SERPL-SCNC: 4.6 MMOL/L (ref 3.4–5.3)
RBC # BLD AUTO: 2.58 10E6/UL (ref 4.4–5.9)
SODIUM SERPL-SCNC: 134 MMOL/L (ref 135–145)
WBC # BLD AUTO: 22.4 10E3/UL (ref 4–11)

## 2023-10-01 PROCEDURE — 36415 COLL VENOUS BLD VENIPUNCTURE: CPT | Performed by: STUDENT IN AN ORGANIZED HEALTH CARE EDUCATION/TRAINING PROGRAM

## 2023-10-01 PROCEDURE — 250N000013 HC RX MED GY IP 250 OP 250 PS 637: Performed by: INTERNAL MEDICINE

## 2023-10-01 PROCEDURE — C9113 INJ PANTOPRAZOLE SODIUM, VIA: HCPCS | Mod: JZ | Performed by: INTERNAL MEDICINE

## 2023-10-01 PROCEDURE — 80069 RENAL FUNCTION PANEL: CPT | Performed by: STUDENT IN AN ORGANIZED HEALTH CARE EDUCATION/TRAINING PROGRAM

## 2023-10-01 PROCEDURE — 250N000013 HC RX MED GY IP 250 OP 250 PS 637: Performed by: EMERGENCY MEDICINE

## 2023-10-01 PROCEDURE — 99232 SBSQ HOSP IP/OBS MODERATE 35: CPT | Performed by: INTERNAL MEDICINE

## 2023-10-01 PROCEDURE — 86704 HEP B CORE ANTIBODY TOTAL: CPT | Performed by: INTERNAL MEDICINE

## 2023-10-01 PROCEDURE — 36415 COLL VENOUS BLD VENIPUNCTURE: CPT | Performed by: INTERNAL MEDICINE

## 2023-10-01 PROCEDURE — 99232 SBSQ HOSP IP/OBS MODERATE 35: CPT | Performed by: STUDENT IN AN ORGANIZED HEALTH CARE EDUCATION/TRAINING PROGRAM

## 2023-10-01 PROCEDURE — 85014 HEMATOCRIT: CPT | Performed by: STUDENT IN AN ORGANIZED HEALTH CARE EDUCATION/TRAINING PROGRAM

## 2023-10-01 PROCEDURE — C9113 INJ PANTOPRAZOLE SODIUM, VIA: HCPCS | Mod: JZ | Performed by: STUDENT IN AN ORGANIZED HEALTH CARE EDUCATION/TRAINING PROGRAM

## 2023-10-01 PROCEDURE — 250N000011 HC RX IP 250 OP 636: Mod: JZ | Performed by: INTERNAL MEDICINE

## 2023-10-01 PROCEDURE — 86481 TB AG RESPONSE T-CELL SUSP: CPT | Performed by: INTERNAL MEDICINE

## 2023-10-01 PROCEDURE — 258N000003 HC RX IP 258 OP 636: Performed by: INTERNAL MEDICINE

## 2023-10-01 PROCEDURE — 250N000013 HC RX MED GY IP 250 OP 250 PS 637: Performed by: STUDENT IN AN ORGANIZED HEALTH CARE EDUCATION/TRAINING PROGRAM

## 2023-10-01 PROCEDURE — 200N000001 HC R&B ICU

## 2023-10-01 PROCEDURE — 250N000011 HC RX IP 250 OP 636: Mod: JZ | Performed by: STUDENT IN AN ORGANIZED HEALTH CARE EDUCATION/TRAINING PROGRAM

## 2023-10-01 RX ORDER — HYDRALAZINE HYDROCHLORIDE 20 MG/ML
20 INJECTION INTRAMUSCULAR; INTRAVENOUS EVERY 6 HOURS PRN
Status: DISCONTINUED | OUTPATIENT
Start: 2023-10-01 | End: 2023-10-01

## 2023-10-01 RX ORDER — LANOLIN ALCOHOL/MO/W.PET/CERES
3 CREAM (GRAM) TOPICAL ONCE
Status: COMPLETED | OUTPATIENT
Start: 2023-10-01 | End: 2023-10-01

## 2023-10-01 RX ORDER — CALCIUM ACETATE 667 MG/1
1334 CAPSULE ORAL
Status: DISCONTINUED | OUTPATIENT
Start: 2023-10-01 | End: 2023-10-06 | Stop reason: HOSPADM

## 2023-10-01 RX ORDER — DIAZEPAM 5 MG
5 TABLET ORAL AT BEDTIME
Status: DISCONTINUED | OUTPATIENT
Start: 2023-10-01 | End: 2023-10-04

## 2023-10-01 RX ORDER — CALCIUM CARBONATE 500(1250)
1000 TABLET ORAL 3 TIMES DAILY
Status: DISCONTINUED | OUTPATIENT
Start: 2023-10-01 | End: 2023-10-06 | Stop reason: HOSPADM

## 2023-10-01 RX ORDER — CALCIUM ACETATE 667 MG/1
667 CAPSULE ORAL
Status: DISCONTINUED | OUTPATIENT
Start: 2023-10-01 | End: 2023-10-06 | Stop reason: HOSPADM

## 2023-10-01 RX ORDER — ZOLPIDEM TARTRATE 5 MG/1
5 TABLET ORAL AT BEDTIME
Status: DISCONTINUED | OUTPATIENT
Start: 2023-10-01 | End: 2023-10-01

## 2023-10-01 RX ORDER — HYDRALAZINE HYDROCHLORIDE 20 MG/ML
10 INJECTION INTRAMUSCULAR; INTRAVENOUS EVERY 6 HOURS PRN
Status: DISCONTINUED | OUTPATIENT
Start: 2023-10-01 | End: 2023-10-03

## 2023-10-01 RX ORDER — CALCIUM CARBONATE 500 MG/1
500 TABLET, CHEWABLE ORAL DAILY PRN
Status: DISCONTINUED | OUTPATIENT
Start: 2023-10-01 | End: 2023-10-06 | Stop reason: HOSPADM

## 2023-10-01 RX ORDER — CALCITRIOL 0.25 UG/1
0.25 CAPSULE, LIQUID FILLED ORAL 2 TIMES DAILY
Status: DISCONTINUED | OUTPATIENT
Start: 2023-10-01 | End: 2023-10-06 | Stop reason: HOSPADM

## 2023-10-01 RX ADMIN — ACETAMINOPHEN 650 MG: 325 TABLET, FILM COATED ORAL at 22:10

## 2023-10-01 RX ADMIN — PANTOPRAZOLE SODIUM 40 MG: 40 INJECTION, POWDER, FOR SOLUTION INTRAVENOUS at 11:34

## 2023-10-01 RX ADMIN — HYDRALAZINE HYDROCHLORIDE 10 MG: 20 INJECTION INTRAMUSCULAR; INTRAVENOUS at 04:38

## 2023-10-01 RX ADMIN — BUMETANIDE 1 MG: 0.25 INJECTION INTRAMUSCULAR; INTRAVENOUS at 03:34

## 2023-10-01 RX ADMIN — CALCIUM ACETATE 1334 MG: 667 CAPSULE ORAL at 12:28

## 2023-10-01 RX ADMIN — CALCIUM ACETATE 1334 MG: 667 CAPSULE ORAL at 17:35

## 2023-10-01 RX ADMIN — HYDRALAZINE HYDROCHLORIDE 10 MG: 10 TABLET, FILM COATED ORAL at 11:34

## 2023-10-01 RX ADMIN — HYDRALAZINE HYDROCHLORIDE 10 MG: 10 TABLET, FILM COATED ORAL at 22:11

## 2023-10-01 RX ADMIN — Medication 3 MG: at 03:37

## 2023-10-01 RX ADMIN — CARVEDILOL 6.25 MG: 6.25 TABLET, FILM COATED ORAL at 17:35

## 2023-10-01 RX ADMIN — AMLODIPINE BESYLATE 2.5 MG: 2.5 TABLET ORAL at 10:23

## 2023-10-01 RX ADMIN — CALCIUM CARBONATE (ANTACID) CHEW TAB 500 MG 500 MG: 500 CHEW TAB at 16:22

## 2023-10-01 RX ADMIN — BUMETANIDE 1 MG: 0.25 INJECTION INTRAMUSCULAR; INTRAVENOUS at 20:42

## 2023-10-01 RX ADMIN — Medication 1 MG: at 22:11

## 2023-10-01 RX ADMIN — ONDANSETRON 4 MG: 2 INJECTION INTRAMUSCULAR; INTRAVENOUS at 03:32

## 2023-10-01 RX ADMIN — CALCIUM 1000 MG: 500 TABLET ORAL at 22:14

## 2023-10-01 RX ADMIN — HYDRALAZINE HYDROCHLORIDE 10 MG: 10 TABLET, FILM COATED ORAL at 17:35

## 2023-10-01 RX ADMIN — CALCIUM 1000 MG: 500 TABLET ORAL at 10:22

## 2023-10-01 RX ADMIN — HYDRALAZINE HYDROCHLORIDE 10 MG: 20 INJECTION INTRAMUSCULAR; INTRAVENOUS at 20:57

## 2023-10-01 RX ADMIN — CARVEDILOL 6.25 MG: 6.25 TABLET, FILM COATED ORAL at 10:23

## 2023-10-01 RX ADMIN — DIAZEPAM 5 MG: 5 TABLET ORAL at 22:11

## 2023-10-01 RX ADMIN — SODIUM ZIRCONIUM CYCLOSILICATE 10 G: 10 POWDER, FOR SUSPENSION ORAL at 08:36

## 2023-10-01 RX ADMIN — SODIUM CHLORIDE 8 MG/HR: 9 INJECTION, SOLUTION INTRAVENOUS at 00:01

## 2023-10-01 RX ADMIN — HYDRALAZINE HYDROCHLORIDE 10 MG: 10 TABLET, FILM COATED ORAL at 05:35

## 2023-10-01 RX ADMIN — CALCITRIOL CAPSULES 0.25 MCG 0.25 MCG: 0.25 CAPSULE ORAL at 20:45

## 2023-10-01 RX ADMIN — ACETAMINOPHEN 650 MG: 325 TABLET, FILM COATED ORAL at 03:37

## 2023-10-01 RX ADMIN — PANTOPRAZOLE SODIUM 40 MG: 40 INJECTION, POWDER, FOR SOLUTION INTRAVENOUS at 22:15

## 2023-10-01 RX ADMIN — CALCIUM 1000 MG: 500 TABLET ORAL at 16:22

## 2023-10-01 RX ADMIN — BUMETANIDE 1 MG: 0.25 INJECTION INTRAMUSCULAR; INTRAVENOUS at 11:34

## 2023-10-01 RX ADMIN — CALCITRIOL CAPSULES 0.25 MCG 0.25 MCG: 0.25 CAPSULE ORAL at 10:23

## 2023-10-01 ASSESSMENT — ACTIVITIES OF DAILY LIVING (ADL)
ADLS_ACUITY_SCORE: 34

## 2023-10-01 NOTE — PLAN OF CARE
Goal Outcome Evaluation:      Plan of Care Reviewed With: patient    Overall Patient Progress: no changeOverall Patient Progress: no change          ICU End of Shift Summary.  For vital signs and complete assessments, please see documentation flowsheets.      Pertinent assessments:   Neuro:A+OX4,calm and cooperative most of night,hardly slept at all  Cardiac:SR,Remains HTN,IV hydralazine given x 1 prn FOR SBP>165  Resp:Dyspneic with minimal exertion,Sats dropped when sleeping ,2L NC applied( H/O sleep apnea)  GI:Obese,BM x1 with blood clots noted  :Voiding adequate amounts  Skin:Generalized blotchy rash  Lines:PIVs x3  Drips:Protonix at 8mgs/hr    Major Shift Events: 0320 Pt frustrated,states He can't stay here another 48 hrs,If He doesn't get something to knock him out He will have to leave. Explained reasons why we cannot knock him out.Spoke with Tele hub,Melatonin ordered,Pt helped into chair where he thinks he would be more comfortable,melatonin given,Pt fell asleep     Plan (Upcoming Events): For tunnel catheter placement tomorrow   Discharge/Transfer Needs: TBD     Bedside Shift Report Completed : yes  Bedside Safety Check Completed: yes

## 2023-10-01 NOTE — PROGRESS NOTES
"  GASTROENTEROLOGY PROGRESS NOTE     IMPRESSION:  1.  Intermittent melena-history of gastric ulcer in 2022.  H. pylori positive, s/p treatment at time of discharge.  We will need to pursue repeat upper endoscopy, awaiting initiation of his hemodialysis and stabilization of his renal and electrolyte status.    RECOMMENDATIONS:  1.  Given his dialysis catheter will be placed tomorrow and hemodialysis done on Monday, then we can tentatively plan for possible EGD on Tuesday.  I will defer specific timing to our team covering him this week.  2.  Twice daily PPI  3.  Monitor hemoglobin, transfuse as necessary.    Chema Leonard MD  [facility]  [phone]      ________________________________________________________________________      SUBJECTIVE:  No significant bleeding overnight.       OBJECTIVE:  BP (!) 179/96   Pulse 78   Temp 98.4  F (36.9  C) (Temporal)   Resp 14   Ht 1.778 m (5' 10\")   Wt (!) 157 kg (346 lb 2 oz)   SpO2 100%   BMI 49.66 kg/m    Temp (24hrs), Av.1  F (36.7  C), Min:97.5  F (36.4  C), Max:98.7  F (37.1  C)    Patient Vitals for the past 72 hrs:   Weight   10/01/23 0200 (!) 157 kg (346 lb 2 oz)   23 0400 (!) 153.8 kg (339 lb 1.1 oz)   23 (!) 168.6 kg (371 lb 11.2 oz)        PHYSICAL EXAM  GEN: Alert, NAD.    Remainder of exam deferred    Additional Data:  I have reviewed the patient's new clinical lab results:     Recent Labs   Lab Test 10/01/23  0740 23  1356 23  0722 23  0015 23  1552 22  1758 22  1528   WBC 22.4*  --  16.9*  --  17.2*   < >  --    HGB 7.5* 7.6* 6.9*  6.9*   < > 5.4*   < >  --    MCV 90  --  91  --  91   < >  --    *  --  134*  --  148*   < >  --    INR  --   --   --   --   --   --  1.01    < > = values in this interval not displayed.     Recent Labs   Lab Test 10/01/23  1222 10/01/23  0820 10/01/23  0740 23  2356 23  0823  0722   NA  --   --  134*  --  " 133*  --  135   POTASSIUM  --   --  4.6  --  4.6  --  5.1   CHLORIDE  --   --  97*  --  98  --  100   CO2  --   --  13*  --  13*  --  12*   BUN  --   --  159.3*  --  148.1*  --  153.5*   CR  --   --  14.38*  --  14.43*  --  14.30*   ANIONGAP  --   --  24*  --  22*  --  23*   GAYLE  --   --  6.5*  --  6.6*  --  7.5*   * 97 97   < > 119*  126*   < > 107*    < > = values in this interval not displayed.     Recent Labs   Lab Test 10/01/23  0740 09/30/23  0618 09/29/23  1552 03/03/23  1521 01/05/23  1619 12/22/22  1721 12/22/22  1543 11/29/22  0727 11/28/22  2133 09/20/21  1435   ALBUMIN 3.3*  --  3.3* 3.6   < > 3.7   < >  --  3.4*  --    BILITOTAL  --   --  0.2  --   --  0.2  --   --  0.2  --    ALT  --   --  52  --   --  12  --   --  20  --    AST  --   --  14  --   --  11  --   --  9*  --    PROTEIN  --  100*  --   --   --   --   --  50*  --  300*   LIPASE  --   --  1,089*  --   --  1,362*  --   --   --   --     < > = values in this interval not displayed.

## 2023-10-01 NOTE — PROGRESS NOTES
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      ~He does need to initiate dialysis (We have discussed modality in the past and he really does not want to do PD)     ~Plan is to have tunneled catheter on Monday and initiate dialysis thereafter.      -Bumetanide 1mg every 8 ordered for anasarca     -IR consulted for tunneled catheter on Monday and patient written for NPO after midnight on Sunday night.      <AG METABOLIC ACIDOSIS     ~Metabolic acidosis related to uremia. Will correct with dialysis. Will temporize with IV NaCO3    ~No urgent to aggressively treated as is not having other electrolyte issues related to is and not tachypneic. Would like to avoid giving more fliud if possible.     ~Will be addressed via dialysis.      -HD tomorrow     <SEVERE ANEMIA, GI BLOOD LOSSES AND ANEMIA OF RENAL DISEASE     ~GI consulted and plan is to do endoscopy when medically stabilized.    ~Has had multiple transfusions, so iron stores ought to repleted.      -Will start HEAVENLY with dialysis    <HYPOCALCEMIA     ~Hypocalcemia, even when corrected for low albumin. Related to renal failure. Dialysis will assist in correction.      -Ordered oral calcium carbonate and calcitriol.     <CKD BMD     ~Allowing patient regular, low K diet for time being. Phos 12.5     -Added calclium acetate with meals and snacks.     <INSOMNIA    ~Sleep likely being disrupted by uremia, but Javon also grows very anxious about being in the hospital.     -Changed zolpidem to 5mg diazepam for while in the hospital for sleep.       Juice Michel MD  Nephrology  [facility]  Cell:[phone]  On [employer]   Pager:[phone]          .........    Interval History     Very sleepy this morning. Did not sleep well last night. Did get a dose of ambien  "at 2200.     Breathing is \"OK\". Continues to have anasarca.      Chemistries reviewed. The total CO22 remains very low at 12.       Temp: 98.4  F (36.9  C) Temp src: Temporal BP: (!) 179/96 Pulse: 78   Resp: 14 SpO2: 100 % O2 Device: None (Room air)      Vitals:    09/29/23 2006 09/30/23 0400 10/01/23 0200   Weight: (!) 168.6 kg (371 lb 11.2 oz) (!) 153.8 kg (339 lb 1.1 oz) (!) 157 kg (346 lb 2 oz)       Vital Signs with Ranges    Temp:  [97.5  F (36.4  C)-98.7  F (37.1  C)] 98.4  F (36.9  C)  Pulse:  [75-85] 78  Resp:  [12-51] 14  BP: (122-200)/() 179/96  SpO2:  [86 %-100 %] 100 %    I/O last 3 completed shifts:  In: 1468.75 [P.O.:690; I.V.:478.75]  Out: 1775 [Urine:1775]    Physical Exam    BP Readings from Last 5 Encounters:   10/01/23 (!) 179/96   03/03/23 (!) 227/139   02/03/23 (!) 150/79   01/06/23 (!) 151/80   12/23/22 (!) 172/94        Wt Readings from Last 10 Encounters:   10/01/23 (!) 157 kg (346 lb 2 oz)   12/23/22 (!) 159.4 kg (351 lb 8 oz)   11/29/22 (!) 159.1 kg (350 lb 12.8 oz)   05/17/22 (!) 174.6 kg (385 lb)   02/04/22 (!) 179.2 kg (395 lb)   05/12/21 (!) 176.8 kg (389 lb 11.2 oz)   04/28/21 (!) 176.7 kg (389 lb 9.6 oz)   03/12/21 (!) 176.8 kg (389 lb 11.2 oz)   10/25/19 (!) 189.9 kg (418 lb 11.2 oz)   04/26/19 (!) 184.6 kg (407 lb)           Medications          amLODIPine  2.5 mg Oral Daily    bumetanide  1 mg Intravenous Q8H    calcitRIOL  0.25 mcg Oral BID    calcium carbonate  1,000 mg Oral TID    carvedilol  6.25 mg Oral BID w/meals    diazepam  5 mg Oral At Bedtime    hydrALAZINE  10 mg Oral 4x Daily    pantoprazole  40 mg Intravenous Q12H    sodium chloride (PF)  3 mL Intracatheter Q8H    sodium zirconium cyclosilicate  10 g Oral Daily       Data     UA RESULTS:  Recent Labs   Lab Test 09/30/23  0618   COLOR Light Yellow   APPEARANCE Clear   URINEGLC 50*   URINEBILI Negative   URINEKETONE Negative   SG 1.012   UBLD Trace*   URINEPH 5.5   PROTEIN 100*   NITRITE Negative   LEUKEST " Moderate*   RBCU 3*   WBCU 16*      BMP  Recent Labs   Lab 10/01/23  0820 10/01/23  0740 10/01/23  0339 09/30/23 2356 09/30/23 2007 09/30/23  0800 09/30/23  0722 09/30/23 0411 09/30/23  0015   NA  --  134*  --   --  133*  --  135  --  132*   POTASSIUM  --  4.6  --   --  4.6  --  5.1  --  5.4*   CHLORIDE  --  97*  --   --  98  --  100  --  100   GAYLE  --  6.5*  --   --  6.6*  --  7.5*  --  7.5*   CO2  --  13*  --   --  13*  --  12*  --  11*   BUN  --  159.3*  --   --  148.1*  --  153.5*  --  151.7*   CR  --  14.38*  --   --  14.43*  --  14.30*  --  14.25*   GLC 97 97 98 134* 119*  126*   < > 107*   < > 118*    < > = values in this interval not displayed.     Phos@[email or web address](phos:4)  CBC)  Recent Labs   Lab 10/01/23  0740 09/30/23  1356 09/30/23  0722 09/30/23  0015 09/29/23  1552   WBC 22.4*  --  16.9*  --  17.2*   HGB 7.5* 7.6* 6.9*  6.9* 6.2* 5.4*   HCT 23.1*  --  21.4*  --  17.5*   MCV 90  --  91  --  91   *  --  134*  --  148*     Lab Results   Component Value Date    ALBUMIN 3.3 10/01/2023    ALBUMIN 3.3 09/29/2023    ALBUMIN 3.6 03/03/2023    ALBUMIN 2.7 09/20/2021    ALBUMIN 2.5 06/14/2017        Recent Labs   Lab 10/01/23  0740   HGB 7.5*   HCT 23.1*   MCV 90       Recent Labs   Lab 09/29/23  1552   AST 14   ALT 52   ALKPHOS 65   BILITOTAL 0.2     No lab results found in last 7 days.  No results found for: D2VIT, D3VIT, DTOT  No results for input(s): PTHI in the last 168 hours.    Attestation:   I have reviewed today's relevant vital signs, notes, medications, labs and imaging.

## 2023-10-01 NOTE — PLAN OF CARE
A&Ox4, flat affect. Afebrile. Tele SR. HTN noted and updated to MDs. HD tomorrow with cath placement. NPO after midnight. See Flowsheet charting.     Goal Outcome Evaluation:      Plan of Care Reviewed With: patient, family    Overall Patient Progress: no changeOverall Patient Progress: no change    Outcome Evaluation: HD planned for tomorrow with cath placed. NPO after midnight

## 2023-10-01 NOTE — PROGRESS NOTES
[facility]    Medicine Progress Note - Hospitalist Service    Date of Admission:  9/29/2023    Assessment & Plan   Brooks Mensah is a 38 year old male with a PMH significant for chronic kidney disease stage IV, hypertension, morbid obesity, CHF, obstructive sleep apnea intolerant to CPAP, MI, GI bleeding due to gastric ulcer, chronically elevated lipase, anemia who presents with a complaint of generalized weakness, abdominal pain that felt similar to what he had during his prior gastric ulcer, decreased urine output, worsening lower extremity edema and being admitted to the hospital on 9/29/2023 with acute on chronic anemia and acute renal failure on chronic kidney disease.     Melena  Abdominal pain, now better  Acute on chronic anemia (of CKD) secondary to upper GI bleeding:  Patient with prior history of gastric ulcer, currently not on PPI.  Last EDG was in December 2022.  Currently has melena likely due to upper GI bleeding.  Hgb 5.4 on presentation to ED.  Received 2u pRBC in ED.  1 more unit on admission.  Hgb now stable.   -GI consulted, appreciate recs   -Holding off on doing EGD given Hgb stability, no ongoing evidence of bleeding and significant electrolyte disturbance in the setting of renal failure  -Transition to BID PPI IV  -Renal diet, NPO midnight starting tonight  -Transfuse for hemoglobin of less than 7  -Back off to daily Hgb check     Anasarca  Hyperkalemia  EMILY on CKD stage IV, approaching ESRD  AGMA secondary to renal failure:  Reportedly he had renal bx in December 2022 which showed advanced global and segmental glomerulosclerosis.  On admission, creatinine is 14.6, BUN is 154, significant increase from 6 months ago when it was 62/5.72.  Potassium is  5.4.  AG 22, bicarb is 9.  Lactic acid normal.  VBG shows pH 7.14, co2 31, Hco3 11.  He was started on a bicarb gtt.  Patient has extensive anasarca, +4 pedal, pretibial and ankle edema extending to his thighs.  Given a dose  [facility] in ED.  -Nephrology is consulted   -Would like to start dialysis Monday or tuesday   -Considered transfer for catheter placement, patient declined    -Monitor until Monday with catheter placement and HD at that time  -Defer Bumex and bicarb gtt to nephrology  -Continue lokelma daily  -IR consulted for HD catheter placement    -Discussed with them, not in house over the weekend, anticipate placement on Monday 10/2  -NPO midnight  -Patient is in agreement with the plan to initiate hemodialysis.  -At risk of fluid overload and needs close monitoring     Morbid obesity  Obstructive sleep apnea intolerant to CPAP:  -Patient has MAURI but intolerant to CPAP  -Overall the morbid obesity complicates his care     Elevated lipase of unclear etiology  Diffuse wall thickening of gallbladder:  Patient had prior history of elevated lipase.  CT scan showed diffuse wall thickening of the gallbladder given fluid overload this is unlikely to be cholecystitis. RUQ US does not show evidence of cholecystitis.  Blood cultures done, liver enzymes are normal.  -Hold off antibiotic at this time     Left sided nephrolithiasis, non obstructing     Leukocytosis, likely due to stress demargination:  No fever or other signs of infection. Daily CBC.        Diet: NPO per Anesthesia Guidelines for Procedure/Surgery Except for: Meds  Renal Diet (dialysis)    DVT Prophylaxis: Pneumatic Compression Devices  Sol Catheter: Not present  Lines: None     Cardiac Monitoring: ACTIVE order. Indication: Electrolyte Imbalance (24 hours)- Magnesium <1.3 mg/ml; Potassium < =2.8 or > 5.5 mg/ml  Code Status: Full Code      Clinically Significant Risk Factors        # Hyperkalemia: Highest K = 5.4 mmol/L in last 2 days, will monitor as appropriate   # Hypocalcemia: Lowest Ca = 6.5 mg/dL in last 2 days, will monitor and replace as appropriate    # Anion Gap Metabolic Acidosis: Highest Anion Gap = 24 mmol/L in last 2 days, will monitor and treat as  "appropriate  # Hypoalbuminemia: Lowest albumin = 3.3 g/dL at 10/1/2023  7:40 AM, will monitor as appropriate       # Hypertension: Noted on problem list  # Acute heart failure with preserved ejection fraction: heart failure noted on problem list, last echo with EF >50%, and receiving IV diuretics       # Severe Obesity: Estimated body mass index is 49.66 kg/m  as calculated from the following:    Height as of this encounter: 1.778 m (5' 10\").    Weight as of this encounter: 157 kg (346 lb 2 oz)., PRESENT ON ADMISSION            Disposition Plan      Expected Discharge Date: 10/03/2023                  Basil Lopes DO  Hospitalist Service  [facility]  Securely message with [employer] (more info)  Text page via [employer] Paging/Directory   ______________________________________________________________________    Interval History   NAEO.  Patient reports that the bed is very uncomfortable.  Now up to recliner.  He feels ok and is without other complaint.    Physical Exam   Vital Signs: Temp: 98.4  F (36.9  C) Temp src: Temporal BP: (!) 180/96 Pulse: 79   Resp: 23 SpO2: 100 % O2 Device: None (Room air)    Weight: 346 lbs 1.96 oz    General Appearance: Awake and alert no apparent distress.  Appropriate.  Respiratory: Lungs are clear to auscultation in the anterior lung fields.  Body habitus makes it difficult to take a clear listen, however.  Look breathing appears mildly increased on room air, however patient reports this is his baseline.  Cardiovascular: Regular rate and rhythm.  No obvious murmurs rubs or gallops.  GI: Obese.  Benign.  Mild tenderness of lower abdomen at sight of edema.  Bowel sounds are positive.    Skin: No obvious rashes or lesions on exposed skin.  Other: Distal extremities are warm and well-perfused.  Patient has generalized anasarca in all extremities.    Medical Decision Making       40 MINUTES SPENT BY ME on the date of service doing chart review, history, exam, documentation " & further activities per the note.      Data   ------------------------- PAST 24 HR DATA REVIEWED -----------------------------------------------    I have personally reviewed the following data over the past 24 hrs:    22.4 (H)  \   7.5 (L)   / 141 (L)     134 (L) 97 (L) 159.3 (H) /  103 (H)   4.6 13 (L) 14.38 (H) \     ALT: N/A AST: N/A AP: N/A TBILI: N/A   ALB: 3.3 (L) TOT PROTEIN: N/A LIPASE: N/A     Imaging results reviewed over the past 24 hrs:   No results found for this or any previous visit (from the past 24 hour(s)).

## 2023-10-02 ENCOUNTER — APPOINTMENT (OUTPATIENT)
Dept: INTERVENTIONAL RADIOLOGY/VASCULAR | Facility: CLINIC | Age: 38
End: 2023-10-02
Attending: EMERGENCY MEDICINE
Payer: COMMERCIAL

## 2023-10-02 LAB
ALBUMIN SERPL BCG-MCNC: 2.9 G/DL (ref 3.5–5.2)
ANION GAP SERPL CALCULATED.3IONS-SCNC: 24 MMOL/L (ref 7–15)
ATRIAL RATE - MUSE: 67 BPM
BUN SERPL-MCNC: 145.5 MG/DL (ref 6–20)
CALCIUM SERPL-MCNC: 6.8 MG/DL (ref 8.6–10)
CHLORIDE SERPL-SCNC: 94 MMOL/L (ref 98–107)
CREAT SERPL-MCNC: 14.28 MG/DL (ref 0.67–1.17)
DEPRECATED HCO3 PLAS-SCNC: 12 MMOL/L (ref 22–29)
DIASTOLIC BLOOD PRESSURE - MUSE: NORMAL MMHG
EGFRCR SERPLBLD CKD-EPI 2021: 4 ML/MIN/1.73M2
ERYTHROCYTE [DISTWIDTH] IN BLOOD BY AUTOMATED COUNT: 17.2 % (ref 10–15)
GLUCOSE SERPL-MCNC: 93 MG/DL (ref 70–99)
HBV CORE AB SERPL QL IA: NONREACTIVE
HCT VFR BLD AUTO: 22.7 % (ref 40–53)
HGB BLD-MCNC: 7.6 G/DL (ref 13.3–17.7)
INR PPP: 1.19 (ref 0.85–1.15)
INTERPRETATION ECG - MUSE: NORMAL
MCH RBC QN AUTO: 29.2 PG (ref 26.5–33)
MCHC RBC AUTO-ENTMCNC: 33.5 G/DL (ref 31.5–36.5)
MCV RBC AUTO: 87 FL (ref 78–100)
P AXIS - MUSE: 50 DEGREES
PHOSPHATE SERPL-MCNC: 12.1 MG/DL (ref 2.5–4.5)
PHOSPHATE SERPL-MCNC: 12.6 MG/DL (ref 2.5–4.5)
PLATELET # BLD AUTO: 113 10E3/UL (ref 150–450)
POTASSIUM SERPL-SCNC: 4.5 MMOL/L (ref 3.4–5.3)
PR INTERVAL - MUSE: 190 MS
QRS DURATION - MUSE: 118 MS
QT - MUSE: 454 MS
QTC - MUSE: 479 MS
R AXIS - MUSE: 45 DEGREES
RBC # BLD AUTO: 2.6 10E6/UL (ref 4.4–5.9)
SODIUM SERPL-SCNC: 130 MMOL/L (ref 135–145)
SYSTOLIC BLOOD PRESSURE - MUSE: NORMAL MMHG
T AXIS - MUSE: 73 DEGREES
VENTRICULAR RATE- MUSE: 67 BPM
WBC # BLD AUTO: 20.1 10E3/UL (ref 4–11)

## 2023-10-02 PROCEDURE — 84100 ASSAY OF PHOSPHORUS: CPT | Performed by: STUDENT IN AN ORGANIZED HEALTH CARE EDUCATION/TRAINING PROGRAM

## 2023-10-02 PROCEDURE — C1750 CATH, HEMODIALYSIS,LONG-TERM: HCPCS

## 2023-10-02 PROCEDURE — 36415 COLL VENOUS BLD VENIPUNCTURE: CPT | Performed by: INTERNAL MEDICINE

## 2023-10-02 PROCEDURE — 99152 MOD SED SAME PHYS/QHP 5/>YRS: CPT

## 2023-10-02 PROCEDURE — 250N000009 HC RX 250: Performed by: NURSE PRACTITIONER

## 2023-10-02 PROCEDURE — 90935 HEMODIALYSIS ONE EVALUATION: CPT

## 2023-10-02 PROCEDURE — 250N000013 HC RX MED GY IP 250 OP 250 PS 637: Performed by: INTERNAL MEDICINE

## 2023-10-02 PROCEDURE — 90935 HEMODIALYSIS ONE EVALUATION: CPT | Performed by: INTERNAL MEDICINE

## 2023-10-02 PROCEDURE — 250N000011 HC RX IP 250 OP 636: Mod: JZ | Performed by: STUDENT IN AN ORGANIZED HEALTH CARE EDUCATION/TRAINING PROGRAM

## 2023-10-02 PROCEDURE — 250N000011 HC RX IP 250 OP 636

## 2023-10-02 PROCEDURE — 36558 INSERT TUNNELED CV CATH: CPT

## 2023-10-02 PROCEDURE — 250N000011 HC RX IP 250 OP 636: Mod: JZ | Performed by: INTERNAL MEDICINE

## 2023-10-02 PROCEDURE — 258N000003 HC RX IP 258 OP 636: Performed by: INTERNAL MEDICINE

## 2023-10-02 PROCEDURE — 99232 SBSQ HOSP IP/OBS MODERATE 35: CPT | Performed by: INTERNAL MEDICINE

## 2023-10-02 PROCEDURE — 76937 US GUIDE VASCULAR ACCESS: CPT

## 2023-10-02 PROCEDURE — 80069 RENAL FUNCTION PANEL: CPT | Performed by: INTERNAL MEDICINE

## 2023-10-02 PROCEDURE — 36415 COLL VENOUS BLD VENIPUNCTURE: CPT | Performed by: STUDENT IN AN ORGANIZED HEALTH CARE EDUCATION/TRAINING PROGRAM

## 2023-10-02 PROCEDURE — C9113 INJ PANTOPRAZOLE SODIUM, VIA: HCPCS | Mod: JZ | Performed by: STUDENT IN AN ORGANIZED HEALTH CARE EDUCATION/TRAINING PROGRAM

## 2023-10-02 PROCEDURE — 250N000011 HC RX IP 250 OP 636: Performed by: INTERNAL MEDICINE

## 2023-10-02 PROCEDURE — C1769 GUIDE WIRE: HCPCS

## 2023-10-02 PROCEDURE — 85014 HEMATOCRIT: CPT | Performed by: STUDENT IN AN ORGANIZED HEALTH CARE EDUCATION/TRAINING PROGRAM

## 2023-10-02 PROCEDURE — 5A1D70Z PERFORMANCE OF URINARY FILTRATION, INTERMITTENT, LESS THAN 6 HOURS PER DAY: ICD-10-PCS | Performed by: INTERNAL MEDICINE

## 2023-10-02 PROCEDURE — 634N000001 HC RX 634: Mod: JZ | Performed by: INTERNAL MEDICINE

## 2023-10-02 PROCEDURE — 250N000011 HC RX IP 250 OP 636: Performed by: NURSE PRACTITIONER

## 2023-10-02 PROCEDURE — 85610 PROTHROMBIN TIME: CPT | Performed by: RADIOLOGY

## 2023-10-02 PROCEDURE — 200N000001 HC R&B ICU

## 2023-10-02 PROCEDURE — 250N000009 HC RX 250

## 2023-10-02 PROCEDURE — 0JH63XZ INSERTION OF TUNNELED VASCULAR ACCESS DEVICE INTO CHEST SUBCUTANEOUS TISSUE AND FASCIA, PERCUTANEOUS APPROACH: ICD-10-PCS | Performed by: RADIOLOGY

## 2023-10-02 PROCEDURE — 36415 COLL VENOUS BLD VENIPUNCTURE: CPT | Performed by: RADIOLOGY

## 2023-10-02 PROCEDURE — 272N000504 HC NEEDLE CR4

## 2023-10-02 PROCEDURE — 02HV33Z INSERTION OF INFUSION DEVICE INTO SUPERIOR VENA CAVA, PERCUTANEOUS APPROACH: ICD-10-PCS | Performed by: RADIOLOGY

## 2023-10-02 PROCEDURE — 77001 FLUOROGUIDE FOR VEIN DEVICE: CPT

## 2023-10-02 RX ORDER — FENTANYL CITRATE 50 UG/ML
INJECTION, SOLUTION INTRAMUSCULAR; INTRAVENOUS
Status: COMPLETED
Start: 2023-10-02 | End: 2023-10-02

## 2023-10-02 RX ORDER — NALOXONE HYDROCHLORIDE 0.4 MG/ML
0.2 INJECTION, SOLUTION INTRAMUSCULAR; INTRAVENOUS; SUBCUTANEOUS
Status: DISCONTINUED | OUTPATIENT
Start: 2023-10-02 | End: 2023-10-03 | Stop reason: HOSPADM

## 2023-10-02 RX ORDER — FLUMAZENIL 0.1 MG/ML
0.2 INJECTION, SOLUTION INTRAVENOUS
Status: DISCONTINUED | OUTPATIENT
Start: 2023-10-02 | End: 2023-10-03 | Stop reason: HOSPADM

## 2023-10-02 RX ORDER — LIDOCAINE HYDROCHLORIDE 10 MG/ML
INJECTION, SOLUTION EPIDURAL; INFILTRATION; INTRACAUDAL; PERINEURAL
Status: DISCONTINUED
Start: 2023-10-02 | End: 2023-10-03 | Stop reason: HOSPADM

## 2023-10-02 RX ORDER — HEPARIN SODIUM 1000 [USP'U]/ML
INJECTION, SOLUTION INTRAVENOUS; SUBCUTANEOUS
Status: COMPLETED
Start: 2023-10-02 | End: 2023-10-02

## 2023-10-02 RX ORDER — AMLODIPINE BESYLATE 2.5 MG/1
2.5 TABLET ORAL ONCE
Status: COMPLETED | OUTPATIENT
Start: 2023-10-02 | End: 2023-10-02

## 2023-10-02 RX ORDER — CEFAZOLIN SODIUM IN 0.9 % NACL 3 G/100 ML
3 INTRAVENOUS SOLUTION, PIGGYBACK (ML) INTRAVENOUS
Status: DISCONTINUED | OUTPATIENT
Start: 2023-10-02 | End: 2023-10-02

## 2023-10-02 RX ORDER — NALOXONE HYDROCHLORIDE 0.4 MG/ML
0.4 INJECTION, SOLUTION INTRAMUSCULAR; INTRAVENOUS; SUBCUTANEOUS
Status: DISCONTINUED | OUTPATIENT
Start: 2023-10-02 | End: 2023-10-03 | Stop reason: HOSPADM

## 2023-10-02 RX ORDER — FENTANYL CITRATE 50 UG/ML
25-50 INJECTION, SOLUTION INTRAMUSCULAR; INTRAVENOUS EVERY 5 MIN PRN
Status: DISCONTINUED | OUTPATIENT
Start: 2023-10-02 | End: 2023-10-02

## 2023-10-02 RX ORDER — TRAZODONE HYDROCHLORIDE 50 MG/1
50 TABLET, FILM COATED ORAL
Status: DISCONTINUED | OUTPATIENT
Start: 2023-10-02 | End: 2023-10-06 | Stop reason: HOSPADM

## 2023-10-02 RX ORDER — LIDOCAINE 40 MG/G
CREAM TOPICAL
Status: DISCONTINUED | OUTPATIENT
Start: 2023-10-02 | End: 2023-10-02

## 2023-10-02 RX ADMIN — BUMETANIDE 1 MG: 0.25 INJECTION INTRAMUSCULAR; INTRAVENOUS at 04:18

## 2023-10-02 RX ADMIN — EPOETIN ALFA-EPBX 4000 UNITS: 2000 INJECTION, SOLUTION INTRAVENOUS; SUBCUTANEOUS at 15:21

## 2023-10-02 RX ADMIN — DIAZEPAM 5 MG: 5 TABLET ORAL at 21:12

## 2023-10-02 RX ADMIN — HYDRALAZINE HYDROCHLORIDE 10 MG: 10 TABLET, FILM COATED ORAL at 05:32

## 2023-10-02 RX ADMIN — CALCIUM ACETATE 1334 MG: 667 CAPSULE ORAL at 17:06

## 2023-10-02 RX ADMIN — HEPARIN SODIUM 4 ML: 1000 INJECTION, SOLUTION INTRAVENOUS; SUBCUTANEOUS at 11:36

## 2023-10-02 RX ADMIN — HEPARIN SODIUM 1800 UNITS: 1000 INJECTION INTRAVENOUS; SUBCUTANEOUS at 15:23

## 2023-10-02 RX ADMIN — LIDOCAINE HYDROCHLORIDE 20 ML: 10 INJECTION, SOLUTION EPIDURAL; INFILTRATION; INTRACAUDAL; PERINEURAL at 11:28

## 2023-10-02 RX ADMIN — PANTOPRAZOLE SODIUM 40 MG: 40 INJECTION, POWDER, FOR SOLUTION INTRAVENOUS at 21:13

## 2023-10-02 RX ADMIN — HYDRALAZINE HYDROCHLORIDE 10 MG: 20 INJECTION INTRAMUSCULAR; INTRAVENOUS at 12:50

## 2023-10-02 RX ADMIN — PANTOPRAZOLE SODIUM 40 MG: 40 INJECTION, POWDER, FOR SOLUTION INTRAVENOUS at 09:44

## 2023-10-02 RX ADMIN — FENTANYL CITRATE 50 MCG: 50 INJECTION INTRAMUSCULAR; INTRAVENOUS at 11:27

## 2023-10-02 RX ADMIN — FENTANYL CITRATE 25 MCG: 50 INJECTION INTRAMUSCULAR; INTRAVENOUS at 11:33

## 2023-10-02 RX ADMIN — HEPARIN SODIUM 1800 UNITS: 1000 INJECTION INTRAVENOUS; SUBCUTANEOUS at 15:24

## 2023-10-02 RX ADMIN — HYDRALAZINE HYDROCHLORIDE 10 MG: 10 TABLET, FILM COATED ORAL at 17:06

## 2023-10-02 RX ADMIN — BUMETANIDE 1 MG: 0.25 INJECTION INTRAMUSCULAR; INTRAVENOUS at 12:00

## 2023-10-02 RX ADMIN — CALCIUM 1000 MG: 500 TABLET ORAL at 21:13

## 2023-10-02 RX ADMIN — MIDAZOLAM 1 MG: 1 INJECTION INTRAMUSCULAR; INTRAVENOUS at 11:27

## 2023-10-02 RX ADMIN — AMLODIPINE BESYLATE 2.5 MG: 2.5 TABLET ORAL at 21:26

## 2023-10-02 RX ADMIN — SODIUM CHLORIDE 300 ML: 9 INJECTION, SOLUTION INTRAVENOUS at 15:21

## 2023-10-02 RX ADMIN — CEFAZOLIN 3 G: 1 INJECTION, POWDER, FOR SOLUTION INTRAMUSCULAR; INTRAVENOUS at 11:04

## 2023-10-02 RX ADMIN — HYDRALAZINE HYDROCHLORIDE 10 MG: 20 INJECTION INTRAMUSCULAR; INTRAVENOUS at 18:48

## 2023-10-02 RX ADMIN — BUMETANIDE 1 MG: 0.25 INJECTION INTRAMUSCULAR; INTRAVENOUS at 19:38

## 2023-10-02 RX ADMIN — CALCIUM 1000 MG: 500 TABLET ORAL at 16:46

## 2023-10-02 RX ADMIN — CALCITRIOL CAPSULES 0.25 MCG 0.25 MCG: 0.25 CAPSULE ORAL at 21:13

## 2023-10-02 RX ADMIN — HYDRALAZINE HYDROCHLORIDE 10 MG: 20 INJECTION INTRAMUSCULAR; INTRAVENOUS at 04:17

## 2023-10-02 RX ADMIN — MIDAZOLAM 1 MG: 1 INJECTION INTRAMUSCULAR; INTRAVENOUS at 11:33

## 2023-10-02 RX ADMIN — CARVEDILOL 6.25 MG: 6.25 TABLET, FILM COATED ORAL at 17:07

## 2023-10-02 ASSESSMENT — ACTIVITIES OF DAILY LIVING (ADL)
ADLS_ACUITY_SCORE: 34

## 2023-10-02 NOTE — IR NOTE
RADIOLOGY POST PROCEDURE NOTE    Patient name: Brooks Mensah  MRN: [id number]  : 1985    Pre-procedure diagnosis: Renal failure, need for hemodialysis  Post-procedure diagnosis: Same    Procedure Date/Time: 2023  12:35 PM  Procedure: Right Int Jug tunneled Palindrome Hemodialysis catheter placement with tip at the RA/SVC junction.  Heparinized and ready for immediate use.  No complications.  Tolerated well.  Estimated blood loss: 5 ml  Specimen(s) collected with description: none    The patient tolerated the procedure well with no immediate complications.  Significant findings:  Please see above.    See imaging dictation for procedural details.    Provider name: Cliff Maier MD  Assistant(s):None

## 2023-10-02 NOTE — PLAN OF CARE
Goal Outcome Evaluation:      Plan of Care Reviewed With: patient              ICU End of Shift Summary.  For vital signs and complete assessments, please see documentation flowsheets.      Pertinent assessments:   Neuro:A+O X 4,calm and cooperative  Cardiac:SR,remains HTN hydralazine gven IV x 2 doses for  SBP >165 with little effect  Resp:Dyspneic with minimal exertion  GI:Obese,+BS no BM  :Voids in urinal  Skin:Generalized blotchy rash   Lines:SLX3  Drips:O     Major Shift Events: Sat in chair most of night,back to bed around 0230, awake most of night,valium 5mgs was given.NPO since MN    Plan (Upcoming Events): For tunnel catheter placement and dialysis today  Discharge/Transfer Needs: TBD     Bedside Shift Report Completed : yes  Bedside Safety Check Completed: yes

## 2023-10-02 NOTE — CONSULTS
Patient is on IR schedule today Monday 10/2/23 for a Tunneled dialysis catheter placement with IV moderate sedation.     The patient has leukocytosis which does not appear to be due to infection. IR Dr Moran will review though.     -Labs WNL for procedure.    -Orders for NPO and antibiotics have been entered.   -Consent will be done prior to procedure.    Please contact the IR department for procedural related questions.     Total time: 20 minutes    Thanks, Zoila [facility] Interventional Radiology CNP ([phone]) (phone [phone])

## 2023-10-02 NOTE — PROGRESS NOTES
Renal Medicine Inpatient Dialysis Note                                Brooks Mensah MRN# [id number]   Age: 38 year old YOB: 1985   Date of Admission: 9/29/2023 Hospital LOS: 3          Assessment/Plan:       1.  ESRD   -FSGS    First dialysis 10/02/23    2.  Metabolic acidosis  3.  Hyperkalemia  4.  Hyperphosphatemia  5.  Hypocalcemia  6.  Volume overload  7.  Anemia   -HEAVENLY    Next 10/03/23  Continue UF as tolerated    Prefers Chema Vang     Interval History:     Dialysis run parameters reviewed with dialysis RN at patient bedside.  Seen on run  TDC catheter placed without issue    2.5 hours  200 ml/min  3 liter UF    Stable early portion of run    Comfortable     ROS     GENERAL: NAD, No fever,chills  R: NEGATIVE for significant cough or SOB  GI: NEGATIVE for abdominal pain, heartburn, nausea, vomiting or change in bowel pattern  EXT: no change edema  ROS otherwise negative    Dialysis Parameters:     Vitals were reviewed  Patient Vitals for the past 8 hrs:   BP Temp Temp src Pulse Resp SpO2   10/02/23 1430 (!) 203/105 -- -- 87 24 97 %   10/02/23 1415 (!) 201/106 -- -- 90 25 98 %   10/02/23 1400 (!) 211/104 -- -- 90 29 100 %   10/02/23 1345 (!) 208/99 98.2  F (36.8  C) Oral 90 26 98 %   10/02/23 1330 (!) 202/100 -- -- 92 24 98 %   10/02/23 1300 (!) 197/93 -- -- 91 23 98 %   10/02/23 1230 (!) 192/99 -- -- 90 24 97 %   10/02/23 1215 (!) 190/96 -- -- 92 23 97 %   10/02/23 1200 (!) 194/98 97.9  F (36.6  C) Temporal 93 24 95 %   10/02/23 1140 (!) 188/86 -- -- 91 23 95 %   10/02/23 1135 (!) 192/77 -- -- 91 20 96 %   10/02/23 1130 (!) 203/93 -- -- 91 (!) 33 96 %   10/02/23 1024 (!) 176/89 99  F (37.2  C) Temporal 91 22 96 %   10/02/23 0745 (!) 180/88 98  F (36.7  C) -- 92 24 98 %     I/O last 3 completed shifts:  In: 1457 [P.O.:1400; I.V.:57]  Out: 1250 [Urine:1250]    Vitals:    09/29/23 2006 09/30/23 0400 10/01/23 0200 10/02/23 0300   Weight: (!) 168.6 kg (371 lb 11.2 oz) (!) 153.8 kg (339 lb 1.1 oz)  "(!) 157 kg (346 lb 2 oz) (!) 174.5 kg (384 lb 11.2 oz)       Current Weight: 174.5  Dry Weight: 165  Dialysis Temp: 36.5  C  Access Device: IJ  Access Site: right  Dialyzer: Revaclear  Dialysis Bath: 3  Sodium Profile: n  UF Goal: 3  Blood Flow Rate (mL/min): 200  Total Treatment Time (hrs): 2.5  Heparin: Low dose as required      EPO dose: y  Zemplar: n  IV Fe: n      Medications and Allergies:     Reviewed      Physical Exam:     Seen and examined during course of dialysis run    BP (!) 203/105   Pulse 87   Temp 98.2  F (36.8  C) (Oral)   Resp 24   Ht 1.778 m (5' 10\")   Wt (!) 174.5 kg (384 lb 11.2 oz)   SpO2 97%   BMI 55.20 kg/m      GENERAL: awake, alert, follows  HEENT: NC/AT, PERRLA, EOMI, non icteric, pharynx moist without lesion  RESP: symmetric excursion, good effort, lungs clear - no rales, rhonchi or wheezes  CV: RRR, normal S1 S2  MS: + edema  SKIN: catheter site clean without drainage    Data:       Recent Labs   Lab 10/02/23  0526   *   POTASSIUM 4.5   CHLORIDE 94*   CO2 12*   ANIONGAP 24*   GLC 93   .5*   CR 14.28*   GFRESTIMATED 4*   GAYLE 6.8*     Recent Labs   Lab 10/02/23  0526 10/01/23  0740   POTASSIUM 4.5 4.6     Recent Labs   Lab 10/02/23  0526 10/01/23  0740 09/29/23  1552   ALBUMIN 2.9* 3.3* 3.3*         G Abdulaziz Maya MD    [facility] - Nephrology  [phone]           "

## 2023-10-02 NOTE — PROGRESS NOTES
Potassium   Date Value Ref Range Status   10/02/2023 4.5 3.4 - 5.3 mmol/L Final   09/20/2021 3.7 3.4 - 5.3 mmol/L Final   05/12/2021 3.6 3.4 - 5.3 mmol/L Final     Hemoglobin   Date Value Ref Range Status   10/02/2023 7.6 (L) 13.3 - 17.7 g/dL Final   06/16/2017 11.7 (L) 13.3 - 17.7 g/dL Final     Creatinine   Date Value Ref Range Status   10/02/2023 14.28 (H) 0.67 - 1.17 mg/dL Final   05/12/2021 2.11 (H) 0.66 - 1.25 mg/dL Final     Urea Nitrogen   Date Value Ref Range Status   10/02/2023 145.5 (H) 6.0 - 20.0 mg/dL Final   09/20/2021 27 7 - 30 mg/dL Final   05/12/2021 24 7 - 30 mg/dL Final     Sodium   Date Value Ref Range Status   10/02/2023 130 (L) 135 - 145 mmol/L Final     Comment:     Reference intervals for this test were updated on 09/26/2023 to more accurately reflect our healthy population. There may be differences in the flagging of prior results with similar values performed with this method. Interpretation of those prior results can be made in the context of the updated reference intervals.    05/12/2021 136 133 - 144 mmol/L Final     INR   Date Value Ref Range Status   10/02/2023 1.19 (H) 0.85 - 1.15 Final       DIALYSIS PROCEDURE NOTE  Hepatitis status of previous patient on machine log was checked and verified ok to use with this patients hepatitis status.  Patient dialyzed for 2.5 hrs. on a K3 bath with a net fluid removal of  3L.  A BFR of 200 ml/min was obtained via a CVC.      The treatment plan was discussed with Dr. Maya during the treatment.    Total heparin received during the treatment: 0 units.     Line flushed, clamped and capped with heparin 1:1000 1.8 mL (1800 units) per lumen    Meds  given: epogen   Complications: none      Person educated: pre-tx. Knowledge base minimal. Barriers to learning: denial. Educated on procedure, access care via oral mode. Patient/family verbalized understanding.     ICEBOAT? Timeout performed pre-treatment  I: Patient was identified using 2 identifiers  C:   Consent Signed Yes  E: Equipment preventative maintenance is current and dialysis delivery system OK to use  B:    Latest Reference Range & Units 09/30/23 07:22   Hep B Surface Agn Nonreactive  Nonreactive   Hepatitis B Surface Antibody Instrument Value <8.00 m[IU]/mL 0.14   Hepatitis B Surface Antibody  Nonreactive     O: Dialysis orders present and complete prior to treatment  A: Vascular access verified and assessed prior to treatment  T: Treatment was performed at a clinically appropriate time  ?: Patient was allowed to ask questions and address concerns prior to treatment  See Adult Hemodialysis flowsheet in [facility]SI for further details and post assessment.  Machine water alarm in place and functioning. Transducer pods intact and checked every 15min.   Pt dialyzed in ICU.  Chlorine/Chloramine water system checked every 4 hours.  Outpatient Dialysis at *not established    Patient repositioned every 2 hours during the treatment.  Post treatment report given to Glo LR RN regarding 3L of fluid removed, last BP of 217/110, and patient pain rating of 0/10.

## 2023-10-02 NOTE — PLAN OF CARE
"Goal Outcome Evaluation:      Plan of Care Reviewed With: patient      Pt. A&O, needs one assist for mobility, pt. Has a PORT and 3 PRIV lines, Dialysis was done today, and 3 l of fluids were removed, Dialysis will be tomorrow morning again, pt. Will be NPO at midnight for EGD tomorrow, pt. Has adela B.P., schedule and PRN Hydralazin were administered, denied pain, has edema +3 in BLE, voided today x3 times.    BP (!) 190/97   Pulse 93   Temp 98.1  F (36.7  C) (Oral)   Resp 24   Ht 1.778 m (5' 10\")   Wt (!) 174.5 kg (384 lb 11.2 oz)   SpO2 98%   BMI 55.20 kg/m              "

## 2023-10-02 NOTE — SEDATION DOCUMENTATION
Post Procedure Summary:  Prior to the start of the procedure and with procedural staff participation, I verbally confirmed the patient s identity using two indicators, relevant allergies, that the procedure was appropriate and matched the consent or emergent situation, and that the correct equipment/implants were available. Immediately prior to starting the procedure I conducted the Time Out with the procedural staff and re-confirmed the patient s name, procedure, and site/side. (The Joint Commission universal protocol was followed.)  Yes       Sedatives: Fentanyl and Midazolam (Versed)    Vital signs, airway and pulse oximetry were monitored and remained stable throughout the procedure and sedation was maintained until the procedure was complete.  The patient was monitored by staff until sedation discharge criteria were met.    Patient tolerance: Patient tolerated the procedure well with no immediate complications.    Time of sedation in minutes: 15 Minutes from beginning to end of physician one to one monitoring.

## 2023-10-02 NOTE — PLAN OF CARE
Patient alert and oriented. Tolerating diet. Voiding per urinal  Dialysis line placed today and receiving dialysis.  Possible EGD tomorrow. Plan for dialysis tomorrow.  Probable discharge to home

## 2023-10-02 NOTE — PROGRESS NOTES
"[facility]  [facility]  Gastroenterology Progress note    Interval History:      Patient reports that he is \"feeling better\".      Vital Signs:      BP (!) 180/88   Pulse 92   Temp 98  F (36.7  C)   Resp 24   Ht 1.778 m (5' 10\")   Wt (!) 174.5 kg (384 lb 11.2 oz)   SpO2 98%   BMI 55.20 kg/m    Temp (24hrs), Av.1  F (36.7  C), Min:97.5  F (36.4  C), Max:98.4  F (36.9  C)    Patient Vitals for the past 72 hrs:   Weight   10/02/23 0300 (!) 174.5 kg (384 lb 11.2 oz)   10/01/23 0200 (!) 157 kg (346 lb 2 oz)   23 0400 (!) 153.8 kg (339 lb 1.1 oz)   23 (!) 168.6 kg (371 lb 11.2 oz)       Intake/Output Summary (Last 24 hours) at 10/2/2023 0854  Last data filed at 10/2/2023 0800  Gross per 24 hour   Intake 1457 ml   Output 1500 ml   Net -43 ml         Constitutional: NAD, comfortable  Cardiovascular: RRR, normal S1, S2   Respiratory: CTAB  Abdomen: soft, non-tender, nondistended    Additional Comments:  ROS, FH, SH: See initial GI consult for details.    Laboratory Data:  Recent Labs   Lab Test 10/02/23  0816 10/02/23  0526 10/01/23  0740 23  1356 23  0722 22  1758 22  1528   WBC  --  20.1* 22.4*  --  16.9*   < >  --    HGB  --  7.6* 7.5* 7.6* 6.9*  6.9*   < >  --    MCV  --  87 90  --  91   < >  --    PLT  --  113* 141*  --  134*   < >  --    INR 1.19*  --   --   --   --   --  1.01    < > = values in this interval not displayed.     Recent Labs   Lab Test 10/02/23  0526 10/01/23  0740 09/30/23  2007   * 134* 133*   POTASSIUM 4.5 4.6 4.6   CHLORIDE 94* 97* 98   CO2 12* 13* 13*   .5* 159.3* 148.1*   CR 14.28* 14.38* 14.43*   ANIONGAP 24* 24* 22*   GAYLE 6.8* 6.5* 6.6*     Recent Labs   Lab Test 10/02/23  0526 10/01/23  0740 23  0618 23  1552 23  1619 22  1721 22  1543 22  0727 22  2133 21  1435 21  0931 17  0605   ALBUMIN 2.9* 3.3*  --  3.3*   < > 3.7   < >  --  3.4*  --    " < > 2.5*   BILITOTAL  --   --   --  0.2  --  0.2  --   --  0.2  --   --  0.6   DBIL  --   --   --   --   --   --   --   --   --   --   --  0.1   ALT  --   --   --  52  --  12  --   --  20  --    < > 47   AST  --   --   --  14  --  11  --   --  9*  --   --  30   ALKPHOS  --   --   --  65  --  102  --   --  82  --   --  48   PROTEIN  --   --  100*  --   --   --   --  50*  --  300*  --   --    LIPASE  --   --   --  1,089*  --  1,362*  --   --   --   --   --   --     < > = values in this interval not displayed.         Assessment:  37 yo male with intermittent melena and history of gastric ulcer in 12/2022.  Patient was H. Pylori positive and underwent treatment.    Hemoglobin stable at 7.6.  Plan:  -  Patient for dialysis catheter placement today and hemodialysis.  Plan for EGD tomorrow 10/3.  NPO after midnight.  -  Continue pantoprazole BID.  -  Monitor H/H; transfuse prn.    Total Time Spent: 15 minutes      ARRON Briggs  [facility]  Office:  [phone] call if needed after 5PM  Cell:  [phone], not available after 5PM at this number

## 2023-10-02 NOTE — PROGRESS NOTES
[facility]    Medicine Progress Note - Hospitalist Service    Date of Admission:  9/29/2023    Assessment & Plan   Brooks Mensah is a 38 year old male with a PMH significant for chronic kidney disease stage IV, hypertension, morbid obesity, CHF, obstructive sleep apnea intolerant to CPAP, MI, GI bleeding due to gastric ulcer, chronically elevated lipase, anemia who presents with a complaint of generalized weakness, abdominal pain that felt similar to what he had during his prior gastric ulcer, decreased urine output, worsening lower extremity edema and being admitted to the hospital on 9/29/2023 with acute on chronic anemia and acute renal failure on chronic kidney disease.     Melena  Abdominal pain, now better  Acute on chronic anemia (of CKD) secondary to upper GI bleeding:  Patient with prior history of gastric ulcer with positive H. Pylori s/p treatment- not on PPI.  Last EDG was in December 2022. Presented with Hgb 5.4 on presentation to ED.  Received 2u pRBC in ED.  1 more unit on admission.  Hgb now stable.   -GI consulted, appreciate recs   -plan is for ani placement today for HD and then GI will do EGD 10/3  -Transition to BID PPI IV  -Transfuse for hemoglobin of less than 7  -Backed off to daily Hgb check     Anasarca  Hyperkalemia  EMILY on CKD stage IV, approaching ESRD  AGMA secondary to renal failure:  Reportedly he had renal bx in December 2022 which showed advanced global and segmental glomerulosclerosis.  On admission, creatinine is 14.6, BUN is 154, significant increase from 6 months ago when it was 62/5.72.  Potassium is  5.4.  AG 22, bicarb is 9.  Lactic acid normal.  VBG shows pH 7.14, co2 31, Hco3 11.  He was started on a bicarb gtt.  Patient has extensive anasarca, +4 pedal, pretibial and ankle edema extending to his thighs.  Given a dose of lokelma in ED.  Nephrology is consulted, appreciate their input  -ani placed and HD to start today   -on bumetanide 1 mg tid  with good UO  -stopped lokelma  -Patient is in agreement with the plan to initiate hemodialysis.  -he's massively volume overloaded at this time     Morbid obesity  Obstructive sleep apnea intolerant to CPAP:  -Patient has MAURI but intolerant to CPAP  -Overall the morbid obesity complicates his care     Elevated lipase of unclear etiology  Diffuse wall thickening of gallbladder:  Patient had prior history of elevated lipase.  CT scan showed diffuse wall thickening of the gallbladder given fluid overload this is unlikely to be cholecystitis. RUQ US does not show evidence of cholecystitis.  Blood cultures done, liver enzymes are normal.  -Hold off antibiotic at this time     Left sided nephrolithiasis, non obstructing     Leukocytosis, likely due to stress demargination:  No fever or other signs of infection. Daily CBC.        Diet: NPO per Anesthesia Guidelines for Procedure/Surgery Except for: Meds  NPO per Anesthesia Guidelines for Procedure/Surgery Except for: Meds    DVT Prophylaxis: Pneumatic Compression Devices  Sol Catheter: Not present  Lines: None     Cardiac Monitoring: ACTIVE order. Indication: Electrolyte Imbalance (24 hours)- Magnesium <1.3 mg/ml; Potassium < =2.8 or > 5.5 mg/ml  Code Status: Full Code      Clinically Significant Risk Factors             # Anion Gap Metabolic Acidosis: Highest Anion Gap = 24 mmol/L in last 2 days, will monitor and treat as appropriate  # Hypoalbuminemia: Lowest albumin = 2.9 g/dL at 10/2/2023  5:26 AM, will monitor as appropriate  # Coagulation Defect: INR = 1.19 (Ref range: 0.85 - 1.15) and/or PTT = N/A, will monitor for bleeding  # Thrombocytopenia: Lowest platelets = 113 in last 2 days, will monitor for bleeding     # Hypertension: Noted on problem list  # Acute heart failure with preserved ejection fraction: heart failure noted on problem list, last echo with EF >50%, and receiving IV diuretics       # Severe Obesity: Estimated body mass index is 55.2 kg/m  as  "calculated from the following:    Height as of this encounter: 1.778 m (5' 10\").    Weight as of this encounter: 174.5 kg (384 lb 11.2 oz)., PRESENT ON ADMISSION            Disposition Plan     Expected Discharge Date: 10/03/2023                  Ciera Alvarez MD  Hospitalist Service  [facility]  Securely message with [employer] (more info)  Text page via [facility] Paging/Directory   ______________________________________________________________________    Interval History   Doing ok, just nervous about his upcoming ani catheter placement. No cp or sob    Physical Exam   Vital Signs: Temp: 98  F (36.7  C) Temp src: Axillary BP: (!) 180/88 Pulse: 92   Resp: 24 SpO2: 98 % O2 Device: Nasal cannula Oxygen Delivery: 2 LPM  Weight: 384 lbs 11.24 oz    General Appearance: Awake and alert no apparent distress.  Appropriate.  Respiratory: Lungs are clear to auscultation in the anterior lung fields.  Body habitus makes it difficult to take a clear listen, however.  Look breathing appears mildly increased on room air, however patient reports this is his baseline.  Cardiovascular: Regular rate and rhythm.  No obvious murmurs rubs or gallops.  GI: Obese.  Benign.  Mild tenderness of lower abdomen at sight of edema.  Bowel sounds are positive.    Skin: No obvious rashes or lesions on exposed skin.  Other: Distal extremities are warm and well-perfused.  Patient has generalized anasarca in all extremities.    Medical Decision Making       45 MINUTES SPENT BY ME on the date of service doing chart review, history, exam, documentation & further activities per the note.      Data   ------------------------- PAST 24 HR DATA REVIEWED -----------------------------------------------    I have personally reviewed the following data over the past 24 hrs:    20.1 (H)  \   7.6 (L)   / 113 (L)     130 (L) 94 (L) 145.5 (H) /  93   4.5 12 (L) 14.28 (H) \     ALT: N/A AST: N/A AP: N/A TBILI: N/A   ALB: 2.9 (L) TOT PROTEIN: N/A " LIPASE: N/A     INR:  1.19 (H) PTT:  N/A   D-dimer:  N/A Fibrinogen:  N/A     Imaging results reviewed over the past 24 hrs:   No results found for this or any previous visit (from the past 24 hour(s)).

## 2023-10-03 ENCOUNTER — ANESTHESIA EVENT (OUTPATIENT)
Dept: SURGERY | Facility: CLINIC | Age: 38
End: 2023-10-03
Payer: COMMERCIAL

## 2023-10-03 ENCOUNTER — ANESTHESIA (OUTPATIENT)
Dept: SURGERY | Facility: CLINIC | Age: 38
End: 2023-10-03
Payer: COMMERCIAL

## 2023-10-03 LAB
ANION GAP SERPL CALCULATED.3IONS-SCNC: 21 MMOL/L (ref 7–15)
BUN SERPL-MCNC: 117 MG/DL (ref 6–20)
CALCIUM SERPL-MCNC: 7.2 MG/DL (ref 8.6–10)
CHLORIDE SERPL-SCNC: 96 MMOL/L (ref 98–107)
CREAT SERPL-MCNC: 12.05 MG/DL (ref 0.67–1.17)
DEPRECATED HCO3 PLAS-SCNC: 15 MMOL/L (ref 22–29)
EGFRCR SERPLBLD CKD-EPI 2021: 5 ML/MIN/1.73M2
ERYTHROCYTE [DISTWIDTH] IN BLOOD BY AUTOMATED COUNT: 17 % (ref 10–15)
GAMMA INTERFERON BACKGROUND BLD IA-ACNC: 0.01 IU/ML
GLUCOSE BLDC GLUCOMTR-MCNC: 100 MG/DL (ref 70–99)
GLUCOSE SERPL-MCNC: 104 MG/DL (ref 70–99)
HCT VFR BLD AUTO: 23.6 % (ref 40–53)
HGB BLD-MCNC: 7.8 G/DL (ref 13.3–17.7)
LACTATE SERPL-SCNC: 0.4 MMOL/L (ref 0.7–2)
M TB IFN-G BLD-IMP: NEGATIVE
M TB IFN-G CD4+ BCKGRND COR BLD-ACNC: 4.64 IU/ML
MCH RBC QN AUTO: 28.4 PG (ref 26.5–33)
MCHC RBC AUTO-ENTMCNC: 33.1 G/DL (ref 31.5–36.5)
MCV RBC AUTO: 86 FL (ref 78–100)
MITOGEN IGNF BCKGRD COR BLD-ACNC: 0.03 IU/ML
MITOGEN IGNF BCKGRD COR BLD-ACNC: 0.05 IU/ML
PLATELET # BLD AUTO: 124 10E3/UL (ref 150–450)
POTASSIUM SERPL-SCNC: 4 MMOL/L (ref 3.4–5.3)
QUANTIFERON MITOGEN: 4.65 IU/ML
QUANTIFERON NIL TUBE: 0.01 IU/ML
QUANTIFERON TB1 TUBE: 0.06 IU/ML
QUANTIFERON TB2 TUBE: 0.04
RBC # BLD AUTO: 2.75 10E6/UL (ref 4.4–5.9)
SODIUM SERPL-SCNC: 132 MMOL/L (ref 135–145)
UPPER GI ENDOSCOPY: NORMAL
WBC # BLD AUTO: 19.9 10E3/UL (ref 4–11)

## 2023-10-03 PROCEDURE — 36415 COLL VENOUS BLD VENIPUNCTURE: CPT | Performed by: STUDENT IN AN ORGANIZED HEALTH CARE EDUCATION/TRAINING PROGRAM

## 2023-10-03 PROCEDURE — 250N000013 HC RX MED GY IP 250 OP 250 PS 637: Performed by: INTERNAL MEDICINE

## 2023-10-03 PROCEDURE — 83605 ASSAY OF LACTIC ACID: CPT | Performed by: STUDENT IN AN ORGANIZED HEALTH CARE EDUCATION/TRAINING PROGRAM

## 2023-10-03 PROCEDURE — 88305 TISSUE EXAM BY PATHOLOGIST: CPT | Mod: TC | Performed by: INTERNAL MEDICINE

## 2023-10-03 PROCEDURE — 272N000001 HC OR GENERAL SUPPLY STERILE: Performed by: INTERNAL MEDICINE

## 2023-10-03 PROCEDURE — 36415 COLL VENOUS BLD VENIPUNCTURE: CPT | Performed by: INTERNAL MEDICINE

## 2023-10-03 PROCEDURE — 258N000003 HC RX IP 258 OP 636: Performed by: INTERNAL MEDICINE

## 2023-10-03 PROCEDURE — 258N000003 HC RX IP 258 OP 636

## 2023-10-03 PROCEDURE — 360N000075 HC SURGERY LEVEL 2, PER MIN: Performed by: INTERNAL MEDICINE

## 2023-10-03 PROCEDURE — 99233 SBSQ HOSP IP/OBS HIGH 50: CPT | Performed by: STUDENT IN AN ORGANIZED HEALTH CARE EDUCATION/TRAINING PROGRAM

## 2023-10-03 PROCEDURE — 120N000001 HC R&B MED SURG/OB

## 2023-10-03 PROCEDURE — 250N000009 HC RX 250

## 2023-10-03 PROCEDURE — 250N000011 HC RX IP 250 OP 636: Mod: JZ | Performed by: INTERNAL MEDICINE

## 2023-10-03 PROCEDURE — 250N000025 HC SEVOFLURANE, PER MIN: Performed by: INTERNAL MEDICINE

## 2023-10-03 PROCEDURE — 370N000017 HC ANESTHESIA TECHNICAL FEE, PER MIN: Performed by: INTERNAL MEDICINE

## 2023-10-03 PROCEDURE — 250N000013 HC RX MED GY IP 250 OP 250 PS 637: Performed by: STUDENT IN AN ORGANIZED HEALTH CARE EDUCATION/TRAINING PROGRAM

## 2023-10-03 PROCEDURE — 250N000011 HC RX IP 250 OP 636: Performed by: INTERNAL MEDICINE

## 2023-10-03 PROCEDURE — 710N000009 HC RECOVERY PHASE 1, LEVEL 1, PER MIN: Performed by: INTERNAL MEDICINE

## 2023-10-03 PROCEDURE — 80048 BASIC METABOLIC PNL TOTAL CA: CPT | Performed by: INTERNAL MEDICINE

## 2023-10-03 PROCEDURE — 90937 HEMODIALYSIS REPEATED EVAL: CPT

## 2023-10-03 PROCEDURE — 0DB78ZX EXCISION OF STOMACH, PYLORUS, VIA NATURAL OR ARTIFICIAL OPENING ENDOSCOPIC, DIAGNOSTIC: ICD-10-PCS | Performed by: INTERNAL MEDICINE

## 2023-10-03 PROCEDURE — 99232 SBSQ HOSP IP/OBS MODERATE 35: CPT | Performed by: INTERNAL MEDICINE

## 2023-10-03 PROCEDURE — 0DB68ZX EXCISION OF STOMACH, VIA NATURAL OR ARTIFICIAL OPENING ENDOSCOPIC, DIAGNOSTIC: ICD-10-PCS | Performed by: INTERNAL MEDICINE

## 2023-10-03 PROCEDURE — 250N000011 HC RX IP 250 OP 636

## 2023-10-03 PROCEDURE — 999N000141 HC STATISTIC PRE-PROCEDURE NURSING ASSESSMENT: Performed by: INTERNAL MEDICINE

## 2023-10-03 PROCEDURE — 85027 COMPLETE CBC AUTOMATED: CPT | Performed by: INTERNAL MEDICINE

## 2023-10-03 RX ORDER — OXYCODONE HYDROCHLORIDE 5 MG/1
5 TABLET ORAL
Status: DISCONTINUED | OUTPATIENT
Start: 2023-10-03 | End: 2023-10-03 | Stop reason: HOSPADM

## 2023-10-03 RX ORDER — ONDANSETRON 2 MG/ML
4 INJECTION INTRAMUSCULAR; INTRAVENOUS EVERY 30 MIN PRN
Status: DISCONTINUED | OUTPATIENT
Start: 2023-10-03 | End: 2023-10-03 | Stop reason: HOSPADM

## 2023-10-03 RX ORDER — HYDROMORPHONE HCL IN WATER/PF 6 MG/30 ML
0.4 PATIENT CONTROLLED ANALGESIA SYRINGE INTRAVENOUS EVERY 5 MIN PRN
Status: DISCONTINUED | OUTPATIENT
Start: 2023-10-03 | End: 2023-10-03 | Stop reason: HOSPADM

## 2023-10-03 RX ORDER — ONDANSETRON 4 MG/1
4 TABLET, ORALLY DISINTEGRATING ORAL EVERY 30 MIN PRN
Status: DISCONTINUED | OUTPATIENT
Start: 2023-10-03 | End: 2023-10-03 | Stop reason: HOSPADM

## 2023-10-03 RX ORDER — ACETAMINOPHEN 325 MG/1
975 TABLET ORAL ONCE
Status: DISCONTINUED | OUTPATIENT
Start: 2023-10-03 | End: 2023-10-03 | Stop reason: HOSPADM

## 2023-10-03 RX ORDER — PROPOFOL 10 MG/ML
INJECTION, EMULSION INTRAVENOUS PRN
Status: DISCONTINUED | OUTPATIENT
Start: 2023-10-03 | End: 2023-10-03

## 2023-10-03 RX ORDER — OXYCODONE HYDROCHLORIDE 5 MG/1
10 TABLET ORAL
Status: DISCONTINUED | OUTPATIENT
Start: 2023-10-03 | End: 2023-10-03 | Stop reason: HOSPADM

## 2023-10-03 RX ORDER — DEXAMETHASONE SODIUM PHOSPHATE 4 MG/ML
INJECTION, SOLUTION INTRA-ARTICULAR; INTRALESIONAL; INTRAMUSCULAR; INTRAVENOUS; SOFT TISSUE PRN
Status: DISCONTINUED | OUTPATIENT
Start: 2023-10-03 | End: 2023-10-03

## 2023-10-03 RX ORDER — LIDOCAINE 40 MG/G
CREAM TOPICAL
Status: DISCONTINUED | OUTPATIENT
Start: 2023-10-03 | End: 2023-10-06 | Stop reason: HOSPADM

## 2023-10-03 RX ORDER — SODIUM CHLORIDE, SODIUM LACTATE, POTASSIUM CHLORIDE, CALCIUM CHLORIDE 600; 310; 30; 20 MG/100ML; MG/100ML; MG/100ML; MG/100ML
INJECTION, SOLUTION INTRAVENOUS CONTINUOUS PRN
Status: DISCONTINUED | OUTPATIENT
Start: 2023-10-03 | End: 2023-10-03

## 2023-10-03 RX ORDER — FENTANYL CITRATE 50 UG/ML
INJECTION, SOLUTION INTRAMUSCULAR; INTRAVENOUS PRN
Status: DISCONTINUED | OUTPATIENT
Start: 2023-10-03 | End: 2023-10-03

## 2023-10-03 RX ORDER — PANTOPRAZOLE SODIUM 40 MG/1
40 TABLET, DELAYED RELEASE ORAL
Status: DISCONTINUED | OUTPATIENT
Start: 2023-10-03 | End: 2023-10-06 | Stop reason: HOSPADM

## 2023-10-03 RX ORDER — KETAMINE HYDROCHLORIDE 10 MG/ML
INJECTION INTRAMUSCULAR; INTRAVENOUS PRN
Status: DISCONTINUED | OUTPATIENT
Start: 2023-10-03 | End: 2023-10-03

## 2023-10-03 RX ORDER — HYDRALAZINE HYDROCHLORIDE 20 MG/ML
20 INJECTION INTRAMUSCULAR; INTRAVENOUS EVERY 6 HOURS PRN
Status: DISCONTINUED | OUTPATIENT
Start: 2023-10-03 | End: 2023-10-06 | Stop reason: HOSPADM

## 2023-10-03 RX ORDER — FENTANYL CITRATE 50 UG/ML
25 INJECTION, SOLUTION INTRAMUSCULAR; INTRAVENOUS EVERY 5 MIN PRN
Status: DISCONTINUED | OUTPATIENT
Start: 2023-10-03 | End: 2023-10-03 | Stop reason: HOSPADM

## 2023-10-03 RX ORDER — SODIUM CHLORIDE, SODIUM LACTATE, POTASSIUM CHLORIDE, CALCIUM CHLORIDE 600; 310; 30; 20 MG/100ML; MG/100ML; MG/100ML; MG/100ML
INJECTION, SOLUTION INTRAVENOUS CONTINUOUS
Status: DISCONTINUED | OUTPATIENT
Start: 2023-10-03 | End: 2023-10-03 | Stop reason: HOSPADM

## 2023-10-03 RX ORDER — GLYCOPYRROLATE 0.2 MG/ML
INJECTION, SOLUTION INTRAMUSCULAR; INTRAVENOUS PRN
Status: DISCONTINUED | OUTPATIENT
Start: 2023-10-03 | End: 2023-10-03

## 2023-10-03 RX ORDER — FENTANYL CITRATE 50 UG/ML
50 INJECTION, SOLUTION INTRAMUSCULAR; INTRAVENOUS EVERY 5 MIN PRN
Status: DISCONTINUED | OUTPATIENT
Start: 2023-10-03 | End: 2023-10-03 | Stop reason: HOSPADM

## 2023-10-03 RX ORDER — SODIUM CHLORIDE 9 MG/ML
500 INJECTION, SOLUTION INTRAVENOUS CONTINUOUS
Status: DISCONTINUED | OUTPATIENT
Start: 2023-10-03 | End: 2023-10-04

## 2023-10-03 RX ORDER — HYDROMORPHONE HCL IN WATER/PF 6 MG/30 ML
0.2 PATIENT CONTROLLED ANALGESIA SYRINGE INTRAVENOUS EVERY 5 MIN PRN
Status: DISCONTINUED | OUTPATIENT
Start: 2023-10-03 | End: 2023-10-03 | Stop reason: HOSPADM

## 2023-10-03 RX ORDER — HYDRALAZINE HYDROCHLORIDE 20 MG/ML
10 INJECTION INTRAMUSCULAR; INTRAVENOUS ONCE
Status: COMPLETED | OUTPATIENT
Start: 2023-10-03 | End: 2023-10-03

## 2023-10-03 RX ORDER — FLUMAZENIL 0.1 MG/ML
0.2 INJECTION, SOLUTION INTRAVENOUS
Status: ACTIVE | OUTPATIENT
Start: 2023-10-03 | End: 2023-10-04

## 2023-10-03 RX ADMIN — AMLODIPINE BESYLATE 2.5 MG: 2.5 TABLET ORAL at 16:09

## 2023-10-03 RX ADMIN — FENTANYL CITRATE 100 MCG: 50 INJECTION, SOLUTION INTRAMUSCULAR; INTRAVENOUS at 13:16

## 2023-10-03 RX ADMIN — HYDRALAZINE HYDROCHLORIDE 10 MG: 10 TABLET, FILM COATED ORAL at 17:15

## 2023-10-03 RX ADMIN — MIDAZOLAM 2 MG: 1 INJECTION INTRAMUSCULAR; INTRAVENOUS at 13:09

## 2023-10-03 RX ADMIN — GLYCOPYRROLATE 0.2 MG: 0.2 INJECTION, SOLUTION INTRAMUSCULAR; INTRAVENOUS at 13:20

## 2023-10-03 RX ADMIN — CALCIUM ACETATE 1334 MG: 667 CAPSULE ORAL at 17:14

## 2023-10-03 RX ADMIN — SODIUM CHLORIDE 250 ML: 9 INJECTION, SOLUTION INTRAVENOUS at 08:39

## 2023-10-03 RX ADMIN — PANTOPRAZOLE SODIUM 40 MG: 40 TABLET, DELAYED RELEASE ORAL at 16:10

## 2023-10-03 RX ADMIN — CALCIUM 1000 MG: 500 TABLET ORAL at 21:39

## 2023-10-03 RX ADMIN — HYDRALAZINE HYDROCHLORIDE 10 MG: 20 INJECTION INTRAMUSCULAR; INTRAVENOUS at 02:36

## 2023-10-03 RX ADMIN — HYDRALAZINE HYDROCHLORIDE 10 MG: 10 TABLET, FILM COATED ORAL at 06:07

## 2023-10-03 RX ADMIN — CALCIUM 1000 MG: 500 TABLET ORAL at 16:09

## 2023-10-03 RX ADMIN — SODIUM CHLORIDE 300 ML: 9 INJECTION, SOLUTION INTRAVENOUS at 08:39

## 2023-10-03 RX ADMIN — CARVEDILOL 6.25 MG: 6.25 TABLET, FILM COATED ORAL at 17:14

## 2023-10-03 RX ADMIN — SODIUM CHLORIDE 500 ML: 9 INJECTION, SOLUTION INTRAVENOUS at 16:15

## 2023-10-03 RX ADMIN — ONDANSETRON 4 MG: 2 INJECTION INTRAMUSCULAR; INTRAVENOUS at 13:24

## 2023-10-03 RX ADMIN — BUMETANIDE 1 MG: 0.25 INJECTION INTRAMUSCULAR; INTRAVENOUS at 21:38

## 2023-10-03 RX ADMIN — Medication 160 MG: at 13:16

## 2023-10-03 RX ADMIN — BUMETANIDE 1 MG: 0.25 INJECTION INTRAMUSCULAR; INTRAVENOUS at 04:11

## 2023-10-03 RX ADMIN — TRAZODONE HYDROCHLORIDE 50 MG: 50 TABLET ORAL at 00:33

## 2023-10-03 RX ADMIN — HYDRALAZINE HYDROCHLORIDE 10 MG: 20 INJECTION INTRAMUSCULAR; INTRAVENOUS at 04:58

## 2023-10-03 RX ADMIN — DIAZEPAM 5 MG: 5 TABLET ORAL at 21:39

## 2023-10-03 RX ADMIN — HYDRALAZINE HYDROCHLORIDE 10 MG: 10 TABLET, FILM COATED ORAL at 00:33

## 2023-10-03 RX ADMIN — SODIUM CHLORIDE, POTASSIUM CHLORIDE, SODIUM LACTATE AND CALCIUM CHLORIDE: 600; 310; 30; 20 INJECTION, SOLUTION INTRAVENOUS at 13:09

## 2023-10-03 RX ADMIN — Medication 30 MG: at 13:16

## 2023-10-03 RX ADMIN — PROPOFOL 100 MG: 10 INJECTION, EMULSION INTRAVENOUS at 13:16

## 2023-10-03 RX ADMIN — DEXAMETHASONE SODIUM PHOSPHATE 4 MG: 4 INJECTION, SOLUTION INTRA-ARTICULAR; INTRALESIONAL; INTRAMUSCULAR; INTRAVENOUS; SOFT TISSUE at 13:20

## 2023-10-03 RX ADMIN — CALCITRIOL CAPSULES 0.25 MCG 0.25 MCG: 0.25 CAPSULE ORAL at 21:39

## 2023-10-03 ASSESSMENT — ACTIVITIES OF DAILY LIVING (ADL)
ADLS_ACUITY_SCORE: 34
ADLS_ACUITY_SCORE: 29
ADLS_ACUITY_SCORE: 34
ADLS_ACUITY_SCORE: 34
ADLS_ACUITY_SCORE: 29
ADLS_ACUITY_SCORE: 34
ADLS_ACUITY_SCORE: 29
ADLS_ACUITY_SCORE: 29
ADLS_ACUITY_SCORE: 34
DEPENDENT_IADLS:: INDEPENDENT
ADLS_ACUITY_SCORE: 34

## 2023-10-03 ASSESSMENT — ENCOUNTER SYMPTOMS: DYSRHYTHMIAS: 0

## 2023-10-03 ASSESSMENT — LIFESTYLE VARIABLES: TOBACCO_USE: 1

## 2023-10-03 NOTE — CONSULTS
Care Management Initial Consult    General Information  Assessment completed with: Patient,         Primary Care Provider verified and updated as needed:     Readmission within the last 30 days:        Reason for Consult: discharge planning  Advance Care Planning:            Communication Assessment  Patient's communication style: spoken language (English or Bilingual)    Hearing Difficulty or Deaf: no   Wear Glasses or Blind: no    Cognitive  Cognitive/Neuro/Behavioral: WDL                      Living Environment:   People in home: sibling(s), parent(s)     Current living Arrangements: house      Able to return to prior arrangements: yes       Family/Social Support:  Care provided by: self, parent(s)  Provides care for: no one  Marital Status: Single  Parent(s), Sibling(s)          Description of Support System: Involved, Supportive         Current Resources:   Patient receiving home care services: No     Community Resources:    Equipment currently used at home: none  Supplies currently used at home:      Employment/Financial:  Employment Status:          Financial Concerns:             Does the patient's insurance plan have a 3 day qualifying hospital stay waiver?  Yes     Which insurance plan 3 day waiver is available? Alternative insurance waiver    Will the waiver be used for post-acute placement? No    Lifestyle & Psychosocial Needs:  Social Determinants of Health     Food Insecurity: Not on file   Depression: Not at risk (2/4/2022)    PHQ-2     PHQ-2 Score: 1   Housing Stability: Not on file   Tobacco Use: High Risk (10/3/2023)    Patient History     Smoking Tobacco Use: Every Day     Smokeless Tobacco Use: Never     Passive Exposure: Not on file   Financial Resource Strain: Not on file   Alcohol Use: Not on file   Transportation Needs: Not on file   Physical Activity: Not on file   Interpersonal Safety: Not on file   Stress: Not on file   Social Connections: Not on file       Functional Status:  Prior to  admission patient needed assistance:   Dependent ADLs:: Independent  Dependent IADLs:: Independent       Beliefs, Spiritism Practices, Values that affect care:                 Additional Information:  Consult placed for discharge planning to assist in establishing new outpatient HD. URR of 30% admitted with abd pain, anemia, anasarca and EMILY with CKD. Patient followed by nephrology for his ESRD that will now require hemodialysis.     Met with patient briefly in dialysis this morning. Drowsy but able to answer a few questions to get started on HD referral to [facility]. Patient currently is living with his mother and sister. 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 He resides in [place] and would like to start dialysis at the [facility] in [place].  Patient will be able to drive himself to his appointments and would prefer a third shift chair time.  All hepatitis and TB labs have been resulted. [facility] Intake checklist, labs and flowsheets faxed to [phone]. Received call from a [facility] admissions coordinator Zayra who stated only a few pieces of the clinical documentation had been received. Gave some requested information over the phone to start the referral process and they will call back if they do not receive additional information. The coordinator that will be handling this patients intake will be Olesya [phone] ext [phone]    Will continue to assist in coordinating discharge for patient. Per MD patient may be ready for outpt HD by Friday or next Monday based on hospital course.    Addendum 1550:  Received call from [facility] tracking # [phone]  They have received all the clinical information and should be able to call with a confirmed chair time at [facility] tomorrow. Questions can be called to the above coordinator.    Tia Brown RN BSN OCN  Care Coordinator  [facility]  [phone]

## 2023-10-03 NOTE — ANESTHESIA PROCEDURE NOTES
Airway       Patient location during procedure: OR       Procedure Start/Stop Times: 10/3/2023 1:17 PM  Staff -        CRNA: Melissa Guthrie APRN CRNA       Performed By: CRNA  Consent for Airway        Urgency: elective  Indications and Patient Condition       Indications for airway management: bryanna-procedural       Induction type:RSI       Mask difficulty assessment: 0 - not attempted    Final Airway Details       Final airway type: endotracheal airway       Successful airway: ETT - single  Endotracheal Airway Details        ETT size (mm): 7.5       Cuffed: yes       Successful intubation technique: video laryngoscopy       VL Blade Size: Glidescope 4       Grade View of Cords: 1       Adjucts: stylet       Position: Right       Measured from: gums/teeth       Secured at (cm): 23       Bite block used: None    Post intubation assessment        Placement verified by: capnometry, equal breath sounds and chest rise        Number of attempts at approach: 1       Number of other approaches attempted: 0       Secured with: plastic tape       Ease of procedure: easy       Dentition: Unchanged    Medication(s) Administered   Medication Administration Time: 10/3/2023 1:17 PM

## 2023-10-03 NOTE — PLAN OF CARE
Pt is alert and oriented x4. Pt denies pain or discomfort. VSS. No cute distress noted. Pt BP was elevated and Hydralazine was given. Pt is on Tele monitoring. Pt is NPO at midnight except meds for EGD in the morning. Pt is on dialysis. Pt is assist of two in transfer and cares. Pt is sleeping in bed. Bed alarm in place and call light within reach. Will continue to monitor and assess pt.

## 2023-10-03 NOTE — ANESTHESIA PREPROCEDURE EVALUATION
Anesthesia Pre-Procedure Evaluation    Patient: Brooks Mensah   MRN: [id number] : 1985        Procedure : Procedure(s):  ESOPHAGOGASTRODUODENOSCOPY          Past Medical History:   Diagnosis Date    Acute on chronic systolic congestive heart failure (H) 2017    CAD (coronary artery disease)     Nonobstructive    Cardiomyopathy, unspecified (H) 2017    Congestive heart failure, unspecified congestive heart failure chronicity, unspecified congestive heart failure type 2017    HTN (hypertension), malignant     Hypertensive urgency 2017    Morbid obesity with BMI of 50.0-59.9, adult (H) 2017    NSTEMI (non-ST elevated myocardial infarction) (H) 2017    Renal insufficiency 2017    Sleep apnea     Tobacco abuse       Past Surgical History:   Procedure Laterality Date    ESOPHAGOSCOPY, GASTROSCOPY, DUODENOSCOPY (EGD), COMBINED N/A 2022    Procedure: ESOPHAGOGASTRODUODENOSCOPY, WITH BIOPSY;  Surgeon: Dm Warner MD;  Location:  GI    IR CVC TUNNEL PLACEMENT > 5 YRS OF AGE  10/2/2023      No Known Allergies   Social History     Tobacco Use    Smoking status: Every Day     Packs/day: 0.25     Types: Cigarettes     Start date:     Smokeless tobacco: Never    Tobacco comments:     1 pack per week   Substance Use Topics    Alcohol use: Yes     Comment: occassional      Wt Readings from Last 1 Encounters:   10/02/23 (!) 174.5 kg (384 lb 11.2 oz)        Anesthesia Evaluation            ROS/MED HX  ENT/Pulmonary:     (+) sleep apnea, doesn't use CPAP,              tobacco use,                       Neurologic:       Cardiovascular: Comment: Study Date: 2023 09:02 AM  Age: 38 yrs  Gender: Male  Patient Location: [facility]  Reason For Study: CHF  Ordering Physician: GUILLERMINA DIETRICH  Performed By: ILDA Fernandez     BSA: 2.6 m2  Height: 70 in  Weight: 339 lb  BP: 170/80  mmHg  ______________________________________________________________________________  Procedure  Complete Portable Echo Adult. Optison (NDC #[id number]) given intravenously.  ______________________________________________________________________________  Interpretation Summary     TDS     The visual ejection fraction is 55-60%.  There is severe concentric left ventricular hypertrophy.  The left ventricle is borderline dilated.  There is mild apical wall hypokinesis.  The right ventricle is normal in structure, function and size.  The left atrium is mildly dilated.  There is mild (1+) aortic regurgitation.  There is no pericardial effusion.     LV wall thickness is more pronounced compared to 2019 study, otherwise no  significant changes.  ____________________      (+) Dyslipidemia hypertension- -  CAD - past MI - -      CHF                             (-) arrhythmias and pulmonary hypertension   METS/Exercise Tolerance:     Hematologic:     (+)      anemia,          Musculoskeletal:   (+)  arthritis,             GI/Hepatic:    (-) GERD   Renal/Genitourinary:     (+) renal disease, type: ESRD, Pt requires dialysis,           Endo:     (+)               Obesity,       Psychiatric/Substance Use:       Infectious Disease:       Malignancy:       Other:            Physical Exam    Airway        Mallampati: III       Respiratory Devices and Support         Dental           Cardiovascular   cardiovascular exam normal          Pulmonary   pulmonary exam normal        (+) decreased breath sounds           OUTSIDE LABS:  CBC:   Lab Results   Component Value Date    WBC 19.9 (H) 10/03/2023    WBC 20.1 (H) 10/02/2023    HGB 7.8 (L) 10/03/2023    HGB 7.6 (L) 10/02/2023    HCT 23.6 (L) 10/03/2023    HCT 22.7 (L) 10/02/2023     (L) 10/03/2023     (L) 10/02/2023     BMP:   Lab Results   Component Value Date     (L) 10/03/2023     (L) 10/02/2023    POTASSIUM 4.0 10/03/2023    POTASSIUM 4.5 10/02/2023     CHLORIDE 96 (L) 10/03/2023    CHLORIDE 94 (L) 10/02/2023    CO2 15 (L) 10/03/2023    CO2 12 (L) 10/02/2023    .0 (H) 10/03/2023    .5 (H) 10/02/2023    CR 12.05 (H) 10/03/2023    CR 14.28 (H) 10/02/2023     (H) 10/03/2023     (H) 10/03/2023     COAGS:   Lab Results   Component Value Date    PTT 21 (L) 11/30/2022    INR 1.19 (H) 10/02/2023     POC:   Lab Results   Component Value Date     (H) 06/14/2017    HCGS Negative 06/15/2017     HEPATIC:   Lab Results   Component Value Date    ALBUMIN 2.9 (L) 10/02/2023    PROTTOTAL 5.9 (L) 09/29/2023    ALT 52 09/29/2023    AST 14 09/29/2023    ALKPHOS 65 09/29/2023    BILITOTAL 0.2 09/29/2023     OTHER:   Lab Results   Component Value Date    PH 7.40 05/13/2019    LACT <0.3 (L) 09/29/2023    A1C <4.2 11/29/2022    GAYLE 7.2 (L) 10/03/2023    PHOS 12.1 (H) 10/02/2023    MAG 2.5 (H) 06/16/2017    LIPASE 1,089 (H) 09/29/2023       Anesthesia Plan    ASA Status:  4       Anesthesia Type: General.     - Airway: ETT   Induction: RSI, Propofol.   Maintenance: Balanced.   Techniques and Equipment:     - Airway: Video-Laryngoscope       Consents    Anesthesia Plan(s) and associated risks, benefits, and realistic alternatives discussed. Questions answered and patient/representative(s) expressed understanding.     - Discussed:     - Discussed with:  Patient      - Extended Intubation/Ventilatory Support Discussed: No.      - Patient is DNR/DNI Status: No     Use of blood products discussed: No .     Postoperative Care    Pain management: IV analgesics, Oral pain medications.   PONV prophylaxis: Ondansetron (or other 5HT-3), Background Propofol Infusion     Comments:                Alex Gonzales MD

## 2023-10-03 NOTE — PLAN OF CARE
Hospitalist ordered one time Hydralazine. Medication administered. Will continue t o monitor and assess pt.

## 2023-10-03 NOTE — PLAN OF CARE
Pt BP in 180's. Pt was given PRN Hydralazine but BP still elevated in 180's. Hospitalist bran. Will continue to monitor and assess pt.

## 2023-10-03 NOTE — PROGRESS NOTES
Physician Attestation   I, Basil Lopes DO, was present with the medical/CHARLIE student who participated in the service and in the documentation of the note.  I have verified the history and personally performed the physical exam and medical decision making.  I agree with the assessment and plan of care as documented in the note.      Key findings: See note below.      Please see A&P for additional details of medical decision making.    I have personally reviewed the following data over the past 24 hrs:    19.9 (H)  \   7.8 (L)   / 124 (L)     132 (L) 96 (L) 117.0 (H) /  100 (H)   4.0 15 (L) 12.05 (H) \         Basil Lopes DO  Date of Service (when I saw the patient): 10/03/23      [facility]    Medicine Progress Note - Hospitalist Service    Date of Admission:  9/29/2023    Assessment & Plan   Brooks Mensah is a 38 year old male with a PMH significant for chronic kidney disease stage IV, hypertension, morbid obesity, CHF, obstructive sleep apnea intolerant to CPAP, MI, GI bleeding due to gastric ulcer, chronically elevated lipase, anemia who presents with a complaint of generalized weakness, abdominal pain that felt similar to what he had during his prior gastric ulcer, decreased urine output, worsening lower extremity edema and being admitted to the hospital on 9/29/2023 with acute on chronic anemia and acute renal failure on chronic kidney disease.     Melena  Abdominal pain, now better  Acute on chronic anemia (of CKD) secondary to upper GI bleeding:  Patient with prior history of gastric ulcer with positive H. Pylori s/p treatment- not on PPI.  Last EDG was in December 2022. Presented with Hgb 5.4 on presentation to ED.  Received 2u pRBC in ED.  1 more unit on admission.  Hgb now stable.   -GI consulted, appreciate recs   -EGD normal per GI report   -Consider colonoscopy in the future once further stabilized if any ongoing evidence of bleeding  -Transition to BID PPI PO  -Transfuse for  hemoglobin of less than 7  -Backed off to daily Hgb check     Anasarca  Hyperkalemia  EMILY on CKD stage IV, approaching ESRD  AGMA secondary to renal failure:  Reportedly he had renal bx in December 2022 which showed advanced global and segmental glomerulosclerosis.  On admission, creatinine is 14.6, BUN is 154, significant increase from 6 months ago when it was 62/5.72.  Potassium is  5.4.  AG 22, bicarb is 9.  Lactic acid normal.  VBG shows pH 7.14, co2 31, Hco3 11.  He was started on a bicarb gtt.  Patient has extensive anasarca, +4 pedal, pretibial and ankle edema extending to his thighs.  Given a dose of lokelma in ED.  -Nephrology is consulted, appreciate their input  -ani placed and HD started   -on bumetanide 1 mg tid with good UO  -stopped lokelma  -Patient is in agreement with the plan to initiate hemodialysis.  -he's massively volume overloaded at this time     Morbid obesity  Obstructive sleep apnea intolerant to CPAP:  -Patient has MAURI but intolerant to CPAP  -Overall the morbid obesity complicates his care     Elevated lipase of unclear etiology  Diffuse wall thickening of gallbladder:  Patient had prior history of elevated lipase.  CT scan showed diffuse wall thickening of the gallbladder given fluid overload this is unlikely to be cholecystitis. RUQ US does not show evidence of cholecystitis.  Blood cultures done, liver enzymes are normal.  -Hold off antibiotic at this time    High blood pressure   Pt had BP of 212/112 overnight, treated with hydralazine by cross cover. Clinic visits suggest pt runs at 160-170/ high 80s. Suspect HBP is due to massive volume overload in setting of CKD IV approaching ESRD. Cont to pull off fluid via HD. Cont current regiment:   - Bumex IV 1 mg q8  - HD as per nephrology   - Hydralazine 10 mg q6 PRN  - Amlodipine 2.5 mg daily    - Coreg 6.25 mg BID      Left sided nephrolithiasis, non obstructing     Leukocytosis, likely due to stress demargination:  No fever or  "other signs of infection. Daily CBC.        Diet: NPO per Anesthesia Guidelines for Procedure/Surgery Except for: Meds    DVT Prophylaxis: Pneumatic Compression Devices  Sol Catheter: Not present  Lines: PRESENT      CVC Double Lumen Right Internal jugular Tunneled-Site Assessment: WDL      Cardiac Monitoring: None  Code Status: Full Code      Clinically Significant Risk Factors             # Anion Gap Metabolic Acidosis: Highest Anion Gap = 24 mmol/L in last 2 days, will monitor and treat as appropriate  # Hypoalbuminemia: Lowest albumin = 2.9 g/dL at 10/2/2023  5:26 AM, will monitor as appropriate  # Coagulation Defect: INR = 1.19 (Ref range: 0.85 - 1.15) and/or PTT = N/A, will monitor for bleeding  # Thrombocytopenia: Lowest platelets = 113 in last 2 days, will monitor for bleeding       # Hypertension: Noted on problem list  # Acute heart failure with preserved ejection fraction: heart failure noted on problem list, last echo with EF >50%, and receiving IV diuretics         # Severe Obesity: Estimated body mass index is 55.2 kg/m  as calculated from the following:    Height as of this encounter: 1.778 m (5' 10\").    Weight as of this encounter: 174.5 kg (384 lb 11.2 oz).  , PRESENT ON ADMISSION            Disposition Plan      Expected Discharge Date: 10/05/2023        Discharge Comments: Will need further dialysis for volume removal prior to discharge, will need dialysis unit set up prior to discharge.  Need PT/OT.  Anticpate 3-5 more days.          Xiomara Dela Cruz MS4      Hospitalist Service  [facility]  Securely message with [employer] (more info)  Text page via [employer] Paging/Directory   ______________________________________________________________________    Interval History   Pt at hemodialysis. Was able to pull off 3.5 L at HD run yesterday. In addition to needing HD, pending GI work up for gastric ulcer. Pt is floor status.     Physical Exam   Vital Signs: Temp: 99.2  F (37.3  C) Temp " src: Temporal BP: (!) 165/89 Pulse: 97   Resp: 22 SpO2: 92 % O2 Device: Nasal cannula Oxygen Delivery: 2 LPM  Weight: 384 lbs 11.24 oz    General Appearance:     Awake and alert no apparent distress.  Appropriate.  Respiratory: Lungs are clear to auscultation in the anterior lung fields.  Body habitus makes it difficult to take a clear listen, however.  Normal WOB.  Cardiovascular: Regular rate and rhythm.  No obvious murmurs rubs or gallops.  GI: Obese.  Benign.  no TTP.    Skin: No obvious rashes or lesions on exposed skin.  Other:   Distal extremities are warm and well-perfused.  Patient has generalized anasarca in all extremities.    Medical Decision Making       45 MINUTES SPENT BY ME on the date of service doing chart review, history, exam, documentation & further activities per the note.      Data   ------------------------- PAST 24 HR DATA REVIEWED -----------------------------------------------    I have personally reviewed the following data over the past 24 hrs:    19.9 (H)  \   7.8 (L)   / 124 (L)     132 (L) 96 (L) 117.0 (H) /  100 (H)   4.0 15 (L) 12.05 (H) \     Imaging results reviewed over the past 24 hrs:   No results found for this or any previous visit (from the past 24 hour(s)).

## 2023-10-03 NOTE — PROGRESS NOTES
Potassium   Date Value Ref Range Status   10/03/2023 4.0 3.4 - 5.3 mmol/L Final   09/20/2021 3.7 3.4 - 5.3 mmol/L Final   05/12/2021 3.6 3.4 - 5.3 mmol/L Final     Hemoglobin   Date Value Ref Range Status   10/03/2023 7.8 (L) 13.3 - 17.7 g/dL Final   06/16/2017 11.7 (L) 13.3 - 17.7 g/dL Final     Creatinine   Date Value Ref Range Status   10/03/2023 12.05 (H) 0.67 - 1.17 mg/dL Final   05/12/2021 2.11 (H) 0.66 - 1.25 mg/dL Final     Urea Nitrogen   Date Value Ref Range Status   10/03/2023 117.0 (H) 6.0 - 20.0 mg/dL Final   09/20/2021 27 7 - 30 mg/dL Final   05/12/2021 24 7 - 30 mg/dL Final     Sodium   Date Value Ref Range Status   10/03/2023 132 (L) 135 - 145 mmol/L Final     Comment:     Reference intervals for this test were updated on 09/26/2023 to more accurately reflect our healthy population. There may be differences in the flagging of prior results with similar values performed with this method. Interpretation of those prior results can be made in the context of the updated reference intervals.    05/12/2021 136 133 - 144 mmol/L Final     INR   Date Value Ref Range Status   10/02/2023 1.19 (H) 0.85 - 1.15 Final       DIALYSIS PROCEDURE NOTE  Hepatitis status of previous patient on machine log was checked and verified ok to use with this patients hepatitis status.  Patient dialyzed for 3 hrs. on a K3 bath with a net fluid removal of  3L.  A BFR of 250 ml/min was obtained via a CVC.      The treatment plan was discussed with Dr. Maya during the treatment.    Total heparin received during the treatment: 0 units.     Line flushed, clamped and capped with heparin 1:1000 1.8 mL (1800 units) per lumen    Meds  given: none   Complications: none      Person educated: Pre-tx. Knowledge base minimal. Barriers to learning: none. Educated on procedure via oral mode. Patient/family verbalized understanding.     ICEBOAT? Timeout performed pre-treatment  I: Patient was identified using 2 identifiers  C:  Consent Signed  Yes  E: Equipment preventative maintenance is current and dialysis delivery system OK to use  B:    Latest Reference Range & Units 09/30/23 07:22   Hep B Surface Agn Nonreactive  Nonreactive   Hepatitis B Surface Antibody Instrument Value <8.00 m[IU]/mL 0.14   Hepatitis B Surface Antibody  Nonreactive     O: Dialysis orders present and complete prior to treatment  A: Vascular access verified and assessed prior to treatment  T: Treatment was performed at a clinically appropriate time  ?: Patient was allowed to ask questions and address concerns prior to treatment  See Adult Hemodialysis flowsheet in [employer] for further details and post assessment.  Machine water alarm in place and functioning. Transducer pods intact and checked every 15min.   Pt returned via wheelchair.  Chlorine/Chloramine water system checked every 4 hours.  Outpatient Dialysis at *not established    Patient repositioned every 2 hours during the treatment.  Post treatment report given to [facility] ICU RN regarding 3.5L of fluid removed, last BP of 168/87, and patient pain rating of 0/10.

## 2023-10-03 NOTE — ANESTHESIA CARE TRANSFER NOTE
Patient: Brooks Mensah    Procedure: Procedure(s):  ESOPHAGOGASTRODUODENOSCOPY       Diagnosis: Anemia due to blood loss, acute [D62]  Diagnosis Additional Information: No value filed.    Anesthesia Type:   General     Note:    Oropharynx: oropharynx clear of all foreign objects  Level of Consciousness: drowsy  Oxygen Supplementation: face mask  Level of Supplemental Oxygen (L/min / FiO2): 6  Independent Airway: airway patency satisfactory and stable  Dentition: dentition unchanged  Vital Signs Stable: post-procedure vital signs reviewed and stable  Report to RN Given: handoff report given  Patient transferred to: PACU    Handoff Report: Identifed the Patient, Identified the Reponsible Provider, Reviewed the pertinent medical history, Discussed the surgical course, Reviewed Intra-OP anesthesia mangement and issues during anesthesia, Set expectations for post-procedure period and Allowed opportunity for questions and acknowledgement of understanding      Vitals:  Vitals Value Taken Time   BP     Temp     Pulse 101 10/03/23 1337   Resp 18 10/03/23 1337   SpO2 98 % 10/03/23 1337   Vitals shown include unvalidated device data.    Electronically Signed By: YUNIER Adrian CRNA  October 3, 2023  1:38 PM

## 2023-10-03 NOTE — SIGNIFICANT EVENT
Significant Event Note    Time of event: 4:48 AM October 3, 2023    Description of event:  See phone call regarding patient having persistently elevated blood pressure after receiving 10 mg of IV hydralazine x1 for systolic blood pressure greater than 190    Plan:  Medical records reviewed, safe to increase hydralazine as needed to 20 mg, will give one-time dose of IV hydralazine of 10 mg additionally at this time    Discussed with: bedside nurse    Annemarie Maddox MD

## 2023-10-03 NOTE — PROGRESS NOTES
Renal Medicine Inpatient Dialysis Note                                Brooks Mensah MRN# [id number]   Age: 38 year old YOB: 1985   Date of Admission: 9/29/2023 Hospital LOS: 4          Assessment/Plan:       1.  ESRD   -FSGS    First dialysis 10/02/23    2.  Metabolic acidosis  3.  Hyperkalemia  4.  Hyperphosphatemia  5.  Hypocalcemia  6.  Volume overload  7.  Anemia   -HEAVENLY    Next 10/04/23  Continue UF as tolerated    Prefers [facility]   MWF 3rd shift     Interval History:     Dialysis run parameters reviewed with dialysis RN at patient bedside.  Seen on run  TDC catheter placed without issue    3 hours  250 ml/min  3.5 liter UF    Stable early portion of run    Comfortable     Continued daily dialysis for massive volume overload   BP should improved with UF     ROS     GENERAL: NAD, No fever,chills  R: NEGATIVE for significant cough or SOB  GI: NEGATIVE for abdominal pain, heartburn, nausea, vomiting or change in bowel pattern  EXT: no change edema  ROS otherwise negative    Dialysis Parameters:     Vitals were reviewed  Patient Vitals for the past 8 hrs:   BP Temp Temp src Pulse Resp SpO2   10/03/23 1100 (!) 161/89 -- -- 88 -- 96 %   10/03/23 1045 (!) 168/94 -- -- 90 -- 97 %   10/03/23 1030 (!) 166/85 -- -- 90 -- 96 %   10/03/23 1015 (!) 162/90 -- -- 90 -- 97 %   10/03/23 1000 (!) 166/89 -- -- 89 -- 96 %   10/03/23 0945 (!) 161/86 -- -- 90 -- 95 %   10/03/23 0930 (!) 168/86 -- -- 91 -- 97 %   10/03/23 0915 (!) 172/91 -- -- 89 -- 93 %   10/03/23 0900 (!) 170/93 -- -- 93 -- 92 %   10/03/23 0845 (!) 174/90 -- -- 92 -- 92 %   10/03/23 0830 (!) 176/93 -- -- 92 -- 95 %   10/03/23 0823 (!) 174/93 -- -- 91 -- 94 %   10/03/23 0815 (!) 180/92 97.6  F (36.4  C) Oral 91 20 94 %   10/03/23 0700 (!) 174/90 -- -- 93 18 --   10/03/23 0607 (!) 180/93 -- -- -- -- --   10/03/23 0500 (!) 184/88 -- -- -- -- --   10/03/23 0412 (!) 187/92 -- -- -- -- --     I/O last 3 completed shifts:  In: 150 [P.O.:150]  Out: 4186  "[Urine:1350; Other:3000]    Vitals:    09/29/23 2006 09/30/23 0400 10/01/23 0200 10/02/23 0300   Weight: (!) 168.6 kg (371 lb 11.2 oz) (!) 153.8 kg (339 lb 1.1 oz) (!) 157 kg (346 lb 2 oz) (!) 174.5 kg (384 lb 11.2 oz)       Current Weight: 174.5  Dry Weight: 165  Dialysis Temp: 36.5  C  Access Device: IJ  Access Site: right  Dialyzer: Revaclear  Dialysis Bath: 3  Sodium Profile: n  UF Goal: 3  Blood Flow Rate (mL/min): 200  Total Treatment Time (hrs): 2.5  Heparin: Low dose as required      EPO dose: y  Zemplar: n  IV Fe: n      Medications and Allergies:     Reviewed      Physical Exam:     Seen and examined during course of dialysis run    BP (!) 161/89   Pulse 88   Temp 97.6  F (36.4  C) (Oral)   Resp 20   Ht 1.778 m (5' 10\")   Wt (!) 174.5 kg (384 lb 11.2 oz)   SpO2 96%   BMI 55.20 kg/m      GENERAL: awake, alert, follows  HEENT: NC/AT, PERRLA, EOMI, non icteric, pharynx moist without lesion  RESP: symmetric excursion, good effort, lungs clear - no rales, rhonchi or wheezes  CV: RRR, normal S1 S2  MS: + edema  SKIN: catheter site clean without drainage    Data:       Recent Labs   Lab 10/03/23  0800 10/03/23  0531   NA  --  132*   POTASSIUM  --  4.0   CHLORIDE  --  96*   CO2  --  15*   ANIONGAP  --  21*   * 104*   BUN  --  117.0*   CR  --  12.05*   GFRESTIMATED  --  5*   GAYLE  --  7.2*     Recent Labs   Lab 10/03/23  0531 10/02/23  0526   POTASSIUM 4.0 4.5     Recent Labs   Lab 10/02/23  0526 10/01/23  0740 09/29/23  1552   ALBUMIN 2.9* 3.3* 3.3*         G Abdulaziz Maya MD    [facility] - Nephrology  [phone]           "

## 2023-10-03 NOTE — PROGRESS NOTES
Patient Transfer Information  Patient connected to monitoring equipment on arrival: yes Capnography     Patient connected to wall oxygen on arrival: Yes    Belongings: Transferred with patient    Safety check completed: Yes

## 2023-10-03 NOTE — ANESTHESIA POSTPROCEDURE EVALUATION
Patient: Brooks Mensah    Procedure: Procedure(s):  ESOPHAGOGASTRODUODENOSCOPY       Anesthesia Type:  General    Note:  Disposition: Inpatient   Postop Pain Control: Uneventful            Sign Out: Well controlled pain   PONV: No   Neuro/Psych: Uneventful            Sign Out: Acceptable/Baseline neuro status   Airway/Respiratory: Uneventful            Sign Out: Acceptable/Baseline resp. status   CV/Hemodynamics: Uneventful            Sign Out: Acceptable CV status; No obvious hypovolemia; No obvious fluid overload   Other NRE: NONE   DID A NON-ROUTINE EVENT OCCUR? No           Last vitals:  Vitals Value Taken Time   /87 10/03/23 1340   Temp     Pulse 99 10/03/23 1344   Resp 26 10/03/23 1344   SpO2 97 % 10/03/23 1344   Vitals shown include unvalidated device data.    Electronically Signed By: Jennifer Robles MD  October 3, 2023  1:45 PM

## 2023-10-03 NOTE — PLAN OF CARE
Goal Outcome Evaluation:      Plan of Care Reviewed With: patient    Overall Patient Progress: no changeOverall Patient Progress: no change     Care [id number]. VSS ex htn. Discussed htn of 190s/100s w/ cross cover MD- received order for 1x dose 2.5mg norvasc.   Denies pain. R chest dialysis cath WDL. Tele sr, lungs clear on room air. Small dark brown hard/formed stool w/ small amount of blood.     NPO at midnight, plan for dialysis and EGD tomorrow AM.    Tx to med floor when bed available.

## 2023-10-04 LAB
ABO/RH(D): NORMAL
ALBUMIN SERPL BCG-MCNC: 2.6 G/DL (ref 3.5–5.2)
ANION GAP SERPL CALCULATED.3IONS-SCNC: 17 MMOL/L (ref 7–15)
ANTIBODY SCREEN: NEGATIVE
BACTERIA BLD CULT: NO GROWTH
BACTERIA BLD CULT: NO GROWTH
BLD PROD TYP BPU: NORMAL
BLOOD COMPONENT TYPE: NORMAL
BUN SERPL-MCNC: 87.5 MG/DL (ref 6–20)
CALCIUM SERPL-MCNC: 7 MG/DL (ref 8.6–10)
CHLORIDE SERPL-SCNC: 97 MMOL/L (ref 98–107)
CODING SYSTEM: NORMAL
CREAT SERPL-MCNC: 9.93 MG/DL (ref 0.67–1.17)
CROSSMATCH: NORMAL
DEPRECATED HCO3 PLAS-SCNC: 19 MMOL/L (ref 22–29)
EGFRCR SERPLBLD CKD-EPI 2021: 6 ML/MIN/1.73M2
ERYTHROCYTE [DISTWIDTH] IN BLOOD BY AUTOMATED COUNT: 17 % (ref 10–15)
GLUCOSE SERPL-MCNC: 106 MG/DL (ref 70–99)
HCT VFR BLD AUTO: 19.8 % (ref 40–53)
HGB BLD-MCNC: 6.5 G/DL (ref 13.3–17.7)
ISSUE DATE AND TIME: NORMAL
MCH RBC QN AUTO: 29.1 PG (ref 26.5–33)
MCHC RBC AUTO-ENTMCNC: 32.8 G/DL (ref 31.5–36.5)
MCV RBC AUTO: 89 FL (ref 78–100)
PATH REPORT.COMMENTS IMP SPEC: NORMAL
PATH REPORT.FINAL DX SPEC: NORMAL
PATH REPORT.GROSS SPEC: NORMAL
PATH REPORT.MICROSCOPIC SPEC OTHER STN: NORMAL
PATH REPORT.RELEVANT HX SPEC: NORMAL
PHOSPHATE SERPL-MCNC: 7.8 MG/DL (ref 2.5–4.5)
PHOTO IMAGE: NORMAL
PLATELET # BLD AUTO: 116 10E3/UL (ref 150–450)
POTASSIUM SERPL-SCNC: 4.2 MMOL/L (ref 3.4–5.3)
RBC # BLD AUTO: 2.23 10E6/UL (ref 4.4–5.9)
SODIUM SERPL-SCNC: 133 MMOL/L (ref 135–145)
SPECIMEN EXPIRATION DATE: NORMAL
UNIT ABO/RH: NORMAL
UNIT NUMBER: NORMAL
UNIT STATUS: NORMAL
UNIT TYPE ISBT: 5100
WBC # BLD AUTO: 16.2 10E3/UL (ref 4–11)

## 2023-10-04 PROCEDURE — 90935 HEMODIALYSIS ONE EVALUATION: CPT

## 2023-10-04 PROCEDURE — 36415 COLL VENOUS BLD VENIPUNCTURE: CPT | Performed by: HOSPITALIST

## 2023-10-04 PROCEDURE — 120N000001 HC R&B MED SURG/OB

## 2023-10-04 PROCEDURE — 85027 COMPLETE CBC AUTOMATED: CPT | Performed by: STUDENT IN AN ORGANIZED HEALTH CARE EDUCATION/TRAINING PROGRAM

## 2023-10-04 PROCEDURE — 99232 SBSQ HOSP IP/OBS MODERATE 35: CPT | Performed by: INTERNAL MEDICINE

## 2023-10-04 PROCEDURE — 86901 BLOOD TYPING SEROLOGIC RH(D): CPT | Performed by: HOSPITALIST

## 2023-10-04 PROCEDURE — 80069 RENAL FUNCTION PANEL: CPT | Performed by: STUDENT IN AN ORGANIZED HEALTH CARE EDUCATION/TRAINING PROGRAM

## 2023-10-04 PROCEDURE — 250N000013 HC RX MED GY IP 250 OP 250 PS 637: Performed by: STUDENT IN AN ORGANIZED HEALTH CARE EDUCATION/TRAINING PROGRAM

## 2023-10-04 PROCEDURE — 250N000013 HC RX MED GY IP 250 OP 250 PS 637: Performed by: INTERNAL MEDICINE

## 2023-10-04 PROCEDURE — 258N000003 HC RX IP 258 OP 636: Performed by: INTERNAL MEDICINE

## 2023-10-04 PROCEDURE — P9016 RBC LEUKOCYTES REDUCED: HCPCS | Performed by: HOSPITALIST

## 2023-10-04 PROCEDURE — 86923 COMPATIBILITY TEST ELECTRIC: CPT | Performed by: HOSPITALIST

## 2023-10-04 PROCEDURE — 36415 COLL VENOUS BLD VENIPUNCTURE: CPT | Performed by: STUDENT IN AN ORGANIZED HEALTH CARE EDUCATION/TRAINING PROGRAM

## 2023-10-04 PROCEDURE — 99233 SBSQ HOSP IP/OBS HIGH 50: CPT | Performed by: HOSPITALIST

## 2023-10-04 PROCEDURE — 88305 TISSUE EXAM BY PATHOLOGIST: CPT | Mod: 26 | Performed by: PATHOLOGY

## 2023-10-04 PROCEDURE — 250N000011 HC RX IP 250 OP 636: Performed by: INTERNAL MEDICINE

## 2023-10-04 PROCEDURE — 634N000001 HC RX 634: Mod: JZ | Performed by: HOSPITALIST

## 2023-10-04 PROCEDURE — 250N000011 HC RX IP 250 OP 636: Mod: JZ | Performed by: INTERNAL MEDICINE

## 2023-10-04 RX ORDER — HEPARIN SODIUM 1000 [USP'U]/ML
500 INJECTION, SOLUTION INTRAVENOUS; SUBCUTANEOUS CONTINUOUS
Status: DISCONTINUED | OUTPATIENT
Start: 2023-10-04 | End: 2023-10-04

## 2023-10-04 RX ORDER — LISINOPRIL 10 MG/1
10 TABLET ORAL DAILY
Status: DISCONTINUED | OUTPATIENT
Start: 2023-10-04 | End: 2023-10-05

## 2023-10-04 RX ADMIN — SODIUM CHLORIDE 250 ML: 9 INJECTION, SOLUTION INTRAVENOUS at 08:38

## 2023-10-04 RX ADMIN — CALCIUM ACETATE 1334 MG: 667 CAPSULE ORAL at 18:41

## 2023-10-04 RX ADMIN — SODIUM CHLORIDE 300 ML: 9 INJECTION, SOLUTION INTRAVENOUS at 08:30

## 2023-10-04 RX ADMIN — EPOETIN ALFA-EPBX 4000 UNITS: 2000 INJECTION, SOLUTION INTRAVENOUS; SUBCUTANEOUS at 10:36

## 2023-10-04 RX ADMIN — LISINOPRIL 10 MG: 10 TABLET ORAL at 14:05

## 2023-10-04 RX ADMIN — AMLODIPINE BESYLATE 2.5 MG: 2.5 TABLET ORAL at 12:30

## 2023-10-04 RX ADMIN — CALCIUM 1000 MG: 500 TABLET ORAL at 16:25

## 2023-10-04 RX ADMIN — HYDRALAZINE HYDROCHLORIDE 10 MG: 10 TABLET, FILM COATED ORAL at 07:00

## 2023-10-04 RX ADMIN — BUMETANIDE 1 MG: 0.25 INJECTION INTRAMUSCULAR; INTRAVENOUS at 12:33

## 2023-10-04 RX ADMIN — CALCIUM 1000 MG: 500 TABLET ORAL at 21:52

## 2023-10-04 RX ADMIN — CARVEDILOL 6.25 MG: 6.25 TABLET, FILM COATED ORAL at 18:43

## 2023-10-04 RX ADMIN — CARVEDILOL 6.25 MG: 6.25 TABLET, FILM COATED ORAL at 12:33

## 2023-10-04 RX ADMIN — PANTOPRAZOLE SODIUM 40 MG: 40 TABLET, DELAYED RELEASE ORAL at 07:00

## 2023-10-04 RX ADMIN — CALCIUM 1000 MG: 500 TABLET ORAL at 12:33

## 2023-10-04 RX ADMIN — CALCITRIOL CAPSULES 0.25 MCG 0.25 MCG: 0.25 CAPSULE ORAL at 12:31

## 2023-10-04 RX ADMIN — BUMETANIDE 1 MG: 0.25 INJECTION INTRAMUSCULAR; INTRAVENOUS at 04:12

## 2023-10-04 RX ADMIN — HYDRALAZINE HYDROCHLORIDE 10 MG: 10 TABLET, FILM COATED ORAL at 12:45

## 2023-10-04 RX ADMIN — HYDRALAZINE HYDROCHLORIDE 10 MG: 10 TABLET, FILM COATED ORAL at 18:43

## 2023-10-04 RX ADMIN — HEPARIN SODIUM 1600 UNITS: 1000 INJECTION INTRAVENOUS; SUBCUTANEOUS at 11:40

## 2023-10-04 RX ADMIN — HYDRALAZINE HYDROCHLORIDE 10 MG: 10 TABLET, FILM COATED ORAL at 00:00

## 2023-10-04 RX ADMIN — CALCITRIOL CAPSULES 0.25 MCG 0.25 MCG: 0.25 CAPSULE ORAL at 21:52

## 2023-10-04 RX ADMIN — PANTOPRAZOLE SODIUM 40 MG: 40 TABLET, DELAYED RELEASE ORAL at 16:25

## 2023-10-04 RX ADMIN — CALCIUM ACETATE 1334 MG: 667 CAPSULE ORAL at 12:42

## 2023-10-04 ASSESSMENT — ACTIVITIES OF DAILY LIVING (ADL)
ADLS_ACUITY_SCORE: 29
ADLS_ACUITY_SCORE: 30

## 2023-10-04 NOTE — PLAN OF CARE
Goal Outcome Evaluation:    VS elevated on BP meds and BB. Dc'd Norvasc, added Lisinopril. Dc'd IV Bumex. Dialysis tomorrow and Friday. Plan to discharge on Friday afterwards. Cr improving. Pt voiding well in urinal. Echo EF 55-60%, see report for further details. EGD done yesterday,see report, on PO Protonix. Hgb 6.5, x1 PRBC's transfused in Dialysis this morning.  Pt unsteady up with walker. Trial off o2, placed back on 1Lo2 this afternoon.

## 2023-10-04 NOTE — PLAN OF CARE
"BP (!) 150/70 (BP Location: Right arm)   Pulse 90   Temp 99.1  F (37.3  C) (Oral)   Resp 20   Ht 1.778 m (5' 10\")   Wt (!) 174.5 kg (384 lb 11.2 oz)   SpO2 (!) 85%   BMI 55.20 kg/m      MENTAL STATUS:  Alert, oriented x4.    CVS exam: S1, S2 normal, no murmur, click, rub or gallop, regular rate and rhythm, 4+ edema present hips down bilaterally, abdomen rounded and patient states full, swelling noted in arms bilaterally.   RESPIRATORY: LS diminished in bases.   GI/: pt uses urinal at the bedside  INTEGUMENTARY: petechial rash present of bilateral arms, scab noted on right foot, skin is taut and clair on legs bilaterally    SAFETY: Bed in the low and locked position, call light in reach, patient aware of need to not attempt of get out of bed. Pt able to do minimal adjustments of self in bed   MOOD/COPING: patient calm and cooperative.    Dee Carranza RN    Problem: Plan of Care - These are the overarching goals to be used throughout the patient stay.    Goal: Absence of Hospital-Acquired Illness or Injury  Outcome: Not Progressing  Intervention: Identify and Manage Fall Risk  Recent Flowsheet Documentation  Taken 10/3/2023 2346 by Dee Carranza, RN  Safety Promotion/Fall Prevention: safety round/check completed  Taken 10/3/2023 2150 by Dee Carranza, RN  Safety Promotion/Fall Prevention:   clutter free environment maintained   room organization consistent   safety round/check completed  Goal: Optimal Comfort and Wellbeing  Outcome: Not Progressing  Goal: Readiness for Transition of Care  Outcome: Not Progressing     Problem: Plan of Care - These are the overarching goals to be used throughout the patient stay.    Goal: Plan of Care Review  Description: The Plan of Care Review/Shift note should be completed every shift.  The Outcome Evaluation is a brief statement about your assessment that the patient is improving, declining, or no change.  This information will be displayed automatically on your " "shift note.  Outcome: Progressing  Flowsheets (Taken 10/4/2023 0116)  Outcome Evaluation: .  Goal: Patient-Specific Goal (Individualized)  Description: You can add care plan individualizations to a care plan. Examples of Individualization might be:  \"Parent requests to be called daily at 9am for status\", \"I have a hard time hearing out of my right ear\", or \"Do not touch me to wake me up as it startles me\".  Outcome: Progressing     Problem: Acute Kidney Injury/Impairment  Goal: Fluid and Electrolyte Balance  Outcome: Progressing  Goal: Optimal Nutrition Intake  Outcome: Progressing  Goal: Effective Renal Function  Outcome: Progressing  Intervention: Monitor and Support Renal Function  Recent Flowsheet Documentation  Taken 10/3/2023 2150 by Dee Carranza, RN  Medication Review/Management:   medications reviewed   high-risk medications identified         "

## 2023-10-04 NOTE — PROGRESS NOTES
Care Management Follow Up    Length of Stay (days): 5    Expected Discharge Date: 10/06/2023     Concerns to be Addressed: new OP HD      Patient plan of care discussed at interdisciplinary rounds: Yes    Anticipated Discharge Disposition: Dialysis Services     Anticipated Discharge Services:  new OPHD  Education Provided on the Discharge Plan:  yes  Patient/Family in Agreement with the Plan:  yes    Referrals Placed by CM/SW:  ([facility])    Additional Information:  RN CC following in collaboration with [facility]missions and Nephrology for initiation of new OP HD.    Final confirmation plan for new OP HD is as follows:  Patient will be discharging to home with family and have outpatient dialysis at:  [facility] in [place].  First Outpatient run will be on Monday 10/9. Patient should arrive at 3:00pm for the first run to complete paperwork.  For subsequent runs, chair time will be every (MWF) at 3:40pm. Arrival should be 15 min before chair time.  Transportation to and from dialysis will be provided by: family or himself    AVS discharge instructions and patient were updated on final plan of care.           Amber Still RN BSN CM  Inpatient Care Coordination  [facility]  [phone]

## 2023-10-04 NOTE — DISCHARGE INSTRUCTIONS
You will get your outpatient dialysis at:  [facility]  [address], [place] [address]  [phone]    Your first outpatient run will be on Monday 10/9.  Please arrive at 3:00pm to complete paperwork.    For subsequent runs, your chair time will be every  (MWF) at 3:40pm.  You should plan to arrive 15 minutes prior to your chair time.    Please bring:   Two forms of ID  Insurance cards  Your CURRENT medication list  Wear a button up shirt  Bring something quiet to do  Bring a pillow or a small blanket because the room can be chilly.

## 2023-10-04 NOTE — PLAN OF CARE
"Goal Outcome Evaluation:      Plan of Care Reviewed With: patient, spouse    Overall Patient Progress: no changeOverall Patient Progress: no change     Blood pressure (!) 161/76, pulse 92, temperature 98.6  F (37  C), temperature source Oral, resp. rate 17, height 1.778 m (5' 10\"), weight (!) 174.5 kg (384 lb 11.2 oz), SpO2 92 %.    Assumed care of patient at 1500 after transfer from PACU.  Pt A&Ox4 on 1 lpm of O2 and denies pain, CP, and SOB.  +4 edema in lower extremities.  Dialysis run planned for tomorrow.  Pt on renal diet.  Provider aware of elevated BP through day.  IV running NS at 10 mL hour.  CVC Double Lumen for dialysis WDL.  Pt with family through most of shift.  Urinal at bedside.  Pt able to pivot but has declined to get out of bed.  Continue with POC.    "

## 2023-10-04 NOTE — PROGRESS NOTES
Renal Medicine Inpatient Dialysis Note                                Brooks Mensah MRN# [id number]   Age: 38 year old YOB: 1985   Date of Admission: 9/29/2023 Hospital LOS: 5          Assessment/Plan:       1.  ESRD   -FSGS    First dialysis 10/02/23    2.  Metabolic acidosis  3.  Hyperkalemia  4.  Hyperphosphatemia  5.  Hypocalcemia  6.  Volume overload  7.  Anemia   -HEAVENLY    Next 10/05/23  Continue UF as tolerated    Prefers [facility]   MWF 3rd shift available     Stop IV diuretic  Stop amlodipine  Start lisinopril       Interval History:     Dialysis run parameters reviewed with dialysis RN at patient bedside.  Seen on run  TDC catheter placed without issue    3 hours  300 ml/min  3.5 liter UF    Stable early portion of run    Comfortable     Continued daily dialysis for massive volume overload   BP should improved with UF     ROS     GENERAL: NAD, No fever,chills  R: NEGATIVE for significant cough or SOB  GI: NEGATIVE for abdominal pain, heartburn, nausea, vomiting or change in bowel pattern  EXT: no change edema  ROS otherwise negative    Dialysis Parameters:     Vitals were reviewed  Patient Vitals for the past 8 hrs:   BP Temp Temp src Pulse Resp SpO2 Weight   10/04/23 1222 (!) 163/76 98.6  F (37  C) Oral 84 16 91 % --   10/04/23 1200 (!) 182/96 -- -- 81 12 96 % --   10/04/23 1145 (!) 180/95 98  F (36.7  C) Oral 82 16 95 % --   10/04/23 1140 -- -- -- 80 15 95 % --   10/04/23 1139 (!) 178/113 -- -- 81 18 94 % --   10/04/23 1130 (!) 180/94 -- -- 78 21 95 % --   10/04/23 1115 (!) 167/89 -- -- 76 20 98 % --   10/04/23 1100 (!) 179/90 -- -- 77 20 99 % --   10/04/23 1055 (!) 179/90 98.6  F (37  C) Oral 76 20 99 % --   10/04/23 1045 (!) 172/91 -- -- 75 16 99 % --   10/04/23 1030 (!) 183/89 -- -- 80 19 97 % --   10/04/23 1015 (!) 175/95 -- -- 75 19 99 % --   10/04/23 1000 (!) 172/89 -- -- 75 19 99 % --   10/04/23 0945 (!) 172/96 -- -- 85 19 97 % --   10/04/23 0930 (!) 171/88 -- -- 80 20 97 % --  "  10/04/23 0915 (!) 178/87 -- -- 81 19 98 % --   10/04/23 0905 -- 98.4  F (36.9  C) Oral -- -- 97 % --   10/04/23 0900 (!) 179/90 -- -- 84 -- 98 % --   10/04/23 0853 -- 97.6  F (36.4  C) Oral -- -- 98 % --   10/04/23 0845 (!) 167/89 -- -- 83 -- 98 % --   10/04/23 0838 (!) 179/94 -- -- 83 -- 98 % --   10/04/23 0830 (!) 182/96 97.6  F (36.4  C) Oral 83 22 98 % --   10/04/23 0758 (!) 157/68 99.3  F (37.4  C) Axillary 82 20 94 % --   10/04/23 0700 (!) 143/66 -- -- -- -- -- (!) 167.6 kg (369 lb 8 oz)     I/O last 3 completed shifts:  In: 1275 [P.O.:1150; I.V.:125]  Out: 5050 [Urine:1550; Other:3500]    Vitals:    09/29/23 2006 09/30/23 0400 10/01/23 0200 10/02/23 0300   Weight: (!) 168.6 kg (371 lb 11.2 oz) (!) 153.8 kg (339 lb 1.1 oz) (!) 157 kg (346 lb 2 oz) (!) 174.5 kg (384 lb 11.2 oz)    10/04/23 0700   Weight: (!) 167.6 kg (369 lb 8 oz)       Current Weight: 174.5  Dry Weight: 165  Dialysis Temp: 36.5  C  Access Device: IJ  Access Site: right  Dialyzer: Revaclear  Dialysis Bath: 3  Sodium Profile: n  UF Goal: 3  Blood Flow Rate (mL/min): 200  Total Treatment Time (hrs): 2.5  Heparin: Low dose as required      EPO dose: y  Zemplar: n  IV Fe: n      Medications and Allergies:     Reviewed      Physical Exam:     Seen and examined during course of dialysis run    BP (!) 163/76 (BP Location: Right arm)   Pulse 84   Temp 98.6  F (37  C) (Oral)   Resp 16   Ht 1.778 m (5' 10\")   Wt (!) 167.6 kg (369 lb 8 oz)   SpO2 91%   BMI 53.02 kg/m      GENERAL: awake, alert, follows  HEENT: NC/AT, PERRLA, EOMI, non icteric, pharynx moist without lesion  RESP: symmetric excursion, good effort, lungs clear - no rales, rhonchi or wheezes  CV: RRR, normal S1 S2  MS: + edema  SKIN: catheter site clean without drainage    Data:       Recent Labs   Lab 10/04/23  0635   *   POTASSIUM 4.2   CHLORIDE 97*   CO2 19*   ANIONGAP 17*   *   BUN 87.5*   CR 9.93*   GFRESTIMATED 6*   GAYLE 7.0*     Recent Labs   Lab 10/04/23  0635 " 10/03/23  0531   POTASSIUM 4.2 4.0     Recent Labs   Lab 10/04/23  0635 10/02/23  0526 10/01/23  0740 09/29/23  1552   ALBUMIN 2.6* 2.9* 3.3* 3.3*         G Abdulaziz Maya MD    [facility]phrology  [phone]

## 2023-10-04 NOTE — PROGRESS NOTES
Potassium   Date Value Ref Range Status   10/04/2023 4.2 3.4 - 5.3 mmol/L Final   09/20/2021 3.7 3.4 - 5.3 mmol/L Final   05/12/2021 3.6 3.4 - 5.3 mmol/L Final     Hemoglobin   Date Value Ref Range Status   10/04/2023 6.5 (LL) 13.3 - 17.7 g/dL Final   06/16/2017 11.7 (L) 13.3 - 17.7 g/dL Final     Creatinine   Date Value Ref Range Status   10/04/2023 9.93 (H) 0.67 - 1.17 mg/dL Final   05/12/2021 2.11 (H) 0.66 - 1.25 mg/dL Final     Urea Nitrogen   Date Value Ref Range Status   10/04/2023 87.5 (H) 6.0 - 20.0 mg/dL Final   09/20/2021 27 7 - 30 mg/dL Final   05/12/2021 24 7 - 30 mg/dL Final     Sodium   Date Value Ref Range Status   10/04/2023 133 (L) 135 - 145 mmol/L Final     Comment:     Reference intervals for this test were updated on 09/26/2023 to more accurately reflect our healthy population. There may be differences in the flagging of prior results with similar values performed with this method. Interpretation of those prior results can be made in the context of the updated reference intervals.    05/12/2021 136 133 - 144 mmol/L Final     INR   Date Value Ref Range Status   10/02/2023 1.19 (H) 0.85 - 1.15 Final       DIALYSIS PROCEDURE NOTE  Hepatitis status of previous patient on machine log was checked and verified ok to use with this patients hepatitis status.  Patient dialyzed for 3 hrs. on a K3 bath with a net fluid removal of  3.5L.  A BFR of 300 ml/min was obtained via a R tunneled internal jugular CVC.     The treatment plan was discussed with Dr. Maya during the treatment.    Total heparin received during the treatment: 0 units.   Line flushed, clamped and capped with heparin 1:1000 1.6 mL (1600 units) per lumen    Meds  given: EPO, 1U pRBCs given during HD run  Complications: none      Person educated: patient. Knowledge base limited. Barriers to learning: none. Educated on procedure and central line care via verbal mode and written literature on CVC line care. Patient verbalized understanding.    ICEBOAT? Timeout performed pre-treatment  I: Patient was identified using 2 identifiers  C:  Consent Signed Yes  E: Equipment preventative maintenance is current and dialysis delivery system OK to use  B:    Latest Reference Range & Units 09/30/23 07:22   Hep B Surface Agn Nonreactive  Nonreactive   Hepatitis B Surface Antibody Instrument Value <8.00 m[IU]/mL 0.14   Hepatitis B Surface Antibody  Nonreactive     O: Dialysis orders present and complete prior to treatment  A: Vascular access verified and assessed prior to treatment  T: Treatment was performed at a clinically appropriate time  ?: Patient was allowed to ask questions and address concerns prior to treatment  See Adult Hemodialysis flowsheet in [facility] for further details and post assessment.  Machine water alarm in place and functioning. Transducer pods intact and checked every 15min.   Pt assisted with repositioning throughout dialysis treatment.  Pt returned via wheelchair.  Chlorine/Chloramine water system checked every 4 hours.  Outpatient Dialysis at [facility] currently being arranged.    Post treatment report given to NARENDRA Morton regarding 3.5L of fluid removed, last /96.    Mandi Sol RN

## 2023-10-04 NOTE — PROGRESS NOTES
"[facility]  [facility]  Gastroenterology Progress note    Interval History:      EGD without source of bleeding.  Note hemoglobin drop with no evidence of GI bleed.      Vital Signs:      BP (!) 172/89   Pulse 75   Temp 98.4  F (36.9  C) (Oral)   Resp 19   Ht 1.778 m (5' 10\")   Wt (!) 167.6 kg (369 lb 8 oz)   SpO2 99%   BMI 53.02 kg/m    Temp (24hrs), Av.5  F (36.9  C), Min:97.5  F (36.4  C), Max:99.7  F (37.6  C)    Patient Vitals for the past 72 hrs:   Weight   10/04/23 0700 (!) 167.6 kg (369 lb 8 oz)   10/02/23 0300 (!) 174.5 kg (384 lb 11.2 oz)       Intake/Output Summary (Last 24 hours) at 10/4/2023 1012  Last data filed at 10/4/2023 0830  Gross per 24 hour   Intake 1275 ml   Output 4550 ml   Net -3275 ml         Constitutional: NAD, comfortable  Cardiovascular: RRR, normal S1, S2   Respiratory: CTAB  Abdomen: soft, non-tender, nondistended    Additional Comments:  ROS, FH, SH: See initial GI consult for details.    Laboratory Data:  Recent Labs   Lab Test 10/04/23  0635 10/03/23  0531 10/02/23  0816 10/02/23  0526 11/30/22  1758 22  1528   WBC 16.2* 19.9*  --  20.1*   < >  --    HGB 6.5* 7.8*  --  7.6*   < >  --    MCV 89 86  --  87   < >  --    * 124*  --  113*   < >  --    INR  --   --  1.19*  --   --  1.01    < > = values in this interval not displayed.     Recent Labs   Lab Test 10/04/23  0635 10/03/23  0531 10/02/23  0526   * 132* 130*   POTASSIUM 4.2 4.0 4.5   CHLORIDE 97* 96* 94*   CO2 19* 15* 12*   BUN 87.5* 117.0* 145.5*   CR 9.93* 12.05* 14.28*   ANIONGAP 17* 21* 24*   GAYLE 7.0* 7.2* 6.8*     Recent Labs   Lab Test 10/04/23  0635 10/02/23  0526 10/01/23  0740 23  0618 23  1552 23  1619 22  1721 22  1543 22  0727 22  2133 21  1435 21  0931 17  0605   ALBUMIN 2.6* 2.9* 3.3*  --  3.3*   < > 3.7   < >  --  3.4*  --    < > 2.5*   BILITOTAL  --   --   --   --  0.2  --  0.2  --   --  0.2  " --   --  0.6   DBIL  --   --   --   --   --   --   --   --   --   --   --   --  0.1   ALT  --   --   --   --  52  --  12  --   --  20  --    < > 47   AST  --   --   --   --  14  --  11  --   --  9*  --   --  30   ALKPHOS  --   --   --   --  65  --  102  --   --  82  --   --  48   PROTEIN  --   --   --  100*  --   --   --   --  50*  --  300*  --   --    LIPASE  --   --   --   --  1,089*  --  1,362*  --   --   --   --   --   --     < > = values in this interval not displayed.         Assessment:  39 yo male with intermittent melena and history of gastric ulcer in 12/2022.  Patient was H. Pylori positive and underwent treatment.    Hemoglobin down to 6.5.  EGD 10/3 with normal esophagus, erythematous mucosa in the gastric body and antrum, biopsies pending, normal duodenum.  No source for melena and anemia.  Patient reports intermittent Pepto Bismol use, possible source of black stools.  Plan:  -  No source for bleeding found on EGD.  Biopsies pending.  -  No further GI workup planned at this time.  Anemia is likely multifactorial.  Can consider colonoscopy in the future if evidence of GI bleeding or as outpatient when patient is recovered.  -  Continue pantoprazole BID.  -  Monitor H/H; transfuse prn.  -  Will sign off.  Please call with questions.     Total Time Spent: 15 minutes    ARRON Briggs  [facility]  Office:  [phone] call if needed after 5PM  Cell:  [phone], not available after 5PM at this number

## 2023-10-05 LAB
ANION GAP SERPL CALCULATED.3IONS-SCNC: 14 MMOL/L (ref 7–15)
BUN SERPL-MCNC: 63.6 MG/DL (ref 6–20)
CALCIUM SERPL-MCNC: 7.5 MG/DL (ref 8.6–10)
CHLORIDE SERPL-SCNC: 97 MMOL/L (ref 98–107)
CREAT SERPL-MCNC: 7.88 MG/DL (ref 0.67–1.17)
DEPRECATED HCO3 PLAS-SCNC: 23 MMOL/L (ref 22–29)
EGFRCR SERPLBLD CKD-EPI 2021: 8 ML/MIN/1.73M2
ERYTHROCYTE [DISTWIDTH] IN BLOOD BY AUTOMATED COUNT: 16.7 % (ref 10–15)
GLUCOSE SERPL-MCNC: 95 MG/DL (ref 70–99)
HCT VFR BLD AUTO: 24.3 % (ref 40–53)
HGB BLD-MCNC: 7.8 G/DL (ref 13.3–17.7)
MCH RBC QN AUTO: 28.8 PG (ref 26.5–33)
MCHC RBC AUTO-ENTMCNC: 32.1 G/DL (ref 31.5–36.5)
MCV RBC AUTO: 90 FL (ref 78–100)
PLATELET # BLD AUTO: 139 10E3/UL (ref 150–450)
POTASSIUM SERPL-SCNC: 3.9 MMOL/L (ref 3.4–5.3)
RBC # BLD AUTO: 2.71 10E6/UL (ref 4.4–5.9)
SODIUM SERPL-SCNC: 134 MMOL/L (ref 135–145)
WBC # BLD AUTO: 13 10E3/UL (ref 4–11)

## 2023-10-05 PROCEDURE — 250N000011 HC RX IP 250 OP 636: Performed by: INTERNAL MEDICINE

## 2023-10-05 PROCEDURE — 250N000013 HC RX MED GY IP 250 OP 250 PS 637: Performed by: INTERNAL MEDICINE

## 2023-10-05 PROCEDURE — 258N000003 HC RX IP 258 OP 636: Performed by: INTERNAL MEDICINE

## 2023-10-05 PROCEDURE — 250N000011 HC RX IP 250 OP 636: Mod: JZ | Performed by: INTERNAL MEDICINE

## 2023-10-05 PROCEDURE — 99232 SBSQ HOSP IP/OBS MODERATE 35: CPT | Performed by: INTERNAL MEDICINE

## 2023-10-05 PROCEDURE — 99233 SBSQ HOSP IP/OBS HIGH 50: CPT | Performed by: HOSPITALIST

## 2023-10-05 PROCEDURE — 120N000001 HC R&B MED SURG/OB

## 2023-10-05 PROCEDURE — 80048 BASIC METABOLIC PNL TOTAL CA: CPT | Performed by: HOSPITALIST

## 2023-10-05 PROCEDURE — 85027 COMPLETE CBC AUTOMATED: CPT | Performed by: HOSPITALIST

## 2023-10-05 PROCEDURE — 36415 COLL VENOUS BLD VENIPUNCTURE: CPT | Performed by: HOSPITALIST

## 2023-10-05 PROCEDURE — 250N000013 HC RX MED GY IP 250 OP 250 PS 637: Performed by: STUDENT IN AN ORGANIZED HEALTH CARE EDUCATION/TRAINING PROGRAM

## 2023-10-05 RX ORDER — HYDRALAZINE HYDROCHLORIDE 25 MG/1
25 TABLET, FILM COATED ORAL 4 TIMES DAILY
Status: DISCONTINUED | OUTPATIENT
Start: 2023-10-05 | End: 2023-10-06

## 2023-10-05 RX ORDER — LISINOPRIL 20 MG/1
20 TABLET ORAL DAILY
Status: DISCONTINUED | OUTPATIENT
Start: 2023-10-05 | End: 2023-10-06 | Stop reason: HOSPADM

## 2023-10-05 RX ADMIN — LISINOPRIL 20 MG: 20 TABLET ORAL at 13:46

## 2023-10-05 RX ADMIN — SODIUM CHLORIDE 300 ML: 9 INJECTION, SOLUTION INTRAVENOUS at 08:15

## 2023-10-05 RX ADMIN — CARVEDILOL 6.25 MG: 6.25 TABLET, FILM COATED ORAL at 13:46

## 2023-10-05 RX ADMIN — CALCIUM ACETATE 1334 MG: 667 CAPSULE ORAL at 12:59

## 2023-10-05 RX ADMIN — HYDRALAZINE HYDROCHLORIDE 25 MG: 25 TABLET, FILM COATED ORAL at 18:34

## 2023-10-05 RX ADMIN — CARVEDILOL 6.25 MG: 6.25 TABLET, FILM COATED ORAL at 18:34

## 2023-10-05 RX ADMIN — SODIUM CHLORIDE 250 ML: 9 INJECTION, SOLUTION INTRAVENOUS at 08:20

## 2023-10-05 RX ADMIN — Medication: at 09:19

## 2023-10-05 RX ADMIN — TRAZODONE HYDROCHLORIDE 50 MG: 50 TABLET ORAL at 00:13

## 2023-10-05 RX ADMIN — HYDRALAZINE HYDROCHLORIDE 10 MG: 10 TABLET, FILM COATED ORAL at 06:05

## 2023-10-05 RX ADMIN — HEPARIN SODIUM 1600 UNITS: 1000 INJECTION INTRAVENOUS; SUBCUTANEOUS at 11:20

## 2023-10-05 RX ADMIN — PANTOPRAZOLE SODIUM 40 MG: 40 TABLET, DELAYED RELEASE ORAL at 06:05

## 2023-10-05 RX ADMIN — PANTOPRAZOLE SODIUM 40 MG: 40 TABLET, DELAYED RELEASE ORAL at 16:07

## 2023-10-05 RX ADMIN — HYDRALAZINE HYDROCHLORIDE 25 MG: 25 TABLET, FILM COATED ORAL at 23:50

## 2023-10-05 RX ADMIN — CALCIUM 1000 MG: 500 TABLET ORAL at 22:05

## 2023-10-05 RX ADMIN — CALCIUM 1000 MG: 500 TABLET ORAL at 12:59

## 2023-10-05 RX ADMIN — CALCITRIOL CAPSULES 0.25 MCG 0.25 MCG: 0.25 CAPSULE ORAL at 12:59

## 2023-10-05 RX ADMIN — HYDRALAZINE HYDROCHLORIDE 20 MG: 20 INJECTION, SOLUTION INTRAMUSCULAR; INTRAVENOUS at 11:03

## 2023-10-05 RX ADMIN — CALCITRIOL CAPSULES 0.25 MCG 0.25 MCG: 0.25 CAPSULE ORAL at 22:05

## 2023-10-05 RX ADMIN — HYDRALAZINE HYDROCHLORIDE 10 MG: 10 TABLET, FILM COATED ORAL at 00:07

## 2023-10-05 RX ADMIN — CALCIUM ACETATE 1334 MG: 667 CAPSULE ORAL at 18:34

## 2023-10-05 RX ADMIN — CALCIUM 1000 MG: 500 TABLET ORAL at 16:07

## 2023-10-05 ASSESSMENT — ACTIVITIES OF DAILY LIVING (ADL)
ADLS_ACUITY_SCORE: 20
ADLS_ACUITY_SCORE: 26
ADLS_ACUITY_SCORE: 26
ADLS_ACUITY_SCORE: 20
ADLS_ACUITY_SCORE: 26
ADLS_ACUITY_SCORE: 20

## 2023-10-05 NOTE — PROGRESS NOTES
Potassium   Date Value Ref Range Status   10/05/2023 3.9 3.4 - 5.3 mmol/L Final   09/20/2021 3.7 3.4 - 5.3 mmol/L Final   05/12/2021 3.6 3.4 - 5.3 mmol/L Final     Hemoglobin   Date Value Ref Range Status   10/05/2023 7.8 (L) 13.3 - 17.7 g/dL Final   06/16/2017 11.7 (L) 13.3 - 17.7 g/dL Final     Creatinine   Date Value Ref Range Status   10/05/2023 7.88 (H) 0.67 - 1.17 mg/dL Final   05/12/2021 2.11 (H) 0.66 - 1.25 mg/dL Final     Urea Nitrogen   Date Value Ref Range Status   10/05/2023 63.6 (H) 6.0 - 20.0 mg/dL Final   09/20/2021 27 7 - 30 mg/dL Final   05/12/2021 24 7 - 30 mg/dL Final     Sodium   Date Value Ref Range Status   10/05/2023 134 (L) 135 - 145 mmol/L Final     Comment:     Reference intervals for this test were updated on 09/26/2023 to more accurately reflect our healthy population. There may be differences in the flagging of prior results with similar values performed with this method. Interpretation of those prior results can be made in the context of the updated reference intervals.    05/12/2021 136 133 - 144 mmol/L Final     INR   Date Value Ref Range Status   10/02/2023 1.19 (H) 0.85 - 1.15 Final       DIALYSIS PROCEDURE NOTE  Hepatitis status of previous patient on machine log was checked and verified ok to use with this patients hepatitis status.  Patient dialyzed for 3 hrs. on a K3 bath with a net fluid removal of  4L.  A BFR of 325 ml/min was obtained via a R tunneled internal jugular CVC.      The treatment plan was discussed with Dr. Maya during the treatment.    Total heparin received during the treatment: 0 units.   Line flushed, clamped and capped with heparin 1:1000 1.6 mL (1600 units) per lumen    Meds  given: none   Complications: Hypertension, Bedside RN notified. IV Hydralazine given by bedside RN. BP trending down at end of run.      Person educated: Patient. Knowledge base limited. Barriers to learning: none. Educated on procedure via verbal mode. Patient verbalized  understanding.   ICEBOAT? Timeout performed pre-treatment  I: Patient was identified using 2 identifiers  C:  Consent Signed Yes  E: Equipment preventative maintenance is current and dialysis delivery system OK to use  B:    Latest Reference Range & Units 09/30/23 07:22   Hep B Surface Agn Nonreactive  Nonreactive   Hepatitis B Surface Antibody Instrument Value <8.00 m[IU]/mL 0.14   Hepatitis B Surface Antibody  Nonreactive     O: Dialysis orders present and complete prior to treatment  A: Vascular access verified and assessed prior to treatment  T: Treatment was performed at a clinically appropriate time  ?: Patient was allowed to ask questions and address concerns prior to treatment  See Adult Hemodialysis flowsheet in [facility] for further details and post assessment.  Machine water alarm in place and functioning. Transducer pods intact and checked every 15min.   Pt assisted with repositioning throughout dialysis treatment.  Pt returned via wheelchair.  Chlorine/Chloramine water system checked every 4 hours.  Outpatient Dialysis at [facility] beginning on 10/9/23.      Post treatment report given to NARENDRA Morton regarding 4L of fluid removed, last /90.    Mandi Sol RN

## 2023-10-05 NOTE — PLAN OF CARE
"Goal Outcome Evaluation:      Plan of Care Reviewed With: patient    Overall Patient Progress: improvingOverall Patient Progress: improving    Outcome Evaluation: .    VSS on RA, Hypertensive at times, scheduled Hydralazine. SBA, GB/W. I/Os. A/O. Renal diet. Tele: SR. CVC for dialysis. Plan is to continue dialysis, and discharge Friday after dialysis.     Problem: Plan of Care - These are the overarching goals to be used throughout the patient stay.    Goal: Plan of Care Review  Description: The Plan of Care Review/Shift note should be completed every shift.  The Outcome Evaluation is a brief statement about your assessment that the patient is improving, declining, or no change.  This information will be displayed automatically on your shift note.  Outcome: Progressing  Flowsheets (Taken 10/5/2023 0154)  Outcome Evaluation: .  Plan of Care Reviewed With: patient  Overall Patient Progress: improving  Goal: Patient-Specific Goal (Individualized)  Description: You can add care plan individualizations to a care plan. Examples of Individualization might be:  \"Parent requests to be called daily at 9am for status\", \"I have a hard time hearing out of my right ear\", or \"Do not touch me to wake me up as it startles me\".  Outcome: Progressing  Goal: Absence of Hospital-Acquired Illness or Injury  Outcome: Progressing  Intervention: Identify and Manage Fall Risk  Recent Flowsheet Documentation  Taken 10/5/2023 0025 by Julio César Sanchez RN  Safety Promotion/Fall Prevention:   activity supervised   assistive device/personal items within reach   clutter free environment maintained   increased rounding and observation   increase visualization of patient   lighting adjusted   mobility aid in reach   nonskid shoes/slippers when out of bed   patient and family education   room organization consistent   safety round/check completed   supervised activity   treat reversible contributory factors   treat underlying cause  Intervention: Prevent " Skin Injury  Recent Flowsheet Documentation  Taken 10/5/2023 0025 by Julio César Sanchez RN  Body Position: position changed independently  Intervention: Prevent and Manage VTE (Venous Thromboembolism) Risk  Recent Flowsheet Documentation  Taken 10/5/2023 0025 by Julio César Sanchez RN  VTE Prevention/Management: SCDs (sequential compression devices) off  Goal: Optimal Comfort and Wellbeing  Outcome: Progressing  Goal: Readiness for Transition of Care  Outcome: Progressing     Problem: Acute Kidney Injury/Impairment  Goal: Fluid and Electrolyte Balance  Outcome: Progressing  Goal: Optimal Nutrition Intake  Outcome: Progressing  Goal: Effective Renal Function  Outcome: Progressing  Intervention: Monitor and Support Renal Function  Recent Flowsheet Documentation  Taken 10/5/2023 0025 by Julio César Sanchez, RN  Medication Review/Management: medications reviewed

## 2023-10-05 NOTE — PROGRESS NOTES
Renal Medicine Inpatient Dialysis Note                                Brooks Mensah MRN# [id number]   Age: 38 year old YOB: 1985   Date of Admission: 9/29/2023 Hospital LOS: 6          Assessment/Plan:       1.  ESRD   -FSGS    First dialysis 10/02/23    2.  Metabolic acidosis  3.  Hyperkalemia  4.  Hyperphosphatemia  5.  Hypocalcemia  6.  Volume overload  7.  Anemia   -HEAVENLY    Next 10/05/23  Continue UF as tolerated    Prefers [facility]   MWF 3rd shift available     Increase lisinopril     Discontinue post dialysis 10/06/23      Interval History:     Dialysis run parameters reviewed with dialysis RN at patient bedside.  Seen on run  TDC catheter placed without issue    3 hours  300 ml/min  4 liter UF    Stable early portion of run    Comfortable     Continued daily dialysis for massive volume overload   BP should improved with UF     ROS     GENERAL: NAD, No fever,chills  R: NEGATIVE for significant cough or SOB  GI: NEGATIVE for abdominal pain, heartburn, nausea, vomiting or change in bowel pattern  EXT: no change edema  ROS otherwise negative    Dialysis Parameters:     Vitals were reviewed  Patient Vitals for the past 8 hrs:   BP Temp Temp src Pulse SpO2   10/05/23 1045 (!) 190/107 -- -- 90 97 %   10/05/23 1030 (!) 191/106 -- -- 90 90 %   10/05/23 1015 (!) 186/100 -- -- 83 98 %   10/05/23 1000 (!) 184/101 -- -- 89 97 %   10/05/23 0945 (!) 192/104 -- -- 87 97 %   10/05/23 0930 (!) 199/110 -- -- 91 96 %   10/05/23 0915 (!) 182/110 -- -- 83 98 %   10/05/23 0900 (!) 185/102 -- -- 83 90 %   10/05/23 0845 (!) 188/101 -- -- 85 90 %   10/05/23 0830 (!) 175/92 -- -- 83 94 %   10/05/23 0820 (!) 182/92 -- -- 85 92 %   10/05/23 0815 (!) 187/100 98.4  F (36.9  C) Oral 87 91 %   10/05/23 0607 (!) 163/70 -- -- -- --     I/O last 3 completed shifts:  In: 420 [P.O.:120]  Out: 5250 [Urine:1750; Other:3500]    Vitals:    09/29/23 2006 09/30/23 0400 10/01/23 0200 10/02/23 0300   Weight: (!) 168.6 kg (371 lb 11.2  "oz) (!) 153.8 kg (339 lb 1.1 oz) (!) 157 kg (346 lb 2 oz) (!) 174.5 kg (384 lb 11.2 oz)    10/04/23 0700   Weight: (!) 167.6 kg (369 lb 8 oz)       Current Weight: 174.5  Dry Weight: 165  Dialysis Temp: 36.5  C  Access Device: IJ  Access Site: right  Dialyzer: Revaclear  Dialysis Bath: 3  Sodium Profile: n  UF Goal: 3  Blood Flow Rate (mL/min): 200  Total Treatment Time (hrs): 2.5  Heparin: Low dose as required      EPO dose: y  Zemplar: n  IV Fe: n      Medications and Allergies:     Reviewed      Physical Exam:     Seen and examined during course of dialysis run    BP (!) 190/107   Pulse 90   Temp 98.4  F (36.9  C) (Oral)   Resp 18   Ht 1.778 m (5' 10\")   Wt (!) 167.6 kg (369 lb 8 oz)   SpO2 97%   BMI 53.02 kg/m      GENERAL: awake, alert, follows  HEENT: NC/AT, PERRLA, EOMI, non icteric, pharynx moist without lesion  RESP: symmetric excursion, good effort, lungs clear - no rales, rhonchi or wheezes  CV: RRR, normal S1 S2  MS: + edema  SKIN: catheter site clean without drainage    Data:       Recent Labs   Lab 10/05/23  0640   *   POTASSIUM 3.9   CHLORIDE 97*   CO2 23   ANIONGAP 14   GLC 95   BUN 63.6*   CR 7.88*   GFRESTIMATED 8*   GAYLE 7.5*     Recent Labs   Lab 10/05/23  0640 10/04/23  0635   POTASSIUM 3.9 4.2     Recent Labs   Lab 10/04/23  0635 10/02/23  0526 10/01/23  0740 09/29/23  1552   ALBUMIN 2.6* 2.9* 3.3* 3.3*         G Abdulaziz Maya MD    [facility]  [phone]           "

## 2023-10-05 NOTE — PLAN OF CARE
Goal Outcome Evaluation:      Plan of Care Reviewed With: patient, family    Overall Patient Progress: no change     Temp: 98.8  F (37.1  C) Temp src: Oral BP: 138/61 Pulse: 82   Resp: 18 SpO2: 91 % O2 Device: None (Room air)      A/O x4, elevated BP on scheduled HTN meds. No c/o pain, n/v, dizziness. LS dim, on RA. On tele, SR. Two PIVs on right arm are saline locked. Up 1A gb wk, pt uses urinal in br, voiding well. Will have dialysis tomorrow and Friday. Plan for possible discharge on Friday after dialysis. Will continue POC.

## 2023-10-05 NOTE — PROGRESS NOTES
[facility]    Medicine Progress Note - Hospitalist Service    Date of Admission:  9/29/2023    Assessment & Plan   Brooks Mensah is a 38 year old male with a PMH significant for chronic kidney disease stage IV, hypertension, morbid obesity, CHF, obstructive sleep apnea intolerant to CPAP, MI, GI bleeding due to gastric ulcer, chronically elevated lipase, anemia who presents with a complaint of generalized weakness, abdominal pain that felt similar to what he had during his prior gastric ulcer, decreased urine output, worsening lower extremity edema and being admitted to the hospital on 9/29/2023 with acute on chronic anemia and acute renal failure on chronic kidney disease.     Gastritis w/melena  Acute on chronic (CKD) anemia   Coagulopathy from uremia  -Patient with prior history of gastric ulcer with positive H. Pylori s/p treatment- not on PPI.  Last EDG was in December 2022.   -Presented with Hgb 5.4 on presentation to ED.  Received 2u pRBC in ED.  1 more unit on admission and on 10/4  -s/p EGD 10/4 with gastritis but no active bleeding noted. Biopsies pending  -transitioned back to oral protonix, GI to consider colonoscopy as outpt  -Hgb down to 6.5 on 10/4 and transfused additional PRBC, improved to 7.8 this am. Cont serial labs but no evidence of active bleeding, suspect severe uremia was contributing (now improving with HD)     EMILY on CKD stage IV, now requiring HD  Biopsy proven focal segmental glomerulosclerosis   AGMA secondary to renal failure w/anasarca and hyperkalemia  Reportedly he had renal bx in December 2022 which showed advanced global and segmental glomerulosclerosis.  On admission, creatinine is 14.6, BUN is 154, significant increase from 6 months ago when it was 62/5.72.  Potassium is  5.4.  AG 22, bicarb is 9.  Lactic acid normal.  VBG shows pH 7.14, co2 31, Hco3 11.  He was started on a bicarb gtt.  Patient has extensive anasarca, +4 pedal, pretibial and ankle edema  extending to his thighs.  Given a dose of lokelma in ED.  -Nephrology is consulted and HD catheter placed  -bumex stopped, on HEAVENLY for anemia  -s/p HD 4th and 5th with appx 7L removed combined, additional HD today and likely tomorrow     Morbid obesity  Obstructive sleep apnea intolerant to CPAP:  -Patient has MAURI but intolerant to CPAP  -Overall the morbid obesity complicates his care     Elevated lipase of unclear etiology  Diffuse wall thickening of gallbladder:  Patient had prior history of elevated lipase.  CT scan showed diffuse wall thickening of the gallbladder given fluid overload this is unlikely to be cholecystitis. RUQ US does not show evidence of cholecystitis.  Blood cultures done, liver enzymes are normal.  -Hold off antibiotic at this time     High blood pressure   Pt had BP of 212/112 overnight, treated with hydralazine by cross cover. Clinic visits suggest pt runs at 160-170/ high 80s. Suspect HBP is due to massive volume overload in setting of CKD IV approaching ESRD. Cont to pull off fluid via HD. Cont current regiment:   - improving with HD, large component is just volume overload  -cont coreg, hydralazine, lisinopril and adjust as needed  -Bumex and norvasc now stopped     Left sided nephrolithiasis, non obstructing     Leukocytosis, likely due to stress demargination:  No fever or other signs of infection. Daily CBC.        Diet: Renal Diet (dialysis)    DVT Prophylaxis: Pneumatic Compression Devices  Sol Catheter: Not present  Lines: PRESENT      CVC Double Lumen Right Internal jugular Tunneled-Site Assessment: WDL except;Ecchymotic;Tender      Cardiac Monitoring: ACTIVE order. Indication: Electrolyte Imbalance (24 hours)- Magnesium <1.3 mg/ml; Potassium < =2.8 or > 5.5 mg/ml  Code Status: Full Code        Disposition Plan   Possible discharge Friday pending HD schedule       Darron Galo DO  Hospitalist Service  [facility]  Securely message with [employer] [place]  info)  Text page via [facility] Paging/Directory   ______________________________________________________________________    Interval History   Seen in dialysis and sleepy. Hgb improved and no dark or tarry stools. BP has been overall improved but still high at times, getting good pulls from HD and plan for additional pull tomorrow. No complaints today    Physical Exam   Vital Signs: Temp: 98.7  F (37.1  C) Temp src: Oral BP: (!) 168/90 Pulse: 93   Resp: 18 SpO2: 94 % O2 Device: Oxymask Oxygen Delivery: 2 LPM  Weight: 369 lbs 8 oz  Constitutional: fatigued, somnolent, cooperative, morbidly obese  Eyes: pupils equal, round and reactive to light and conjunctiva normal  ENT: normocepalic, without obvious abnormality, atramatic  Respiratory: no increased work of breathing, good air exchange, and clear to auscultation  Cardiovascular: regular rate and rhythm and no murmur noted  GI: hypoactive bowel sounds, soft, and obese abdomen  Skin: no bruising or bleeding  Neurologic: somnolent but awakens easily, moving all extremities    50 MINUTES SPENT BY ME on the date of service doing chart review, history, exam, documentation & further activities per the note.      Data   ------------------------- PAST 24 HR DATA REVIEWED -----------------------------------------------    I have personally reviewed the following data over the past 24 hrs:    13.0 (H)  \   7.8 (L)   / 139 (L)     134 (L) 97 (L) 63.6 (H) /  95   3.9 23 7.88 (H) \       Imaging results reviewed over the past 24 hrs:   No results found for this or any previous visit (from the past 24 hour(s)).

## 2023-10-05 NOTE — PLAN OF CARE
Goal Outcome Evaluation:      VS elevated on BP meds and BB. Increased Lisinopril and Hydralazine. Had to give IV Hydralazine while in Dialysis, BP improved. Dialysis tomorrow. Possible to discharge on Friday. Cr improving. Pt voiding well in urinal. Echo EF 55-60%, see report for further details. EGD done during this stay,see report, on PO Protonix. Hgb improved 7.8 had received x1 PRBC's yesterday.

## 2023-10-06 VITALS
BODY MASS INDEX: 45.1 KG/M2 | SYSTOLIC BLOOD PRESSURE: 161 MMHG | OXYGEN SATURATION: 98 % | HEIGHT: 70 IN | WEIGHT: 315 LBS | DIASTOLIC BLOOD PRESSURE: 73 MMHG | RESPIRATION RATE: 20 BRPM | HEART RATE: 94 BPM | TEMPERATURE: 98.1 F

## 2023-10-06 LAB
ANION GAP SERPL CALCULATED.3IONS-SCNC: 11 MMOL/L (ref 7–15)
BUN SERPL-MCNC: 48 MG/DL (ref 6–20)
CALCIUM SERPL-MCNC: 7.3 MG/DL (ref 8.6–10)
CHLORIDE SERPL-SCNC: 101 MMOL/L (ref 98–107)
CREAT SERPL-MCNC: 6.78 MG/DL (ref 0.67–1.17)
DEPRECATED HCO3 PLAS-SCNC: 25 MMOL/L (ref 22–29)
EGFRCR SERPLBLD CKD-EPI 2021: 10 ML/MIN/1.73M2
ERYTHROCYTE [DISTWIDTH] IN BLOOD BY AUTOMATED COUNT: 16.4 % (ref 10–15)
GLUCOSE SERPL-MCNC: 96 MG/DL (ref 70–99)
HCT VFR BLD AUTO: 23.9 % (ref 40–53)
HGB BLD-MCNC: 7.5 G/DL (ref 13.3–17.7)
MCH RBC QN AUTO: 28.3 PG (ref 26.5–33)
MCHC RBC AUTO-ENTMCNC: 31.4 G/DL (ref 31.5–36.5)
MCV RBC AUTO: 90 FL (ref 78–100)
PLATELET # BLD AUTO: 151 10E3/UL (ref 150–450)
POTASSIUM SERPL-SCNC: 3.9 MMOL/L (ref 3.4–5.3)
RBC # BLD AUTO: 2.65 10E6/UL (ref 4.4–5.9)
SODIUM SERPL-SCNC: 137 MMOL/L (ref 135–145)
WBC # BLD AUTO: 10.7 10E3/UL (ref 4–11)

## 2023-10-06 PROCEDURE — 250N000013 HC RX MED GY IP 250 OP 250 PS 637: Performed by: INTERNAL MEDICINE

## 2023-10-06 PROCEDURE — 90937 HEMODIALYSIS REPEATED EVAL: CPT

## 2023-10-06 PROCEDURE — 80048 BASIC METABOLIC PNL TOTAL CA: CPT | Performed by: HOSPITALIST

## 2023-10-06 PROCEDURE — 36415 COLL VENOUS BLD VENIPUNCTURE: CPT | Performed by: HOSPITALIST

## 2023-10-06 PROCEDURE — 99232 SBSQ HOSP IP/OBS MODERATE 35: CPT | Performed by: INTERNAL MEDICINE

## 2023-10-06 PROCEDURE — 99239 HOSP IP/OBS DSCHRG MGMT >30: CPT | Performed by: INTERNAL MEDICINE

## 2023-10-06 PROCEDURE — 250N000011 HC RX IP 250 OP 636: Performed by: INTERNAL MEDICINE

## 2023-10-06 PROCEDURE — 85027 COMPLETE CBC AUTOMATED: CPT | Performed by: HOSPITALIST

## 2023-10-06 PROCEDURE — 258N000003 HC RX IP 258 OP 636: Performed by: INTERNAL MEDICINE

## 2023-10-06 PROCEDURE — 634N000001 HC RX 634: Mod: JZ | Performed by: INTERNAL MEDICINE

## 2023-10-06 PROCEDURE — 250N000013 HC RX MED GY IP 250 OP 250 PS 637: Performed by: STUDENT IN AN ORGANIZED HEALTH CARE EDUCATION/TRAINING PROGRAM

## 2023-10-06 RX ORDER — CARVEDILOL 12.5 MG/1
12.5 TABLET ORAL 2 TIMES DAILY WITH MEALS
Status: DISCONTINUED | OUTPATIENT
Start: 2023-10-06 | End: 2023-10-06 | Stop reason: HOSPADM

## 2023-10-06 RX ORDER — HEPARIN SODIUM 1000 [USP'U]/ML
500 INJECTION, SOLUTION INTRAVENOUS; SUBCUTANEOUS CONTINUOUS
Status: DISCONTINUED | OUTPATIENT
Start: 2023-10-06 | End: 2023-10-06

## 2023-10-06 RX ORDER — HYDRALAZINE HYDROCHLORIDE 50 MG/1
50 TABLET, FILM COATED ORAL 4 TIMES DAILY
Status: DISCONTINUED | OUTPATIENT
Start: 2023-10-06 | End: 2023-10-06 | Stop reason: HOSPADM

## 2023-10-06 RX ORDER — CALCIUM ACETATE 667 MG/1
667 CAPSULE ORAL
Qty: 60 CAPSULE | Refills: 1 | Status: SHIPPED | OUTPATIENT
Start: 2023-10-06 | End: 2024-01-18

## 2023-10-06 RX ORDER — CARVEDILOL 25 MG/1
12.5 TABLET ORAL 2 TIMES DAILY WITH MEALS
Qty: 30 TABLET | Refills: 1 | Status: SHIPPED | OUTPATIENT
Start: 2023-10-06

## 2023-10-06 RX ORDER — HYDRALAZINE HYDROCHLORIDE 50 MG/1
50 TABLET, FILM COATED ORAL 4 TIMES DAILY
Qty: 120 TABLET | Refills: 1 | Status: SHIPPED | OUTPATIENT
Start: 2023-10-06 | End: 2023-12-05

## 2023-10-06 RX ORDER — CALCITRIOL 0.25 UG/1
0.25 CAPSULE, LIQUID FILLED ORAL 2 TIMES DAILY
Qty: 60 CAPSULE | Refills: 1 | Status: SHIPPED | OUTPATIENT
Start: 2023-10-06 | End: 2023-12-05 | Stop reason: ALTCHOICE

## 2023-10-06 RX ORDER — CALCIUM ACETATE 667 MG/1
1334 CAPSULE ORAL
Qty: 180 CAPSULE | Refills: 1 | Status: ON HOLD | OUTPATIENT
Start: 2023-10-06 | End: 2024-05-18

## 2023-10-06 RX ORDER — PANTOPRAZOLE SODIUM 40 MG/1
40 TABLET, DELAYED RELEASE ORAL 2 TIMES DAILY
Qty: 60 TABLET | Refills: 1 | Status: ON HOLD | OUTPATIENT
Start: 2023-10-06 | End: 2024-05-18

## 2023-10-06 RX ORDER — LISINOPRIL 20 MG/1
20 TABLET ORAL DAILY
Qty: 30 TABLET | Refills: 1 | Status: SHIPPED | OUTPATIENT
Start: 2023-10-07

## 2023-10-06 RX ADMIN — SODIUM CHLORIDE 300 ML: 9 INJECTION, SOLUTION INTRAVENOUS at 10:32

## 2023-10-06 RX ADMIN — HYDRALAZINE HYDROCHLORIDE 50 MG: 50 TABLET, FILM COATED ORAL at 12:43

## 2023-10-06 RX ADMIN — PANTOPRAZOLE SODIUM 40 MG: 40 TABLET, DELAYED RELEASE ORAL at 06:39

## 2023-10-06 RX ADMIN — CALCIUM 1000 MG: 500 TABLET ORAL at 12:36

## 2023-10-06 RX ADMIN — CALCITRIOL CAPSULES 0.25 MCG 0.25 MCG: 0.25 CAPSULE ORAL at 12:36

## 2023-10-06 RX ADMIN — CALCIUM ACETATE 1334 MG: 667 CAPSULE ORAL at 12:36

## 2023-10-06 RX ADMIN — HEPARIN SODIUM 500 UNITS: 1000 INJECTION INTRAVENOUS; SUBCUTANEOUS at 10:32

## 2023-10-06 RX ADMIN — EPOETIN ALFA-EPBX 4000 UNITS: 2000 INJECTION, SOLUTION INTRAVENOUS; SUBCUTANEOUS at 10:30

## 2023-10-06 RX ADMIN — HEPARIN SODIUM 500 UNITS/HR: 1000 INJECTION INTRAVENOUS; SUBCUTANEOUS at 10:33

## 2023-10-06 RX ADMIN — SODIUM CHLORIDE 250 ML: 9 INJECTION, SOLUTION INTRAVENOUS at 10:31

## 2023-10-06 RX ADMIN — LISINOPRIL 20 MG: 20 TABLET ORAL at 12:36

## 2023-10-06 RX ADMIN — HYDRALAZINE HYDROCHLORIDE 25 MG: 25 TABLET, FILM COATED ORAL at 06:38

## 2023-10-06 ASSESSMENT — ACTIVITIES OF DAILY LIVING (ADL)
ADLS_ACUITY_SCORE: 20

## 2023-10-06 NOTE — PROGRESS NOTES
Care Management Discharge Note    Discharge Date: 10/06/2023       Discharge Disposition: Dialysis Services    Discharge Services:  dialysis    Discharge DME:  none    Discharge Transportation: family or friend will provide    Private pay costs discussed: Not applicable      Education Provided on the Discharge Plan:  yes  Persons Notified of Discharge Plans: patient  Patient/Family in Agreement with the Plan:  yes    Handoff Referral Completed:  N/A    Additional Information:  Patient discharging to home this afternoon. Met with patient and reviewed dialysis schedule, place and time. Assisted patient to schedule an appt with primary clinic to follow up as he wants to get handicap parking and will FMLA paperwork to sign.   Appointment scheduled for patient to see provider on Monday 10/9 at 0900 at [employer]. Updated patient.  Family will transport.    Angelica Lopes RN  Care Coordinator  [facility]

## 2023-10-06 NOTE — PROVIDER NOTIFICATION
"Dr. Velasquez notified via [employer] message that patient had small amount of blood in stool.  Patients first BM in four days.    \"FYI. Pt. just had first bowel movement in four days. Large and formed. Had scant amount of blood. Hgb 7.8 10/5 @ 0700. He is currently scheduled for AM draws. Please advise if you would like further action.\"    Dr. Velasquez responded a few minutes later, stating no further action at this time.  "

## 2023-10-06 NOTE — PLAN OF CARE
Goal Outcome Evaluation:      Plan of Care Reviewed With: patient    Overall Patient Progress: improvingOverall Patient Progress: improving     Temp: 98.5  F (36.9  C) Temp src: Oral BP: (!) 165/76 Pulse: 80   Resp: 18 SpO2: 96 % O2 Device: None (Room air)     A/O x4, elevated BP on scheduled HTN meds. No c/o pain, SOB, n/v. LS dim, on RA, sating stable > 95%. Tele SR. 2 PIVs are saline locked, flush well. Pt is SBA, uses urinal in br. Ambulates in the hallway 1x w/ family members. Plan for possible discharge home tomorrow after dialysis.

## 2023-10-06 NOTE — PROGRESS NOTES
Potassium   Date Value Ref Range Status   10/06/2023 3.9 3.4 - 5.3 mmol/L Final   09/20/2021 3.7 3.4 - 5.3 mmol/L Final   05/12/2021 3.6 3.4 - 5.3 mmol/L Final     Hemoglobin   Date Value Ref Range Status   10/06/2023 7.5 (L) 13.3 - 17.7 g/dL Final   06/16/2017 11.7 (L) 13.3 - 17.7 g/dL Final     Creatinine   Date Value Ref Range Status   10/06/2023 6.78 (H) 0.67 - 1.17 mg/dL Final   05/12/2021 2.11 (H) 0.66 - 1.25 mg/dL Final     Urea Nitrogen   Date Value Ref Range Status   10/06/2023 48.0 (H) 6.0 - 20.0 mg/dL Final   09/20/2021 27 7 - 30 mg/dL Final   05/12/2021 24 7 - 30 mg/dL Final     Sodium   Date Value Ref Range Status   10/06/2023 137 135 - 145 mmol/L Final     Comment:     Reference intervals for this test were updated on 09/26/2023 to more accurately reflect our healthy population. There may be differences in the flagging of prior results with similar values performed with this method. Interpretation of those prior results can be made in the context of the updated reference intervals.    05/12/2021 136 133 - 144 mmol/L Final     INR   Date Value Ref Range Status   10/02/2023 1.19 (H) 0.85 - 1.15 Final      Latest Reference Range & Units 09/30/23 07:22   Hep B Surface Agn Nonreactive  Nonreactive   Hepatitis B Surface Antibody Instrument Value <8.00 m[IU]/mL 0.14   Hepatitis B Surface Antibody  Nonreactive       DIALYSIS PROCEDURE NOTE  Hepatitis status of previous patient on machine log was checked and verified ok to use with this patients hepatitis status.  Patient dialyzed for 3 hrs. on a K3 bath with a net fluid removal of  4.5L.  A BFR of 350 ml/min was obtained via a CVC.     The treatment plan was discussed with Dr. Silver during the treatment.    Total heparin received during the treatment: 1.5 units.     Line flushed, clamped and capped with heparin 1:1000 1.6 mL (1600 units) per lumen    Meds  given: retacrit   Complications: O2 sats dropped when pt fell asleep. 2L o2 given to maintain O2 sats       Person educated: patient. Knowledge base minimal. Barriers to learning: none. Educated on procedure via verbal mode. Patient verbalized understanding.     ICEBOAT? Timeout performed pre-treatment  I: Patient was identified using 2 identifiers  C:  Consent Signed Yes  E: Equipment preventative maintenance is current and dialysis delivery system OK to use  B: Hepatitis B Surface Antigen: Negative; Draw Date: 9/30/23      Hepatitis B Surface Antibody: Melanie; Draw Date: 9/30/23  O: Dialysis orders present and complete prior to treatment  A: Vascular access verified and assessed prior to treatment  T: Treatment was performed at a clinically appropriate time  ?: Patient was allowed to ask questions and address concerns prior to treatment  See Adult Hemodialysis flowsheet in [employer] for further details and post assessment.  Machine water alarm in place and functioning. Transducer pods intact and checked every 15min.   Pt returned via wheelchair.  Chlorine/Chloramine water system checked every 4 hours.  Outpatient Dialysis at [facility] dialysis to start on Monday 10/9/23    Patient repositioned every 2 hours during the treatment.  Post treatment report given to Marian Schulte RN

## 2023-10-06 NOTE — DISCHARGE SUMMARY
Discharge Summary  Hospitalist Service    Brooks Mensah MRN# [id number]   YOB: 1985 Age: 38 year old     Date of Admission:  9/29/2023  Date of Discharge:  10/6/2023  Admitting Physician:  Hong Escalante MD  Discharge Physician: Ciera Alvarez MD  Discharging Service: Hospitalist Service     Primary Provider: Rhiannon Anabelajb Berry  Primary Care Physician Phone Number: [phone]         Discharge Diagnoses/Problem Oriented Hospital Course (Providers):      Brooks Mensah was admitted on 9/29/2023 by Hong Escalante MD and I would refer you to their history and physical.  The following problems were addressed during his hospitalization:    Gastritis with melena  Acute on chronic (CKD) anemia  Coagulopathy from uremia     EMILY on CKD stage IV, now requiring HD  Biopsy proven focal segmental glomerulosclerosis   AGMA secondary to renal failure w/anasarca and hyperkalemia  Non obstructing left sided nephrolithiasis    Morbid obesity   MAURI intolerant to CPAP     Elevated lipase of unclear etiology  Diffuse wall thickening of gallbladder:    Htn  Severe cLVH on echo  Chronic mild hypokinesis of the apical wall (similar to 2019)    Leukocytosis       Brooks Mensah is a 38 year old male with a PMH significant for chronic kidney disease stage IV, hypertension, morbid obesity, CHF, obstructive sleep apnea intolerant to CPAP, MI, GI bleeding due to gastric ulcer, chronically elevated lipase, anemia who presents with a complaint of generalized weakness, abdominal pain that felt similar to what he had during his prior gastric ulcer, decreased urine output, worsening lower extremity edema and being admitted to the hospital on 9/29/2023 with acute on chronic anemia and acute renal failure on chronic kidney disease.     Gastritis w/melena  Acute on chronic (CKD) anemia   Coagulopathy from uremia  -Patient with prior history of gastric ulcer with positive H. Pylori s/p treatment- not on PPI.  Last EDG was  in December 2022.   -Presented with Hgb 5.4 on presentation to ED.  Received 2u pRBC in ED.  1 more unit on admission and on 10/4 for hgb 6.5 s/p another unit with hgb 7.5 on day of discharge.        *no hemodynamic compromise   -s/p EGD 10/4 with gastritis but no active bleeding noted. Biopsies reactive gastropathy (chemical gastritis), neg for h. Pylori, neg of intestinal metaplasia and malignancy   -transitioned back to oral protonix, GI to consider colonoscopy as outpt       EMILY on CKD stage IV, now requiring HD  Biopsy proven focal segmental glomerulosclerosis   AGMA secondary to renal failure w/anasarca and hyperkalemia  Reportedly he had renal bx in December 2022 which showed advanced global and segmental glomerulosclerosis.  On admission, creatinine is 14.6, BUN is 154, significant increase from 6 months ago when it was 62/5.72.  Potassium is  5.4.  AG 22, bicarb is 9.  Lactic acid normal.  VBG shows pH 7.14, co2 31, Hco3 11.  He was started on a bicarb gtt.  Patient has extensive anasarca, +4 pedal, pretibial and ankle edema extending to his thighs.   -Nephrology is consulted and HD catheter placed and initiated on HD 10/2  -bumex stopped, on HEAVENLY for anemia  -s/p HD for the past 5 days with approximately 12 L removed      Morbid obesity  Obstructive sleep apnea intolerant to CPAP:  -Patient has MAURI but intolerant to CPAP  -Overall the morbid obesity complicates his care     Elevated lipase of unclear etiology  Diffuse wall thickening of gallbladder:  Patient had prior history of elevated lipase.  CT scan showed diffuse wall thickening of the gallbladder given fluid overload this is unlikely to be cholecystitis. RUQ US does not show evidence of cholecystitis.  Blood cultures done, liver enzymes are normal.  -Hold off antibiotic at this time     High blood pressure   Continues to have issues with htn throughout his hospitalization.  Pta regimen carvedilol 25 mg bid, hydralazine 50 mg qid, amlodipine 10 mg  every day, furosemide 40 mg bid.  It's not clear to me how well he was taking these medications.  Clearly major issue is that of ongoing volume overload in the setting of ESRD  -discharged on carvedilol 12.5 mg bid (increased from 6.25 mg on day of discharge) hydralazine 50 mg qid (increased from 25 mg on day of discharge), and lisinopril 20 mg every day.     Hlp  Cont pta atorvastatin      Left sided nephrolithiasis, non obstructing     Leukocytosis, likely due to stress demargination:  No fever or other signs of infection. Daily CBC.            Code Status:      Full Code        Brief Hospital Stay Summary Sent Home With Patient in AVS:          Reason for your hospital stay      GI bleed of unclear source  Progressive kidney disease to kidney failure, initiated on dialysis  Uncontrolled hypertension in the setting of massive fluid overload from   kidney disease                     Important Results:        As noted below          Pending Results:        Unresulted Labs Ordered in the Past 30 Days of this Admission       Date and Time Order Name Status Description    9/30/2023  8:00 AM Prepare red blood cells (unit) Preliminary               Discharge Instructions and Follow-Up:      Follow-up Appointments     Follow-up and recommended labs and tests       You will report for HD on Monday   Follow-up with kidney docs per their recommendations, they;ll manage your   BPs              Discharge Disposition:        Discharged to home         Discharge Medications:        Current Discharge Medication List        START taking these medications    Details   calcitRIOL (ROCALTROL) 0.25 MCG capsule Take 1 capsule (0.25 mcg) by mouth 2 times daily  Qty: 60 capsule, Refills: 1    Associated Diagnoses: Anemia due to stage 3 chronic kidney disease treated with erythropoietin (H)      !! calcium acetate (PHOSLO) 667 MG CAPS capsule Take 2 capsules (1,334 mg) by mouth 3 times daily (with meals)  Qty: 180 capsule, Refills: 1     Associated Diagnoses: Anemia due to stage 3 chronic kidney disease treated with erythropoietin (H)      !! calcium acetate (PHOSLO) 667 MG CAPS capsule Take 1 capsule (667 mg) by mouth Take with snacks or supplements (WITH SNACKS OR SUPPLEMENTS)  Qty: 60 capsule, Refills: 1    Associated Diagnoses: Anemia due to stage 3 chronic kidney disease treated with erythropoietin (H)      lisinopril (ZESTRIL) 20 MG tablet Take 1 tablet (20 mg) by mouth daily  Qty: 30 tablet, Refills: 1    Associated Diagnoses: Hypertensive emergency       !! - Potential duplicate medications found. Please discuss with provider.        CONTINUE these medications which have CHANGED    Details   carvedilol (COREG) 25 MG tablet Take 0.5 tablets (12.5 mg) by mouth 2 times daily (with meals)  Qty: 30 tablet, Refills: 1    Associated Diagnoses: Hypertensive emergency      hydrALAZINE (APRESOLINE) 50 MG tablet Take 1 tablet (50 mg) by mouth 4 times daily  Qty: 120 tablet, Refills: 1    Associated Diagnoses: Essential hypertension; Chronic systolic congestive heart failure (H)      pantoprazole (PROTONIX) 40 MG EC tablet Take 1 tablet (40 mg) by mouth 2 times daily  Qty: 60 tablet, Refills: 1    Associated Diagnoses: Acute upper GI bleed; Gastrointestinal hemorrhage associated with gastric ulcer           CONTINUE these medications which have NOT CHANGED    Details   atorvastatin (LIPITOR) 10 MG tablet Take 1 tablet (10 mg) by mouth every evening  Qty: 90 tablet, Refills: 3    Associated Diagnoses: NSTEMI (non-ST elevated myocardial infarction) (H)           STOP taking these medications       amLODIPine (NORVASC) 10 MG tablet Comments:   Reason for Stopping:         furosemide (LASIX) 40 MG tablet Comments:   Reason for Stopping:                 Allergies:       No Known Allergies        Consultations This Hospital Stay:                 Condition and Physical on Discharge:        Discharge condition: Stable   Vitals: Blood pressure (!) 161/73, pulse  "94, temperature 98.1  F (36.7  C), temperature source Oral, resp. rate 20, height 1.778 m (5' 10\"), weight (!) 159.8 kg (352 lb 6.4 oz), SpO2 98 %.     Constitutional: Pleasant, sleeping on HD but easily awoken     Lungs: Normal respiratory effort    Cardiovascular: Distant heart sounds rrr 3 + partially pitting edema    Abdomen: S/nt/nd, very obese   Skin: Warm and dry no cyanosis or clubbing    Other: Affect appropriate perry oriented          Discharge Time:      Greater than 30 minutes.        Image Results From This Hospital Stay (For Non-EPIC Providers):        Results for orders placed or performed during the hospital encounter of 09/29/23   CT Abdomen Pelvis w/o Contrast    Narrative    CT ABDOMEN AND PELVIS WITHOUT CONTRAST 9/29/2023 4:14 PM    CLINICAL HISTORY: Several weeks abdominal pain, history of renal  failure and gastric ulcers.    TECHNIQUE: CT scan of the abdomen and pelvis was performed without IV  contrast. Multiplanar reformats were obtained. Dose reduction  techniques were used.    CONTRAST: None.    COMPARISON: CT abdomen and pelvis 12/22/2022.    FINDINGS:   LOWER CHEST: Trace bibasilar pleural fluid.    HEPATOBILIARY: Diffuse gallbladder wall thickening. No specific acute  hepatic abnormality.    PANCREAS: Normal.    SPLEEN: Normal.    ADRENAL GLANDS: Normal.    KIDNEYS/BLADDER: No hydronephrosis. Stable nonobstructing stone lower  left kidney measuring 3 mm image 121. Stable left renal cyst without  specific imaging follow-up recommended. The bladder is unremarkable.    BOWEL: There is no bowel obstruction. No evidence for appendicitis. No  specific acute infiltrate change of the bowel. No free air identified.  No abscess.    LYMPH NODES: There are several mildly prominent lymph nodes identified  at the retroperitoneum. Stable left para-aortic lymph node measuring  1.3 cm image 121.    VASCULATURE: Unremarkable.    PELVIC ORGANS: Small pelvic ascites.    OTHER: None.    MUSCULOSKELETAL: " Diffuse anasarca. Edema throughout the mesentery.      Impression    IMPRESSION:   1.  Diffuse wall thickening of the gallbladder. Correlate with  cholecystitis. This could also be seen with fluid overload.  2.  Small ascites. Diffuse anasarca.  3.  Trace bibasilar pleural fluid.  4.  Stable nonobstructing small stone at the left kidney. No  hydronephrosis.  5.  A few mildly prominent retroperitoneal lymph nodes again noted.    LEWIS STOVALL MD         SYSTEM ID:  [id number]   US Abdomen Limited    Narrative    EXAM: US ABDOMEN LIMITED  LOCATION: [facility]  DATE: 9/29/2023    INDICATION: r o cholecystitis  COMPARISON: None.  TECHNIQUE: Limited abdominal ultrasound.    FINDINGS:    GALLBLADDER: Gallbladder polyp is noted measuring up to 4 mm in size. There is minimal pericholecystic free fluid without gallbladder wall thickening or sonographic Khalil sign.    BILE DUCTS: No biliary dilatation. The common duct measures 4 mm.    LIVER: Normal parenchyma with smooth contour. No focal mass.    RIGHT KIDNEY: No hydronephrosis.    PANCREAS: The visualized portions are normal.    No ascites.      Impression    IMPRESSION:  1.  No sonographic evidence of acute cholecystitis.       IR CVC Tunnel Placement > 5 Yrs of Age    Narrative    INTERVENTIONAL RADIOLOGY CENTRAL VENOUS CATHETER TUNNEL PLACEMENT  GREATER THAN FIVE YEARS OF AGE 10/2/2023 11:48 AM    TUNNELED CATHETER PLACEMENT    INDICATION:  Chronic intravenous access.  38-year-old patient with  history of renal failure, request made for long-term IV access for  hemodialysis.    Location:  Right internal jugular vein    PROCEDURE:   Ultrasound guidance:  Ultrasound was used to localize and document  patency of the internal jugular vein.  A permanent image of the vein  was recorded.      Maximal Sterile Barrier Technique Utilized: Cap AND mask AND sterile  gown AND sterile gloves AND sterile full body drape AND hand hygiene  AND skin preparation 2%  chlorhexidine for cutaneous antisepsis (or  acceptable alternative antiseptics).  Sterile Ultrasound Technique  Utilized ?Sterile gel AND sterile probe covers.    Local anesthesia was administered to the skin over the vein and a 4 mm  incision was made.  Ultrasound was used to guide placement of a 5F  dilator in the vein using standard technique.      Lidocaine was then administered in the subcutaneous tissue over the  clavicle to a point about 5 cm below the mid clavicle.  A short  incision was made at this point.  A 19 cm catheter was then brought  through the tract between the two incisions and inserted into the  jugular vein through a peel away sheath. The catheter was secured in  the subclavian region with two interrupted stitches of 2-0  polypropylene.  The neck incision was closed with Dermabond adhesive.       Complications:  None  Sedation: A moderate level sedation achieved with 1.5 mg midazolam.                  100   mcg fentanyl.   Sedation time: 15 minutes.  Vital signs and sedation monitored by our nursing staff under my  supervision.   Fluoroscopy time:  0.6 minutes.  Air Kerma: 37 mGy  Contrast given:  None  Local anesthetic:  20 mL of 1% lidocaine    FINDINGS:  Fluoroscopic guidance with a permanent image confirmed the  catheter tip location in at the right atrial/SVC, confirmed a single  spot fluoroscopic image.      Impression    IMPRESSION: Successful placement of right internal jugular tunneled  hemodialysis catheter. The catheter is ready for immediate use.    AMELIA CAMP MD         SYSTEM ID:  [id number]   Echocardiogram Complete     Value    LVEF  55-60%    Narrative    [id number]  [id number]  [id number]  [id number]^KAUR^GUILLERMINA^[facility]  Echocardiography Laboratory  [address] [place] [address]     Name: BREA DO  MRN: [id number]  : 1985  Study Date: 2023 09:02 AM  Age: 38 yrs  Gender: Male  Patient Location: [facility]  Reason For Study:  CHF  Ordering Physician: GUILLERMINA DIETRICH  Performed By: [facility] Bailey Fernandez     BSA: 2.6 m2  Height: 70 in  Weight: 339 lb  BP: 170/80 mmHg  ______________________________________________________________________________  Procedure  Complete Portable Echo Adult. Optison (NDC #6620-2024) given intravenously.  ______________________________________________________________________________  Interpretation Summary     TDS     The visual ejection fraction is 55-60%.  There is severe concentric left ventricular hypertrophy.  The left ventricle is borderline dilated.  There is mild apical wall hypokinesis.  The right ventricle is normal in structure, function and size.  The left atrium is mildly dilated.  There is mild (1+) aortic regurgitation.  There is no pericardial effusion.     LV wall thickness is more pronounced compared to 2019 study, otherwise no  significant changes.  ______________________________________________________________________________  Left Ventricle  The left ventricle is borderline dilated. There is severe concentric left  ventricular hypertrophy. The visual ejection fraction is 55-60%. There is mild  apical wall hypokinesis.     Right Ventricle  The right ventricle is normal in structure, function and size.     Atria  The left atrium is mildly dilated. Right atrial size is normal.     Mitral Valve  There is no mitral regurgitation noted.     Tricuspid Valve  There is trace tricuspid regurgitation.     Aortic Valve  There is mild (1+) aortic regurgitation.     Pulmonic Valve  The pulmonic valve is not well visualized.     Vessels  The aortic root is normal size. Normal size ascending aorta. The inferior vena  cava was dilated at 3.6 cm without respiratory variability, consistent with  increased right atrial pressure.     Pericardium  There is no pericardial effusion.     ______________________________________________________________________________  MMode/2D Measurements & Calculations  IVSd: 1.8  cm  LVIDd: 5.9 cm  LVIDs: 4.2 cm  LVPWd: 1.9 cm     FS: 29.3 %  LV mass(C)d: 568.1 grams  LV mass(C)dI: 217.5 grams/m2  Ao root diam: 3.5 cm  asc Aorta Diam: 3.9 cm  LVOT diam: 2.6 cm  LVOT area: 5.4 cm2  Ao root diam index Ht(cm/m): 2.0  Ao root diam index BSA (cm/m2): 1.4  Asc Ao diam index BSA (cm/m2): 1.5  Asc Ao diam index Ht(cm/m): 2.2  LA Volume (BP): 163.0 ml     LA Volume Index (BP): 62.5 ml/m2  RV Base: 4.8 cm  RWT: 0.64  TAPSE: 3.0 cm     Doppler Measurements & Calculations  MV E max chapin: 144.0 cm/sec  MV A max chapin: 69.1 cm/sec  MV E/A: 2.1  MV max P.8 mmHg  MV mean P.1 mmHg  MV V2 VTI: 36.9 cm  MV dec time: 0.16 sec  PA V2 max: 126.7 cm/sec  PA max P.4 mmHg  PA acc time: 0.13 sec  E/E' av.0  Lateral E/e': 18.4  Medial E/e': 23.6  RV S Chapin: 17.2 cm/sec     ______________________________________________________________________________  Report approved by: DIVINA Keller 2023 10:24 AM                 Most Recent Lab Results In EPIC (For Non-EPIC Providers):    Most Recent 3 CBC's:  Recent Labs   Lab Test 10/06/23  0717 10/05/23  0640 10/04/23  0635   WBC 10.7 13.0* 16.2*   HGB 7.5* 7.8* 6.5*   MCV 90 90 89    139* 116*      Most Recent 3 BMP's:  Recent Labs   Lab Test 10/06/23  0717 10/05/23  0640 10/04/23  0635    134* 133*   POTASSIUM 3.9 3.9 4.2   CHLORIDE 101 97* 97*   CO2 25 23 19*   BUN 48.0* 63.6* 87.5*   CR 6.78* 7.88* 9.93*   ANIONGAP 11 14 17*   GAYLE 7.3* 7.5* 7.0*   GLC 96 95 106*       Most Recent 3 INR's:  Recent Labs   Lab Test 10/02/23  0816 22  1528   INR 1.19* 1.01     Most Recent 2 LFT's:  Recent Labs   Lab Test 23  1552 22  1721   AST 14 11   ALT 52 12   ALKPHOS 65 102   BILITOTAL 0.2 0.2     Most Recent Cholesterol Panel:  Recent Labs   Lab Test 23  0722 21  0931   CHOL  --  169   LDL  --  104*   HDL  --  44   TRIG 78 104

## 2023-10-06 NOTE — PLAN OF CARE
Goal Outcome Evaluation:      Overall Patient Progress: improvingOverall Patient Progress: improving    Outcome Evaluation: .      Orientation: x4  VS: Temp: 99.2  F (37.3  C) Temp src: Oral BP: (!) 153/69 Pulse: 88   Resp: 22 SpO2: 96 % O2 Device: None (Room air) Oxygen Delivery: 2 LPM   Resp: RA  Pain: Denies Pain.  Tele: SR  Activity: SBA  Diet: Renal Diet  : Dialysis  GI: Large Formed BM, Scant Blood.  Skin: Scattered Ecchymosis & Petechia. Blanchable Redness on Coccyx.    LDAs: Right PIV Saline Locked. Right CVC Double Lumen.  Labs/Protocols: Hgb 7.8 on 10/5.    Plan: Potentially discharge today depending on HD schedule.

## 2023-10-09 ENCOUNTER — PATIENT OUTREACH (OUTPATIENT)
Dept: CARE COORDINATION | Facility: CLINIC | Age: 38
End: 2023-10-09
Payer: COMMERCIAL

## 2023-10-09 NOTE — PROGRESS NOTES
[facility]    Background: Transitional Care Management program identified per system criteria and reviewed by [facility] team for possible outreach.    Assessment:  Upon chart review, patient had a follow up appointment with an appropriate provider today for hospital discharge. [facility] Team member will update episode status of Transitional Care Management program to enrolled per system workflows.     Per Care Everywhere, patient had hospital follow up appt with their [facility] PCP today, 10/9/2023.    [facility] made first outreach attempt on 10/8/2023 to reach patient for hospital follow up and left message and that time.    Plan: [facility] will make no additional outreach attempts to minimize duplicative efforts and reduce potential confusion for patient.      Fabiola Carranza RN  [facility] Resource [place], [facility]    *Connected Care Resource Team does NOT follow patient ongoing. Referrals are identified based on internal discharge reports and the outreach is to ensure patient has an understanding of their discharge instructions.

## 2023-10-11 ENCOUNTER — TELEPHONE (OUTPATIENT)
Dept: OTHER | Facility: CLINIC | Age: 38
End: 2023-10-11
Payer: COMMERCIAL

## 2023-10-11 DIAGNOSIS — Z01.818 PRE-OP EXAM: Primary | ICD-10-CM

## 2023-10-11 NOTE — TELEPHONE ENCOUNTER
Referral received via fax on 10/9/23.    Pt referred to [facility] by Dr. Michel for AVF creation.    Pt needs to be scheduled for BUE vein mapping and NEW VASCULAR PATIENT consult with Vascular Surgery.  Will route to scheduling to coordinate an appointment at next available.    Appt note: Ref to [facility] by Dr. Michel for AVF creation; BUE vein mapping to be scheduled prior; pt on dialysis via CVC at [facility].    Teresa Conner, JEANNETTEN, RN-[facility] [place]

## 2023-10-12 NOTE — TELEPHONE ENCOUNTER
Left voicemail with instructions for patient to call back to schedule their appointment(s)    October 12, 2023 , 10:44 AM

## 2023-10-12 NOTE — TELEPHONE ENCOUNTER
Future Appointments   Date Time Provider Department Center   11/2/2023  1:00 PM [facility]   11/2/2023  2:00 PM Ramiro Pantoja MD [facility]

## 2023-10-24 DIAGNOSIS — Z99.2 ESRD ON HEMODIALYSIS (H): ICD-10-CM

## 2023-10-24 DIAGNOSIS — I73.9 CLAUDICATION OF BOTH LOWER EXTREMITIES (H): Primary | ICD-10-CM

## 2023-10-24 DIAGNOSIS — N18.6 ESRD ON HEMODIALYSIS (H): ICD-10-CM

## 2023-10-31 NOTE — PROGRESS NOTES
[place] [facility] CENTER    Brooks Mensah is a 38-year-old male with end-stage renal disease followed by Dr Michel.  He comes today for evaluation of fistula creation.  Tunnel catheter placed after admission for severe anemia 10/2/2023.  EGD performed 10/3/2023--no obvious bleeding source.    PMH: Medications: Lisinopril, Coreg, Apresoline, Protonix, PhosLo, Rocaltrol, Lipitor   Medical: Hypertension    History of NSTEMI and cardiomyopathy and CHF    Hyperlipidemia on statin    GERD--history of gastric ulcer    Anemia    Obesity-weight= 160 kg.  BMI= 53 kg /meter2    10/6/2023 laboratory: K= 3.9 SCr= 6.7   Hgb= 7.5    PRESENT SITUATION: Dialyzes at 1445 hrs. at [facility] using the tunneled catheter which is worked well.  Did have a great deal of fluid prior to dialysis that is improved.  However he has noted since that time painful induration on both of his calfs.  This is even tender to touch and was not there prior to his acute renal decomposition.    Exam: Alert and appropriate.  Larger patient.  Sitting with his legs elevated on a chair due to soreness.   Blood pressure 108/64 right arm.  112/71 left arm.    Pulse 93 regular  Right jugular tunneled catheter   Chest= clear   Cardiovascular= regular rate   Very well developed left forearm cephalic vein.   +3 radial pulse.  Bedside Doppler with triphasic waveforms in the left radial and ulnar arteries with normal Werner test by Doppler     +3 DP pulses bilaterally with triphasic Doppler signals.   Firm induration with intact skin on both calf.  Circumferentially distally.                Duplex reveals excellent of the left forearm and upper arm cephalic vein.          Impression:     #1.  End-stage renal disease.  He be an excellent candidate for a left distal radial to cephalic fistula.  Discussed the procedure which would be performed under local anesthetic with sedation as an outpatient.  He potentially could do this on the morning of  dialysis day which opens up more of the operating room options.  I have given him a fistula tourniquet that he will use postprocedure.  With his artery a large cephalic vein which is over 5 mm in diameter with the fistula at bedside this should mature very rapidly to an excellent fistula.  Discussed this with the patient and he would like to proceed.  He he knows he will need a ride home following the same-day surgical procedure.    #2.  Painful induration of both calfs.  This appears more like lymphedema.  No evidence of any venous issues.  We have given him an Ace bandage type lymphedema mesh compression stocking to see if this will help.  Other providers have raised the possibility of calciphylaxis which is certainly possible but he has no stigmata of this otherwise.    Over 35 minutes with patient today including chart reviews.    Ramiro Pantoja MD   This note was created using Dragon voice recognition software which may result in transcription errors.      CC:  Dr Michel

## 2023-11-02 ENCOUNTER — OFFICE VISIT (OUTPATIENT)
Dept: OTHER | Facility: CLINIC | Age: 38
End: 2023-11-02
Attending: SURGERY
Payer: COMMERCIAL

## 2023-11-02 ENCOUNTER — HOSPITAL ENCOUNTER (OUTPATIENT)
Dept: ULTRASOUND IMAGING | Facility: CLINIC | Age: 38
Discharge: HOME | End: 2023-11-02
Attending: SURGERY
Payer: COMMERCIAL

## 2023-11-02 VITALS — SYSTOLIC BLOOD PRESSURE: 112 MMHG | DIASTOLIC BLOOD PRESSURE: 71 MMHG | HEART RATE: 94 BPM

## 2023-11-02 DIAGNOSIS — N18.6 ESRD (END STAGE RENAL DISEASE) ON DIALYSIS (H): Primary | ICD-10-CM

## 2023-11-02 DIAGNOSIS — Z99.2 ESRD (END STAGE RENAL DISEASE) ON DIALYSIS (H): Primary | ICD-10-CM

## 2023-11-02 DIAGNOSIS — Z01.818 PRE-OP EXAM: ICD-10-CM

## 2023-11-02 PROCEDURE — 99213 OFFICE O/P EST LOW 20 MIN: CPT | Mod: 25 | Performed by: SURGERY

## 2023-11-02 PROCEDURE — 99203 OFFICE O/P NEW LOW 30 MIN: CPT | Performed by: SURGERY

## 2023-11-02 PROCEDURE — 93970 EXTREMITY STUDY: CPT

## 2023-11-02 NOTE — NURSING NOTE
Patient Education    Procedure: Creation of left distal radial to cephalic AVF  Diagnosis: ESRD on dialysis  Anticoagulation Instruction: n/a  GLP-1 Agonists Instruction: n/a  Pre-Operative Physical Exam: You need to have a pre-op physical exam within 30 days of your procedure. Your procedure may be cancelled if you do not have a current History and Physical. Call your PCP's office to schedule.  Allergies:  Updated in Epic  Bowel Prep: n/a  NPO for solid 8 hours prior to arrival time.   NPO for clear liquids 2 hours prior to arrival time.   Post Procedure Education: [facility] post-procedure fact sheet reviewed with patient.    Showering instructions reviewed: Yes    Vein building exercises reviewed with patient.  Instructed patient there should be no IV sticks or lab draws going forward on his left arm.    Learner(s):patient  Method: Listening  Barriers to Learning:No Barrier  Outcome: Patient did verbalize understanding of above education.    Teresa Conner, BSN, RN-[facility] [place]

## 2023-11-02 NOTE — PATIENT INSTRUCTIONS
Scheduling for left vascular Surgery  Pre-Procedure Patient Care Plan    Procedure: Creation of distal left radial to cephalic AV fistula  Date and Time:  TBD (our surgery scheduler will update you)  Your Surgeon:     Your procedure is being performed at:   [facility]  [address] [place] [address]    Please check in at the Welcome Desk the day of surgery.  Our surgery scheduler will notify you of the date and arrival time.    As you get ready for surgery, you may have a lot of questions.  This care plan will help you understand your treatment before and after your stay in the hospital.  You and your family are the most important members of your health care team.  You will need to take an active role in your care.  Your spiritual beliefs and values are vital to your healing; tell your admission nurse if you would like to speak with a  during your hospitalization.  Please take the time to review these instructions and let us know if you have further questions or concerns.  A [x] means this specifically applies to you.    Important Phone Numbers  [facility] main number and after-hours medical concerns: [phone]  Appointment [phone]  Surgery schedulers:  [facility] [phone]    Pre-Registration  Pre-Certification authorization may be required by your insurance company.  Please call your insurance company to make sure that all requirements with them have been satisfied.  If you have any questions regarding this, please call our Business Office at [phone] or [facility]illing at [phone].  Failure to comply with insurance requirements may increase your out-of-pocket expenses.  If you do not have insurance, please let us know.  You may receive a call from the hospital if further registration information is needed.  To register online, go to [email or web address].  It is the patient's responsibility to check with their insurance company  "regarding any second surgical opinion requirements.  Failure to comply with insurance requirements may increase your out-of-pocket expenses.    Pre-Procedure Clearance   [x]  You need to have a Physical Exam within 30 days of your procedure; this is a legal requirement that will result in your procedure being canceled if not done.  If the physician completing your Physical Exam is outside of the [facility] system, please fax to:   []  [phone], for AM Surgery      [x]  [phone], for Same Day Surgery  If you have a history of heart disease, or heart procedures within the past year, please notify your surgeon.  You may require further cardiac evaluation for pre-procedure clearance.  If you have a pacemaker or an AICD (automatic implanted cardiac defibrillator), please bring the ID card with you on the day of surgery.    Medication Holds  Your Primary Care Provider will review your medications with you at your physical exam.  They will discuss with you if you will need to adjust any of your medications prior to your surgery.    Bring a list of your medications, including doses and the time of day you take them, with you to the hospital.  []  Continue to take your aspirin (this is different from most NON vascular surgeries and may not be well known by your Primary Care Provider)  []  Hold seven (7) days prior for once weekly injectable doses [semaglutide (Ozempic, Wegovy), dulaglutide (Trulicity), exenatide ER (Bydureon), tirzepatide (Mounjaro)]  []  Hold the day before and day of for once daily injectable GLP-1 agonists [exenatide (Byetta), liraglutide (Saxenda,Victoza)]  []  Hold seven (7) days for oral semaglutide (Rybelsus)    Eating and Drinking Restrictions  []  You WILL need a gentle bowel prep.  You must follow the \"bowel prep instructions\" handout.  [x]  Do not eat anything 8 hours prior to arrival time (This includes gum and mints).  [x]  You may have clear liquids until 2 hours prior to arrival " time  Clear liquids include water, soda, apple or cranberry juice, Jell-O, popsicles, black coffee or tea.  Dairy products and Tomato products are NOT considered Clear Liquids).        [x]  Our surgery scheduler will confirm the specific hold times with you    Other Important Information before your surgery  []  Follow instructions on the Showering Before Surgery Form.  [x]  Take a shower or bath the evening before or the morning of your surgery.    [x]  You will need to make arrangements for an adult to drive you home and stay with you at least for 24 hours or longer  [x]  Do not smoke, drink alcohol or take over-the-counter medicines (unless your surgeon tells you to) for 24 hours before and after surgery.  Please remove deodorant, cologne, scented lotion, makeup, nail polish and jewelry - including rings and body piercings.  If you wear artificial nails, please remove at least one nail before coming to the hospital.  Wear clean, loose clothing to the hospital.  A surgical Physician Assistant, Vascular Fellow and/or Vascular Resident may be assisting your surgeon  It is sometimes necessary to adjust the surgery schedule due to emergencies and unforeseen changes.  If your surgery is affected by this, we greatly appreciate your flexibility and understanding in this matter.  Call your surgeon if there is any changes in your health.  This includes signs of a cold or flu (such as a sore throat, runny nose, cough, rash or fever).  Bring these items to the hospital  Your insurance card.  Money for parking and co-pays, if needed.  Leave extra money and valuables at home.  A list of all medications you take, including supplements.  A list of your allergies.  A copy of your Advance Directives, if you have one.  This tells us what treatment you would want and who would make health care decisions if you are unable to do so.  You may request this form in advance or download it from [email or web address]  A case for your  glasses, contact lenses, hearing aids or dentures.  Your inhaler or CPAP machine, if you use these at home.    Post-Operative Appointment  You will need to see your surgeon for a post-operative evaluation 10-14 days after your procedure.  Please call to schedule this appointment as soon as possible; most physicians are only in the clinic 1-2 days a week, so schedules tend to fill quickly.    Disability Forms  If you will be filing Disability Forms, please complete as much of the information that you are able to, such as first day of disability and dates of hospitalizations.  It is also helpful for you to include a note indicating the date you would like to return to work and the restrictions that you feel you might need for your type of work.  Fax forms to [phone], Attention: Disability team member   Please allow 5-7 business days for your forms to be completed.    DO NOT BRING YOUR FORMS TO SURGERY    Prescription Refills  If you need a refill on your pain medication, call your pharmacy for the refill; they will contact our office for the request.  Please allow 48 hours for the request to be processed.  Do not call the on-call doctor for a refill on your pain medications.     Arteriovenous Fistula/Graft Creation    Post-operative instructions    An AVF (arteriovenous fistula) is created by directly connecting a person's artery and vein - usually in the arm.  This procedure may be performed as an outpatient operation using a local anesthetic.  As blood flows to the vein from the newly connected artery, the vein grows bigger and stronger.  You will need to do exercises-such as squeezing a rubber ball-to help the fistula strengthen and mature to get it ready for use.  It can take a minimum of 6 weeks to months before the fistula matures and is ready to be used for hemodialysis.    Sometimes, a person's vein is not large enough to create a fistula.  In this case, a graft will be used to connect the artery and  "vein.  Your surgeon will let you know what type of graft they will be using if this is necessary.    It is extremely important that you check for a \"thrill\" or a pulse in your fistula EVERYDAY.  If you note any changes, you must notify your dialysis unit or call us immediately.      Arteriovenous Fistula:    Arteriovenous Graft:    Source: GoPago    The fistula is considered the \"gold standard\" access because it:  Has a lower risk of infections than other access types  Has a lower risk of forming clots than other access types  Performs better than other accesses  Allows for greater blood flow  Lasts longer than other access types  Can last many year, even decades, when well-care for    Some issues people may have with fistulas include:  The appearance of bulging veins at the access site  Taking several months for a new one to mature  Not maturing at all in some case    How to increase the size of your AV fistula:  After surgery, the vein needs time to enlarge, or mature, so that needles can be easily inserted for dialysis.  You can help this by performing exercises as listed below.  First, exercise your arm!  Use your arm for usual activities  Second, you will be given a tourniquet and exercise ball/foam to help with vein exercises.  Beginning one week after your surgery, apply a tourniquet in the armpit region and tighten this.  You will want it tight enough to make the veins stand out, but not so tight that your hand falls asleep.  Leave the tourniquet in place for 10-15 minutes.  Exercise your forearm during this period by squeezing a ball.   Repeat this activity 3-5 times a day.  It may take 6-8 weeks of this type of activity for your fistula to \"mature\" enough to use.    Post-operative appointments:  You will have a 2 week follow up appointment with the surgeon or nurse practitioner.  Approximately 6 weeks after surgery, you will need an ultrasound to evaluate the fistula for " "maturation.  Depending on the type of fistula creation you had, there may be additional ultrasounds.    When to call our office:  Signs of infection--redness, swelling, drainage, temperature.  Fever over 101 .  Persistent nausea and vomiting.  Unable to feel the \"thrill\" or pulse in your fistula/graft.    Call our main number, [phone], for assistance with any concerns or questions.      "

## 2023-11-02 NOTE — PROGRESS NOTES
[facility]        Patient is here for a  follow up.      Pt is currently taking Statin.    /71 (BP Location: Left arm, Patient Position: Chair, Cuff Size: Adult Large)   Pulse 94     The provider has been notified that the patient has no concerns.     Questions patient would like addressed today are: N/A.    Refills are needed: N/A    Has homecare services and agency name:  Vickie Deleon MA

## 2023-11-03 ENCOUNTER — TELEPHONE (OUTPATIENT)
Dept: OTHER | Facility: CLINIC | Age: 38
End: 2023-11-03
Payer: COMMERCIAL

## 2023-11-03 NOTE — TELEPHONE ENCOUNTER
CASE RECEIVED ON 11/2/23 FOR: CREATION OF LEFT DISTAL RADIAL-CEPHALIC ARTERIOVENOUS FISTULA    CASE ID:[id number]    Spoke to patient states any day/time would work.

## 2023-11-09 NOTE — TELEPHONE ENCOUNTER
LM for patient to call to review scheduled surgery date/time and to schedule a post-op appointment.

## 2023-11-17 NOTE — TELEPHONE ENCOUNTER
Spoke to Ignacia ([facility]). Informed her of patient's scheduled surgery date/time. Ignacia provided e-fax number to send patient pre-procedure care plan form to review with patient today.    Patient is scheduled for post-op with Chandni Young and noted it can be rescheduled if time does not suffice for patient.

## 2023-11-27 ENCOUNTER — TELEPHONE (OUTPATIENT)
Dept: OTHER | Facility: CLINIC | Age: 38
End: 2023-11-27
Payer: COMMERCIAL

## 2023-11-27 ENCOUNTER — ANESTHESIA EVENT (OUTPATIENT)
Dept: SURGERY | Facility: CLINIC | Age: 38
End: 2023-11-27
Payer: COMMERCIAL

## 2023-11-27 NOTE — TELEPHONE ENCOUNTER
[facility]    I called Brooks THOMAS Mensah about his AVF surgery tomorrow.  I left a message x2.    Will check BMP pre-op.  Last EKG on 9/29/23--MDA to decide if needs to be repeated.      Ramiro Pantoja MD

## 2023-11-27 NOTE — TELEPHONE ENCOUNTER
Pt getting pre-op physical today at [facility] in [place].   Will be faxing to us later today.

## 2023-11-28 ENCOUNTER — APPOINTMENT (OUTPATIENT)
Dept: SURGERY | Facility: PHYSICIAN GROUP | Age: 38
End: 2023-11-28
Payer: COMMERCIAL

## 2023-11-28 ENCOUNTER — ANESTHESIA (OUTPATIENT)
Dept: SURGERY | Facility: CLINIC | Age: 38
End: 2023-11-28
Payer: COMMERCIAL

## 2023-11-28 ENCOUNTER — HOSPITAL ENCOUNTER (OUTPATIENT)
Facility: CLINIC | Age: 38
Discharge: HOME | End: 2023-11-28
Attending: SURGERY | Admitting: SURGERY
Payer: COMMERCIAL

## 2023-11-28 VITALS
BODY MASS INDEX: 40.79 KG/M2 | RESPIRATION RATE: 16 BRPM | HEART RATE: 74 BPM | WEIGHT: 284.9 LBS | DIASTOLIC BLOOD PRESSURE: 77 MMHG | TEMPERATURE: 96.8 F | HEIGHT: 70 IN | OXYGEN SATURATION: 97 % | SYSTOLIC BLOOD PRESSURE: 132 MMHG

## 2023-11-28 DIAGNOSIS — N28.9 RENAL INSUFFICIENCY: Primary | ICD-10-CM

## 2023-11-28 PROCEDURE — 360N000076 HC SURGERY LEVEL 3, PER MIN: Performed by: SURGERY

## 2023-11-28 PROCEDURE — 258N000003 HC RX IP 258 OP 636: Performed by: SURGERY

## 2023-11-28 PROCEDURE — 370N000017 HC ANESTHESIA TECHNICAL FEE, PER MIN: Performed by: SURGERY

## 2023-11-28 PROCEDURE — 258N000003 HC RX IP 258 OP 636: Performed by: NURSE ANESTHETIST, CERTIFIED REGISTERED

## 2023-11-28 PROCEDURE — 36821 AV FUSION DIRECT ANY SITE: CPT | Mod: LT | Performed by: SURGERY

## 2023-11-28 PROCEDURE — 710N000009 HC RECOVERY PHASE 1, LEVEL 1, PER MIN: Performed by: SURGERY

## 2023-11-28 PROCEDURE — 999N000141 HC STATISTIC PRE-PROCEDURE NURSING ASSESSMENT: Performed by: SURGERY

## 2023-11-28 PROCEDURE — 272N000001 HC OR GENERAL SUPPLY STERILE: Performed by: SURGERY

## 2023-11-28 PROCEDURE — 250N000011 HC RX IP 250 OP 636: Performed by: NURSE ANESTHETIST, CERTIFIED REGISTERED

## 2023-11-28 PROCEDURE — 250N000011 HC RX IP 250 OP 636: Performed by: SURGERY

## 2023-11-28 PROCEDURE — 250N000009 HC RX 250: Performed by: SURGERY

## 2023-11-28 PROCEDURE — 250N000009 HC RX 250: Performed by: NURSE ANESTHETIST, CERTIFIED REGISTERED

## 2023-11-28 PROCEDURE — 710N000012 HC RECOVERY PHASE 2, PER MINUTE: Performed by: SURGERY

## 2023-11-28 RX ORDER — HYDROMORPHONE HCL IN WATER/PF 6 MG/30 ML
0.4 PATIENT CONTROLLED ANALGESIA SYRINGE INTRAVENOUS EVERY 5 MIN PRN
Status: DISCONTINUED | OUTPATIENT
Start: 2023-11-28 | End: 2023-11-28 | Stop reason: HOSPADM

## 2023-11-28 RX ORDER — ONDANSETRON 2 MG/ML
4 INJECTION INTRAMUSCULAR; INTRAVENOUS EVERY 30 MIN PRN
Status: DISCONTINUED | OUTPATIENT
Start: 2023-11-28 | End: 2023-11-28 | Stop reason: HOSPADM

## 2023-11-28 RX ORDER — CEFAZOLIN SODIUM/WATER 3 G/30 ML
3 SYRINGE (ML) INTRAVENOUS
Status: COMPLETED | OUTPATIENT
Start: 2023-11-28 | End: 2023-11-28

## 2023-11-28 RX ORDER — ONDANSETRON 4 MG/1
4 TABLET, ORALLY DISINTEGRATING ORAL EVERY 30 MIN PRN
Status: DISCONTINUED | OUTPATIENT
Start: 2023-11-28 | End: 2023-11-28 | Stop reason: HOSPADM

## 2023-11-28 RX ORDER — LIDOCAINE HYDROCHLORIDE 20 MG/ML
INJECTION, SOLUTION INFILTRATION; PERINEURAL PRN
Status: DISCONTINUED | OUTPATIENT
Start: 2023-11-28 | End: 2023-11-28

## 2023-11-28 RX ORDER — BUPIVACAINE HYDROCHLORIDE 5 MG/ML
INJECTION, SOLUTION EPIDURAL; INTRACAUDAL
Status: DISCONTINUED
Start: 2023-11-28 | End: 2023-11-28 | Stop reason: HOSPADM

## 2023-11-28 RX ORDER — HEPARIN SODIUM 1000 [USP'U]/ML
INJECTION, SOLUTION INTRAVENOUS; SUBCUTANEOUS PRN
Status: DISCONTINUED | OUTPATIENT
Start: 2023-11-28 | End: 2023-11-28 | Stop reason: HOSPADM

## 2023-11-28 RX ORDER — OXYCODONE HYDROCHLORIDE 5 MG/1
5 TABLET ORAL
Status: DISCONTINUED | OUTPATIENT
Start: 2023-11-28 | End: 2023-11-28 | Stop reason: HOSPADM

## 2023-11-28 RX ORDER — HEPARIN SODIUM 1000 [USP'U]/ML
INJECTION, SOLUTION INTRAVENOUS; SUBCUTANEOUS PRN
Status: DISCONTINUED | OUTPATIENT
Start: 2023-11-28 | End: 2023-11-28

## 2023-11-28 RX ORDER — OXYCODONE HYDROCHLORIDE 5 MG/1
5 TABLET ORAL EVERY 6 HOURS PRN
Qty: 12 TABLET | Refills: 0 | Status: SHIPPED | OUTPATIENT
Start: 2023-11-28 | End: 2023-11-28

## 2023-11-28 RX ORDER — LIDOCAINE HYDROCHLORIDE 10 MG/ML
INJECTION, SOLUTION EPIDURAL; INFILTRATION; INTRACAUDAL; PERINEURAL
Status: DISCONTINUED
Start: 2023-11-28 | End: 2023-11-28 | Stop reason: HOSPADM

## 2023-11-28 RX ORDER — ONDANSETRON 2 MG/ML
INJECTION INTRAMUSCULAR; INTRAVENOUS PRN
Status: DISCONTINUED | OUTPATIENT
Start: 2023-11-28 | End: 2023-11-28

## 2023-11-28 RX ORDER — PROPOFOL 10 MG/ML
INJECTION, EMULSION INTRAVENOUS CONTINUOUS PRN
Status: DISCONTINUED | OUTPATIENT
Start: 2023-11-28 | End: 2023-11-28

## 2023-11-28 RX ORDER — HYDROMORPHONE HCL IN WATER/PF 6 MG/30 ML
0.2 PATIENT CONTROLLED ANALGESIA SYRINGE INTRAVENOUS EVERY 5 MIN PRN
Status: DISCONTINUED | OUTPATIENT
Start: 2023-11-28 | End: 2023-11-28 | Stop reason: HOSPADM

## 2023-11-28 RX ORDER — BUPIVACAINE HYDROCHLORIDE 5 MG/ML
INJECTION, SOLUTION PERINEURAL PRN
Status: DISCONTINUED | OUTPATIENT
Start: 2023-11-28 | End: 2023-11-28 | Stop reason: HOSPADM

## 2023-11-28 RX ORDER — SODIUM CHLORIDE 9 MG/ML
INJECTION, SOLUTION INTRAVENOUS CONTINUOUS PRN
Status: DISCONTINUED | OUTPATIENT
Start: 2023-11-28 | End: 2023-11-28

## 2023-11-28 RX ORDER — SODIUM CHLORIDE, SODIUM LACTATE, POTASSIUM CHLORIDE, CALCIUM CHLORIDE 600; 310; 30; 20 MG/100ML; MG/100ML; MG/100ML; MG/100ML
INJECTION, SOLUTION INTRAVENOUS CONTINUOUS
Status: DISCONTINUED | OUTPATIENT
Start: 2023-11-28 | End: 2023-11-28 | Stop reason: HOSPADM

## 2023-11-28 RX ORDER — MAGNESIUM HYDROXIDE 1200 MG/15ML
LIQUID ORAL PRN
Status: DISCONTINUED | OUTPATIENT
Start: 2023-11-28 | End: 2023-11-28 | Stop reason: HOSPADM

## 2023-11-28 RX ORDER — FENTANYL CITRATE 0.05 MG/ML
25 INJECTION, SOLUTION INTRAMUSCULAR; INTRAVENOUS EVERY 5 MIN PRN
Status: DISCONTINUED | OUTPATIENT
Start: 2023-11-28 | End: 2023-11-28 | Stop reason: HOSPADM

## 2023-11-28 RX ORDER — OXYCODONE HYDROCHLORIDE 5 MG/1
10 TABLET ORAL
Status: DISCONTINUED | OUTPATIENT
Start: 2023-11-28 | End: 2023-11-28 | Stop reason: HOSPADM

## 2023-11-28 RX ORDER — MEPERIDINE HYDROCHLORIDE 25 MG/ML
12.5 INJECTION INTRAMUSCULAR; INTRAVENOUS; SUBCUTANEOUS EVERY 5 MIN PRN
Status: DISCONTINUED | OUTPATIENT
Start: 2023-11-28 | End: 2023-11-28 | Stop reason: HOSPADM

## 2023-11-28 RX ORDER — FENTANYL CITRATE 0.05 MG/ML
50 INJECTION, SOLUTION INTRAMUSCULAR; INTRAVENOUS EVERY 5 MIN PRN
Status: DISCONTINUED | OUTPATIENT
Start: 2023-11-28 | End: 2023-11-28 | Stop reason: HOSPADM

## 2023-11-28 RX ORDER — OXYCODONE HYDROCHLORIDE 5 MG/1
5 TABLET ORAL EVERY 6 HOURS PRN
Qty: 6 TABLET | Refills: 0 | Status: SHIPPED | OUTPATIENT
Start: 2023-11-28 | End: 2024-01-18

## 2023-11-28 RX ORDER — FENTANYL CITRATE 0.05 MG/ML
50 INJECTION, SOLUTION INTRAMUSCULAR; INTRAVENOUS
Status: DISCONTINUED | OUTPATIENT
Start: 2023-11-28 | End: 2023-11-28 | Stop reason: HOSPADM

## 2023-11-28 RX ORDER — ASPIRIN 81 MG/1
81 TABLET ORAL DAILY
Qty: 90 TABLET | Refills: 3 | Status: ON HOLD | OUTPATIENT
Start: 2023-11-28 | End: 2024-05-18

## 2023-11-28 RX ORDER — FENTANYL CITRATE 50 UG/ML
INJECTION, SOLUTION INTRAMUSCULAR; INTRAVENOUS PRN
Status: DISCONTINUED | OUTPATIENT
Start: 2023-11-28 | End: 2023-11-28

## 2023-11-28 RX ORDER — HEPARIN SODIUM 1000 [USP'U]/ML
INJECTION, SOLUTION INTRAVENOUS; SUBCUTANEOUS
Status: DISCONTINUED
Start: 2023-11-28 | End: 2023-11-28 | Stop reason: HOSPADM

## 2023-11-28 RX ADMIN — PROPOFOL 100 MCG/KG/MIN: 10 INJECTION, EMULSION INTRAVENOUS at 09:13

## 2023-11-28 RX ADMIN — ONDANSETRON 4 MG: 2 INJECTION INTRAMUSCULAR; INTRAVENOUS at 09:27

## 2023-11-28 RX ADMIN — MIDAZOLAM 2 MG: 1 INJECTION INTRAMUSCULAR; INTRAVENOUS at 09:13

## 2023-11-28 RX ADMIN — HEPARIN SODIUM 3000 UNITS: 1000 INJECTION, SOLUTION INTRAVENOUS; SUBCUTANEOUS at 09:42

## 2023-11-28 RX ADMIN — LIDOCAINE HYDROCHLORIDE 40 MG: 20 INJECTION, SOLUTION INFILTRATION; PERINEURAL at 09:13

## 2023-11-28 RX ADMIN — SODIUM CHLORIDE: 9 INJECTION, SOLUTION INTRAVENOUS at 09:09

## 2023-11-28 RX ADMIN — FENTANYL CITRATE 50 MCG: 50 INJECTION INTRAMUSCULAR; INTRAVENOUS at 09:30

## 2023-11-28 RX ADMIN — PHENYLEPHRINE HYDROCHLORIDE 100 MCG: 10 INJECTION INTRAVENOUS at 10:26

## 2023-11-28 RX ADMIN — PHENYLEPHRINE HYDROCHLORIDE 100 MCG: 10 INJECTION INTRAVENOUS at 10:17

## 2023-11-28 RX ADMIN — Medication 3 G: at 09:13

## 2023-11-28 RX ADMIN — FENTANYL CITRATE 50 MCG: 50 INJECTION INTRAMUSCULAR; INTRAVENOUS at 09:13

## 2023-11-28 RX ADMIN — PHENYLEPHRINE HYDROCHLORIDE 100 MCG: 10 INJECTION INTRAVENOUS at 10:04

## 2023-11-28 RX ADMIN — PHENYLEPHRINE HYDROCHLORIDE 100 MCG: 10 INJECTION INTRAVENOUS at 10:23

## 2023-11-28 ASSESSMENT — LIFESTYLE VARIABLES: TOBACCO_USE: 0

## 2023-11-28 ASSESSMENT — ENCOUNTER SYMPTOMS
DYSRHYTHMIAS: 0
SEIZURES: 0

## 2023-11-28 ASSESSMENT — ACTIVITIES OF DAILY LIVING (ADL)
ADLS_ACUITY_SCORE: 35
ADLS_ACUITY_SCORE: 35

## 2023-11-28 NOTE — OR NURSING
VSS. A&O. Denies pain. Dressing CDI to left wrist. Bruit and thrill present. Sona PO fluids. Up dressed in recliner and transferred to phase II.

## 2023-11-28 NOTE — ANESTHESIA PREPROCEDURE EVALUATION
Anesthesia Pre-Procedure Evaluation    Patient: Brooks Mensah   MRN: [id number] : 1985        Procedure : Procedure(s):  CREATION OF LEFT DISTAL RADIAL TO CEPHALIC ARTERIOVENOUS FISTULA          Past Medical History:   Diagnosis Date    Acute on chronic systolic congestive heart failure (H) 2017    CAD (coronary artery disease)     Nonobstructive    Cardiomyopathy, unspecified (H) 2017    Congestive heart failure, unspecified congestive heart failure chronicity, unspecified congestive heart failure type 2017    HTN (hypertension), malignant     Hypertensive urgency 2017    Morbid obesity with BMI of 50.0-59.9, adult (H) 2017    NSTEMI (non-ST elevated myocardial infarction) (H) 2017    Renal insufficiency 2017    Sleep apnea     Tobacco abuse       Past Surgical History:   Procedure Laterality Date    ESOPHAGOSCOPY, GASTROSCOPY, DUODENOSCOPY (EGD), COMBINED N/A 2022    Procedure: ESOPHAGOGASTRODUODENOSCOPY, WITH BIOPSY;  Surgeon: Dm Warner MD;  Location:  GI    ESOPHAGOSCOPY, GASTROSCOPY, DUODENOSCOPY (EGD), COMBINED N/A 10/3/2023    Procedure: Esophagogastroduodenoscopy with biopsy;  Surgeon: Manuel Leal MD;  Location: [facility] OR    IR CVC TUNNEL PLACEMENT > 5 YRS OF AGE  10/2/2023      No Known Allergies   Social History     Tobacco Use    Smoking status: Former     Packs/day: .25     Types: Cigarettes     Start date:      Quit date: 2022     Years since quittin.0    Smokeless tobacco: Never    Tobacco comments:     1 pack per week   Substance Use Topics    Alcohol use: Yes     Comment: occassional      Wt Readings from Last 1 Encounters:   10/06/23 (!) 159.8 kg (352 lb 6.4 oz)        Anesthesia Evaluation    Type: General.    History of anesthetic complications       ROS/MED HX  ENT/Pulmonary:     (+) sleep apnea, doesn't use CPAP,                                  (-) tobacco use and asthma   Neurologic:     (+)    no peripheral  neuropathy                         (-) no seizures and no CVA   Cardiovascular:     (+)  hypertension- -  CAD - past MI - -      CHF                             (-) arrhythmias   METS/Exercise Tolerance:     Hematologic:       Musculoskeletal:       GI/Hepatic:    (-) GERD   Renal/Genitourinary:     (+) renal disease, type: ESRD, Pt requires dialysis,           Endo:     (+)               Obesity,    (-) Type II DM and thyroid disease   Psychiatric/Substance Use:       Infectious Disease:    (-) Recent Fever   Malignancy:       Other:            Physical Exam    Airway  airway exam normal      Mallampati: II   TM distance: > 3 FB   Neck ROM: full   Mouth opening: > 3 cm    Respiratory Devices and Support         Dental       (+) Minor Abnormalities - some fillings, tiny chips    B=Bridge, C=Chipped, L=Loose, M=Missing    Cardiovascular   cardiovascular exam normal          Pulmonary   pulmonary exam normal                  Mika Holbrook MD  [phone] 2023      Narrative & Impression  [id number]  [id number]  [id number]  [id number]^KAUR^GUILLERMINA^SHIMON     [facility]  Echocardiography Laboratory  [address] [place] [address]     Name: BREA DO  MRN: [id number]  : 1985  Study Date: 2023 09:02 AM  Age: 38 yrs  Gender: Male  Patient Location: [facility]  Reason For Study: CHF  Ordering Physician: GUILLERMINA DIETRICH  Performed By: ILDA Fernandez     BSA: 2.6 m2  Height: 70 in  Weight: 339 lb  BP: 170/80 mmHg  ______________________________________________________________________________  Procedure  Complete Portable Echo Adult. Optison (NDC #2163-0455) given intravenously.  ______________________________________________________________________________  Interpretation Summary     TDS     The visual ejection fraction is 55-60%.  There is severe concentric left ventricular hypertrophy.  The left ventricle is borderline dilated.  There is mild apical wall  hypokinesis.  The right ventricle is normal in structure, function and size.  The left atrium is mildly dilated.  There is mild (1+) aortic regurgitation.  There is no pericardial effusion.     LV wall thickness is more pronounced compared to 2019 study, otherwise no  significant changes.      Jacinto Hernandez MD Tech: Leyda Toussaint        Patient Information    Name MRN Description   Javon Mensah [id number] 32 year old male         Conclusion       1. Coronary angiogram     2. Left heart catheterization     Approach: Right radial artery     Complications: None noted     INDICATION: Acute systolic heart failure in the setting of  hypertension renal insufficiency and left ventricular dysfunction on  echocardiogram. We have reviewed the risks of death, myocardial  infarction, stroke, hematoma, peripheral vascular location, renal  failure, and the option of medical therapy alone without further  testing. The patient voiced understanding and wishes to proceed.     Procedural sedation documentation: Under my direct supervision the  nursing Cath Lab staff administered a total of 1 mg of midazolam and  50 mcg of fentanyl carefully observing blood pressure, heart rate,  symptoms, ECG, and oxygen saturation for total sedation time of 30  minutes     1. Coronary angiogram     The right radial region was prepped and draped standard fashion and  infiltrated with 1% lidocaine. The right radial artery was entered  percutaneously with a Cook needle and was Seldinger technique a 6  Guamanian sheath was placed in the right radial artery. We gave 5000  units of intravenous heparin. We gave 2.5 mg verapamil/400 mcg  nitroglycerin surgery. We advanced a 5 Guamanian Radha catheter under  fluoroscopic guidance places catheter in the ascending aorta. We  aspirated flushed and zeroed system. We seated the catheter in the  ostium the right coronary. Right coronary angiography was performed  orthogonal views. We seated the catheter in the  ostium left main. Left  coronary angiography performed orthogonal views.     2. Left heart catheterization     We advanced the Radha catheter across the aortic valve. We noted no  gradient. We aspirated flushed and zeroed system. We measured left  ventricular end-diastolic pressures. The catheter was withdrawn across  the aortic valve. We noted no gradient     We stopped the procedure this point. The catheter was removed from the  sheath. The sheath was removed the wrist. A TR band was placed with  good hemostasis. The patient was returned back to the mendez for  observation. No complications were observed     FINDINGS:  Hemodynamics     Left ventricular end as a pressure equals 28 mmHg     Angiography     Left main: Smooth. Large. Normal.     LAD: Smooth. Large. Normal     Ramus intermedius branch: Smooth. Normal. Large.     Circumflex: Nondominant. Smooth. Normal. Large.     Right coronary: A very large ectatic vessel with a  20%  ostial/proximal and distal 30% narrowing before the crux. There is no  significant narrowing in the large sized posterior descending and the  2 posterior lateral branches.     Assessment: Elevated left ventricular end diastolic pressure  consistent with acute systolic/diastolic heart failure.  Atherosclerotic remodeling prevalent in right coronary with no  flow-limiting stenosis.     Recommendations  1. Aggressive lifestyle intervention with weight loss, measuring style  diet, exercise, and avoidance of tobacco     2. Diuretic therapy as necessary to control blood pressure and avoid  edema     3. ACE inhibitor/beta blockers for treatment of hypertension left  ventricular dysfunction     4. High potency statin therapy and aspirin 81 mg daily     DEA SHEPHERD MD              OUTSIDE LABS:  CBC:   Lab Results   Component Value Date    WBC 10.7 10/06/2023    WBC 13.0 (H) 10/05/2023    HGB 7.5 (L) 10/06/2023    HGB 7.8 (L) 10/05/2023    HCT 23.9 (L) 10/06/2023    HCT 24.3 (L)  10/05/2023     10/06/2023     (L) 10/05/2023     BMP:   Lab Results   Component Value Date     10/06/2023     (L) 10/05/2023    POTASSIUM 3.9 10/06/2023    POTASSIUM 3.9 10/05/2023    CHLORIDE 101 10/06/2023    CHLORIDE 97 (L) 10/05/2023    CO2 25 10/06/2023    CO2 23 10/05/2023    BUN 48.0 (H) 10/06/2023    BUN 63.6 (H) 10/05/2023    CR 6.78 (H) 10/06/2023    CR 7.88 (H) 10/05/2023    GLC 96 10/06/2023    GLC 95 10/05/2023     COAGS:   Lab Results   Component Value Date    PTT 21 (L) 11/30/2022    INR 1.19 (H) 10/02/2023     POC:   Lab Results   Component Value Date     (H) 06/14/2017    HCGS Negative 06/15/2017     HEPATIC:   Lab Results   Component Value Date    ALBUMIN 2.6 (L) 10/04/2023    PROTTOTAL 5.9 (L) 09/29/2023    ALT 52 09/29/2023    AST 14 09/29/2023    ALKPHOS 65 09/29/2023    BILITOTAL 0.2 09/29/2023     OTHER:   Lab Results   Component Value Date    PH 7.40 05/13/2019    LACT 0.4 (L) 10/03/2023    A1C <4.2 11/29/2022    GAYLE 7.3 (L) 10/06/2023    PHOS 7.8 (H) 10/04/2023    MAG 2.5 (H) 06/16/2017    LIPASE 1,089 (H) 09/29/2023       Anesthesia Plan    ASA Status:  3    NPO Status:  NPO Appropriate    Anesthesia Type: MAC.              Consents    Anesthesia Plan(s) and associated risks, benefits, and realistic alternatives discussed. Questions answered and patient/representative(s) expressed understanding.     - Discussed:     - Discussed with:  Patient      - Specific Concerns: Specific risks discussed (but not limited to): Possibility of intraoperative awareness..           Postoperative Care    Pain management: IV analgesics, Oral pain medications.   PONV prophylaxis: Ondansetron (or other 5HT-3), Dexamethasone or Solumedrol     Comments:               Darron A. Kudak, MD    I have reviewed the pertinent notes and labs in the chart from the past 30 days and (re)examined the patient.  Any updates or changes from those notes are reflected in this note.

## 2023-11-28 NOTE — BRIEF OP NOTE
[facility]    Brief Operative Note    Pre-operative diagnosis: ESRD (end stage renal disease) on dialysis (H) [N18.6, Z99.2]  Post-operative diagnosis Same as pre-operative diagnosis    Procedure: CREATION OF LEFT DISTAL RADIAL TO CEPHALIC ARTERIOVENOUS FISTULA, Left - Arm    Surgeon: Surgeon(s) and Role:     * Ramiro Pantoja MD - Primary     * Milana Moy MD - Assisting  Anesthesia: MAC   Estimated Blood Loss: 5 mL from 11/28/2023  9:08 AM to 11/28/2023 10:37 AM      Drains: None  Specimens: * No specimens in log *  Findings:   Healthy vein and mildly calcified artery. Anastomosed with good signals post-op .  Complications: None.  Implants: * No implants in log *

## 2023-11-28 NOTE — OP NOTE
OPERATIVE NOTE    PROCEDURE DATE: 11/28/2023      PRE-OP DIAGNOSIS: End-stage renal disease in need of permanent hemodialysis access      POST-OP DIAGNOSES: Same      PROCEDURE PERFORMED: Left distal radial to cephalic arteriovenous fistula      SURGEON:  Ramiro Pantoja M.D.      ASSISTANT:  Milana Moy MD (Vascular Fellow)      ANESTHESIA:  Local with MAC      PRE-OP MEDICATIONS: Ancef 3 g IV      INDICATIONS FOR PROCEDURE: 38-year-old patient is on chronic hemodialysis via right jugular tunneled catheter.  He needs a permanent hemodialysis access.  He has an excellent left forearm cephalic vein and good radial artery with a normal Doppler Werner test.  He comes the operating today under informed consent      DESCRIPTION OF PROCEDURE: Brought the op room.  Left arm and hand were prepped and draped.  Timeout was called the sites were identified.  Vein was very easily identified and measured at least 5 mm in diameter.    1% lidocaine injected field block fashion.  A distal radial incision was made.  Dissection was carried down to identify the excellent cephalic vein.  This is dissected free proximally distally and mobilized.  Distally we did preserve a moderate-sized sidebranch to aid in our anastomosis.  We then exposed the radial artery.  The had some mild calcification but otherwise was relatively disease-free with a strong pulse.  Proximal and distal Silastic Vesseloops were placed preserving 2 small side branches.    3000 units intravenous heparin given.  Distal end and vein was ligated and transected and spatulated out through the sidebranch.  This was irrigated with heparinized saline solution and gently dilated with 1% lidocaine.  Excepted a 4 mm dilator with no difficulty.  Vesseloops were tightened around the radial artery.  A 1 cm longitudinal arteriotomy was made in the artery with some mild wall calcification but a good patent lumen.  Using the spatulated sidebranch end-to-side anastomosis created a  running 7-0 Prolene suture and loupe magnification.    Upon completion release of the vessels we had a strong pulse and thrill to the mid forearm.  By Doppler there were no significant competing side of side branches at least to the mid forearm.  Very gentle curve was noted from the anastomosis to the outflow track.  Wound was infiltrated with 0.5% Marcaine for postoperative analgesia.  Subcutaneous tissue approximately interrupted 3-0 Vicryl.  Skin was closed with 5-0 Monocryl in subcuticular fashion.  Surgical adhesive and Tegaderm dressing were applied.    Patient tolerated procedure quite well.        EBL: Less than 5 mL      COMPLICATIONS: None      OPERATIVE FINDINGS: Excellent cephalic vein.  Mild disease within the radial artery but good inflow.  Should mature to a good fistula.      Ramiro Pantoja MD

## 2023-11-28 NOTE — ANESTHESIA POSTPROCEDURE EVALUATION
Patient: Brooks Mensah    Procedure: Procedure(s):  CREATION OF LEFT DISTAL RADIAL TO CEPHALIC ARTERIOVENOUS FISTULA       Anesthesia Type:  MAC    Note:  Disposition: Outpatient   Postop Pain Control: Uneventful            Sign Out: Well controlled pain   PONV: No   Neuro/Psych: Uneventful            Sign Out: Acceptable/Baseline neuro status   Airway/Respiratory: Uneventful            Sign Out: Acceptable/Baseline resp. status   CV/Hemodynamics: Uneventful            Sign Out: Acceptable CV status   Other NRE: NONE   DID A NON-ROUTINE EVENT OCCUR? No           Last vitals:  Vitals Value Taken Time   /88 11/28/23 1115   Temp 36.4  C (97.5  F) 11/28/23 1039   Pulse 75 11/28/23 1118   Resp 15 11/28/23 1118   SpO2 92 % 11/28/23 1118   Vitals shown include unfiled device data.    Electronically Signed By: Darrno Muñoz MD  November 28, 2023  12:08 PM

## 2023-11-28 NOTE — ANESTHESIA CARE TRANSFER NOTE
Patient: Brooks Mensah    Procedure: Procedure(s):  CREATION OF LEFT DISTAL RADIAL TO CEPHALIC ARTERIOVENOUS FISTULA       Diagnosis: ESRD (end stage renal disease) on dialysis (H) [N18.6, Z99.2]  Diagnosis Additional Information: No value filed.    Anesthesia Type:   MAC     Note:    Oropharynx: oropharynx clear of all foreign objects and spontaneously breathing  Level of Consciousness: awake and drowsy  Oxygen Supplementation: face mask  Level of Supplemental Oxygen (L/min / FiO2): 6  Independent Airway: airway patency satisfactory and stable  Dentition: dentition unchanged  Vital Signs Stable: post-procedure vital signs reviewed and stable  Report to RN Given: handoff report given  Patient transferred to: PACU    Handoff Report: Identifed the Patient, Identified the Reponsible Provider, Reviewed the pertinent medical history, Discussed the surgical course, Reviewed Intra-OP anesthesia mangement and issues during anesthesia, Set expectations for post-procedure period and Allowed opportunity for questions and acknowledgement of understanding      Vitals:  Vitals Value Taken Time   /62 11/28/23 1039   Temp     Pulse 85 11/28/23 1040   Resp 19 11/28/23 1040   SpO2 98 % 11/28/23 1040   Vitals shown include unfiled device data.    Electronically Signed By: YUNIER Mcneil CRNA  November 28, 2023  10:42 AM

## 2023-12-05 DIAGNOSIS — I10 ESSENTIAL HYPERTENSION: ICD-10-CM

## 2023-12-05 DIAGNOSIS — I50.22 CHRONIC SYSTOLIC CONGESTIVE HEART FAILURE (H): ICD-10-CM

## 2023-12-05 RX ORDER — HYDRALAZINE HYDROCHLORIDE 25 MG/1
25 TABLET, FILM COATED ORAL 3 TIMES DAILY
COMMUNITY
Start: 2023-12-05

## 2023-12-19 ENCOUNTER — OFFICE VISIT (OUTPATIENT)
Dept: OTHER | Facility: CLINIC | Age: 38
End: 2023-12-19
Payer: COMMERCIAL

## 2023-12-19 VITALS — DIASTOLIC BLOOD PRESSURE: 87 MMHG | SYSTOLIC BLOOD PRESSURE: 131 MMHG | HEART RATE: 76 BPM

## 2023-12-19 DIAGNOSIS — T82.9XXA DIALYSIS COMPLICATION, INITIAL ENCOUNTER: Primary | ICD-10-CM

## 2023-12-19 PROCEDURE — 99024 POSTOP FOLLOW-UP VISIT: CPT

## 2023-12-19 PROCEDURE — 99213 OFFICE O/P EST LOW 20 MIN: CPT

## 2023-12-19 NOTE — PROGRESS NOTES
[facility]        Patient is here for a  follow up.    Pt is currently taking Aspirin and Statin.    /87 (BP Location: Right arm, Patient Position: Chair, Cuff Size: Adult Large)   Pulse 76     The provider has been notified that the patient has no concerns.     Questions patient would like addressed today are: N/A.    Refills are needed: N/A    Has homecare services and agency name:  Vickie Deleon MA

## 2023-12-20 ENCOUNTER — TELEPHONE (OUTPATIENT)
Dept: OTHER | Facility: CLINIC | Age: 38
End: 2023-12-20
Payer: COMMERCIAL

## 2023-12-20 NOTE — TELEPHONE ENCOUNTER
Per 12/19/23 visit with Chandni, pt needs the following in approximately 4 weeks:    AVF US  In Clinic visit with Dr. Patnoja.    Routing to scheduling to contact patient to coordinate above.    Appt note in order comments.    Teresa Conner, JEANNETTEN, RN, [facility] [place]

## 2023-12-20 NOTE — PROGRESS NOTES
VASCULAR SURGERY PROGRESS NOTE    [facility]    Brooks Mensah  Medical Record #:  [id number]  YOB: 1985  Age:  38 year old     Date of Service: 12/19/2023    PRIMARY CARE PROVIDER: Anabela Jurado    Reason for visit:  post-operative appointment    Javon Mensah is a 38 year old male who underwent a left distal radial to cephalic arteriovenous fistula creation with Dr. Pantoja on 11/28/2023. He comes in for his post-operative examination.     On examination today Javon is doing well  Alert and oriented x4, neurologically intact  Incision is fully healed  2+ radial pulse, excellent thrill noted  No edema noted in his arms  5/5  strength bilaterally  Denies numbness, tingling, pain in his left arm     RECOMMENDATION/RISKS: Continue to use tunneled catheter for dialysis. Will have Javon come back for duplex of his fistula in 4 weeks time and to see Dr. Pantoja. Discussed vein building exercises and supplies given. Javon had no further questions.     REVIEW OF SYSTEMS:    A 12 point ROS was reviewed and is negative except for what is listed above in HPI.    PHH:    Past Medical History:   Diagnosis Date    Acute on chronic systolic congestive heart failure (H) 8/9/2017    CAD (coronary artery disease)     Nonobstructive    Cardiomyopathy, unspecified (H) 8/9/2017    Congestive heart failure, unspecified congestive heart failure chronicity, unspecified congestive heart failure type 6/13/2017    HTN (hypertension), malignant     Hypertensive urgency 6/13/2017    Morbid obesity with BMI of 50.0-59.9, adult (H) 6/21/2017    NSTEMI (non-ST elevated myocardial infarction) (H) 6/13/2017    Renal insufficiency 6/13/2017    Sleep apnea     Tobacco abuse           Past Surgical History:   Procedure Laterality Date    CREATE FISTULA ARTERIOVENOUS UPPER EXTREMITY Left 11/28/2023    Procedure: CREATION OF LEFT DISTAL RADIAL TO CEPHALIC ARTERIOVENOUS FISTULA;  Surgeon: Ramiro Pantoja  MD Laith;  Location: [facility] OR    ESOPHAGOSCOPY, GASTROSCOPY, DUODENOSCOPY (EGD), COMBINED N/A 12/1/2022    Procedure: ESOPHAGOGASTRODUODENOSCOPY, WITH BIOPSY;  Surgeon: Dm Warner MD;  Location: [facility]    ESOPHAGOSCOPY, GASTROSCOPY, DUODENOSCOPY (EGD), COMBINED N/A 10/3/2023    Procedure: Esophagogastroduodenoscopy with biopsy;  Surgeon: Manuel Leal MD;  Location: [facility] OR    IR CVC TUNNEL PLACEMENT > 5 YRS OF AGE  10/2/2023       ALLERGIES:  Patient has no known allergies.    MEDS:    Current Outpatient Medications:     aspirin 81 MG EC tablet, Take 1 tablet (81 mg) by mouth daily, Disp: 90 tablet, Rfl: 3    atorvastatin (LIPITOR) 10 MG tablet, Take 1 tablet (10 mg) by mouth every evening, Disp: 90 tablet, Rfl: 3    calcium acetate (PHOSLO) 667 MG CAPS capsule, Take 2 capsules (1,334 mg) by mouth 3 times daily (with meals), Disp: 180 capsule, Rfl: 1    calcium acetate (PHOSLO) 667 MG CAPS capsule, Take 1 capsule (667 mg) by mouth Take with snacks or supplements (WITH SNACKS OR SUPPLEMENTS), Disp: 60 capsule, Rfl: 1    carvedilol (COREG) 25 MG tablet, Take 0.5 tablets (12.5 mg) by mouth 2 times daily (with meals), Disp: 30 tablet, Rfl: 1    hydrALAZINE (APRESOLINE) 25 MG tablet, Take 1 tablet (25 mg) by mouth 3 times daily, Disp: , Rfl:     lisinopril (ZESTRIL) 20 MG tablet, Take 1 tablet (20 mg) by mouth daily, Disp: 30 tablet, Rfl: 1    pantoprazole (PROTONIX) 40 MG EC tablet, Take 1 tablet (40 mg) by mouth 2 times daily, Disp: 60 tablet, Rfl: 1    oxyCODONE (ROXICODONE) 5 MG tablet, Take 1 tablet (5 mg) by mouth every 6 hours as needed for pain (Patient not taking: Reported on 12/19/2023), Disp: 6 tablet, Rfl: 0    SOCIAL HABITS:    History   Smoking Status    Former    Packs/day: 0.25    Types: Cigarettes    Start date: 2002    Quit date: 11/1/2022   Smokeless Tobacco    Never     Social History    Substance and Sexual Activity      Alcohol use: Not Currently        Comment: sober since NOV  of 2022      History   Drug Use No       FAMILY HISTORY:    Family History   Problem Relation Age of Onset    Hyperlipidemia Mother     Hyperlipidemia Father        PE:  /87 (BP Location: Right arm, Patient Position: Chair, Cuff Size: Adult Large)   Pulse 76   Wt Readings from Last 1 Encounters:   11/28/23 284 lb 14.4 oz (129.2 kg)     There is no height or weight on file to calculate BMI.    EXAM:  GENERAL: well-developed 38 year old male who appears his stated age  CARDIAC: normal   CHEST/LUNG: normal respiratory effort   MUSCULOSKELETAL: grossly normal and both lower extremities are intact, no lower extremity edema  NEUROLOGIC: focally intact, alert and oriented x 3  PSYCH: appropriate affect  VASCULAR:       DIAGNOSTIC STUDIES:     Images:  No results found.    LABS:      Sodium   Date Value Ref Range Status   10/06/2023 137 135 - 145 mmol/L Final     Comment:     Reference intervals for this test were updated on 09/26/2023 to more accurately reflect our healthy population. There may be differences in the flagging of prior results with similar values performed with this method. Interpretation of those prior results can be made in the context of the updated reference intervals.    10/05/2023 134 (L) 135 - 145 mmol/L Final     Comment:     Reference intervals for this test were updated on 09/26/2023 to more accurately reflect our healthy population. There may be differences in the flagging of prior results with similar values performed with this method. Interpretation of those prior results can be made in the context of the updated reference intervals.    10/04/2023 133 (L) 135 - 145 mmol/L Final     Comment:     Reference intervals for this test were updated on 09/26/2023 to more accurately reflect our healthy population. There may be differences in the flagging of prior results with similar values performed with this method. Interpretation of those prior results can be made in the context of the updated  reference intervals.    05/12/2021 136 133 - 144 mmol/L Final   04/28/2021 137 133 - 144 mmol/L Final   03/05/2021 139 133 - 144 mmol/L Final     Urea Nitrogen   Date Value Ref Range Status   10/06/2023 48.0 (H) 6.0 - 20.0 mg/dL Final   10/05/2023 63.6 (H) 6.0 - 20.0 mg/dL Final   10/04/2023 87.5 (H) 6.0 - 20.0 mg/dL Final   09/20/2021 27 7 - 30 mg/dL Final   05/12/2021 24 7 - 30 mg/dL Final   04/28/2021 26 7 - 30 mg/dL Final   03/05/2021 25 7 - 30 mg/dL Final     Hemoglobin   Date Value Ref Range Status   10/06/2023 7.5 (L) 13.3 - 17.7 g/dL Final   10/05/2023 7.8 (L) 13.3 - 17.7 g/dL Final   10/04/2023 6.5 (LL) 13.3 - 17.7 g/dL Final   06/16/2017 11.7 (L) 13.3 - 17.7 g/dL Final   06/15/2017 11.9 (L) 13.3 - 17.7 g/dL Final   06/13/2017 12.0 (L) 13.3 - 17.7 g/dL Final     Platelet Count   Date Value Ref Range Status   10/06/2023 151 150 - 450 10e3/uL Final   10/05/2023 139 (L) 150 - 450 10e3/uL Final   10/04/2023 116 (L) 150 - 450 10e3/uL Final   06/16/2017 214 150 - 450 10e9/L Final   06/15/2017 219 150 - 450 10e9/L Final   06/13/2017 200 150 - 450 10e9/L Final     INR   Date Value Ref Range Status   10/02/2023 1.19 (H) 0.85 - 1.15 Final   11/30/2022 1.01 0.85 - 1.15 Final       20 minutes spent on the day of encounter doing chart review, history and exam, documentation, and further activities as noted.       Chandni Young NP  VASCULAR SURGERY

## 2023-12-21 NOTE — TELEPHONE ENCOUNTER
Future Appointments   Date Time Provider Department Center   1/18/2024  8:00 AM [id number]   1/18/2024  9:00 AM Ramiro Pantoja MD [facility]

## 2024-01-06 ENCOUNTER — HEALTH MAINTENANCE LETTER (OUTPATIENT)
Age: 39
End: 2024-01-06

## 2024-01-17 NOTE — PROGRESS NOTES
[place] [facility]    Brooks Mensah is on chronic hemodialysis via right jugular tunneled catheter.  We placed a left distal radial to cephalic fistula on 11/28/2023.  Following very mild disease within the radial artery but good inflow.  Excellent cephalic vein.    Doing well on 12/19/2023 follow-up.  Continue with fistula exercises and returns for duplex ultrasound today.    Dialysis is going well with the tunneled catheter.  He has been doing this fistula tourniquet exercises.    Dialysis q. [id number] at the [facility] unit.      Exam: Alert and appropriate.  Normal affect.   Blood pressure 122/77 right arm.  Pulse 76 regular   Well-healed left wrist incision.   Fistula is easily visualized at least 6 mm in diameter    Sidebranche going laterally the distal one third    Strong firm pulse still noted in fistula despite sidebranch with occlusion of AVF and proximal forearm    Duplex today reveals a widely patent left distal radial to cephalic fistula.  Diameter averages 5 mm without a tourniquet.  Excellent upper arm outflow in both the basilic and cephalic system with no stenosis.  Good inflow.  Sidebranch at distal one third of the forearm noted.  Outflow volume= 406 mm.        Impression:   Well-developed left forearm radiocephalic fistula.  Clinically I do not feel the sidebranch is significant--i.e. access should not be an issue even with this sidebranch.  Dialysis unit may start using the fistula.  Once transitioned will remove tunneled catheter in office.    Ramiro Pantoja MD   This note was created using Dragon voice recognition software which may result in transcription errors.

## 2024-01-18 ENCOUNTER — HOSPITAL ENCOUNTER (OUTPATIENT)
Dept: ULTRASOUND IMAGING | Facility: CLINIC | Age: 39
Discharge: HOME | End: 2024-01-18
Payer: COMMERCIAL

## 2024-01-18 ENCOUNTER — OFFICE VISIT (OUTPATIENT)
Dept: OTHER | Facility: CLINIC | Age: 39
End: 2024-01-18
Payer: COMMERCIAL

## 2024-01-18 VITALS — HEART RATE: 76 BPM | DIASTOLIC BLOOD PRESSURE: 77 MMHG | SYSTOLIC BLOOD PRESSURE: 122 MMHG

## 2024-01-18 DIAGNOSIS — N18.6 ESRD (END STAGE RENAL DISEASE) ON DIALYSIS (H): Primary | ICD-10-CM

## 2024-01-18 DIAGNOSIS — I77.0 ARTERIOVENOUS FISTULA (H): ICD-10-CM

## 2024-01-18 DIAGNOSIS — Z99.2 ESRD (END STAGE RENAL DISEASE) ON DIALYSIS (H): Primary | ICD-10-CM

## 2024-01-18 DIAGNOSIS — T82.9XXA DIALYSIS COMPLICATION, INITIAL ENCOUNTER: ICD-10-CM

## 2024-01-18 PROCEDURE — 99213 OFFICE O/P EST LOW 20 MIN: CPT | Performed by: SURGERY

## 2024-01-18 PROCEDURE — 93990 DOPPLER FLOW TESTING: CPT

## 2024-01-18 PROCEDURE — 99024 POSTOP FOLLOW-UP VISIT: CPT | Performed by: SURGERY

## 2024-01-18 NOTE — PROGRESS NOTES
[facility]        Patient is here for a  follow up  to discuss AV fistula.     Pt is currently taking Aspirin and Statin.    /77 (BP Location: Right arm, Patient Position: Chair, Cuff Size: Adult Large)   Pulse 76     The provider has been notified that the patient has no concerns.     Questions patient would like addressed today are: N/A.    Refills are needed: N/A    Has homecare services and agency name:  Vickie Deleon MA

## 2024-02-26 ENCOUNTER — HOSPITAL ENCOUNTER (OUTPATIENT)
Facility: CLINIC | Age: 39
End: 2024-02-26
Admitting: INTERNAL MEDICINE
Payer: COMMERCIAL

## 2024-02-26 ENCOUNTER — TELEPHONE (OUTPATIENT)
Dept: INTERVENTIONAL RADIOLOGY/VASCULAR | Facility: CLINIC | Age: 39
End: 2024-02-26
Payer: COMMERCIAL

## 2024-02-26 RX ORDER — ACETAMINOPHEN 325 MG/1
650 TABLET ORAL
Status: CANCELLED | OUTPATIENT
Start: 2024-02-26

## 2024-03-07 ENCOUNTER — HOSPITAL ENCOUNTER (OUTPATIENT)
Facility: CLINIC | Age: 39
Discharge: HOME | End: 2024-03-07
Admitting: RADIOLOGY
Payer: COMMERCIAL

## 2024-03-07 ENCOUNTER — APPOINTMENT (OUTPATIENT)
Dept: INTERVENTIONAL RADIOLOGY/VASCULAR | Facility: CLINIC | Age: 39
End: 2024-03-07
Attending: INTERNAL MEDICINE
Payer: COMMERCIAL

## 2024-03-07 VITALS
BODY MASS INDEX: 41.95 KG/M2 | OXYGEN SATURATION: 97 % | SYSTOLIC BLOOD PRESSURE: 105 MMHG | TEMPERATURE: 98.4 F | HEART RATE: 93 BPM | RESPIRATION RATE: 18 BRPM | WEIGHT: 293 LBS | DIASTOLIC BLOOD PRESSURE: 68 MMHG | HEIGHT: 70 IN

## 2024-03-07 DIAGNOSIS — Z99.2 ESRD (END STAGE RENAL DISEASE) ON DIALYSIS (H): ICD-10-CM

## 2024-03-07 DIAGNOSIS — N18.6 ESRD (END STAGE RENAL DISEASE) ON DIALYSIS (H): ICD-10-CM

## 2024-03-07 PROCEDURE — 250N000009 HC RX 250: Performed by: PHYSICIAN ASSISTANT

## 2024-03-07 PROCEDURE — 36589 REMOVAL TUNNELED CV CATH: CPT

## 2024-03-07 PROCEDURE — 999N000163 HC STATISTIC SIMPLE TUBE INSERTION/CHARGE, PORT, CATH, FISTULOGRAM

## 2024-03-07 RX ORDER — ACETAMINOPHEN 325 MG/1
650 TABLET ORAL
Status: DISCONTINUED | OUTPATIENT
Start: 2024-03-07 | End: 2024-03-07 | Stop reason: HOSPADM

## 2024-03-07 RX ADMIN — LIDOCAINE HYDROCHLORIDE 4 ML: 10 INJECTION, SOLUTION INFILTRATION; PERINEURAL at 09:51

## 2024-03-07 ASSESSMENT — ACTIVITIES OF DAILY LIVING (ADL)
ADLS_ACUITY_SCORE: 37
ADLS_ACUITY_SCORE: 37

## 2024-03-07 NOTE — PROGRESS NOTES
Interventional Radiology - Progress Note  Inpatient - [facility]  3/7/2024    Interventional Radiology Brief Post Procedure Note    Procedure: IR CVC TUNNEL REMOVAL RIGHTTunneled dialysis catheter removal    Proceduralist: Vielka NIÑO    Assistant: Antonio FREDERICK    Time Out:  Immediately prior to starting the procedure I conducted the Time Out and re-confirmed the patient s name, procedure, and site/side.     Sedation: None. Local Anesthestic used    Findings: Rt Chest catheter tunneled to RIJ. 2mL lidocaine injected along tunneled after which minimal blunt dissection used to release catheter cuff. Catheter removed under sterile field while pressure held at two points along tunnel until hemostasis obtained.    Estimated Blood Loss: Minimal    SPECIMENS: None    Complications:  None     Condition: Stable    Plan: Discussed with RN. Check site and discharge home in 10 minutes    Comments: See dictated procedure note for full details.    Sarkis Vora PA-C

## 2024-03-07 NOTE — DISCHARGE INSTRUCTIONS
Tunnel Cath Removal Discharge Instructions     After you go home:    You may resume your normal diet    Care of Puncture Site:    Keep the dressing clean & dry for 3 days  You may use a bandaid on the site after 3 days until the wound has healed  No tub baths, whirlpools or swimming until your puncture site has fully healed     Activity     You may go back to normal activity in 24 hours   Avoid heavy lifting (greater than 10 pounds), strenuous activity or the overuse of your shoulder for 3 days    Bleeding:    If you start bleeding from the incision site in your chest or have swelling in your neck, sit down and press on the site for 5-10 minutes.   If bleeding has not stopped after 10 minutes, call your provider.        Call 911 right away if you have heavy bleeding or bleeding that does not stop.      Medicines:    You may resume all your medicines today  For minor pain, you may take Acetaminophen (Tylenol) or Ibuprofen (Advil)                 Call the provider who ordered this procedure if:    The site is red, swollen, hot or tender or there is drainage at the site  You have chills or a fever greater than 100 F (37.8 C)  Swelling in your neck  Any questions or concerns      If you have questions call:          [facility] Radiology Dept @ [phone]        The provider who performed your procedure was Mayito PARKS

## 2024-03-07 NOTE — PROGRESS NOTES
Care Suites Admission Nursing Note    Patient Information  Name: Brooks Mensah  Age: 38 year old  Reason for admission: tunnel removal  Care Suites arrival time: 0845    Visitor Information  Name:      Patient Admission/Assessment   Pre-procedure assessment complete: Yes  If abnormal assessment/labs, provider notified: N/A  NPO: N/A  Medications held per instructions/orders: N/A  Consent: deferred  If applicable, pregnancy test status: deferred  Patient oriented to room: Yes  Education/questions answered: Yes  Plan/other: proceed as planned discharge to home    Discharge Planning  Discharge name/phone number:  --  Overnight post sedation caregiver:   Discharge location: home    Kyler Tsang RN     0605 Mayito PARKS removed tunnel cath right chest, pt jennifer well.  Dressing CDI.  Will monitor for bleeding and discharge home    Care Suites Discharge Nursing Note    Patient Information  Name: Brooks Mensah  Age: 38 year old    Discharge Education:  Discharge instructions reviewed: Yes  Additional education/resources provided:   Patient/patient representative verbalizes understanding: Yes  Patient discharging on new medications: N/A  Medication education completed: N/A    Discharge Plans:   Discharge location: home  Discharge ride contacted: N/A  Approximate discharge time: 1005    Discharge Criteria:  Discharge criteria met and vital signs stable: Yes    Patient Belongs:  Patient belongings returned to patient: Yes    Kyler Tsang RN

## 2024-03-25 NOTE — PROGRESS NOTES
OBSERVATION patient END time: 1000    Patient's After Visit Summary was reviewed with patient.   Patient verbalized understanding of After Visit Summary, recommended follow up and was given an opportunity to ask questions.   Discharge medications sent home with patient/family: YES, one script filled here and one sent to [facility] Young.   Discharged with mother         Patient/Caregiver provided printed discharge information.

## 2024-05-17 ENCOUNTER — HOSPITAL ENCOUNTER (INPATIENT)
Facility: CLINIC | Age: 39
LOS: 1 days | Discharge: LEFT BEFORE DISCHARGE | End: 2024-05-18
Attending: EMERGENCY MEDICINE | Admitting: HOSPITALIST
Payer: COMMERCIAL

## 2024-05-17 DIAGNOSIS — A41.9 SEPSIS, DUE TO UNSPECIFIED ORGANISM, UNSPECIFIED WHETHER ACUTE ORGAN DYSFUNCTION PRESENT (H): ICD-10-CM

## 2024-05-17 DIAGNOSIS — L03.114 CELLULITIS OF LEFT UPPER EXTREMITY: ICD-10-CM

## 2024-05-17 LAB
ALBUMIN SERPL BCG-MCNC: 4.2 G/DL (ref 3.5–5.2)
ALP SERPL-CCNC: 73 U/L (ref 40–150)
ALT SERPL W P-5'-P-CCNC: 15 U/L (ref 0–70)
ANION GAP SERPL CALCULATED.3IONS-SCNC: 17 MMOL/L (ref 7–15)
AST SERPL W P-5'-P-CCNC: 21 U/L (ref 0–45)
BASOPHILS # BLD AUTO: 0.1 10E3/UL (ref 0–0.2)
BASOPHILS NFR BLD AUTO: 0 %
BILIRUB SERPL-MCNC: 0.3 MG/DL
BUN SERPL-MCNC: 40.3 MG/DL (ref 6–20)
CALCIUM SERPL-MCNC: 9.6 MG/DL (ref 8.6–10)
CHLORIDE SERPL-SCNC: 92 MMOL/L (ref 98–107)
CREAT SERPL-MCNC: 7.44 MG/DL (ref 0.67–1.17)
DEPRECATED HCO3 PLAS-SCNC: 24 MMOL/L (ref 22–29)
EGFRCR SERPLBLD CKD-EPI 2021: 9 ML/MIN/1.73M2
EOSINOPHIL # BLD AUTO: 0.4 10E3/UL (ref 0–0.7)
EOSINOPHIL NFR BLD AUTO: 3 %
ERYTHROCYTE [DISTWIDTH] IN BLOOD BY AUTOMATED COUNT: 14.8 % (ref 10–15)
FLUAV RNA SPEC QL NAA+PROBE: NEGATIVE
FLUBV RNA RESP QL NAA+PROBE: NEGATIVE
GLUCOSE SERPL-MCNC: 115 MG/DL (ref 70–99)
HCT VFR BLD AUTO: 35.4 % (ref 40–53)
HGB BLD-MCNC: 11.3 G/DL (ref 13.3–17.7)
IMM GRANULOCYTES # BLD: 0.4 10E3/UL
IMM GRANULOCYTES NFR BLD: 3 %
LACTATE SERPL-SCNC: 1.7 MMOL/L (ref 0.7–2)
LYMPHOCYTES # BLD AUTO: 0.9 10E3/UL (ref 0.8–5.3)
LYMPHOCYTES NFR BLD AUTO: 7 %
MCH RBC QN AUTO: 30.9 PG (ref 26.5–33)
MCHC RBC AUTO-ENTMCNC: 31.9 G/DL (ref 31.5–36.5)
MCV RBC AUTO: 97 FL (ref 78–100)
MONOCYTES # BLD AUTO: 0.8 10E3/UL (ref 0–1.3)
MONOCYTES NFR BLD AUTO: 6 %
NEUTROPHILS # BLD AUTO: 10.7 10E3/UL (ref 1.6–8.3)
NEUTROPHILS NFR BLD AUTO: 81 %
NRBC # BLD AUTO: 0 10E3/UL
NRBC BLD AUTO-RTO: 0 /100
PLATELET # BLD AUTO: 78 10E3/UL (ref 150–450)
POTASSIUM SERPL-SCNC: 5 MMOL/L (ref 3.4–5.3)
PROT SERPL-MCNC: 8.3 G/DL (ref 6.4–8.3)
RBC # BLD AUTO: 3.66 10E6/UL (ref 4.4–5.9)
RSV RNA SPEC NAA+PROBE: NEGATIVE
SARS-COV-2 RNA RESP QL NAA+PROBE: NEGATIVE
SODIUM SERPL-SCNC: 133 MMOL/L (ref 135–145)
WBC # BLD AUTO: 13.2 10E3/UL (ref 4–11)

## 2024-05-17 PROCEDURE — 96365 THER/PROPH/DIAG IV INF INIT: CPT

## 2024-05-17 PROCEDURE — 99222 1ST HOSP IP/OBS MODERATE 55: CPT | Performed by: HOSPITALIST

## 2024-05-17 PROCEDURE — 120N000001 HC R&B MED SURG/OB

## 2024-05-17 PROCEDURE — 87040 BLOOD CULTURE FOR BACTERIA: CPT | Performed by: EMERGENCY MEDICINE

## 2024-05-17 PROCEDURE — 258N000003 HC RX IP 258 OP 636: Performed by: EMERGENCY MEDICINE

## 2024-05-17 PROCEDURE — 87637 SARSCOV2&INF A&B&RSV AMP PRB: CPT | Performed by: EMERGENCY MEDICINE

## 2024-05-17 PROCEDURE — 80053 COMPREHEN METABOLIC PANEL: CPT | Performed by: EMERGENCY MEDICINE

## 2024-05-17 PROCEDURE — 36415 COLL VENOUS BLD VENIPUNCTURE: CPT | Performed by: EMERGENCY MEDICINE

## 2024-05-17 PROCEDURE — 96375 TX/PRO/DX INJ NEW DRUG ADDON: CPT

## 2024-05-17 PROCEDURE — 85025 COMPLETE CBC W/AUTO DIFF WBC: CPT | Performed by: EMERGENCY MEDICINE

## 2024-05-17 PROCEDURE — 250N000011 HC RX IP 250 OP 636: Performed by: EMERGENCY MEDICINE

## 2024-05-17 PROCEDURE — 99285 EMERGENCY DEPT VISIT HI MDM: CPT | Mod: 25

## 2024-05-17 PROCEDURE — 83605 ASSAY OF LACTIC ACID: CPT | Performed by: EMERGENCY MEDICINE

## 2024-05-17 RX ORDER — ASPIRIN 81 MG/1
81 TABLET ORAL DAILY
Status: DISCONTINUED | OUTPATIENT
Start: 2024-05-18 | End: 2024-05-18

## 2024-05-17 RX ORDER — HYDRALAZINE HYDROCHLORIDE 25 MG/1
25 TABLET, FILM COATED ORAL 3 TIMES DAILY
Status: DISCONTINUED | OUTPATIENT
Start: 2024-05-18 | End: 2024-05-18 | Stop reason: HOSPADM

## 2024-05-17 RX ORDER — PIPERACILLIN SODIUM, TAZOBACTAM SODIUM 4; .5 G/20ML; G/20ML
4.5 INJECTION, POWDER, LYOPHILIZED, FOR SOLUTION INTRAVENOUS ONCE
Status: COMPLETED | OUTPATIENT
Start: 2024-05-17 | End: 2024-05-17

## 2024-05-17 RX ORDER — CALCIUM CARBONATE 500 MG/1
1000 TABLET, CHEWABLE ORAL 4 TIMES DAILY PRN
Status: DISCONTINUED | OUTPATIENT
Start: 2024-05-17 | End: 2024-05-18 | Stop reason: HOSPADM

## 2024-05-17 RX ORDER — ATORVASTATIN CALCIUM 10 MG/1
10 TABLET, FILM COATED ORAL EVERY EVENING
Status: DISCONTINUED | OUTPATIENT
Start: 2024-05-17 | End: 2024-05-18 | Stop reason: HOSPADM

## 2024-05-17 RX ORDER — LISINOPRIL 20 MG/1
20 TABLET ORAL DAILY
Status: DISCONTINUED | OUTPATIENT
Start: 2024-05-18 | End: 2024-05-18 | Stop reason: HOSPADM

## 2024-05-17 RX ORDER — CARVEDILOL 12.5 MG/1
12.5 TABLET ORAL 2 TIMES DAILY WITH MEALS
Status: DISCONTINUED | OUTPATIENT
Start: 2024-05-18 | End: 2024-05-18 | Stop reason: HOSPADM

## 2024-05-17 RX ORDER — LIDOCAINE 40 MG/G
CREAM TOPICAL
Status: DISCONTINUED | OUTPATIENT
Start: 2024-05-17 | End: 2024-05-18 | Stop reason: HOSPADM

## 2024-05-17 RX ORDER — CALCIUM ACETATE 667 MG/1
2001 CAPSULE ORAL
Status: DISCONTINUED | OUTPATIENT
Start: 2024-05-18 | End: 2024-05-18 | Stop reason: HOSPADM

## 2024-05-17 RX ORDER — AMOXICILLIN 250 MG
2 CAPSULE ORAL 2 TIMES DAILY PRN
Status: DISCONTINUED | OUTPATIENT
Start: 2024-05-17 | End: 2024-05-18 | Stop reason: HOSPADM

## 2024-05-17 RX ORDER — PIPERACILLIN SODIUM, TAZOBACTAM SODIUM 2; .25 G/10ML; G/10ML
2.25 INJECTION, POWDER, LYOPHILIZED, FOR SOLUTION INTRAVENOUS EVERY 8 HOURS
Status: DISCONTINUED | OUTPATIENT
Start: 2024-05-18 | End: 2024-05-18 | Stop reason: HOSPADM

## 2024-05-17 RX ORDER — PANTOPRAZOLE SODIUM 40 MG/1
40 TABLET, DELAYED RELEASE ORAL 2 TIMES DAILY
Status: DISCONTINUED | OUTPATIENT
Start: 2024-05-17 | End: 2024-05-18

## 2024-05-17 RX ORDER — AMOXICILLIN 250 MG
1 CAPSULE ORAL 2 TIMES DAILY PRN
Status: DISCONTINUED | OUTPATIENT
Start: 2024-05-17 | End: 2024-05-18 | Stop reason: HOSPADM

## 2024-05-17 RX ADMIN — SODIUM CHLORIDE 500 ML: 9 INJECTION, SOLUTION INTRAVENOUS at 20:57

## 2024-05-17 RX ADMIN — VANCOMYCIN HYDROCHLORIDE 2000 MG: 5 INJECTION, POWDER, LYOPHILIZED, FOR SOLUTION INTRAVENOUS at 21:50

## 2024-05-17 RX ADMIN — PIPERACILLIN AND TAZOBACTAM 4.5 G: 4; .5 INJECTION, POWDER, FOR SOLUTION INTRAVENOUS at 20:57

## 2024-05-17 ASSESSMENT — ACTIVITIES OF DAILY LIVING (ADL)
ADLS_ACUITY_SCORE: 35
ADLS_ACUITY_SCORE: 37

## 2024-05-17 ASSESSMENT — COLUMBIA-SUICIDE SEVERITY RATING SCALE - C-SSRS
1. IN THE PAST MONTH, HAVE YOU WISHED YOU WERE DEAD OR WISHED YOU COULD GO TO SLEEP AND NOT WAKE UP?: NO
6. HAVE YOU EVER DONE ANYTHING, STARTED TO DO ANYTHING, OR PREPARED TO DO ANYTHING TO END YOUR LIFE?: NO
2. HAVE YOU ACTUALLY HAD ANY THOUGHTS OF KILLING YOURSELF IN THE PAST MONTH?: NO

## 2024-05-18 VITALS
TEMPERATURE: 98.6 F | SYSTOLIC BLOOD PRESSURE: 125 MMHG | OXYGEN SATURATION: 94 % | BODY MASS INDEX: 44.1 KG/M2 | DIASTOLIC BLOOD PRESSURE: 72 MMHG | WEIGHT: 315 LBS | HEART RATE: 85 BPM | HEIGHT: 71 IN | RESPIRATION RATE: 20 BRPM

## 2024-05-18 LAB
ANION GAP SERPL CALCULATED.3IONS-SCNC: 14 MMOL/L (ref 7–15)
BASOPHILS # BLD AUTO: 0 10E3/UL (ref 0–0.2)
BASOPHILS NFR BLD AUTO: 0 %
BUN SERPL-MCNC: 49 MG/DL (ref 6–20)
CALCIUM SERPL-MCNC: 8.5 MG/DL (ref 8.6–10)
CHLORIDE SERPL-SCNC: 95 MMOL/L (ref 98–107)
CREAT SERPL-MCNC: 9.26 MG/DL (ref 0.67–1.17)
DEPRECATED HCO3 PLAS-SCNC: 24 MMOL/L (ref 22–29)
EGFRCR SERPLBLD CKD-EPI 2021: 7 ML/MIN/1.73M2
EOSINOPHIL # BLD AUTO: 0.5 10E3/UL (ref 0–0.7)
EOSINOPHIL NFR BLD AUTO: 4 %
ERYTHROCYTE [DISTWIDTH] IN BLOOD BY AUTOMATED COUNT: 15.2 % (ref 10–15)
GLUCOSE SERPL-MCNC: 107 MG/DL (ref 70–99)
HCT VFR BLD AUTO: 28.8 % (ref 40–53)
HGB BLD-MCNC: 9.1 G/DL (ref 13.3–17.7)
IMM GRANULOCYTES # BLD: 0.4 10E3/UL
IMM GRANULOCYTES NFR BLD: 4 %
LYMPHOCYTES # BLD AUTO: 1.9 10E3/UL (ref 0.8–5.3)
LYMPHOCYTES NFR BLD AUTO: 17 %
MCH RBC QN AUTO: 31.3 PG (ref 26.5–33)
MCHC RBC AUTO-ENTMCNC: 31.6 G/DL (ref 31.5–36.5)
MCV RBC AUTO: 99 FL (ref 78–100)
MONOCYTES # BLD AUTO: 1.1 10E3/UL (ref 0–1.3)
MONOCYTES NFR BLD AUTO: 10 %
MRSA DNA SPEC QL NAA+PROBE: NEGATIVE
NEUTROPHILS # BLD AUTO: 7.2 10E3/UL (ref 1.6–8.3)
NEUTROPHILS NFR BLD AUTO: 65 %
NRBC # BLD AUTO: 0 10E3/UL
NRBC BLD AUTO-RTO: 0 /100
PLATELET # BLD AUTO: 82 10E3/UL (ref 150–450)
POTASSIUM SERPL-SCNC: 5 MMOL/L (ref 3.4–5.3)
RBC # BLD AUTO: 2.91 10E6/UL (ref 4.4–5.9)
SA TARGET DNA: NEGATIVE
SODIUM SERPL-SCNC: 133 MMOL/L (ref 135–145)
WBC # BLD AUTO: 11 10E3/UL (ref 4–11)

## 2024-05-18 PROCEDURE — 85025 COMPLETE CBC W/AUTO DIFF WBC: CPT | Performed by: HOSPITALIST

## 2024-05-18 PROCEDURE — 36415 COLL VENOUS BLD VENIPUNCTURE: CPT | Performed by: HOSPITALIST

## 2024-05-18 PROCEDURE — 87640 STAPH A DNA AMP PROBE: CPT | Performed by: INTERNAL MEDICINE

## 2024-05-18 PROCEDURE — 250N000011 HC RX IP 250 OP 636: Performed by: HOSPITALIST

## 2024-05-18 PROCEDURE — 250N000013 HC RX MED GY IP 250 OP 250 PS 637: Performed by: INTERNAL MEDICINE

## 2024-05-18 PROCEDURE — 99254 IP/OBS CNSLTJ NEW/EST MOD 60: CPT | Performed by: INTERNAL MEDICINE

## 2024-05-18 PROCEDURE — 87641 MR-STAPH DNA AMP PROBE: CPT | Performed by: INTERNAL MEDICINE

## 2024-05-18 PROCEDURE — 250N000013 HC RX MED GY IP 250 OP 250 PS 637: Performed by: HOSPITALIST

## 2024-05-18 PROCEDURE — 80048 BASIC METABOLIC PNL TOTAL CA: CPT | Performed by: HOSPITALIST

## 2024-05-18 PROCEDURE — 99233 SBSQ HOSP IP/OBS HIGH 50: CPT | Performed by: INTERNAL MEDICINE

## 2024-05-18 RX ORDER — CALCIUM ACETATE 667 MG/1
1334 CAPSULE ORAL PRN
COMMUNITY

## 2024-05-18 RX ORDER — GABAPENTIN 300 MG/1
300 CAPSULE ORAL AT BEDTIME
COMMUNITY

## 2024-05-18 RX ORDER — CALCIUM ACETATE 667 MG/1
2001 CAPSULE ORAL
COMMUNITY

## 2024-05-18 RX ORDER — PANTOPRAZOLE SODIUM 40 MG/1
40 TABLET, DELAYED RELEASE ORAL
Status: DISCONTINUED | OUTPATIENT
Start: 2024-05-18 | End: 2024-05-18 | Stop reason: HOSPADM

## 2024-05-18 RX ORDER — PANTOPRAZOLE SODIUM 40 MG/1
40 TABLET, DELAYED RELEASE ORAL DAILY
COMMUNITY

## 2024-05-18 RX ORDER — BUMETANIDE 2 MG/1
2 TABLET ORAL DAILY
COMMUNITY

## 2024-05-18 RX ADMIN — SODIUM ZIRCONIUM CYCLOSILICATE 10 G: 10 POWDER, FOR SUSPENSION ORAL at 13:00

## 2024-05-18 RX ADMIN — HYDRALAZINE HYDROCHLORIDE 25 MG: 25 TABLET, FILM COATED ORAL at 10:55

## 2024-05-18 RX ADMIN — PIPERACILLIN AND TAZOBACTAM 2.25 G: 2; .25 INJECTION, POWDER, FOR SOLUTION INTRAVENOUS at 04:58

## 2024-05-18 RX ADMIN — CALCIUM ACETATE 2001 MG: 667 CAPSULE ORAL at 12:20

## 2024-05-18 RX ADMIN — HYDRALAZINE HYDROCHLORIDE 25 MG: 25 TABLET, FILM COATED ORAL at 13:08

## 2024-05-18 RX ADMIN — CARVEDILOL 12.5 MG: 12.5 TABLET, FILM COATED ORAL at 10:52

## 2024-05-18 RX ADMIN — PIPERACILLIN AND TAZOBACTAM 2.25 G: 2; .25 INJECTION, POWDER, FOR SOLUTION INTRAVENOUS at 13:01

## 2024-05-18 RX ADMIN — LISINOPRIL 20 MG: 20 TABLET ORAL at 10:55

## 2024-05-18 RX ADMIN — PANTOPRAZOLE SODIUM 40 MG: 40 TABLET, DELAYED RELEASE ORAL at 10:55

## 2024-05-18 ASSESSMENT — ACTIVITIES OF DAILY LIVING (ADL)
ADLS_ACUITY_SCORE: 20
ADLS_ACUITY_SCORE: 20
ADLS_ACUITY_SCORE: 22
ADLS_ACUITY_SCORE: 22
ADLS_ACUITY_SCORE: 20
ADLS_ACUITY_SCORE: 22
ADLS_ACUITY_SCORE: 20
ADLS_ACUITY_SCORE: 20
ADLS_ACUITY_SCORE: 22
ADLS_ACUITY_SCORE: 20
ADLS_ACUITY_SCORE: 22
ADLS_ACUITY_SCORE: 22
ADLS_ACUITY_SCORE: 20
ADLS_ACUITY_SCORE: 22
ADLS_ACUITY_SCORE: 22

## 2024-05-18 NOTE — H&P
"[facility]    History and Physical - Hospitalist Service       Date of Admission:  5/17/2024    Assessment & Plan      Brooks Mensah is a 38 year old male admitted on 5/17/2024. He has end-stage renal disease on hemodialysis.  He presented to the emergency department with left arm pain, redness and swelling suspicious for cellulitis.  He has a fistula in the same side.  In the emergency department he has been started on Zosyn and Vanco, ED physician has requested admission for further treatment and follow-up.  Apparently the patient completed 3 hours of dialysis today.     Left upper extremity cellulitis.  Zosyn/vancomycin.  ID consult requested    End-stage renal disease on hemodialysis.  Fistula in the left arm.  Nephrology consult requested    Essential hypertension.  On carvedilol, hydralazine and lisinopril.    History of chronic heart failure with preserved ejection fraction.  On atorvastatin, carvedilol and lisinopril.    Severe obesity.   Complicates cares.    Diet:  Dialysis diet  DVT Prophylaxis: Pneumatic Compression Devices  Sol Catheter: Not present  Lines: None     Cardiac Monitoring: None  Code Status: Full code    Clinically Significant Risk Factors Present on Admission                # Drug Induced Platelet Defect: home medication list includes an antiplatelet medication   # Hypertension: Noted on problem list  # Chronic heart failure with preserved ejection fraction: heart failure noted on problem list and last echo with EF >50%     # Severe Obesity: Estimated body mass index is 43.94 kg/m  as calculated from the following:    Height as of this encounter: 1.803 m (5' 11\").    Weight as of this encounter: 142.9 kg (315 lb 0.6 oz).              Disposition Plan     Medically Ready for Discharge: Anticipated in 2-4 Days           Louie Buckley MD  Hospitalist Service  [facility]  Securely message with [employer] (more info)  Text page via [employer] " Paging/Directory     ______________________________________________________________________    Chief Complaint   Left forearm redness, pain and swelling    History is obtained from the patient, electronic health record, and emergency department physician    History of Present Illness   Brooks Mensah is a 38 year old male who has a history of end-stage renal disease on hemodialysis.  He has essential hypertension, HFpEF, coronary disease, morbid obesity, and others listed in the past medical history.  He presented to the emergency department with left forearm progressing redness, pain and swelling.  He had been treated with mupirocin locally but symptoms are worsening.  They suspected patient has a cellulitis.  This infection is in the same site of his fistula.  In the emergency department he has been started on Zosyn and vancomycin.  He is admitted for further treatment and follow-up.      Past Medical History    Past Medical History:   Diagnosis Date    Acute on chronic systolic congestive heart failure (H) 8/9/2017    CAD (coronary artery disease)     Nonobstructive    Cardiomyopathy, unspecified (H) 8/9/2017    Congestive heart failure, unspecified congestive heart failure chronicity, unspecified congestive heart failure type 6/13/2017    HTN (hypertension), malignant     Hypertensive urgency 6/13/2017    Morbid obesity with BMI of 50.0-59.9, adult (H) 6/21/2017    NSTEMI (non-ST elevated myocardial infarction) (H) 6/13/2017    Renal insufficiency 6/13/2017    Sleep apnea     Tobacco abuse        Past Surgical History   Past Surgical History:   Procedure Laterality Date    CREATE FISTULA ARTERIOVENOUS UPPER EXTREMITY Left 11/28/2023    Procedure: CREATION OF LEFT DISTAL RADIAL TO CEPHALIC ARTERIOVENOUS FISTULA;  Surgeon: Ramiro Pantoja MD;  Location:  OR    ESOPHAGOSCOPY, GASTROSCOPY, DUODENOSCOPY (EGD), COMBINED N/A 12/1/2022    Procedure: ESOPHAGOGASTRODUODENOSCOPY, WITH BIOPSY;  Surgeon: Alana  Dm Jackson MD;  Location: [facility] GI    ESOPHAGOSCOPY, GASTROSCOPY, DUODENOSCOPY (EGD), COMBINED N/A 10/3/2023    Procedure: Esophagogastroduodenoscopy with biopsy;  Surgeon: Manuel Leal MD;  Location: [facility] OR    IR CVC TUNNEL PLACEMENT > 5 YRS OF AGE  10/2/2023    IR CVC TUNNEL REMOVAL RIGHT  3/7/2024       Prior to Admission Medications   Prior to Admission Medications   Prescriptions Last Dose Informant Patient Reported? Taking?   aspirin 81 MG EC tablet   No No   Sig: Take 1 tablet (81 mg) by mouth daily   atorvastatin (LIPITOR) 10 MG tablet   No No   Sig: Take 1 tablet (10 mg) by mouth every evening   calcium acetate (PHOSLO) 667 MG CAPS capsule   No No   Sig: Take 2 capsules (1,334 mg) by mouth 3 times daily (with meals)   Patient taking differently: Take 1,334 mg by mouth 3 times daily (with meals) Take 3 caps 3 times daily   carvedilol (COREG) 25 MG tablet   No No   Sig: Take 0.5 tablets (12.5 mg) by mouth 2 times daily (with meals)   hydrALAZINE (APRESOLINE) 25 MG tablet   Yes No   Sig: Take 1 tablet (25 mg) by mouth 3 times daily   lisinopril (ZESTRIL) 20 MG tablet   No No   Sig: Take 1 tablet (20 mg) by mouth daily   pantoprazole (PROTONIX) 40 MG EC tablet   No No   Sig: Take 1 tablet (40 mg) by mouth 2 times daily      Facility-Administered Medications: None        Review of Systems    The 10 point Review of Systems is negative other than noted in the HPI or here.       Physical Exam   Vital Signs: Temp: 99.3  F (37.4  C) Temp src: Oral BP: 125/69 Pulse: 107   Resp: 20 SpO2: 95 % O2 Device: None (Room air)    Weight: 315 lbs .6 oz    Constitutional: awake, alert, cooperative, no apparent distress, and appears stated age  Cardiovascular: Normal apical impulse, regular rate and rhythm, normal S1 and S2, no S3 or S4, and no murmur noted  Skin:     Medical Decision Making       60 MINUTES SPENT BY ME on the date of service doing chart review, history, exam, documentation & further activities per the  note.      Data     I have personally reviewed the following data over the past 24 hrs:    13.2 (H)  \   11.3 (L)   / 78 (L)     133 (L) 92 (L) 40.3 (H) /  115 (H)   5.0 24 7.44 (H) \     ALT: 15 AST: 21 AP: 73 TBILI: 0.3   ALB: 4.2 TOT PROTEIN: 8.3 LIPASE: N/A     Procal: N/A CRP: N/A Lactic Acid: 1.7         Imaging results reviewed over the past 24 hrs:   No results found for this or any previous visit (from the past 24 hour(s)).

## 2024-05-18 NOTE — PLAN OF CARE
"Goal Outcome Evaluation:      Plan of Care Reviewed With: patient    Overall Patient Progress: no change    Outcome Evaluation: A/O x4, swabbed for MRSA, results pending. Zosyn administered. LUE wrapped by patient. Denies pain. WBC 11.0.    Patient calm and cooperative, A/O x4. VS stable, BP's inconsistent, PO hydralazine given. Lungs sounds clear. Denies SOB and chest pain. Denies pain. NIKUNJ left upper extremity as patient wrapped himself and stated he has cream that he got from the dermatologist. Right PIV CDI and saline locked. IV zosyn given. Infectious disease following. No urine output this AM, but urinal provided for I/O's. Nephrology following. Patient on renal diet, ate [employer] and another fast food joint. Continue to monitor and follow POC.     Problem: Adult Inpatient Plan of Care  Goal: Plan of Care Review  Description: The Plan of Care Review/Shift note should be completed every shift.  The Outcome Evaluation is a brief statement about your assessment that the patient is improving, declining, or no change.  This information will be displayed automatically on your shift  note.  Outcome: Progressing  Flowsheets (Taken 5/18/2024 1610)  Outcome Evaluation: A/O x4, swabbed for MRSA, results pending. Zosyn administered. LUE wrapped by patient. Denies pain. WBC 11.0.  Plan of Care Reviewed With: patient  Overall Patient Progress: no change  Goal: Patient-Specific Goal (Individualized)  Description: You can add care plan individualizations to a care plan. Examples of Individualization might be:  \"Parent requests to be called daily at 9am for status\", \"I have a hard time hearing out of my right ear\", or \"Do not touch me to wake me up as it startles  me\".  Outcome: Progressing  Goal: Absence of Hospital-Acquired Illness or Injury  Outcome: Progressing  Intervention: Identify and Manage Fall Risk  Recent Flowsheet Documentation  Taken 5/18/2024 1345 by Georgia Lombardi RN  Safety Promotion/Fall Prevention: " safety round/check completed  Taken 5/18/2024 1050 by Georgia Lombardi RN  Safety Promotion/Fall Prevention:   activity supervised   assistive device/personal items within reach   clutter free environment maintained   lighting adjusted   nonskid shoes/slippers when out of bed   patient and family education   patient video monitoring   room door open   room near nurse's station   room organization consistent   safety round/check completed  Taken 5/18/2024 0725 by Georgia Lombardi RN  Safety Promotion/Fall Prevention: safety round/check completed  Intervention: Prevent Skin Injury  Recent Flowsheet Documentation  Taken 5/18/2024 1345 by Georgia Lombardi RN  Body Position: position changed independently  Taken 5/18/2024 1104 by Georgia Lombardi RN  Body Position: position changed independently  Taken 5/18/2024 1050 by Georgia Lombardi RN  Body Position: position changed independently  Device Skin Pressure Protection:   adhesive use limited   tubing/devices free from skin contact  Taken 5/18/2024 0725 by Georgia Lombardi RN  Body Position: position changed independently  Intervention: Prevent and Manage VTE (Venous Thromboembolism) Risk  Recent Flowsheet Documentation  Taken 5/18/2024 1050 by Georgia Lombardi RN  VTE Prevention/Management: (Ambulatory) SCDs (sequential compression devices) off  Intervention: Prevent Infection  Recent Flowsheet Documentation  Taken 5/18/2024 1345 by Georgia Lombardi RN  Infection Prevention:   equipment surfaces disinfected   hand hygiene promoted   rest/sleep promoted   single patient room provided  Taken 5/18/2024 1050 by Georgia Lombardi RN  Infection Prevention:   equipment surfaces disinfected   hand hygiene promoted   rest/sleep promoted   single patient room provided  Taken 5/18/2024 0725 by Georgia Lombardi RN  Infection Prevention:   equipment surfaces disinfected   hand hygiene promoted   rest/sleep promoted   single patient room  provided  Goal: Optimal Comfort and Wellbeing  Outcome: Progressing  Goal: Readiness for Transition of Care  Outcome: Progressing     Problem: Infection  Goal: Absence of Infection Signs and Symptoms  Outcome: Progressing     Problem: Comorbidity Management  Goal: Blood Pressure in Desired Range  Outcome: Progressing  Intervention: Maintain Blood Pressure Management  Recent Flowsheet Documentation  Taken 5/18/2024 1050 by Georgia Lombardi, RN  Medication Review/Management: medications reviewed  Taken 5/18/2024 0783 by Georgia Lombardi, RN  Medication Review/Management: medications reviewed

## 2024-05-18 NOTE — PROVIDER NOTIFICATION
1655: Paged Infectious disease Dr. Russell: #[phone] wants to leave AMA. Call [phone].  -Call back and updated.    1701: Paged Dr. Gibson. Patient would like to leave AMA. Paged Dr. Russell - said he can leave.   -Call back and updated.    Patient can leave AMA if he wishes.    1710: AMA form signed. Patient left with family.

## 2024-05-18 NOTE — PROGRESS NOTES
"[facility]    Medicine Progress Note - Hospitalist Service    Date of Admission:  5/17/2024    Assessment & Plan      Brooks Mensah is a 38 year old male admitted on 5/17/2024. He has end-stage renal disease on hemodialysis.  He presented to the emergency department with left arm pain, redness and swelling suspicious for cellulitis.  He has a fistula in the same side.  In the emergency department he has been started on Zosyn and Vanco, ED physician has requested admission for further treatment and follow-up.  Apparently the patient completed 3 hours of dialysis today.    Left upper extremity cellulitis.  Zosyn/vancomycin.  ID consult requested     End-stage renal disease on hemodialysis.  Fistula in the left arm.  Nephrology consult requested     Essential hypertension.  On carvedilol, hydralazine and lisinopril.     History of chronic heart failure with preserved ejection fraction.  On atorvastatin, carvedilol and lisinopril.     Severe obesity.   Complicates cares.        Diet: Renal Diet (dialysis)    DVT Prophylaxis: Pneumatic Compression Devices  Sol Catheter: Not present  Lines: None     Cardiac Monitoring: None  Code Status: Full Code      Clinically Significant Risk Factors Present on Admission                # Drug Induced Platelet Defect: home medication list includes an antiplatelet medication   # Hypertension: Noted on problem list  # Chronic heart failure with preserved ejection fraction: heart failure noted on problem list and last echo with EF >50%     # Severe Obesity: Estimated body mass index is 44.2 kg/m  as calculated from the following:    Height as of this encounter: 1.803 m (5' 11\").    Weight as of this encounter: 143.7 kg (316 lb 14.4 oz).              Disposition Plan     Medically Ready for Discharge: Anticipated in 2-4 Days             Wicho Gibson MD  Hospitalist Service  [facility]  Securely message with [employer] (more info)  Text page via " [employer] Paging/Directory   ______________________________________________________________________    Interval History     No fevers/chills/chest pain/SOB.    Physical Exam   Vital Signs: Temp: 98.9  F (37.2  C) Temp src: Oral BP: 100/47 Pulse: 85   Resp: 20 SpO2: 94 % O2 Device: None (Room air)    Weight: 316 lbs 14.4 oz    Gen - AAO x 3 in NAD.  Lungs - CTA B.  Heart - RR,S1+S2 nml, no m/g/r.  Abd - soft, NT, ND, + BS.  Ext -  LUE in dressing which I did not remove.    Medical Decision Making       60 MINUTES SPENT BY ME on the date of service doing chart review, history, exam, documentation & further activities per the note.      Data     I have personally reviewed the following data over the past 24 hrs:    11.0  \   9.1 (L)   / 82 (L)     133 (L) 95 (L) 49.0 (H) /  107 (H)   5.0 24 9.26 (H) \     ALT: 15 AST: 21 AP: 73 TBILI: 0.3   ALB: 4.2 TOT PROTEIN: 8.3 LIPASE: N/A     Procal: N/A CRP: N/A Lactic Acid: 1.7         Imaging results reviewed over the past 24 hrs:   No results found for this or any previous visit (from the past 24 hour(s)).

## 2024-05-18 NOTE — ED PROVIDER NOTES
Emergency Department Note      History of Present Illness     Chief Complaint  Fever    HPI  Brooks Mensah is a 38 year old male who presents for evaluation of fever and left forearm swelling.  He reports that he went to dialysis appointment today and found to have a fever of 101.  He was shaking and felt quite unwell.  He has been dealing with some redness of the left forearm over the last week or 2.  Looks like he was put on Mobic.  Some by dialysis care and they thought maybe was related to lidocaine patches and tape.  Seems to be improving but it feels red and hot and warm to the touch.  He has no pain over his fistula.  He has no chest pain, cough, nasal congestion, sore throat, or rhinorrhea.  He has no abdominal pain, vomiting, or diarrhea.  He makes very little urine and had no pain when he last urinated.  No other rashes.  No known sick contacts.    Independent Historian  None    Review of External Notes  None  Past Medical History   Medical History and Problem List  Past Medical History:   Diagnosis Date    Acute on chronic systolic congestive heart failure (H) 8/9/2017    CAD (coronary artery disease)     Cardiomyopathy, unspecified (H) 8/9/2017    Congestive heart failure, unspecified congestive heart failure chronicity, unspecified congestive heart failure type 6/13/2017    HTN (hypertension), malignant     Hypertensive urgency 6/13/2017    Morbid obesity with BMI of 50.0-59.9, adult (H) 6/21/2017    NSTEMI (non-ST elevated myocardial infarction) (H) 6/13/2017    Renal insufficiency 6/13/2017    Sleep apnea     Tobacco abuse        Medications  No current outpatient medications on file.      Surgical History   Past Surgical History:   Procedure Laterality Date    CREATE FISTULA ARTERIOVENOUS UPPER EXTREMITY Left 11/28/2023    Procedure: CREATION OF LEFT DISTAL RADIAL TO CEPHALIC ARTERIOVENOUS FISTULA;  Surgeon: Ramiro Pantoja MD;  Location:  OR    ESOPHAGOSCOPY, GASTROSCOPY, DUODENOSCOPY  "(EGD), COMBINED N/A 12/1/2022    Procedure: ESOPHAGOGASTRODUODENOSCOPY, WITH BIOPSY;  Surgeon: Dm Warner MD;  Location: [facility]    ESOPHAGOSCOPY, GASTROSCOPY, DUODENOSCOPY (EGD), COMBINED N/A 10/3/2023    Procedure: Esophagogastroduodenoscopy with biopsy;  Surgeon: Manuel Leal MD;  Location: [facility] OR    IR CVC TUNNEL PLACEMENT > 5 YRS OF AGE  10/2/2023    IR CVC TUNNEL REMOVAL RIGHT  3/7/2024     Physical Exam   Patient Vitals for the past 24 hrs:   BP Temp Temp src Pulse Resp SpO2 Height Weight   05/17/24 2338 100/56 99.3  F (37.4  C) Oral -- -- 94 % -- --   05/17/24 2315 (!) 143/61 -- -- 95 -- 93 % -- --   05/17/24 2300 129/62 -- -- 96 -- 92 % -- --   05/17/24 2230 94/62 -- -- 105 -- 95 % -- --   05/17/24 2220 108/75 -- -- 104 -- 94 % -- --   05/17/24 2200 135/47 -- -- 106 -- 93 % -- --   05/17/24 2150 125/69 -- -- 107 -- 95 % -- --   05/17/24 2130 136/68 -- -- 106 -- 94 % -- --   05/17/24 2118 (!) 144/68 -- -- 104 -- 94 % -- --   05/17/24 2100 (!) 144/68 -- -- 108 -- 92 % -- --   05/17/24 2048 139/74 -- -- 108 -- 95 % -- --   05/17/24 2031 137/68 -- -- 115 -- 93 % -- --   05/17/24 2016 129/66 -- -- 119 -- 95 % -- --   05/17/24 1933 (!) 126/93 99.3  F (37.4  C) Oral 120 20 96 % 1.803 m (5' 11\") 142.9 kg (315 lb 0.6 oz)     Physical Exam  Constitutional: Well appearing.  HEENT: Atraumatic.  PERRL.  EOMI.  Moist mucous membranes.  Neck: Soft.  Supple.  No JVD.  Cardiac: Regular rate and rhythm.  No murmur or rub.  Respiratory: Clear to auscultation bilaterally.  No respiratory distress.  No wheezing, rhonchi, or rales.  Abdomen: Soft and nontender.  No rebound or guarding.  Nondistended.  Musculoskeletal: Left forearm exhibits erythema and warmth is mild induration.  Palpable thrill left wrist.  Mild edema.  No edema.  Normal range of motion.   Normal gait.  Skin: Left forearm as above.  Psych: Normal affect.  Normal behavior.          Diagnostics   Lab Results   Labs Ordered and Resulted from " Time of ED Arrival to Time of ED Departure   COMPREHENSIVE METABOLIC PANEL - Abnormal       Result Value    Sodium 133 (*)     Potassium 5.0      Carbon Dioxide (CO2) 24      Anion Gap 17 (*)     Urea Nitrogen 40.3 (*)     Creatinine 7.44 (*)     GFR Estimate 9 (*)     Calcium 9.6      Chloride 92 (*)     Glucose 115 (*)     Alkaline Phosphatase 73      AST 21      ALT 15      Protein Total 8.3      Albumin 4.2      Bilirubin Total 0.3     CBC WITH PLATELETS AND DIFFERENTIAL - Abnormal    WBC Count 13.2 (*)     RBC Count 3.66 (*)     Hemoglobin 11.3 (*)     Hematocrit 35.4 (*)     MCV 97      MCH 30.9      MCHC 31.9      RDW 14.8      Platelet Count 78 (*)     % Neutrophils 81      % Lymphocytes 7      % Monocytes 6      % Eosinophils 3      % Basophils 0      % Immature Granulocytes 3      NRBCs per 100 WBC 0      Absolute Neutrophils 10.7 (*)     Absolute Lymphocytes 0.9      Absolute Monocytes 0.8      Absolute Eosinophils 0.4      Absolute Basophils 0.1      Absolute Immature Granulocytes 0.4      Absolute NRBCs 0.0     LACTIC ACID WHOLE BLOOD - Normal    Lactic Acid 1.7     INFLUENZA A/B, RSV, & SARS-COV2 PCR - Normal    Influenza A PCR Negative      Influenza B PCR Negative      RSV PCR Negative      SARS CoV2 PCR Negative     BLOOD CULTURE   BLOOD CULTURE       Imaging  No orders to display       Independent Interpretation  None  ED Course    Medications Administered  Medications   aspirin EC tablet 81 mg (has no administration in time range)   atorvastatin (LIPITOR) tablet 10 mg (has no administration in time range)   calcium acetate (PHOSLO) capsule 1,334 mg (has no administration in time range)   carvedilol (COREG) tablet 12.5 mg (has no administration in time range)   hydrALAZINE (APRESOLINE) tablet 25 mg (has no administration in time range)   lisinopril (ZESTRIL) tablet 20 mg (has no administration in time range)   pantoprazole (PROTONIX) EC tablet 40 mg (has no administration in time range)    piperacillin-tazobactam (ZOSYN) 2.25 g vial to attach to  ml bag (has no administration in time range)   lidocaine 1 % 0.1-1 mL (has no administration in time range)   lidocaine (LMX4) cream (has no administration in time range)   sodium chloride (PF) 0.9% PF flush 3 mL (has no administration in time range)   sodium chloride (PF) 0.9% PF flush 3 mL (has no administration in time range)   senna-docusate (SENOKOT-S/PERICOLACE) 8.6-50 MG per tablet 1 tablet (has no administration in time range)     Or   senna-docusate (SENOKOT-S/PERICOLACE) 8.6-50 MG per tablet 2 tablet (has no administration in time range)   calcium carbonate (TUMS) chewable tablet 1,000 mg (has no administration in time range)   sodium chloride 0.9% BOLUS 500 mL (0 mLs Intravenous Stopped 5/17/24 2159)   piperacillin-tazobactam (ZOSYN) 4.5 g vial to attach to  mL bag (0 g Intravenous Stopped 5/17/24 2128)   vancomycin (VANCOCIN) 2,000 mg in 0.9% NaCl 500 mL intermittent infusion (2,000 mg Intravenous $New Bag 5/17/24 2150)       Procedures  Procedures     Discussion of Management  None    Social Determinants of Health adding to complexity of care  None    ED Course     Medical Decision Making / Diagnosis   CMS Diagnoses: None    MIPS     None    [facility]  Brooks Mensah is a 38 year old male who is afebrile here but tachycardic.  He had a fever prior to arrival at dialysis appointment and has not taken antipyretics.  He has what appears to be a potential cellulitis of his left forearm where his fistula is in the left forearm as well.  Has no significant tenderness over the fistula or induration or fluctuance.  He has a mild leukocytosis.  We discussed potential sepsis from left upper extremity cellulitis and plan for admission to the hospital with IV antibiotics.  IV vancomycin and Zosyn were given.  Blood cultures obtained.  Lactic acid normal.  He remained mildly tachycardic but had no evidence of septic shock.  He is in agreement his  questions were answered.  Discussed with hospital service who accepts for admission and he is in stable condition at time of admission    Disposition  The patient was admitted to the hospital.     ICD-10 Codes:    ICD-10-CM    1. Cellulitis of left upper extremity  L03.114       2. Sepsis, due to unspecified organism, unspecified whether acute organ dysfunction present (H)  A41.9            Discharge Medications  Current Discharge Medication List            MD Rochelle Walton Nicholas J, MD  05/27/24 1329

## 2024-05-18 NOTE — ED TRIAGE NOTES
Pt reports at dialysis today and had a temp of 101 with elevated HR. Pt has redness to his left arm which he reports he has had for 2 weeks.

## 2024-05-18 NOTE — ED NOTES
"[facility]  ED Nurse Handoff Report    ED Chief complaint: Fever  . ED Diagnosis:   Final diagnoses:   None       Allergies: No Known Allergies    Code Status: Full Code    Activity level - Baseline/Home:  independent.  Activity Level - Current:   standby.   Lift room needed: No.   Bariatric: No   Needed: No   Isolation: No.   Infection: Not Applicable.     Respiratory status: Room air    Vital Signs (within 30 minutes):   Vitals:    05/17/24 1933 05/17/24 2016 05/17/24 2031 05/17/24 2100   BP: (!) 126/93 129/66 137/68 (!) 144/68   Pulse: 120 119 115 108   Resp: 20      Temp: 99.3  F (37.4  C)      TempSrc: Oral      SpO2: 96% 95% 93% 92%   Weight: 142.9 kg (315 lb 0.6 oz)      Height: 1.803 m (5' 11\")          Cardiac Rhythm:  ,      Pain level:    Patient confused: No.   Patient Falls Risk: nonskid shoes/slippers when out of bed, patient and family education, and assistive device/personal items within reach.   Elimination Status: Has not yet voided, does not make urine (dialysis pt)      Patient Report - Initial Complaint: Pt reports at dialysis today and had a temp of 101 with elevated HR. Pt has redness to his left arm which he reports he has had for 2 weeks. .   Focused Assessment: Respiratory WDLRespiratory WDL: WDL  Cardiac WDLCardiac WDL: .WDL except; rhythmPulse Rate & Regularity: tachycardic  Peripheral/Neurovascular WDLPeripheral Neurovascular WDL: .WDL except  Skin WDLSkin WDL: .WDL except; temperatureSkin Temperature: hot     Abnormal Results:   Labs Ordered and Resulted from Time of ED Arrival to Time of ED Departure   COMPREHENSIVE METABOLIC PANEL - Abnormal       Result Value    Sodium 133 (*)     Potassium 5.0      Carbon Dioxide (CO2) 24      Anion Gap 17 (*)     Urea Nitrogen 40.3 (*)     Creatinine 7.44 (*)     GFR Estimate 9 (*)     Calcium 9.6      Chloride 92 (*)     Glucose 115 (*)     Alkaline Phosphatase 73      AST 21      ALT 15      Protein Total 8.3   "    Albumin 4.2      Bilirubin Total 0.3     CBC WITH PLATELETS AND DIFFERENTIAL - Abnormal    WBC Count 13.2 (*)     RBC Count 3.66 (*)     Hemoglobin 11.3 (*)     Hematocrit 35.4 (*)     MCV 97      MCH 30.9      MCHC 31.9      RDW 14.8      Platelet Count 78 (*)     % Neutrophils 81      % Lymphocytes 7      % Monocytes 6      % Eosinophils 3      % Basophils 0      % Immature Granulocytes 3      NRBCs per 100 WBC 0      Absolute Neutrophils 10.7 (*)     Absolute Lymphocytes 0.9      Absolute Monocytes 0.8      Absolute Eosinophils 0.4      Absolute Basophils 0.1      Absolute Immature Granulocytes 0.4      Absolute NRBCs 0.0     LACTIC ACID WHOLE BLOOD - Normal    Lactic Acid 1.7     INFLUENZA A/B, RSV, & SARS-COV2 PCR   BLOOD CULTURE   BLOOD CULTURE        No orders to display       Treatments provided: IVF, antibiotics, see MAR  Family Comments: visitor at bedside  OBS brochure/video discussed/provided to patient:  No  ED Medications:   Medications   sodium chloride 0.9% BOLUS 500 mL (500 mLs Intravenous $New Bag 5/17/24 2057)   piperacillin-tazobactam (ZOSYN) 4.5 g vial to attach to  mL bag (4.5 g Intravenous $New Bag 5/17/24 2057)       Drips infusing:  No  For the majority of the shift this patient was Green.   Interventions performed were n/a.    Sepsis treatment initiated: No    Cares/treatment/interventions/medications to be completed following ED care: see orders    ED Nurse Name: Pat Gandhi RN  9:20 PM  RECEIVING UNIT ED HANDOFF REVIEW    Above ED Nurse Handoff Report was reviewed: Yes  Reviewed by: Magalis Briceño RN on May 17, 2024 at 11:12 PM   MURIEL Culp called the ED to inform them the note was read: Yes

## 2024-05-18 NOTE — CONSULTS
[facility]    Nephrology Consultation     Date of Admission:  5/17/2024    Assessment & Plan     Brooks Mensah is a 38 year old male who was admitted on 5/17/2024.     Assessment:  1) end-stage renal disease:  Chronic Monday Wednesday Friday dialysis, [facility]  Nephrologist: Dr. Saunders  Treatment time 4 hours  Self-reported Target weight 139 kg  Access: Left radiocephalic fistula    Anemia: On Mircera and Venofer as outpatient     CKD-MBD: Patient takes 3 PhosLo's with meals      2) left arm skin changes: Does appear as having some eczematous or skin irritation.  Unusual to have cellulitis been persistent for a few weeks.  This was improving with the steroid cream he was prescribed by his outpatient dermatologist    3) febrile illness with shaking chills on dialysis.  Agree with concern related to bacteremia.  Blood cultures drawn, on empiric antibiotics.  ID consult pending    Plan/Recs:  1) not urgent and patient declines any additional dialysis today.  Plan next dialysis session Monday, 5/20/2024  2) adding Lokelma, 10 g daily.  This was added to his outpatient regimen a month ago for nondialysis days.  3) we will continue IV antibiotics as started until at least 24 hours and blood culture results.  4) await ID consult but the left arm does not appear as cellulitis  5) avoid Emla cream, ideal to use paper tape and avoid adhesives for now 2 left arm access needle sites    Oneal Nath DO  [facility] - Nephrology  [phone]  --------------------------------------------------------------------------------------------  Reason for Consult     I was asked to see the patient for ESRD management, cellulitis    History is obtained from the patient and chart review.      History of Present Illness     Brooks Mensah is a 38 year old male who presented yesterday from his dialysis center with fever and chills.  Patient reported 101 temperature, shaking chills prompting him to be taken  off dialysis 1 hour early.  Concerns related to left forearm cellulitis.  Patient received blood cultures, dosed with vancomycin and Zosyn and admitted for further evaluation.    Patient reports 2 weeks of left lower arm skin changes.  He does use Emla cream but has not had a change in formulation or pattern.  There is been question of irritation related to the Emla or tape.  Referred to a dermatologist who he reports was confused although treated him with a steroid cream.  Reports this is been slowly improving.  Has not had fevers or chills recently, denies any other focal symptoms.  There is been no cough or cardiovascular symptoms.  Denies nausea vomiting diarrhea.    Since admission he has had a fever of 99.3 but feels at his baseline self today.  Question if he can go home today.    Potassium today at 5.0, notable prescription last month for Lokelma to take on nondialysis days, 10 g.    Past Medical History   I have reviewed this patient's medical history and updated it with pertinent information if needed.   Past Medical History:   Diagnosis Date    Acute on chronic systolic congestive heart failure (H) 8/9/2017    CAD (coronary artery disease)     Nonobstructive    Cardiomyopathy, unspecified (H) 8/9/2017    Congestive heart failure, unspecified congestive heart failure chronicity, unspecified congestive heart failure type 6/13/2017    HTN (hypertension), malignant     Hypertensive urgency 6/13/2017    Morbid obesity with BMI of 50.0-59.9, adult (H) 6/21/2017    NSTEMI (non-ST elevated myocardial infarction) (H) 6/13/2017    Renal insufficiency 6/13/2017    Sleep apnea     Tobacco abuse        Past Surgical History   I have reviewed this patient's surgical history and updated it with pertinent information if needed.  Past Surgical History:   Procedure Laterality Date    CREATE FISTULA ARTERIOVENOUS UPPER EXTREMITY Left 11/28/2023    Procedure: CREATION OF LEFT DISTAL RADIAL TO CEPHALIC ARTERIOVENOUS FISTULA;   Surgeon: Ramiro Pantoja MD;  Location: [facility] OR    ESOPHAGOSCOPY, GASTROSCOPY, DUODENOSCOPY (EGD), COMBINED N/A 12/1/2022    Procedure: ESOPHAGOGASTRODUODENOSCOPY, WITH BIOPSY;  Surgeon: Dm Warner MD;  Location: [facility] GI    ESOPHAGOSCOPY, GASTROSCOPY, DUODENOSCOPY (EGD), COMBINED N/A 10/3/2023    Procedure: Esophagogastroduodenoscopy with biopsy;  Surgeon: Manuel Lela MD;  Location: [facility] OR    IR CVC TUNNEL PLACEMENT > 5 YRS OF AGE  10/2/2023    IR CVC TUNNEL REMOVAL RIGHT  3/7/2024       Prior to Admission Medications   Prior to Admission Medications   Prescriptions Last Dose Informant Patient Reported? Taking?   aspirin 81 MG EC tablet   No No   Sig: Take 1 tablet (81 mg) by mouth daily   atorvastatin (LIPITOR) 10 MG tablet   No No   Sig: Take 1 tablet (10 mg) by mouth every evening   calcium acetate (PHOSLO) 667 MG CAPS capsule   No No   Sig: Take 2 capsules (1,334 mg) by mouth 3 times daily (with meals)   Patient taking differently: Take 1,334 mg by mouth 3 times daily (with meals) Take 3 caps 3 times daily   carvedilol (COREG) 25 MG tablet   No No   Sig: Take 0.5 tablets (12.5 mg) by mouth 2 times daily (with meals)   hydrALAZINE (APRESOLINE) 25 MG tablet   Yes No   Sig: Take 1 tablet (25 mg) by mouth 3 times daily   lisinopril (ZESTRIL) 20 MG tablet   No No   Sig: Take 1 tablet (20 mg) by mouth daily   pantoprazole (PROTONIX) 40 MG EC tablet   No No   Sig: Take 1 tablet (40 mg) by mouth 2 times daily      Facility-Administered Medications: None     Allergies   No Known Allergies    Social History   I have reviewed this patient's social history and updated it with pertinent information if needed. Brooks Mensah  reports that he quit smoking about 18 months ago. His smoking use included cigarettes. He started smoking about 22 years ago. He has a 5.2 pack-year smoking history. He has never used smokeless tobacco. He reports that he does not currently use alcohol. He reports that he does  "not use drugs.    Family History   I have reviewed this patient's family history and updated it with pertinent information if needed.   Family History   Problem Relation Age of Onset    Hyperlipidemia Mother     Hyperlipidemia Father        Review of Systems   The 10 point Review of Systems is negative other than noted in the HPI.     Physical Exam   Temp: 98.9  F (37.2  C) Temp src: Oral BP: 100/47 Pulse: 85   Resp: 20 SpO2: 94 % O2 Device: None (Room air)    Vital Signs with Ranges  Temp:  [98.9  F (37.2  C)-99.3  F (37.4  C)] 98.9  F (37.2  C)  Pulse:  [] 85  Resp:  [20-22] 20  BP: ()/(47-93) 100/47  SpO2:  [92 %-96 %] 94 %  316 lbs 14.4 oz    GENERAL: healthy, alert, NAD  HEENT:  Normocephalic. No gross abnormalities.    CV: RRR, 1/6 systolic murmur, no edema  RESP: Clear bilaterally with good efforts  GI: Abdomen soft/nt/nd, BS normal. No masses, organomegaly  MUSCULOSKELETAL: extremities nl - no gross deformities noted  SKIN: Left forearm fistula with bruit and thrill, overlying skin with some Scabbing and dryness.  Darkening noted proximal up to his antecubital area.  There is no warmth or discomfort.  NEURO:  Strength normal and symmetric.   PSYCH: mood good, affect appropriate      Data   BMP  Recent Labs   Lab 05/18/24  0715 05/17/24 2006   * 133*   POTASSIUM 5.0 5.0   CHLORIDE 95* 92*   GAYLE 8.5* 9.6   CO2 24 24   BUN 49.0* 40.3*   CR 9.26* 7.44*   * 115*     Phos@LABRCNTIPR(phos:4)  CBC)  Recent Labs   Lab 05/18/24  0715 05/17/24 2006   WBC 11.0 13.2*   HGB 9.1* 11.3*   HCT 28.8* 35.4*   MCV 99 97   PLT 82* 78*     Recent Labs   Lab 05/17/24 2006   AST 21   ALT 15   ALKPHOS 73   BILITOTAL 0.3     No lab results found in last 7 days.  No results found for: \"D2VIT\", \"D3VIT\", \"DTOT\"  Recent Labs   Lab 05/18/24  0715   HGB 9.1*   HCT 28.8*   MCV 99     No results for input(s): \"PTHI\" in the last 168 hours.    "

## 2024-05-18 NOTE — CONSULTS
[facility]    Infectious Disease Consultation     Date of Admission:  5/17/2024  Date of Consult (When I saw the patient): 05/18/24    Assessment & Plan   Brooks Mensah is a 38 year old who was admitted on 5/17/2024.     Impression:  37 yo presented from dialysis center with fever and chills.  Patient reported 101 temperature, shaking chills prompting him to be taken off dialysis 1 hour early.    Concerns related to left forearm cellulitis.  Patient received blood cultures, dosed with vancomycin and Zosyn and admitted for further evaluation.   Patient reports 2 weeks of left lower arm skin changes.      Recommendations:   Changes on the forearm is right on top of his fistula a possible source of infection but so far blood cultures are negative, no fevers and white count has normalized  Patient wants to go home I told him that we should wait on the cultures before discharge explained why but he still wants to leave      Pooja Russell MD    Reason for Consult   Reason for consult: I was asked to evaluate this patient for fevers and shaking chills .    Primary Care Physician   Anabela Mensah-Reena    Chief Complaint   Fever at dialysis     History is obtained from the patient and medical records    History of Present Illness   Brooks Mensah is a 38 year old male who presents with fevers and chills while getting dialysis     Past Medical History   I have reviewed this patient's medical history and updated it with pertinent information if needed.   Past Medical History:   Diagnosis Date    Acute on chronic systolic congestive heart failure (H) 8/9/2017    CAD (coronary artery disease)     Nonobstructive    Cardiomyopathy, unspecified (H) 8/9/2017    Congestive heart failure, unspecified congestive heart failure chronicity, unspecified congestive heart failure type 6/13/2017    HTN (hypertension), malignant     Hypertensive urgency 6/13/2017    Morbid obesity with BMI of 50.0-59.9, adult (H) 6/21/2017     NSTEMI (non-ST elevated myocardial infarction) (H) 6/13/2017    Renal insufficiency 6/13/2017    Sleep apnea     Tobacco abuse        Past Surgical History   I have reviewed this patient's surgical history and updated it with pertinent information if needed.  Past Surgical History:   Procedure Laterality Date    CREATE FISTULA ARTERIOVENOUS UPPER EXTREMITY Left 11/28/2023    Procedure: CREATION OF LEFT DISTAL RADIAL TO CEPHALIC ARTERIOVENOUS FISTULA;  Surgeon: Ramiro Pantoja MD;  Location: [facility] OR    ESOPHAGOSCOPY, GASTROSCOPY, DUODENOSCOPY (EGD), COMBINED N/A 12/1/2022    Procedure: ESOPHAGOGASTRODUODENOSCOPY, WITH BIOPSY;  Surgeon: Dm Warner MD;  Location: [facility]    ESOPHAGOSCOPY, GASTROSCOPY, DUODENOSCOPY (EGD), COMBINED N/A 10/3/2023    Procedure: Esophagogastroduodenoscopy with biopsy;  Surgeon: Manuel Leal MD;  Location: [facility] OR    IR CVC TUNNEL PLACEMENT > 5 YRS OF AGE  10/2/2023    IR CVC TUNNEL REMOVAL RIGHT  3/7/2024       Prior to Admission Medications   Prior to Admission Medications   Prescriptions Last Dose Informant Patient Reported? Taking?   atorvastatin (LIPITOR) 10 MG tablet 5/16/2024  No Yes   Sig: Take 1 tablet (10 mg) by mouth every evening   bumetanide (BUMEX) 2 MG tablet 5/17/2024  Yes Yes   Sig: Take 2 mg by mouth daily   calcium acetate (PHOSLO) 667 MG CAPS capsule 5/17/2024  Yes Yes   Sig: Take 2,001 mg by mouth 3 times daily (with meals)   calcium acetate (PHOSLO) 667 MG CAPS capsule Past Week  Yes Yes   Sig: Take 1,334 mg by mouth as needed With snacks   carvedilol (COREG) 25 MG tablet 5/17/2024  No Yes   Sig: Take 0.5 tablets (12.5 mg) by mouth 2 times daily (with meals)   gabapentin (NEURONTIN) 300 MG capsule 5/16/2024  Yes Yes   Sig: Take 300 mg by mouth at bedtime   hydrALAZINE (APRESOLINE) 25 MG tablet 5/17/2024  Yes Yes   Sig: Take 1 tablet (25 mg) by mouth 3 times daily   lisinopril (ZESTRIL) 20 MG tablet 5/17/2024  No Yes   Sig: Take 1 tablet (20  "mg) by mouth daily   pantoprazole (PROTONIX) 40 MG EC tablet 5/17/2024  Yes Yes   Sig: Take 40 mg by mouth daily      Facility-Administered Medications: None     Allergies   No Known Allergies    Immunization History   Immunization History   Administered Date(s) Administered    COVID-19 MONOVALENT 12+ (Pfizer) 03/25/2021, 04/13/2021       Social History   I have reviewed this patient's social history and updated it with pertinent information if needed. Brooks Mensah  reports that he quit smoking about 18 months ago. His smoking use included cigarettes. He started smoking about 22 years ago. He has a 5.2 pack-year smoking history. He has never used smokeless tobacco. He reports that he does not currently use alcohol. He reports that he does not use drugs.    Family History   I have reviewed this patient's family history and updated it with pertinent information if needed.   Family History   Problem Relation Age of Onset    Hyperlipidemia Mother     Hyperlipidemia Father        Review of Systems   The 10 point Review of Systems is negative    Physical Exam   Temp: 98.9  F (37.2  C) Temp src: Oral BP: 124/72 Pulse: 85   Resp: 20 SpO2: 94 % O2 Device: None (Room air)    Vital Signs with Ranges  Temp:  [98.9  F (37.2  C)-99.3  F (37.4  C)] 98.9  F (37.2  C)  Pulse:  [] 85  Resp:  [20-22] 20  BP: ()/(47-93) 124/72  SpO2:  [92 %-96 %] 94 %  316 lbs 14.4 oz  Body mass index is 44.2 kg/m .    GENERAL APPEARANCE:  awake  EYES: Eyes grossly normal to inspection  NECK: no adenopathy  RESP: lungs clear   CV: regular rates and rhythm  LYMPHATICS: normal ant/post cervical and supraclavicular nodes  ABDOMEN: soft, nontender  MS: Skin changes on the left forearm does not look cellulitis but could be the source of entry for bacteria  SKIN: no suspicious lesions or rashes        Data   All laboratory and imaging data in the past 24 hours reviewed  No results for input(s): \"CULT\" in the last 168 hours.  No lab results " "found.    Invalid input(s): \"UC\"       All cultures:  Recent Labs   Lab 05/17/24  2015 05/17/24  2006   CULTURE No growth after 12 hours No growth after 12 hours      Blood culture:  Results for orders placed or performed during the hospital encounter of 05/17/24   Blood Culture Arm, Right    Specimen: Arm, Right; Blood   Result Value Ref Range    Culture No growth after 12 hours    Blood Culture Peripheral Blood    Specimen: Peripheral Blood   Result Value Ref Range    Culture No growth after 12 hours    Results for orders placed or performed during the hospital encounter of 09/29/23   Blood Culture Arm, Right    Specimen: Arm, Right; Blood   Result Value Ref Range    Culture No Growth    Blood Culture Arm, Left    Specimen: Arm, Left; Blood   Result Value Ref Range    Culture No Growth       Urine culture:  Results for orders placed or performed during the hospital encounter of 11/28/22   Urine Culture    Specimen: Urine, Clean Catch   Result Value Ref Range    Culture <10,000 CFU/mL Urogenital kristian              "

## 2024-05-18 NOTE — PHARMACY-VANCOMYCIN DOSING SERVICE
"Pharmacy Vancomycin Initial Note  Date of Service May 18, 2024  Patient's  1985  38 year old, male    Indication: Skin and Soft Tissue Infection    Current estimated CrCl = Estimated Creatinine Clearance: 15.7 mL/min (A) (based on SCr of 9.26 mg/dL (H)).    Creatinine for last 3 days  2024:  8:06 PM Creatinine 7.44 mg/dL  2024:  7:15 AM Creatinine 9.26 mg/dL    Recent Vancomycin Level(s) for last 3 days  No results found for requested labs within last 3 days.      Vancomycin IV Administrations (past 72 hours)                     vancomycin (VANCOCIN) 2,000 mg in 0.9% NaCl 500 mL intermittent infusion (mg) 2,000 mg New Bag 24 2150                    Nephrotoxins and other renal medications (From now, onward)      Start     Dose/Rate Route Frequency Ordered Stop    24 1046  vancomycin place hoang - receiving intermittent dosing         1 each Intravenous SEE ADMIN INSTRUCTIONS 24 1046      24 0800  lisinopril (ZESTRIL) tablet 20 mg        Note to Pharmacy: PTA Sig:Take 1 tablet (20 mg) by mouth daily      20 mg Oral DAILY 24 2334      24 0500  piperacillin-tazobactam (ZOSYN) 2.25 g vial to attach to  ml bag        Note to Pharmacy: For [facility], [facility] and [facility]: For Zosyn-naive patients, use the \"Zosyn initial dose + extended infusion\" order panel.    2.25 g  over 30 Minutes Intravenous EVERY 8 HOURS 24 2334              Contrast Orders - past 72 hours (72h ago, onward)      None            InsightRX Prediction of Planned Initial Vancomycin Regimen  N/A pt ESRD receiving HD on M, W, and F        Plan:  Pt received Loading Dose 2000 mg once in ED on . Plan no dosing until getting Vanco level pre HD run on  and dose accordingly.   Vancomycin monitoring method: Renal Replacement Therapy    Aubrie Wesley, [facility]   "

## 2024-05-18 NOTE — PHARMACY-ADMISSION MEDICATION HISTORY
Pharmacist Admission Medication History    Admission medication history is complete. The information provided in this note is only as accurate as the sources available at the time of the update.    Information Source(s): Patient and CareEverywhere/SureScripts via in-person    Pertinent Information: None    Changes made to PTA medication list:  Added: Gabapentin, Bumetanide  Deleted: ASA  Changed: Phoslo 2 caps TID --> 3 caps TID and 2 caps with snacks, Pantoprazole BID --> QD    Allergies reviewed with patient and updates made in EHR: yes    Medication History Completed By: Aubrie Wesley [facility] 5/18/2024 9:13 AM    PTA Med List   Medication Sig Last Dose    atorvastatin (LIPITOR) 10 MG tablet Take 1 tablet (10 mg) by mouth every evening 5/16/2024    bumetanide (BUMEX) 2 MG tablet Take 2 mg by mouth daily 5/17/2024    calcium acetate (PHOSLO) 667 MG CAPS capsule Take 2,001 mg by mouth 3 times daily (with meals) 5/17/2024    calcium acetate (PHOSLO) 667 MG CAPS capsule Take 1,334 mg by mouth as needed With snacks Past Week    carvedilol (COREG) 25 MG tablet Take 0.5 tablets (12.5 mg) by mouth 2 times daily (with meals) 5/17/2024    gabapentin (NEURONTIN) 300 MG capsule Take 300 mg by mouth at bedtime 5/16/2024    hydrALAZINE (APRESOLINE) 25 MG tablet Take 1 tablet (25 mg) by mouth 3 times daily 5/17/2024    lisinopril (ZESTRIL) 20 MG tablet Take 1 tablet (20 mg) by mouth daily 5/17/2024    pantoprazole (PROTONIX) 40 MG EC tablet Take 40 mg by mouth daily 5/17/2024

## 2024-05-18 NOTE — PLAN OF CARE
"  Problem: Adult Inpatient Plan of Care  Goal: Plan of Care Review  Description: The Plan of Care Review/Shift note should be completed every shift.  The Outcome Evaluation is a brief statement about your assessment that the patient is improving, declining, or no change.  This information will be displayed automatically on your shift  note.  Outcome: Progressing  Flowsheets (Taken 5/17/2024 2340)  Plan of Care Reviewed With: patient  Overall Patient Progress: no change  Goal: Patient-Specific Goal (Individualized)  Description: You can add care plan individualizations to a care plan. Examples of Individualization might be:  \"Parent requests to be called daily at 9am for status\", \"I have a hard time hearing out of my right ear\", or \"Do not touch me to wake me up as it startles  me\".  Outcome: Progressing  Goal: Absence of Hospital-Acquired Illness or Injury  Outcome: Progressing  Goal: Optimal Comfort and Wellbeing  Outcome: Progressing  Goal: Readiness for Transition of Care  Outcome: Progressing  Intervention: Mutually Develop Transition Plan  Recent Flowsheet Documentation  Taken 5/17/2024 2340 by Magalis Valdivia, RN  Equipment Currently Used at Home: none     Problem: Infection  Goal: Absence of Infection Signs and Symptoms  Outcome: Progressing     Problem: Comorbidity Management  Goal: Blood Pressure in Desired Range  Outcome: Progressing   Goal Outcome Evaluation:      Plan of Care Reviewed With: patient    Overall Patient Progress: no changeOverall Patient Progress: no change     Pt alert and oriented, denies pain. On RA. Abx administered per order. SBA.       "

## 2024-05-22 LAB
BACTERIA BLD CULT: NO GROWTH
BACTERIA BLD CULT: NO GROWTH

## 2024-06-17 DIAGNOSIS — N18.6 ESRD ON HEMODIALYSIS (H): ICD-10-CM

## 2024-06-17 DIAGNOSIS — T82.898A PROBLEM WITH DIALYSIS ACCESS, INITIAL ENCOUNTER (H): Primary | ICD-10-CM

## 2024-06-17 DIAGNOSIS — Z99.2 ESRD ON HEMODIALYSIS (H): ICD-10-CM

## 2024-06-18 ENCOUNTER — HOSPITAL ENCOUNTER (OUTPATIENT)
Facility: CLINIC | Age: 39
Discharge: HOME | End: 2024-06-18
Attending: RADIOLOGY | Admitting: RADIOLOGY
Payer: COMMERCIAL

## 2024-06-18 ENCOUNTER — APPOINTMENT (OUTPATIENT)
Dept: INTERVENTIONAL RADIOLOGY/VASCULAR | Facility: CLINIC | Age: 39
End: 2024-06-18
Attending: PHYSICIAN ASSISTANT
Payer: COMMERCIAL

## 2024-06-18 VITALS
RESPIRATION RATE: 16 BRPM | SYSTOLIC BLOOD PRESSURE: 162 MMHG | DIASTOLIC BLOOD PRESSURE: 101 MMHG | OXYGEN SATURATION: 94 % | HEART RATE: 84 BPM | TEMPERATURE: 97.7 F

## 2024-06-18 DIAGNOSIS — T82.898A PROBLEM WITH DIALYSIS ACCESS, INITIAL ENCOUNTER (H): ICD-10-CM

## 2024-06-18 DIAGNOSIS — Z99.2 ESRD ON HEMODIALYSIS (H): ICD-10-CM

## 2024-06-18 DIAGNOSIS — N18.6 ESRD ON HEMODIALYSIS (H): ICD-10-CM

## 2024-06-18 LAB
ALBUMIN SERPL BCG-MCNC: 3.5 G/DL (ref 3.5–5.2)
ALP SERPL-CCNC: 66 U/L (ref 40–150)
ALT SERPL W P-5'-P-CCNC: 16 U/L (ref 0–70)
ANION GAP SERPL CALCULATED.3IONS-SCNC: 21 MMOL/L (ref 7–15)
AST SERPL W P-5'-P-CCNC: 24 U/L (ref 0–45)
BILIRUB SERPL-MCNC: 0.2 MG/DL
BUN SERPL-MCNC: 102.2 MG/DL (ref 6–20)
CALCIUM SERPL-MCNC: 9.6 MG/DL (ref 8.6–10)
CHLORIDE SERPL-SCNC: 95 MMOL/L (ref 98–107)
CREAT SERPL-MCNC: 14.12 MG/DL (ref 0.67–1.17)
DEPRECATED HCO3 PLAS-SCNC: 22 MMOL/L (ref 22–29)
EGFRCR SERPLBLD CKD-EPI 2021: 4 ML/MIN/1.73M2
ERYTHROCYTE [DISTWIDTH] IN BLOOD BY AUTOMATED COUNT: 15.4 % (ref 10–15)
GLUCOSE SERPL-MCNC: 97 MG/DL (ref 70–99)
HCT VFR BLD AUTO: 28.9 % (ref 40–53)
HGB BLD-MCNC: 9.5 G/DL (ref 13.3–17.7)
MCH RBC QN AUTO: 31.4 PG (ref 26.5–33)
MCHC RBC AUTO-ENTMCNC: 32.9 G/DL (ref 31.5–36.5)
MCV RBC AUTO: 95 FL (ref 78–100)
PLATELET # BLD AUTO: 125 10E3/UL (ref 150–450)
POTASSIUM SERPL-SCNC: 6.1 MMOL/L (ref 3.4–5.3)
PROT SERPL-MCNC: 6.7 G/DL (ref 6.4–8.3)
RBC # BLD AUTO: 3.03 10E6/UL (ref 4.4–5.9)
SODIUM SERPL-SCNC: 138 MMOL/L (ref 135–145)
WBC # BLD AUTO: 11 10E3/UL (ref 4–11)

## 2024-06-18 PROCEDURE — 36415 COLL VENOUS BLD VENIPUNCTURE: CPT | Performed by: RADIOLOGY

## 2024-06-18 PROCEDURE — 258N000003 HC RX IP 258 OP 636: Mod: JZ | Performed by: NURSE PRACTITIONER

## 2024-06-18 PROCEDURE — 250N000011 HC RX IP 250 OP 636: Mod: JZ | Performed by: NURSE PRACTITIONER

## 2024-06-18 PROCEDURE — C1894 INTRO/SHEATH, NON-LASER: HCPCS

## 2024-06-18 PROCEDURE — 250N000011 HC RX IP 250 OP 636: Performed by: RADIOLOGY

## 2024-06-18 PROCEDURE — C1769 GUIDE WIRE: HCPCS

## 2024-06-18 PROCEDURE — C1752 CATH,HEMODIALYSIS,SHORT-TERM: HCPCS

## 2024-06-18 PROCEDURE — 36415 COLL VENOUS BLD VENIPUNCTURE: CPT | Performed by: NURSE PRACTITIONER

## 2024-06-18 PROCEDURE — 250N000009 HC RX 250: Performed by: NURSE PRACTITIONER

## 2024-06-18 PROCEDURE — 999N000127 HC STATISTIC PERIPHERAL IV START W US GUIDANCE

## 2024-06-18 PROCEDURE — 85027 COMPLETE CBC AUTOMATED: CPT | Performed by: NURSE PRACTITIONER

## 2024-06-18 PROCEDURE — 80053 COMPREHEN METABOLIC PANEL: CPT | Performed by: RADIOLOGY

## 2024-06-18 PROCEDURE — 77001 FLUOROGUIDE FOR VEIN DEVICE: CPT

## 2024-06-18 RX ORDER — NALOXONE HYDROCHLORIDE 0.4 MG/ML
0.2 INJECTION, SOLUTION INTRAMUSCULAR; INTRAVENOUS; SUBCUTANEOUS
Status: DISCONTINUED | OUTPATIENT
Start: 2024-06-18 | End: 2024-06-18 | Stop reason: HOSPADM

## 2024-06-18 RX ORDER — NALOXONE HYDROCHLORIDE 0.4 MG/ML
0.4 INJECTION, SOLUTION INTRAMUSCULAR; INTRAVENOUS; SUBCUTANEOUS
Status: DISCONTINUED | OUTPATIENT
Start: 2024-06-18 | End: 2024-06-18 | Stop reason: HOSPADM

## 2024-06-18 RX ORDER — HEPARIN SODIUM 1000 [USP'U]/ML
4000 INJECTION, SOLUTION INTRAVENOUS; SUBCUTANEOUS ONCE
Status: COMPLETED | OUTPATIENT
Start: 2024-06-18 | End: 2024-06-18

## 2024-06-18 RX ORDER — ACETAMINOPHEN 325 MG/1
650 TABLET ORAL
Status: CANCELLED | OUTPATIENT
Start: 2024-06-18

## 2024-06-18 RX ORDER — HEPARIN SODIUM 1000 [USP'U]/ML
500 INJECTION, SOLUTION INTRAVENOUS; SUBCUTANEOUS CONTINUOUS
Status: CANCELLED | OUTPATIENT
Start: 2024-06-18

## 2024-06-18 RX ORDER — CEFAZOLIN SODIUM IN 0.9 % NACL 3 G/100 ML
3 INTRAVENOUS SOLUTION, PIGGYBACK (ML) INTRAVENOUS
Status: COMPLETED | OUTPATIENT
Start: 2024-06-18 | End: 2024-06-18

## 2024-06-18 RX ORDER — FLUMAZENIL 0.1 MG/ML
0.2 INJECTION, SOLUTION INTRAVENOUS
Status: DISCONTINUED | OUTPATIENT
Start: 2024-06-18 | End: 2024-06-18 | Stop reason: HOSPADM

## 2024-06-18 RX ORDER — ALBUMIN (HUMAN) 12.5 G/50ML
50 SOLUTION INTRAVENOUS
Status: CANCELLED | OUTPATIENT
Start: 2024-06-18

## 2024-06-18 RX ORDER — FENTANYL CITRATE 50 UG/ML
25-50 INJECTION, SOLUTION INTRAMUSCULAR; INTRAVENOUS EVERY 5 MIN PRN
Status: DISCONTINUED | OUTPATIENT
Start: 2024-06-18 | End: 2024-06-18 | Stop reason: HOSPADM

## 2024-06-18 RX ADMIN — LIDOCAINE HYDROCHLORIDE 20 ML: 10 INJECTION, SOLUTION INFILTRATION; PERINEURAL at 10:01

## 2024-06-18 RX ADMIN — FENTANYL CITRATE 25 MCG: 50 INJECTION, SOLUTION INTRAMUSCULAR; INTRAVENOUS at 09:53

## 2024-06-18 RX ADMIN — FENTANYL CITRATE 75 MCG: 50 INJECTION, SOLUTION INTRAMUSCULAR; INTRAVENOUS at 09:48

## 2024-06-18 RX ADMIN — HEPARIN SODIUM 4000 UNITS: 1000 INJECTION, SOLUTION INTRAVENOUS; SUBCUTANEOUS at 10:00

## 2024-06-18 RX ADMIN — CEFAZOLIN 3 G: 10 INJECTION, POWDER, FOR SOLUTION INTRAVENOUS at 09:38

## 2024-06-18 ASSESSMENT — ACTIVITIES OF DAILY LIVING (ADL)
ADLS_ACUITY_SCORE: 37

## 2024-06-18 NOTE — PRE-PROCEDURE
GENERAL PRE-PROCEDURE:   Procedure:  Tunneled catheter placement  Date/Time:  6/18/2024 8:24 AM    Written consent obtained?: Yes    Risks and benefits: Risks, benefits and alternatives were discussed    Consent given by:  Patient  Patient states understanding of procedure being performed: Yes    Patient's understanding of procedure matches consent: Yes    Procedure consent matches procedure scheduled: Yes    Expected level of sedation:  Moderate  Appropriately NPO:  Yes  ASA Class:  2  Mallampati  :  Grade 3- soft palate visible, posterior pharyngeal wall not visible  Lungs:  Lungs clear with good breath sounds bilaterally  Heart:  Normal heart sounds and rate  History & Physical reviewed:  History and physical reviewed and no updates needed  Statement of review:  I have reviewed the lab findings, diagnostic data, medications, and the plan for sedation

## 2024-06-18 NOTE — DISCHARGE INSTRUCTIONS
Caring for Your Tunneled Dialysis Catheter  ____________________________________________    Patient Name: Brooks Mensah  Today's Date: June 18, 2024    The radiologist who performed your procedure was:  Dr. Nichols     When you get home:  No driving or drinking alcohol until tomorrow. You may still have side effects from the medicine you received today. (You may feel drowsy, unsteady or forgetful.)  You should have an adult with you for your first six hours at home.  You may go back to your regular diet today.   If you take aspirin or Plavix, you may begin taking it again tomorrow. You may restart all other medicines today. Use pain medicine as directed.  Avoid heavy lifting or the excess use of your shoulder for three days.  Keep the neck bandage clean and dry for three days. Do not shower unless it is covered with plastic.  After three days you may use a Band-Aid on your neck. Keep using Band-Aids until the wound has healed.    What is a tunneled dialysis catheter?   This is a special tube (catheter) that is threaded (tunneled) under your skin and then placed in a vein near your collarbone. Some people have it for a short time, others have it for their entire lives.    We will use this tube to access your bloodstream for dialysis. After each treatment, your dialysis team will flush the tube with a drug called heparin. This will keep the tube from clotting. They will then change your bandage.    What does it look like?   The catheter is a Y-shaped tube. The two ends, called ports, are each covered with a cap. A clamp near each port helps prevent infection, bleeding and air in the vein.   The exit site (where the tube comes out of your skin) stays covered with a clear bandage.    The entrance site, or tunneling site, is higher up on the neck. It is covered with small pieces of tape called Steri-Strips.     How should I care for the catheter?  Prevent injury and infection:  Infection may occur if germs enter your  bloodstream around the exit or entrance sites. To prevent an infection:  Do not swim while you have the catheter.   You may shower if you first cover the bandage with plastic. You may take a bath if the water stays below your waist. (Sponge bathe your upper body to keep the bandage from getting wet.)  Take your temperature by mouth daily. If you have a fever over 100 F (37.8 C), call your doctor.  Check the skin around the tube each day for redness, swelling, drainage or tenderness. These are all signs of infection.  You will need to avoid heavy activity. Avoid contact sports.    If you have oozing or bleeding from the catheter site  Put direct pressure on the wound for 5 to 10 minutes with a gauze pad.  If you still have bleeding after 10 minutes, call your doctor.  If you are bleeding a lot and can't control it with direct pressure, call 911.    Check your catheter each day  Make sure the clamps are closed over both ends of the tubing. Check that the caps are tight.  If a cap is loose or comes off, you may have bleeding from the tube, or air could get into the bloodstream. To prevent this, make sure the clamp on the catheter is closed. Wrap the end of the tube in a clean gauze and call your doctor or dialysis unit at once.  Your catheter is not likely to fall out. It is sewn into your skin with stitches. (Stitches are removed or will fall out in a couple weeks.) Also, a small cuff under the skin helps to hold the catheter in place. If the catheter does fall out, put firm pressure on the exit site with a clean gauze. Then, call your doctor or dialysis unit at once.     When should I call my doctor or dialysis unit?  Call right away if:  Your temperature under the tongue is over 100 F (37.8 C).  The skin around the tube feels tender or you see drainage.  You have trouble breathing.  You have unusual chest or shoulder pain.   A cap comes off.  The catheter falls out.    [facility]  Department:  [facility] at [phone]      If you are deaf or hard of hearing, please let us know. We provide many free services including sign language interpreters, oral interpreters, TTYs, telephone amplifiers, note takers and written materials

## 2024-06-18 NOTE — PROGRESS NOTES
Pt a/o x4. Denied pain. CVC tunnel cath site and dressing clean dry and intact. Tubes clamped. IV removed. Went over discharge instructions with pt. Pt verbalized understanding. All questions answered. Paper discharge information sent home with pt. Blue clamp sent home with pt. Pt discharged home with sister.     BP (!) 162/101 (BP Location: Right arm)   Pulse 84   Temp 97.7  F (36.5  C) (Temporal)   Resp 16   SpO2 94%

## 2024-11-25 DIAGNOSIS — T82.898A PROBLEM WITH DIALYSIS ACCESS, INITIAL ENCOUNTER (H): Primary | ICD-10-CM

## 2024-11-25 DIAGNOSIS — Z99.2 ESRD ON HEMODIALYSIS (H): ICD-10-CM

## 2024-11-25 DIAGNOSIS — N18.6 ESRD ON HEMODIALYSIS (H): ICD-10-CM

## 2024-11-27 ENCOUNTER — TELEPHONE (OUTPATIENT)
Dept: INTERVENTIONAL RADIOLOGY/VASCULAR | Facility: CLINIC | Age: 39
End: 2024-11-27
Payer: COMMERCIAL

## 2024-11-29 ENCOUNTER — APPOINTMENT (OUTPATIENT)
Dept: INTERVENTIONAL RADIOLOGY/VASCULAR | Facility: CLINIC | Age: 39
End: 2024-11-29
Attending: PHYSICIAN ASSISTANT
Payer: COMMERCIAL

## 2024-11-29 ENCOUNTER — HOSPITAL ENCOUNTER (OUTPATIENT)
Facility: CLINIC | Age: 39
Discharge: HOME | End: 2024-11-29
Admitting: RADIOLOGY
Payer: COMMERCIAL

## 2024-11-29 VITALS
HEIGHT: 71 IN | DIASTOLIC BLOOD PRESSURE: 94 MMHG | RESPIRATION RATE: 18 BRPM | WEIGHT: 310 LBS | SYSTOLIC BLOOD PRESSURE: 157 MMHG | BODY MASS INDEX: 43.4 KG/M2 | HEART RATE: 79 BPM | TEMPERATURE: 98.7 F | OXYGEN SATURATION: 96 %

## 2024-11-29 DIAGNOSIS — Z99.2 ESRD ON HEMODIALYSIS (H): ICD-10-CM

## 2024-11-29 DIAGNOSIS — N18.6 ESRD ON HEMODIALYSIS (H): ICD-10-CM

## 2024-11-29 DIAGNOSIS — T82.898A PROBLEM WITH DIALYSIS ACCESS, INITIAL ENCOUNTER (H): ICD-10-CM

## 2024-11-29 PROCEDURE — 36589 REMOVAL TUNNELED CV CATH: CPT

## 2024-11-29 PROCEDURE — 999N000163 HC STATISTIC SIMPLE TUBE INSERTION/CHARGE, PORT, CATH, FISTULOGRAM

## 2024-11-29 RX ORDER — ACETAMINOPHEN 325 MG/1
650 TABLET ORAL
Status: DISCONTINUED | OUTPATIENT
Start: 2024-11-29 | End: 2024-11-29 | Stop reason: HOSPADM

## 2024-11-29 ASSESSMENT — ACTIVITIES OF DAILY LIVING (ADL): ADLS_ACUITY_SCORE: 58

## 2024-11-29 NOTE — IR NOTE
Interventional Radiology Intra-procedural Nursing Note    Patient Name: Brooks Mensah  Medical Record Number: [id number]  Today's Date: November 29, 2024    Procedure: Right Tunneled Dialysis Catheter Removal with Local  Start time: 0816  End time: 0819  Report provided to: Rossy MACKEY    Note: Patient entered Interventional Radiology Suite number 1 via cart. Patient awake, alert and orientated. Assisted onto sitting position on cot. Site prepped.  Dr. Cardenas in room. Time out and procedure started.    Procedure well tolerated by patient without complications. Procedure end with debrief by Dr. Cardenas.      Administered medication totals:  Lidocaine 1% 6 mL Intradermal

## 2024-11-29 NOTE — PROGRESS NOTES
Care Suites Discharge Nursing Note    Patient Information  Name: Brooks Mensah  Age: 39 year old    Discharge Education:  Discharge instructions reviewed: Yes  Additional education/resources provided: no  Patient/patient representative verbalizes understanding: Yes  Patient discharging on new medications: No  Medication education completed: N/A    Discharge Plans:   Discharge location: home  Discharge ride contacted: Yes  Approximate discharge time: 0900    Discharge Criteria:  Discharge criteria met and vital signs stable: Yes    Patient Belongs:  Patient belongings returned to patient: Yes    Rossy Ahn RN

## 2024-11-29 NOTE — PRE-PROCEDURE
GENERAL PRE-PROCEDURE:   Procedure:  Right tunneled dialysis catheter removal with local  Date/Time:  11/29/2024 8:00 AM    Written consent obtained?: Yes    Risks and benefits: Risks, benefits and alternatives were discussed    DC Plan: Appropriate discharge home plan in place for patients who are going home after procedure   Consent given by:  Patient  Patient states understanding of procedure being performed: Yes    Patient's understanding of procedure matches consent: Yes    Procedure consent matches procedure scheduled: Yes      Total time: 15 minutes    [facility] Interventional Radiology CNP ([phone]) (phone [phone])

## 2024-11-29 NOTE — DISCHARGE INSTRUCTIONS
Tunnel Cath Removal Discharge Instructions     After you go home:    You may resume your normal diet    Care of Puncture Site:    Keep the dressing clean & dry for 3 days  You may use a bandaid on the site after 3 days until the wound has healed  No tub baths, whirlpools or swimming until your puncture site has fully healed     Activity     You may go back to normal activity in 24 hours   Avoid heavy lifting (greater than 10 pounds), strenuous activity or the overuse of your shoulder for 3 days    Bleeding:    If you start bleeding from the incision site in your chest or have swelling in your neck, sit down and press on the site for 5-10 minutes.   If bleeding has not stopped after 10 minutes, call your provider.        Call 911 right away if you have heavy bleeding or bleeding that does not stop.      Medicines:    You may resume all your medicines today  For minor pain, you may take Acetaminophen (Tylenol) or Ibuprofen (Advil)                 Call the provider who ordered this procedure if:    The site is red, swollen, hot or tender or there is drainage at the site  You have chills or a fever greater than 100 F (37.8 C)  Swelling in your neck  Any questions or concerns      If you have questions call:          [facility] Radiology Dept @ [phone]    Or you can contact your provider via My Chart

## 2024-12-11 DIAGNOSIS — Z99.2 ESRD ON HEMODIALYSIS (H): ICD-10-CM

## 2024-12-11 DIAGNOSIS — N18.6 ESRD ON HEMODIALYSIS (H): ICD-10-CM

## 2024-12-11 DIAGNOSIS — T82.898A PROBLEM WITH DIALYSIS ACCESS, INITIAL ENCOUNTER (H): Primary | ICD-10-CM

## 2024-12-13 ENCOUNTER — APPOINTMENT (OUTPATIENT)
Dept: INTERVENTIONAL RADIOLOGY/VASCULAR | Facility: CLINIC | Age: 39
End: 2024-12-13
Attending: PHYSICIAN ASSISTANT
Payer: COMMERCIAL

## 2024-12-13 ENCOUNTER — HOSPITAL ENCOUNTER (OUTPATIENT)
Facility: CLINIC | Age: 39
Discharge: HOME | End: 2024-12-13
Attending: RADIOLOGY | Admitting: RADIOLOGY
Payer: COMMERCIAL

## 2024-12-13 VITALS
SYSTOLIC BLOOD PRESSURE: 140 MMHG | TEMPERATURE: 97.4 F | OXYGEN SATURATION: 93 % | RESPIRATION RATE: 18 BRPM | DIASTOLIC BLOOD PRESSURE: 90 MMHG | HEART RATE: 80 BPM

## 2024-12-13 DIAGNOSIS — T82.898A PROBLEM WITH DIALYSIS ACCESS, INITIAL ENCOUNTER (H): ICD-10-CM

## 2024-12-13 DIAGNOSIS — Z99.2 ESRD ON HEMODIALYSIS (H): ICD-10-CM

## 2024-12-13 DIAGNOSIS — N18.6 ESRD ON HEMODIALYSIS (H): ICD-10-CM

## 2024-12-13 LAB
ANION GAP SERPL CALCULATED.3IONS-SCNC: 22 MMOL/L (ref 7–15)
BUN SERPL-MCNC: 98.9 MG/DL (ref 6–20)
CALCIUM SERPL-MCNC: 9.6 MG/DL (ref 8.8–10.4)
CHLORIDE SERPL-SCNC: 92 MMOL/L (ref 98–107)
CREAT SERPL-MCNC: 17.39 MG/DL (ref 0.67–1.17)
EGFRCR SERPLBLD CKD-EPI 2021: 3 ML/MIN/1.73M2
ERYTHROCYTE [DISTWIDTH] IN BLOOD BY AUTOMATED COUNT: 15.3 % (ref 10–15)
GLUCOSE SERPL-MCNC: 98 MG/DL (ref 70–99)
HCO3 SERPL-SCNC: 22 MMOL/L (ref 22–29)
HCT VFR BLD AUTO: 33.8 % (ref 40–53)
HGB BLD-MCNC: 11 G/DL (ref 13.3–17.7)
MCH RBC QN AUTO: 31.4 PG (ref 26.5–33)
MCHC RBC AUTO-ENTMCNC: 32.5 G/DL (ref 31.5–36.5)
MCV RBC AUTO: 97 FL (ref 78–100)
PLATELET # BLD AUTO: 198 10E3/UL (ref 150–450)
POTASSIUM SERPL-SCNC: 7.2 MMOL/L (ref 3.4–5.3)
RBC # BLD AUTO: 3.5 10E6/UL (ref 4.4–5.9)
SODIUM SERPL-SCNC: 136 MMOL/L (ref 135–145)
WBC # BLD AUTO: 10.4 10E3/UL (ref 4–11)

## 2024-12-13 PROCEDURE — 36415 COLL VENOUS BLD VENIPUNCTURE: CPT | Performed by: NURSE PRACTITIONER

## 2024-12-13 PROCEDURE — C1894 INTRO/SHEATH, NON-LASER: HCPCS

## 2024-12-13 PROCEDURE — 999N000285 HC STATISTIC VASC ACCESS LAB DRAW WITH PIV START

## 2024-12-13 PROCEDURE — 999N000127 HC STATISTIC PERIPHERAL IV START W US GUIDANCE

## 2024-12-13 PROCEDURE — 76937 US GUIDE VASCULAR ACCESS: CPT

## 2024-12-13 PROCEDURE — 250N000011 HC RX IP 250 OP 636: Performed by: RADIOLOGY

## 2024-12-13 PROCEDURE — 250N000009 HC RX 250: Performed by: RADIOLOGY

## 2024-12-13 PROCEDURE — C1752 CATH,HEMODIALYSIS,SHORT-TERM: HCPCS

## 2024-12-13 PROCEDURE — C1769 GUIDE WIRE: HCPCS

## 2024-12-13 PROCEDURE — 250N000011 HC RX IP 250 OP 636: Performed by: NURSE PRACTITIONER

## 2024-12-13 PROCEDURE — 80048 BASIC METABOLIC PNL TOTAL CA: CPT | Performed by: NURSE PRACTITIONER

## 2024-12-13 PROCEDURE — 85027 COMPLETE CBC AUTOMATED: CPT | Performed by: NURSE PRACTITIONER

## 2024-12-13 RX ORDER — FLUMAZENIL 0.1 MG/ML
0.2 INJECTION, SOLUTION INTRAVENOUS
Status: DISCONTINUED | OUTPATIENT
Start: 2024-12-13 | End: 2024-12-13 | Stop reason: HOSPADM

## 2024-12-13 RX ORDER — CEFAZOLIN SODIUM IN 0.9 % NACL 3 G/100 ML
3 INTRAVENOUS SOLUTION, PIGGYBACK (ML) INTRAVENOUS
Status: DISCONTINUED | OUTPATIENT
Start: 2024-12-13 | End: 2024-12-13

## 2024-12-13 RX ORDER — FENTANYL CITRATE 50 UG/ML
25-50 INJECTION, SOLUTION INTRAMUSCULAR; INTRAVENOUS EVERY 5 MIN PRN
Status: DISCONTINUED | OUTPATIENT
Start: 2024-12-13 | End: 2024-12-13 | Stop reason: HOSPADM

## 2024-12-13 RX ORDER — LIDOCAINE HYDROCHLORIDE AND EPINEPHRINE 10; 10 MG/ML; UG/ML
INJECTION, SOLUTION INFILTRATION; PERINEURAL PRN
Status: DISCONTINUED | OUTPATIENT
Start: 2024-12-13 | End: 2024-12-13 | Stop reason: HOSPADM

## 2024-12-13 RX ORDER — NALOXONE HYDROCHLORIDE 0.4 MG/ML
0.2 INJECTION, SOLUTION INTRAMUSCULAR; INTRAVENOUS; SUBCUTANEOUS
Status: DISCONTINUED | OUTPATIENT
Start: 2024-12-13 | End: 2024-12-13 | Stop reason: HOSPADM

## 2024-12-13 RX ORDER — NALOXONE HYDROCHLORIDE 0.4 MG/ML
0.4 INJECTION, SOLUTION INTRAMUSCULAR; INTRAVENOUS; SUBCUTANEOUS
Status: DISCONTINUED | OUTPATIENT
Start: 2024-12-13 | End: 2024-12-13 | Stop reason: HOSPADM

## 2024-12-13 RX ORDER — ACETAMINOPHEN 325 MG/1
650 TABLET ORAL
OUTPATIENT
Start: 2024-12-13

## 2024-12-13 RX ORDER — CEFAZOLIN SODIUM/WATER 3 G/30 ML
3 SYRINGE (ML) INTRAVENOUS
Status: COMPLETED | OUTPATIENT
Start: 2024-12-13 | End: 2024-12-13

## 2024-12-13 RX ORDER — HEPARIN SODIUM 1000 [USP'U]/ML
1600 INJECTION, SOLUTION INTRAVENOUS; SUBCUTANEOUS ONCE
Status: COMPLETED | OUTPATIENT
Start: 2024-12-13 | End: 2024-12-13

## 2024-12-13 RX ADMIN — HEPARIN SODIUM 1600 UNITS: 1000 INJECTION INTRAVENOUS; SUBCUTANEOUS at 11:25

## 2024-12-13 RX ADMIN — FENTANYL CITRATE 50 MCG: 50 INJECTION, SOLUTION INTRAMUSCULAR; INTRAVENOUS at 11:14

## 2024-12-13 RX ADMIN — LIDOCAINE HYDROCHLORIDE,EPINEPHRINE BITARTRATE 10 ML: 10; .01 INJECTION, SOLUTION INFILTRATION; PERINEURAL at 11:25

## 2024-12-13 RX ADMIN — MIDAZOLAM HYDROCHLORIDE 1 MG: 1 INJECTION, SOLUTION INTRAMUSCULAR; INTRAVENOUS at 11:13

## 2024-12-13 RX ADMIN — CEFAZOLIN 3 G: 10 INJECTION, POWDER, FOR SOLUTION INTRAVENOUS at 11:02

## 2024-12-13 ASSESSMENT — ACTIVITIES OF DAILY LIVING (ADL)
ADLS_ACUITY_SCORE: 58

## 2024-12-13 NOTE — PRE-PROCEDURE
GENERAL PRE-PROCEDURE:   Procedure:  Tunneled catheter placement.  Date/Time:  12/13/2024 10:55 AM    Written consent obtained?: Yes    Risks and benefits: Risks, benefits and alternatives were discussed    Consent given by:  Patient  Patient states understanding of procedure being performed: Yes    Patient's understanding of procedure matches consent: Yes    Procedure consent matches procedure scheduled: Yes    Expected level of sedation:  Moderate  Appropriately NPO:  Yes  ASA Class:  3  Mallampati  :  Grade 2- soft palate, base of uvula, tonsillar pillars, and portion of posterior pharyngeal wall visible  Lungs:  Lungs clear with good breath sounds bilaterally  Heart:  Normal heart sounds and rate  History & Physical reviewed:  History and physical reviewed and no updates needed  Statement of review:  I have reviewed the lab findings, diagnostic data, medications, and the plan for sedation

## 2024-12-13 NOTE — PROGRESS NOTES
Post Procedure Summary:  Prior to the start of the procedure and with procedural staff participation, I verbally confirmed the patient s identity using two indicators, relevant allergies, that the procedure was appropriate and matched the consent or emergent situation, and that the correct equipment/implants were available. Immediately prior to starting the procedure I conducted the Time Out with the procedural staff and re-confirmed the patient s name, procedure, and site/side. (The Joint Commission universal protocol was followed.)  Yes       Sedatives: Fentanyl and Midazolam (Versed)    Vital signs, airway and pulse oximetry were monitored and remained stable throughout the procedure and sedation was maintained until the procedure was complete.  The patient was monitored by staff until sedation discharge criteria were met.    Patient tolerance: Patient tolerated the procedure well with no immediate complications.    Time of sedation in minutes: 15 Minutes minutes from beginning to end of physician one to one monitoring.    1 mg versed and 50 mcg fentanyl given

## 2024-12-13 NOTE — DISCHARGE INSTRUCTIONS
Caring for Your Tunneled Dialysis Catheter  ____________________________________________    Patient Name: Brooks Mensah  Today's Date: December 13, 2024    The radiologist who performed your procedure was:  Josephine Nichols     When you get home:  No driving or drinking alcohol until tomorrow. You may still have side effects from the medicine you received today. (You may feel drowsy, unsteady or forgetful.)  You should have an adult with you for your first six hours at home.  You may go back to your regular diet today.   If you take aspirin or Plavix, you may begin taking it again tomorrow. You may restart all other medicines today. Use pain medicine as directed.  Avoid heavy lifting or the excess use of your shoulder for three days.  Keep the neck bandage clean and dry for three days. Do not shower unless it is covered with plastic.  After three days you may use a Band-Aid on your neck. Keep using Band-Aids until the wound has healed.    What is a tunneled dialysis catheter?   This is a special tube (catheter) that is threaded (tunneled) under your skin and then placed in a vein near your collarbone. Some people have it for a short time, others have it for their entire lives.    We will use this tube to access your bloodstream for dialysis. After each treatment, your dialysis team will flush the tube with a drug called heparin. This will keep the tube from clotting. They will then change your bandage.    What does it look like?   The catheter is a Y-shaped tube. The two ends, called ports, are each covered with a cap. A clamp near each port helps prevent infection, bleeding and air in the vein.   The exit site (where the tube comes out of your skin) stays covered with a clear bandage.    The entrance site, or tunneling site, is higher up on the neck. It is covered with small pieces of tape called Steri-Strips.     How should I care for the catheter?  Prevent injury and infection:  Infection may occur if germs  enter your bloodstream around the exit or entrance sites. To prevent an infection:  Do not swim while you have the catheter.   You may shower if you first cover the bandage with plastic. You may take a bath if the water stays below your waist. (Sponge bathe your upper body to keep the bandage from getting wet.)  Take your temperature by mouth daily. If you have a fever over 100 F (37.8 C), call your doctor.  Check the skin around the tube each day for redness, swelling, drainage or tenderness. These are all signs of infection.  You will need to avoid heavy activity. Avoid contact sports.    If you have oozing or bleeding from the catheter site  Put direct pressure on the wound for 5 to 10 minutes with a gauze pad.  If you still have bleeding after 10 minutes, call your doctor.  If you are bleeding a lot and can't control it with direct pressure, call 911.    Check your catheter each day  Make sure the clamps are closed over both ends of the tubing. Check that the caps are tight.  If a cap is loose or comes off, you may have bleeding from the tube, or air could get into the bloodstream. To prevent this, make sure the clamp on the catheter is closed. Wrap the end of the tube in a clean gauze and call your doctor or dialysis unit at once.  Your catheter is not likely to fall out. It is sewn into your skin with stitches. (Stitches are removed or will fall out in a couple weeks.) Also, a small cuff under the skin helps to hold the catheter in place. If the catheter does fall out, put firm pressure on the exit site with a clean gauze. Then, call your doctor or dialysis unit at once.     When should I call my doctor or dialysis unit?  Call right away if:  Your temperature under the tongue is over 100 F (37.8 C).  The skin around the tube feels tender or you see drainage.  You have trouble breathing.  You have unusual chest or shoulder pain.   A cap comes off.  The catheter falls out.    [facility]  Department:  [facility] at [phone]      If you are deaf or hard of hearing, please let us know. We provide many free services including sign language interpreters, oral interpreters, TTYs, telephone amplifiers, note takers and written materials

## 2025-01-25 ENCOUNTER — HEALTH MAINTENANCE LETTER (OUTPATIENT)
Age: 40
End: 2025-01-25

## 2025-02-26 DIAGNOSIS — T82.898A PROBLEM WITH DIALYSIS ACCESS, INITIAL ENCOUNTER: Primary | ICD-10-CM

## 2025-02-26 DIAGNOSIS — Z99.2 ESRD ON HEMODIALYSIS (H): ICD-10-CM

## 2025-02-26 DIAGNOSIS — N18.6 ESRD ON HEMODIALYSIS (H): ICD-10-CM

## 2025-03-25 ENCOUNTER — HOSPITAL ENCOUNTER (OUTPATIENT)
Dept: ULTRASOUND IMAGING | Facility: CLINIC | Age: 40
Discharge: HOME | End: 2025-03-25
Attending: PHYSICIAN ASSISTANT | Admitting: PHYSICIAN ASSISTANT
Payer: COMMERCIAL

## 2025-03-25 DIAGNOSIS — T82.898A PROBLEM WITH DIALYSIS ACCESS, INITIAL ENCOUNTER: ICD-10-CM

## 2025-03-25 DIAGNOSIS — N18.6 ESRD ON HEMODIALYSIS (H): ICD-10-CM

## 2025-03-25 DIAGNOSIS — Z99.2 ESRD ON HEMODIALYSIS (H): ICD-10-CM

## 2025-03-25 PROCEDURE — 93990 DOPPLER FLOW TESTING: CPT

## 2025-03-26 ENCOUNTER — TELEPHONE (OUTPATIENT)
Dept: OTHER | Facility: CLINIC | Age: 40
End: 2025-03-26
Payer: COMMERCIAL

## 2025-03-26 NOTE — TELEPHONE ENCOUNTER
"----- Message from Tre Coelho sent at 3/26/2025  9:16 AM CDT -----  Regarding: AVF, low flow  Hi Teresa -     Mr. Guy \"Javon\" Makenna had L radiocephalic AVF created in November 2023 with Dr. Pantoja. He dealt with a skin eruption that, for a time (~much of 2024), derailed cannulation.      Since resolution and resumption of cannulation this year, we've had intermittent success but haven't been able to liberate him from his CVC, often needing to run him 1:1.    He had an US yesterday that showed low flow (~450 ml/min) as well as some areas of low caliber.  No stenosis identified.  Some sidebranches identified.  -------    Staff message above received from Tre with [facility].    Routing to scheduling to coordinate the following:  In clinic visit with Dr. Pantoja  Schedule at next available    Appt note:  History of left radial-cephalic AVF created in 2023 with Dr. Pantoja; difficulty with low flow rates; AVF US done 3/27/25.    Teresa Conner, JEANNETTEN, RN, CV-BC, CNOR  [facility]    "

## 2025-03-27 ENCOUNTER — OFFICE VISIT (OUTPATIENT)
Dept: OTHER | Facility: CLINIC | Age: 40
End: 2025-03-27
Attending: SURGERY
Payer: COMMERCIAL

## 2025-03-27 VITALS — SYSTOLIC BLOOD PRESSURE: 74 MMHG | HEART RATE: 96 BPM | DIASTOLIC BLOOD PRESSURE: 49 MMHG

## 2025-03-27 DIAGNOSIS — N18.6 ESRD (END STAGE RENAL DISEASE) ON DIALYSIS (H): Primary | ICD-10-CM

## 2025-03-27 DIAGNOSIS — I77.0 ARTERIOVENOUS FISTULA: ICD-10-CM

## 2025-03-27 DIAGNOSIS — Z99.2 ESRD (END STAGE RENAL DISEASE) ON DIALYSIS (H): Primary | ICD-10-CM

## 2025-03-27 PROCEDURE — 99213 OFFICE O/P EST LOW 20 MIN: CPT | Performed by: SURGERY

## 2025-03-27 NOTE — PROGRESS NOTES
[place] [facility]    Brooks Mensah comes for evaluation of his left distal radial to cephalic fistula.  [id number]  They are having troubles successfully cannulating the fistula on a regular basis.  Using the very distal forearm segment and spatulated but unable to use the upper and mid forearm segment.  Averages 2 dialysis episodes weekly due to these issues.      11/28/2023: Left distal radial to cephalic fistula.  Mild radial artery disease with excellent cephalic vein.  Mild proximal heavy forearm.    PMH: Medications:Lisinopril, Coreg, Bumex, PhosLo, Protonix, Lipitor, Neurontin    Exam: Alert and appropriate.  Normal affect.  Very pleasant.   Blood pressure 74/49 (asymptomatic).  Pulse 96   Chest= clear   Cardiovascular= regular rate    Left distal radiocephalic fistula.  Well matured for a length of approximately 8 cm then more difficult to palpate and somewhat deeper.  Doppler flow is noted in the upper arm cephalic and brachial outflow veins.  With Doppler there is a significant competing sidebranch located on the ulnar portion of the fistula approximately 4 cm above the upper wrist incision.  When this is occluded pulse within the more proximal forearm fistula is much stronger.    Duplex of the fistula performed today and reviewed including images with patient and compared to the 1/18/2024 duplex.  Outflow volume stable at 461 mm/min.  Small but very adequate radial artery.  Widely patent anastomosis.  Competing sidebranches noted in both images in the distal forearm.  Mid forearm diameter is 4 mm.  Outflow via the brachial and cephalic systems.        IMPRESSION:   #1.  I feel the problems accessing the fistula primarily relating to the competing sidebranch.  This had significant flow with composure and by bedside Doppler here in the clinic.  If we ligate this branch that will make access of the segment of the fistula with her  using much easier by eliminating the competing sidebranch outflow.  This may also allow for dilatation of the mid and upper forearm segment of the fistula with aggressive tourniquet used to make this more usable.    #2.  Some of the issue may be the depth of the fistula and is needed upper forearm.  Mobilization certainly feasible but I would recommend initially to ligate the sidebranch and see if with tourniquet used dilatation of the abscess segment of the fistula will make it much easier to access since it is not overly deep with his mildly subcutaneous tissue thickness.    After lengthy discussion with the patient we decided we would initially just ligate the sidebranch.  This would perform a local anesthetic with sedation as an outpatient.  We did armani the sidebranch with duplex ultrasound and make an incision directly over the branch for ligation.  Dialysis unit should be able to use the already dilated segment the following day with no interruption.  He would then aggressively use his tourniquet to see if we can develope a longer usable segment.    Over 25 minutes with patient today.    Ramiro Pantoja MD   This note was created using Dragon voice recognition software which may result in transcription errors.

## 2025-03-27 NOTE — NURSING NOTE
Patient education completed with patient in clinic on 3/27/25.    Procedure: LIGATION OF SIDE BRANCH OF LEFT ARM AVF  Diagnosis: ESRD ON DIALYSIS  Anticoagulation Instruction: N/A  GLP-1 Agonists Instruction: N/A  Pre-Operative Physical Exam: Patient instructed they will need to have a pre-op physical exam within 30 days of your procedure.   Allergies:  Updated in Epic  Bowel Prep: NPO for solid 8 hours prior to arrival time and NPO for clear liquids 2 hours prior to arrival time   Post Procedure Education: [facility] post-procedure fact sheet reviewed with patient.  Showering instructions provided: Yes  Learner(s): patient  Method: Listening, Reading  Barriers to Learning: No Barrier  Outcome: Patient did verbalize understanding of above education.    Teresa Conner, JEANNETTEN, RN, CV-BC, CNOR  [facility] [place]

## 2025-03-27 NOTE — PATIENT INSTRUCTIONS
Vascular Surgery  Pre-Procedure Patient Care Plan    As you get ready for surgery, you may have a lot of questions.  This care plan will help you understand your treatment before and after your stay in the hospital.  Please complete all the steps in advance of your visit. If you have any questions about the items listed below, please give our office a call. We can be reached at [phone] or visit our website at [email or web address] for more information.     **It is sometimes necessary to adjust the surgery schedule due to emergencies and unforeseen changes.  If your surgery is affected by this, we greatly appreciate your flexibility and understanding in this matter.**      ---------- SURGERY INFORMATION ----------    Procedure: Ligation of side branch of left arm fistula    Date: TBD    Arrival time: Tentative time and subject to change. The pre-admission nurse will call you 1-2 days before surgery to tell you time of arrival.     Surgeon: Dr. Ramiro Pantoja     Location:  [facility]:  [address] [place] [address] (Fax: [phone])    Please park in the Skyway ramp. There will be a parking fee.  is also available if needed. Enter through the main entrance door #2. Check in at the Welcome Desk and you will be directed to the surgery unit.     Post-operative appointment: TBD       ---------- PRE-OP CHECKLIST AND MEDICATION INSTRUCTIONS ----------    Pre-Procedure Clearance   [] Complete a Physical Exam (History & Physical) within 30 days of your procedure; this is a legal requirement that will result in your procedure being canceled if not done.  If you have a history of heart disease, or heart procedures within the past year, please notify your surgeon.  You may require further cardiac evaluation for pre-procedure clearance.  If you have a pacemaker or an AICD (automatic implanted cardiac defibrillator), please bring the ID card with you  on the day of surgery.    Medication Holds  Your Primary Care Provider will review your medications with you at your physical exam.  They will discuss with you if you will need to adjust any of your medications prior to your surgery.    Bring a list of your medications, including doses and the time of day you take them, with you to the hospital.  []  Continue to take your aspirin (this is different from most NON vascular surgeries and may not be well known by your Primary Care Provider)  []  Hold seven (7) days prior for once weekly injectable doses [semaglutide (Ozempic, Wegovy), dulaglutide (Trulicity), exenatide ER (Bydureon), tirzepatide (Mounjaro)]  []  Hold the day before and day of for once daily injectable GLP-1 agonists [exenatide (Byetta), liraglutide (Saxenda,Victoza)]  []  Hold seven (7) days for oral semaglutide (Rybelsus)    Fasting Requirements  [] Do not eat anything 8 hours prior to arrival time (This includes gum and mints).  [] You may have clear liquids until 2 hours prior to arrival time (Clear liquids include water, soda, apple or cranberry juice, Jell-O, popsicles, black coffee or tea.  Dairy products and Tomato products are NOT considered Clear Liquids).    Notify our office right away if you have any changes in your health status or if you develop a cold, flu, diarrhea, infection, fever or sore throat before your scheduled surgery date.       ---------- PRE-OP SHOWER INSTRUCTIONS ----------    Your surgeon has asked you to take 2 showers before surgery.    Why is this important?  It is normal for bacteria (germs) to be on your skin. The skin protects us from these germs. When you have surgery, we cut the skin. Sometimes germs get into the cuts and cause infection (illness caused by germs). By following the instructions below and using special soap, you will lower the number of germs on your skin. This decreases your chance of infection.    Special soap:  Buy or get 8 ounces of antiseptic  surgical soap called 4% CHG (chlorhexadine gluconate). Common name brands of this soap are Hibiclens and Exidine.  You can find it at your local pharmacy, clinic or retail store. If you have trouble, ask your pharmacist to help you find the right substitute.  [facility] provide this at no cost to patient's having surgery within the [facility] system.    A note about shaving:  Do not shave within 12 inches of your incision (surgical cut) area for at least 3 days before surgery.  Shaving can make small cuts in the skin. This puts you at a higher risk of infection.  Items you will need for each shower:   1 newly washed towel   4 ounces of one of the above soaps   Clean pajamas or clothes to change into    Follow these steps the evening before surgery and the morning of surgery:  Remove all body piercing's and jewelry and leave them off until after surgery.  Wash your hair and body with your regular shampoo and soap. Make sure you rinse the shampoo and soap from your hair and body.   Using clean hands, apply about 2 ounces of soap gently on your skin from your ear lobes to your toes. Use on your groin area last. Do not use this soap on your face or head. If you get any soap in your eyes, ears or mouth, rinse right away.  Repeat step 3. It is very important to let the soap stay on your skin for at least 1 minute.  Rinse well and dry off using a clean towel.  If you feel any tingling, itching or other irritation, rinse right away. It is normal to feel some coolness on the skin after using the antiseptic soap. Your skin may feel a bit dry after the shower, but do not use any lotions, creams or moisturizers. Do not use hair spray or other products in your hair.  Dress in freshly washed clothes or pajamas. Use fresh pillowcases and sheets on your bed. Repeat these steps the morning of surgery. If you have any questions about showering or an allergy to CHG soap, please call your surgery  [place].    ---------- ADDITIONAL INFORMATION ----------    Disability Forms  If you will be filing Leave of Absence forms, please complete as much of the information that you are able to, such as first day of disability and dates of hospitalizations.  It is also helpful for you to include a note indicating the date you would like to return to work and the restrictions that you feel you might need for your type of work.  Fax forms to [phone]  Please allow 5-7 business days for your forms to be completed.    DO NOT BRING YOUR FORMS TO SURGERY    If you have questions regarding coverage, please contact your insurance company  If you would like a [facility] [place] Estimate for your upcoming procedure at [facility], contact Cost of Care Estimates at [phone] or [phone].  Contact the Billing office at [phone] for questions about your bill or to dispute your bill.  [email or web address]       Patient Education   What to Expect at Home After Surgery: Care Instructions  After surgery, you'll get information on what you need to do at home. This may include instructions about medicines, when and how to change bandages, and what to do if you have pain or an infection. It may cover how active you can be. And it may also tell you what you can eat and drink and what you should try to avoid.  How can you care for yourself after surgery?  Here are some tips for caring for yourself after surgery. But be sure to follow any instructions your doctor gives you.        Take medicine as directed.   If you take aspirin or some other blood thinner, talk to your doctor before you start taking them again.  If your doctor prescribed medicine, take it exactly as directed.  Ask your doctor about over-the-counter pain medicine.        Care for your cut (incision).   Change the bandage daily or more often if needed.  Gently wash the area daily with warm water, and pat it  "dry.  You may shower 24 to 48 hours after surgery.  Don't take a bath or swim until your doctor says it's okay.        Move carefully.   Don't move quickly or lift anything heavy until your doctor says it's okay.  Ask your doctor when you can exercise and do other activities.  Rest when you feel tired.        Eat and drink as directed.   When you feel like eating, start with small amounts of food.  You can eat your normal diet, unless your doctor gives you other instructions.  When should you call for help?  Call your doctor now if:  You have symptoms of infection, such as:  Increased pain, swelling, warmth, or redness.  Red streaks leading from the incision.  Pus draining from the incision.  A fever.  You have loose stitches, or your incision comes open.  Bright red blood soaks through the bandage over your incision.  You have pain that does not get better after you take pain medicine.  You have nausea or vomiting that gets worse or won't stop.  You are too sick to your stomach to drink any fluids.  Follow-up care is a key part of your treatment and safety. Be sure to make and go to all appointments, and call your doctor if you are having problems. It's also a good idea to know your test results and keep a list of the medicines you take.  Where can you learn more?  Go to https://www.[employer].net/patiented  Enter X507 in the search box to learn more about \"What to Expect at Home After Surgery: Care Instructions.\"  Current as of: July 31, 2024  Content Version: 14.4    [id number] [facility].   Care instructions adapted under license by your healthcare professional. If you have questions about a medical condition or this instruction, always ask your healthcare professional. [facility] disclaims any warranty or liability for your use of this information.       "

## 2025-03-27 NOTE — PROGRESS NOTES
[facility]        Patient is here for a  follow up  to discuss AV fistula.     Pt is currently taking Statin.    BP (!) 74/49 (BP Location: Right arm, Patient Position: Chair, Cuff Size: Adult Large)   Pulse 96     The provider has been notified that the patient has low bloodpressue.     Questions patient would like addressed today are: N/A.    Refills are needed: N/A    Has homecare services and agency name:  Vickie Deleon MA

## 2025-03-28 ENCOUNTER — TELEPHONE (OUTPATIENT)
Dept: OTHER | Facility: CLINIC | Age: 40
End: 2025-03-28
Payer: COMMERCIAL

## 2025-03-28 NOTE — TELEPHONE ENCOUNTER
CASE RECEIVED ON 03/28/25 FOR:LIGATION OF SIDE BRANCH OF LEFT ARM ARTERIOVENOUS FISTULA     CASE ID:[id number]

## 2025-03-31 NOTE — TELEPHONE ENCOUNTER
Patient called in to check if there was a date set for his surgery yet. Surgery scheduler was unable and will call patient back.      Anneliese Ford MA

## 2025-03-31 NOTE — TELEPHONE ENCOUNTER
Reviewed surgery information with patient, informed that a pre-op is needed prior. Post-op with Chandni Young scheduled on 05/06/25. Writer informed patient if his surgery could be rescheduled to 04/15/25 writer will contact patient to provided updated surgery date and time. Patient verbalized understanding. Mailing surgery information to patient on 03/31/25.

## 2025-04-07 DIAGNOSIS — Z99.2 ESRD ON HEMODIALYSIS (H): ICD-10-CM

## 2025-04-07 DIAGNOSIS — N18.6 ESRD ON HEMODIALYSIS (H): ICD-10-CM

## 2025-04-07 DIAGNOSIS — T82.898A PROBLEM WITH DIALYSIS ACCESS, INITIAL ENCOUNTER: Primary | ICD-10-CM

## 2025-04-08 ENCOUNTER — MYC MEDICAL ADVICE (OUTPATIENT)
Dept: INTERVENTIONAL RADIOLOGY/VASCULAR | Facility: CLINIC | Age: 40
End: 2025-04-08
Payer: COMMERCIAL

## 2025-04-08 ENCOUNTER — HOSPITAL ENCOUNTER (OUTPATIENT)
Facility: CLINIC | Age: 40
End: 2025-04-08
Admitting: RADIOLOGY
Payer: COMMERCIAL

## 2025-04-08 RX ORDER — NALOXONE HYDROCHLORIDE 0.4 MG/ML
0.4 INJECTION, SOLUTION INTRAMUSCULAR; INTRAVENOUS; SUBCUTANEOUS
Status: CANCELLED | OUTPATIENT
Start: 2025-04-08

## 2025-04-08 RX ORDER — NALOXONE HYDROCHLORIDE 0.4 MG/ML
0.2 INJECTION, SOLUTION INTRAMUSCULAR; INTRAVENOUS; SUBCUTANEOUS
Status: CANCELLED | OUTPATIENT
Start: 2025-04-08

## 2025-04-08 RX ORDER — ACETAMINOPHEN 325 MG/1
325 TABLET ORAL
Status: CANCELLED | OUTPATIENT
Start: 2025-04-08

## 2025-04-08 RX ORDER — FLUMAZENIL 0.1 MG/ML
0.2 INJECTION, SOLUTION INTRAVENOUS
Status: CANCELLED | OUTPATIENT
Start: 2025-04-08

## 2025-04-08 RX ORDER — FENTANYL CITRATE 50 UG/ML
25-50 INJECTION, SOLUTION INTRAMUSCULAR; INTRAVENOUS EVERY 5 MIN PRN
Status: CANCELLED | OUTPATIENT
Start: 2025-04-08

## 2025-04-08 RX ORDER — CEFAZOLIN SODIUM 2 G/50ML
2 SOLUTION INTRAVENOUS ONCE
Status: CANCELLED | OUTPATIENT
Start: 2025-04-08

## 2025-04-08 NOTE — TELEPHONE ENCOUNTER
Detailed Voicemail message left with request for patient to contact Interventional Radiology Department and acknowledge understanding of MYCHART instructions that were sent in anticipation of procedure scheduled 4/9/25.      IR contact number provided in [facility].    H&P noted in patient chart with date of 3/11/25.  Provider name Michaelrudypriscilla BRAVO  Interventional Radiology RN

## 2025-04-09 ENCOUNTER — HOSPITAL ENCOUNTER (OUTPATIENT)
Facility: CLINIC | Age: 40
End: 2025-04-09
Admitting: RADIOLOGY
Payer: COMMERCIAL

## 2025-04-09 ENCOUNTER — TELEPHONE (OUTPATIENT)
Dept: INTERVENTIONAL RADIOLOGY/VASCULAR | Facility: CLINIC | Age: 40
End: 2025-04-09
Payer: COMMERCIAL

## 2025-04-09 NOTE — TELEPHONE ENCOUNTER
Pt is scheduled for a CVC revision this morning. Pt has not shown. Left Voicemail for patient to call scheduling @ [phone] to reschedule his appt.

## 2025-04-20 RX ORDER — BETAMETHASONE DIPROPIONATE 0.5 MG/G
OINTMENT, AUGMENTED TOPICAL 2 TIMES DAILY
COMMUNITY

## 2025-04-21 ENCOUNTER — TELEPHONE (OUTPATIENT)
Dept: OTHER | Facility: CLINIC | Age: 40
End: 2025-04-21
Payer: COMMERCIAL

## 2025-04-21 NOTE — TELEPHONE ENCOUNTER
[place]    Tried to reach Brooks Mensah over the noon hour and again now at 1630 hrs.  I went to his voicemail both times and I left a message on both calls about his surgery tomorrow morning.  Nothing to eat or drink after midnight.      He should have undergone dialysis via his tunneled catheter today.    Ramiro Pantoja MD

## 2025-04-22 ENCOUNTER — ANESTHESIA (OUTPATIENT)
Dept: SURGERY | Facility: CLINIC | Age: 40
End: 2025-04-22
Payer: COMMERCIAL

## 2025-04-22 ENCOUNTER — ANESTHESIA EVENT (OUTPATIENT)
Dept: SURGERY | Facility: CLINIC | Age: 40
End: 2025-04-22
Payer: COMMERCIAL

## 2025-04-22 ENCOUNTER — HOSPITAL ENCOUNTER (OUTPATIENT)
Facility: CLINIC | Age: 40
Discharge: HOME | End: 2025-04-22
Attending: SURGERY | Admitting: SURGERY
Payer: COMMERCIAL

## 2025-04-22 VITALS
SYSTOLIC BLOOD PRESSURE: 139 MMHG | BODY MASS INDEX: 45.1 KG/M2 | HEIGHT: 70 IN | OXYGEN SATURATION: 94 % | HEART RATE: 73 BPM | TEMPERATURE: 97.2 F | DIASTOLIC BLOOD PRESSURE: 63 MMHG | RESPIRATION RATE: 20 BRPM | WEIGHT: 315 LBS

## 2025-04-22 DIAGNOSIS — L03.114 CELLULITIS OF LEFT UPPER EXTREMITY: Primary | ICD-10-CM

## 2025-04-22 DIAGNOSIS — G89.18 ACUTE POST-OPERATIVE PAIN: ICD-10-CM

## 2025-04-22 LAB
ANION GAP SERPL CALCULATED.3IONS-SCNC: 13 MMOL/L (ref 7–15)
BUN SERPL-MCNC: 34 MG/DL (ref 6–20)
CALCIUM SERPL-MCNC: 9.7 MG/DL (ref 8.8–10.4)
CHLORIDE SERPL-SCNC: 93 MMOL/L (ref 98–107)
CREAT SERPL-MCNC: 9.52 MG/DL (ref 0.67–1.17)
EGFRCR SERPLBLD CKD-EPI 2021: 7 ML/MIN/1.73M2
GLUCOSE SERPL-MCNC: 99 MG/DL (ref 70–99)
HCO3 SERPL-SCNC: 28 MMOL/L (ref 22–29)
POTASSIUM SERPL-SCNC: 5.5 MMOL/L (ref 3.4–5.3)
SODIUM SERPL-SCNC: 134 MMOL/L (ref 135–145)

## 2025-04-22 PROCEDURE — 258N000003 HC RX IP 258 OP 636: Performed by: ANESTHESIOLOGY

## 2025-04-22 PROCEDURE — 710N000009 HC RECOVERY PHASE 1, LEVEL 1, PER MIN: Performed by: SURGERY

## 2025-04-22 PROCEDURE — 250N000009 HC RX 250: Performed by: ANESTHESIOLOGY

## 2025-04-22 PROCEDURE — 250N000009 HC RX 250: Performed by: SURGERY

## 2025-04-22 PROCEDURE — 250N000011 HC RX IP 250 OP 636: Performed by: ANESTHESIOLOGY

## 2025-04-22 PROCEDURE — 360N000076 HC SURGERY LEVEL 3, PER MIN: Performed by: SURGERY

## 2025-04-22 PROCEDURE — 80048 BASIC METABOLIC PNL TOTAL CA: CPT

## 2025-04-22 PROCEDURE — 710N000012 HC RECOVERY PHASE 2, PER MINUTE: Performed by: SURGERY

## 2025-04-22 PROCEDURE — 272N000001 HC OR GENERAL SUPPLY STERILE: Performed by: SURGERY

## 2025-04-22 PROCEDURE — 36415 COLL VENOUS BLD VENIPUNCTURE: CPT

## 2025-04-22 PROCEDURE — 370N000017 HC ANESTHESIA TECHNICAL FEE, PER MIN: Performed by: SURGERY

## 2025-04-22 PROCEDURE — 250N000011 HC RX IP 250 OP 636

## 2025-04-22 PROCEDURE — 999N000141 HC STATISTIC PRE-PROCEDURE NURSING ASSESSMENT: Performed by: SURGERY

## 2025-04-22 RX ORDER — FENTANYL CITRATE 0.05 MG/ML
50 INJECTION, SOLUTION INTRAMUSCULAR; INTRAVENOUS EVERY 5 MIN PRN
Status: DISCONTINUED | OUTPATIENT
Start: 2025-04-22 | End: 2025-04-22 | Stop reason: HOSPADM

## 2025-04-22 RX ORDER — MAGNESIUM HYDROXIDE 1200 MG/15ML
LIQUID ORAL PRN
Status: DISCONTINUED | OUTPATIENT
Start: 2025-04-22 | End: 2025-04-22 | Stop reason: HOSPADM

## 2025-04-22 RX ORDER — LIDOCAINE 40 MG/G
CREAM TOPICAL
Status: DISCONTINUED | OUTPATIENT
Start: 2025-04-22 | End: 2025-04-22 | Stop reason: HOSPADM

## 2025-04-22 RX ORDER — DEXAMETHASONE SODIUM PHOSPHATE 4 MG/ML
4 INJECTION, SOLUTION INTRA-ARTICULAR; INTRALESIONAL; INTRAMUSCULAR; INTRAVENOUS; SOFT TISSUE
Status: DISCONTINUED | OUTPATIENT
Start: 2025-04-22 | End: 2025-04-22 | Stop reason: HOSPADM

## 2025-04-22 RX ORDER — ONDANSETRON 4 MG/1
4 TABLET, ORALLY DISINTEGRATING ORAL EVERY 30 MIN PRN
Status: DISCONTINUED | OUTPATIENT
Start: 2025-04-22 | End: 2025-04-22 | Stop reason: HOSPADM

## 2025-04-22 RX ORDER — CEFAZOLIN SODIUM/WATER 3 G/30 ML
3 SYRINGE (ML) INTRAVENOUS
Status: COMPLETED | OUTPATIENT
Start: 2025-04-22 | End: 2025-04-22

## 2025-04-22 RX ORDER — ONDANSETRON 2 MG/ML
4 INJECTION INTRAMUSCULAR; INTRAVENOUS EVERY 30 MIN PRN
Status: DISCONTINUED | OUTPATIENT
Start: 2025-04-22 | End: 2025-04-22 | Stop reason: HOSPADM

## 2025-04-22 RX ORDER — HYDROMORPHONE HCL IN WATER/PF 6 MG/30 ML
0.4 PATIENT CONTROLLED ANALGESIA SYRINGE INTRAVENOUS EVERY 5 MIN PRN
Status: DISCONTINUED | OUTPATIENT
Start: 2025-04-22 | End: 2025-04-22 | Stop reason: HOSPADM

## 2025-04-22 RX ORDER — OXYCODONE HYDROCHLORIDE 5 MG/1
5 TABLET ORAL
Status: DISCONTINUED | OUTPATIENT
Start: 2025-04-22 | End: 2025-04-22 | Stop reason: HOSPADM

## 2025-04-22 RX ORDER — SODIUM CHLORIDE, SODIUM LACTATE, POTASSIUM CHLORIDE, CALCIUM CHLORIDE 600; 310; 30; 20 MG/100ML; MG/100ML; MG/100ML; MG/100ML
INJECTION, SOLUTION INTRAVENOUS CONTINUOUS
Status: DISCONTINUED | OUTPATIENT
Start: 2025-04-22 | End: 2025-04-22 | Stop reason: HOSPADM

## 2025-04-22 RX ORDER — FENTANYL CITRATE 50 UG/ML
INJECTION, SOLUTION INTRAMUSCULAR; INTRAVENOUS PRN
Status: DISCONTINUED | OUTPATIENT
Start: 2025-04-22 | End: 2025-04-22

## 2025-04-22 RX ORDER — ONDANSETRON 2 MG/ML
INJECTION INTRAMUSCULAR; INTRAVENOUS PRN
Status: DISCONTINUED | OUTPATIENT
Start: 2025-04-22 | End: 2025-04-22

## 2025-04-22 RX ORDER — HYDROMORPHONE HCL IN WATER/PF 6 MG/30 ML
0.2 PATIENT CONTROLLED ANALGESIA SYRINGE INTRAVENOUS EVERY 5 MIN PRN
Status: DISCONTINUED | OUTPATIENT
Start: 2025-04-22 | End: 2025-04-22 | Stop reason: HOSPADM

## 2025-04-22 RX ORDER — PROPOFOL 10 MG/ML
INJECTION, EMULSION INTRAVENOUS PRN
Status: DISCONTINUED | OUTPATIENT
Start: 2025-04-22 | End: 2025-04-22

## 2025-04-22 RX ORDER — FENTANYL CITRATE 0.05 MG/ML
25 INJECTION, SOLUTION INTRAMUSCULAR; INTRAVENOUS EVERY 5 MIN PRN
Status: DISCONTINUED | OUTPATIENT
Start: 2025-04-22 | End: 2025-04-22 | Stop reason: HOSPADM

## 2025-04-22 RX ORDER — SODIUM CHLORIDE 9 MG/ML
INJECTION, SOLUTION INTRAVENOUS CONTINUOUS
Status: DISCONTINUED | OUTPATIENT
Start: 2025-04-22 | End: 2025-04-22 | Stop reason: HOSPADM

## 2025-04-22 RX ORDER — ACETAMINOPHEN 325 MG/1
975 TABLET ORAL ONCE
Status: DISCONTINUED | OUTPATIENT
Start: 2025-04-22 | End: 2025-04-22 | Stop reason: HOSPADM

## 2025-04-22 RX ORDER — ACETAMINOPHEN 325 MG/1
650 TABLET ORAL EVERY 4 HOURS PRN
COMMUNITY
Start: 2025-04-22

## 2025-04-22 RX ORDER — NALOXONE HYDROCHLORIDE 0.4 MG/ML
0.1 INJECTION, SOLUTION INTRAMUSCULAR; INTRAVENOUS; SUBCUTANEOUS
Status: DISCONTINUED | OUTPATIENT
Start: 2025-04-22 | End: 2025-04-22 | Stop reason: HOSPADM

## 2025-04-22 RX ORDER — LIDOCAINE HYDROCHLORIDE 20 MG/ML
INJECTION, SOLUTION INFILTRATION; PERINEURAL PRN
Status: DISCONTINUED | OUTPATIENT
Start: 2025-04-22 | End: 2025-04-22

## 2025-04-22 RX ORDER — CEFAZOLIN SODIUM/WATER 3 G/30 ML
3 SYRINGE (ML) INTRAVENOUS SEE ADMIN INSTRUCTIONS
Status: DISCONTINUED | OUTPATIENT
Start: 2025-04-22 | End: 2025-04-22 | Stop reason: HOSPADM

## 2025-04-22 RX ADMIN — PHENYLEPHRINE HYDROCHLORIDE 100 MCG: 10 INJECTION INTRAVENOUS at 07:51

## 2025-04-22 RX ADMIN — LIDOCAINE HYDROCHLORIDE 60 MG: 20 INJECTION, SOLUTION INFILTRATION; PERINEURAL at 07:22

## 2025-04-22 RX ADMIN — SODIUM CHLORIDE: 900 INJECTION INTRAVENOUS at 06:56

## 2025-04-22 RX ADMIN — FENTANYL CITRATE 50 MCG: 50 INJECTION INTRAMUSCULAR; INTRAVENOUS at 07:25

## 2025-04-22 RX ADMIN — ONDANSETRON 4 MG: 2 INJECTION INTRAMUSCULAR; INTRAVENOUS at 07:39

## 2025-04-22 RX ADMIN — PROPOFOL 30 MG: 10 INJECTION, EMULSION INTRAVENOUS at 07:29

## 2025-04-22 RX ADMIN — PROPOFOL 100 MCG/KG/MIN: 10 INJECTION, EMULSION INTRAVENOUS at 07:24

## 2025-04-22 RX ADMIN — Medication 3 G: at 07:18

## 2025-04-22 RX ADMIN — MIDAZOLAM 2 MG: 1 INJECTION INTRAMUSCULAR; INTRAVENOUS at 07:18

## 2025-04-22 ASSESSMENT — LIFESTYLE VARIABLES: TOBACCO_USE: 0

## 2025-04-22 ASSESSMENT — ACTIVITIES OF DAILY LIVING (ADL)
ADLS_ACUITY_SCORE: 58

## 2025-04-22 ASSESSMENT — ENCOUNTER SYMPTOMS
DYSRHYTHMIAS: 0
SEIZURES: 0

## 2025-04-22 NOTE — PROGRESS NOTES
Pt. Discharged home with sister. A&O. VSS. Denies pain. Tolerating PO. CMS intact. Incision site WNL with derma bond in place. Thrill/bruit present. Education/Review of AVS including: medications, care/restrictions, and when to seek medical attention. No new medications sent with pt.

## 2025-04-22 NOTE — ANESTHESIA POSTPROCEDURE EVALUATION
Patient: Brooks Mensah    Procedure: Procedure(s):  LIGATION OF SIDE BRANCH OF LEFT ARM ARTERIOVENOUS FISTULA       Anesthesia Type:  MAC    Note:  Disposition: Outpatient   Postop Pain Control: Uneventful            Sign Out: Well controlled pain   PONV: No   Neuro/Psych: Uneventful            Sign Out: Acceptable/Baseline neuro status   Airway/Respiratory: Uneventful            Sign Out: Acceptable/Baseline resp. status   CV/Hemodynamics: Uneventful            Sign Out: Acceptable CV status; No obvious hypovolemia; No obvious fluid overload   Other NRE: NONE   DID A NON-ROUTINE EVENT OCCUR? No           Last vitals:  Vitals Value Taken Time   /63 04/22/25 0830   Temp 36.2  C (97.2  F) 04/22/25 0830   Pulse 73 04/22/25 0830   Resp 20 04/22/25 0830   SpO2 94 % 04/22/25 0830   Vitals shown include unfiled device data.    Electronically Signed By: Andrade Connell MD  April 22, 2025  11:48 AM

## 2025-04-22 NOTE — DISCHARGE INSTRUCTIONS
Same Day Surgery Discharge Instructions for  Sedation and General Anesthesia     It's not unusual to feel dizzy, light-headed or faint for up to 24 hours after surgery or while taking pain medication.  If you have these symptoms: sit for a few minutes before standing and have someone assist you when you get up to walk or use the bathroom.    You should rest and relax for the next 24 hours. We recommend you make arrangements to have an adult stay with you for at least 24 hours after your discharge.  Avoid hazardous and strenuous activity.    DO NOT DRIVE any vehicle or operate mechanical equipment for 24 hours following the end of your surgery.  Even though you may feel normal, your reactions may be affected by the medication you have received.    Do not drink alcoholic beverages for 24 hours following surgery.     Slowly progress to your regular diet as you feel able. It's not unusual to feel nauseated and/or vomit after receiving anesthesia.  If you develop these symptoms, drink clear liquids (apple juice, ginger ale, broth, 7-up, etc. ) until you feel better.  If your nausea and vomiting persists for 24 hours, please notify your surgeon.      All narcotic pain medications, along with inactivity and anesthesia, can cause constipation. Drinking plenty of liquids and increasing fiber intake will help.    For any questions of a medical nature, call your surgeon.    Do not make important decisions for 24 hours.    If you had general anesthesia, you may have a sore throat for a couple of days related to the breathing tube used during surgery.  You may use Cepacol lozenges to help with this discomfort.  If it worsens or if you develop a fever, contact your surgeon.     If you feel your pain is not well managed with the pain medications prescribed by your surgeon, please contact your surgeon's office to let them know so they can address your concerns.      **If you have questions or concerns about your procedure  call   Ramiro Pantoja at [phone]**

## 2025-04-22 NOTE — ANESTHESIA PREPROCEDURE EVALUATION
Anesthesia Pre-Procedure Evaluation    Patient: Brooks Mensah   MRN: [id number] : 1985        Procedure : Procedure(s):  LIGATION OF SIDE BRANCH OF LEFT ARM ARTERIOVENOUS FISTULA          Past Medical History:   Diagnosis Date    Acute on chronic systolic congestive heart failure (H) 2017    Anemia of chronic renal failure     CAD (coronary artery disease)     Nonobstructive    Cardiomyopathy, unspecified (H) 2017    CKD (chronic kidney disease) stage 5, GFR less than 15 ml/min (H)     Congestive heart failure, unspecified congestive heart failure chronicity, unspecified congestive heart failure type 2017    ESRD (end stage renal disease) on dialysis (H)     MWF    Focal segmental glomerulosclerosis     Gastric ulcer due to Helicobacter pylori, acute     GI bleed     HTN (hypertension), malignant     Hypertensive urgency 2017    LVH (left ventricular hypertrophy) due to hypertensive disease     Morbid obesity with BMI of 50.0-59.9, adult (H) 2017    NSTEMI (non-ST elevated myocardial infarction) (H) 2017    MAURI (obstructive sleep apnea)     No longer uses CPAP    Renal insufficiency 2017    Sleep apnea     Tobacco abuse     Vitamin D deficiency       Past Surgical History:   Procedure Laterality Date    CREATE FISTULA ARTERIOVENOUS UPPER EXTREMITY Left 2023    Procedure: CREATION OF LEFT DISTAL RADIAL TO CEPHALIC ARTERIOVENOUS FISTULA;  Surgeon: Ramiro Pantoja MD;  Location: [facility] OR    ESOPHAGOSCOPY, GASTROSCOPY, DUODENOSCOPY (EGD), COMBINED N/A 2022    Procedure: ESOPHAGOGASTRODUODENOSCOPY, WITH BIOPSY;  Surgeon: Dm Warner MD;  Location:  GI    ESOPHAGOSCOPY, GASTROSCOPY, DUODENOSCOPY (EGD), COMBINED N/A 10/3/2023    Procedure: Esophagogastroduodenoscopy with biopsy;  Surgeon: Manuel Leal MD;  Location: [facility] OR    IR CVC TUNNEL PLACEMENT > 5 YRS OF AGE  10/2/2023    IR CVC TUNNEL PLACEMENT > 5 YRS OF AGE  2024    IR  CVC TUNNEL PLACEMENT > 5 YRS OF AGE  2024    IR CVC TUNNEL REMOVAL RIGHT  3/7/2024    IR CVC TUNNEL REMOVAL RIGHT  2024      Allergies   Allergen Reactions    Hexachlorophene Rash    Iodine Rash     Topical iodine      Tegaderm Transparent Dressing (Informational Only) Other (See Comments)     Please use Tegaderms instead of tape due to causes itching and rash      Social History     Tobacco Use    Smoking status: Every Day     Current packs/day: 0.00     Average packs/day: 0.3 packs/day for 20.8 years (5.2 ttl pk-yrs)     Types: Cigarettes     Start date:      Last attempt to quit: 2022     Years since quittin.4    Smokeless tobacco: Never    Tobacco comments:     1 pack per week   Substance Use Topics    Alcohol use: Not Currently     Comment: sober since       Wt Readings from Last 1 Encounters:   25 (!) 155.2 kg (342 lb 3.2 oz)        Anesthesia Evaluation   Pt has had prior anesthetic. Type: General.    No history of anesthetic complications       ROS/MED HX  ENT/Pulmonary:     (+) sleep apnea, doesn't use CPAP,                                   (-) tobacco use and asthma   Neurologic:     (+)    no peripheral neuropathy                         (-) no seizures and no CVA   Cardiovascular:     (+)  hypertension- -  CAD - past MI - -      CHF                             (-) arrhythmias   METS/Exercise Tolerance:     Hematologic:       Musculoskeletal:       GI/Hepatic:    (-) GERD and liver disease   Renal/Genitourinary:     (+) renal disease, type: ESRD, Pt requires dialysis, type: Hemodialysis,          Endo:     (+)               Obesity,    (-) Type II DM and thyroid disease   Psychiatric/Substance Use:       Infectious Disease:    (-) Recent Fever   Malignancy:       Other:            Physical Exam    Airway  airway exam normal      Mallampati: II   TM distance: > 3 FB   Neck ROM: full   Mouth opening: > 3 cm    Respiratory Devices and Support         Dental        (+) Minor Abnormalities - some fillings, tiny chips    B=Bridge, C=Chipped, L=Loose, M=Missing    Cardiovascular   cardiovascular exam normal          Pulmonary   pulmonary exam normal                OUTSIDE LABS:  CBC:   Lab Results   Component Value Date    WBC 10.4 12/13/2024    WBC 11.0 06/18/2024    HGB 11.0 (L) 12/13/2024    HGB 9.5 (L) 06/18/2024    HCT 33.8 (L) 12/13/2024    HCT 28.9 (L) 06/18/2024     12/13/2024     (L) 06/18/2024     BMP:   Lab Results   Component Value Date     (L) 04/22/2025     12/13/2024    POTASSIUM 5.5 (H) 04/22/2025    POTASSIUM 7.2 (HH) 12/13/2024    CHLORIDE 93 (L) 04/22/2025    CHLORIDE 92 (L) 12/13/2024    CO2 28 04/22/2025    CO2 22 12/13/2024    BUN 34.0 (H) 04/22/2025    BUN 98.9 (H) 12/13/2024    CR 9.52 (H) 04/22/2025    CR 17.39 (H) 12/13/2024    GLC 99 04/22/2025    GLC 98 12/13/2024     COAGS:   Lab Results   Component Value Date    PTT 21 (L) 11/30/2022    INR 1.19 (H) 10/02/2023     POC:   Lab Results   Component Value Date     (H) 06/14/2017    HCGS Negative 06/15/2017     HEPATIC:   Lab Results   Component Value Date    ALBUMIN 3.5 06/18/2024    PROTTOTAL 6.7 06/18/2024    ALT 16 06/18/2024    AST 24 06/18/2024    ALKPHOS 66 06/18/2024    BILITOTAL 0.2 06/18/2024     OTHER:   Lab Results   Component Value Date    PH 7.40 05/13/2019    LACT 1.7 05/17/2024    A1C <4.2 11/29/2022    GAYLE 9.7 04/22/2025    PHOS 7.8 (H) 10/04/2023    MAG 2.5 (H) 06/16/2017    LIPASE 1,089 (H) 09/29/2023       Anesthesia Plan    ASA Status:  3    NPO Status:  NPO Appropriate    Anesthesia Type: MAC.     - Reason for MAC: straight local not clinically adequate              Consents    Anesthesia Plan(s) and associated risks, benefits, and realistic alternatives discussed. Questions answered and patient/representative(s) expressed understanding.     - Discussed:     - Discussed with:  Patient            Postoperative Care    Pain management: Multi-modal  "analgesia.   PONV prophylaxis: Ondansetron (or other 5HT-3)     Comments:               Andrade Connell MD    Clinically Significant Risk Factors Present on Admission        # Hyperkalemia: Highest K = 5.5 mmol/L in last 2 days, will monitor as appropriate  # Hyponatremia: Lowest Na = 134 mmol/L in last 2 days, will monitor as appropriate  # Hypochloremia: Lowest Cl = 93 mmol/L in last 2 days, will monitor as appropriate          # Hypertension: Noted on problem list  # Chronic heart failure with preserved ejection fraction: heart failure noted on problem list and last echo with EF >50%          # Severe Obesity: Estimated body mass index is 49.1 kg/m  as calculated from the following:    Height as of this encounter: 1.778 m (5' 10\").    Weight as of this encounter: 155.2 kg (342 lb 3.2 oz).                "

## 2025-04-22 NOTE — OP NOTE
OPERATIVE NOTE    PROCEDURE DATE: 4/22/2025      PRE-OP DIAGNOSIS: Competing sidebranch left distal radial to cephalic fistula      POST-OP DIAGNOSES: Same      PROCEDURE PERFORMED: #1.  Ligation of competing left distal radiocephalic fistula sidebranch       #2.  Interoperative duplex ultrasound of fistula and sidebranch      SURGEON:  Ramiro Pantoja M.D.      ASSISTANT: Jairon Warren MD) vascular fellow)      ANESTHESIA:  Local with MAC      PRE-OP MEDICATIONS: Ancef 2 g IV      INDICATIONS FOR PROCEDURE: 39-year-old patient is on chronic hemodialysis via right jugular tunneled catheter.  We created left distal radial to cephalic fistula with excellent artery and vein.  On follow-up fistula is dilated very nicely but they were having difficulty accessing the fistula on a regular basis.  Duplex confirmed a competing sidebranch located lateral to the fistula 4 cm from the radial incision.  Rest of the fistula is widely patent.  We felt ligation of the the sidebranch will allow for easier access and more efficient use of the fistula.      DESCRIPTION OF PROCEDURE: Patient was brought the op room placed supine.  Fistula was easily palpable.  However, of the pulse beyond the sidebranch was fairly weak due to the competing outflow.    DUPLEX: Duplex ultrasound was used to identify the sidebranch which was marked on the skin going lateral to the fistula.  With compression of the sidebranch the outflow portion of the fistula dilated very nicely with excellent flow.  No evidence of stenosis throughout the forearm.    LIGATION SIDEBRANCH: Left arm was prepped and draped.  Timeout was called and the sites were identified.  The site of the sidebranch was anesthetized with 1% lidocaine.  A vertical incision lateral to the fistula was made with a 15 blade scalpel.  Dissection was carried down to identify the sidebranch that measured approximately 2 mm in diameter.  This was doubly ligated with 4-0 silk suture very close to the  main fistula.  Upon completion there was a much stronger pulse going towards the antecubital portion of the forearm and excellent Doppler flow with no evidence of any other competing sidebranches.    Wound was infiltrated with 0.5% Marcaine.  Subcutaneous tissue approximately interrupted 3-0 Vicryl skin was closed with 5-0 Monocryl in subcu fashion followed by surgical adhesive Tegaderm.    Patient tolerated procedure well.  He will continue with his fistula exercises.  Very likely dialysis unit can start using the fistula again in 1 to 2 weeks.      EBL: 1 mL      COMPLICATIONS: None       OPERATIVE FINDINGS: Significant competing sidebranch that has now been ligated.  The rest of the fistula looks excellent with a diameter of approximately 6 mm with compression.      Ramiro Pantoja MD

## 2025-04-22 NOTE — ANESTHESIA CARE TRANSFER NOTE
Patient: Brooks Mensah    Procedure: Procedure(s):  LIGATION OF SIDE BRANCH OF LEFT ARM ARTERIOVENOUS FISTULA       Diagnosis: ESRD (end stage renal disease) on dialysis (H) [N18.6, Z99.2]  Diagnosis Additional Information: No value filed.    Anesthesia Type:   MAC     Note:    Oropharynx: oropharynx clear of all foreign objects and spontaneously breathing  Level of Consciousness: awake  Oxygen Supplementation: room air    Independent Airway: airway patency satisfactory and stable  Dentition: dentition unchanged  Vital Signs Stable: post-procedure vital signs reviewed and stable  Report to RN Given: handoff report given  Patient transferred to: PACU    Handoff Report: Identifed the Patient, Identified the Reponsible Provider, Reviewed the pertinent medical history, Discussed the surgical course, Reviewed Intra-OP anesthesia mangement and issues during anesthesia, Set expectations for post-procedure period and Allowed opportunity for questions and acknowledgement of understanding      Vitals:  Vitals Value Taken Time   /76 04/22/25 0805   Temp     Pulse 80 04/22/25 0807   Resp 18 04/22/25 0807   SpO2 94 % 04/22/25 0807   Vitals shown include unfiled device data.    Electronically Signed By: YUNIER Antony CRNA  April 22, 2025  8:07 AM

## 2025-04-23 ENCOUNTER — LAB REQUISITION (OUTPATIENT)
Dept: LAB | Facility: CLINIC | Age: 40
End: 2025-04-23

## 2025-04-23 LAB
BUN SERPL-MCNC: 17.1 MG/DL (ref 6–20)
BUN SERPL-MCNC: 46.7 MG/DL (ref 6–20)

## 2025-04-23 PROCEDURE — 84520 ASSAY OF UREA NITROGEN: CPT | Performed by: INTERNAL MEDICINE

## 2025-04-28 ENCOUNTER — LAB REQUISITION (OUTPATIENT)
Dept: LAB | Facility: CLINIC | Age: 40
End: 2025-04-28

## 2025-04-28 LAB — POTASSIUM SERPL-SCNC: 5.1 MMOL/L (ref 3.4–5.3)

## 2025-04-28 PROCEDURE — 84132 ASSAY OF SERUM POTASSIUM: CPT | Performed by: INTERNAL MEDICINE

## 2025-05-05 NOTE — PROGRESS NOTES
VASCULAR SURGERY PROGRESS NOTE    LOCATION: [facility]    Brooks Mensah  Medical Record #: [id number]  YOB: 1985  Age: 39 year old     Date of Service: 5/6/2025    PRIMARY CARE PROVIDER: Anabela Jurado    Reason for visit:  post-op    Javon Mensah is a 39 year old male status post ligation of competing sidebranch with Dr. Pantoja on 4/22/2025.    Has used dialysis twice without issue via fistula  Denies any numbness,tingling, pain. 2+ radial pulse, excellent thrill   Incision fully healed      RECOMMENDATION/RISKS: Continue to use for dialysis. Follow-up in 4 weeks if issues with fistula with duplex.       REVIEW OF SYSTEMS:    A 12 point ROS was reviewed and is negative except for what is listed above in HPI.    PHH:    Past Medical History:   Diagnosis Date    Acute on chronic systolic congestive heart failure (H) 08/09/2017    Anemia of chronic renal failure     CAD (coronary artery disease)     Nonobstructive    Cardiomyopathy, unspecified (H) 08/09/2017    CKD (chronic kidney disease) stage 5, GFR less than 15 ml/min (H)     Congestive heart failure, unspecified congestive heart failure chronicity, unspecified congestive heart failure type 06/13/2017    ESRD (end stage renal disease) on dialysis (H)     MWF    Focal segmental glomerulosclerosis     Gastric ulcer due to Helicobacter pylori, acute     GI bleed     HTN (hypertension), malignant     Hypertensive urgency 06/13/2017    LVH (left ventricular hypertrophy) due to hypertensive disease     Morbid obesity with BMI of 50.0-59.9, adult (H) 06/21/2017    NSTEMI (non-ST elevated myocardial infarction) (H) 06/13/2017    MAURI (obstructive sleep apnea)     No longer uses CPAP    Renal insufficiency 06/13/2017    Sleep apnea     Tobacco abuse     Vitamin D deficiency           Past Surgical History:   Procedure Laterality Date    CREATE FISTULA ARTERIOVENOUS UPPER EXTREMITY Left 11/28/2023    Procedure: CREATION OF LEFT DISTAL  RADIAL TO CEPHALIC ARTERIOVENOUS FISTULA;  Surgeon: Ramiro Pantoja MD;  Location: [facility] OR    ESOPHAGOSCOPY, GASTROSCOPY, DUODENOSCOPY (EGD), COMBINED N/A 12/1/2022    Procedure: ESOPHAGOGASTRODUODENOSCOPY, WITH BIOPSY;  Surgeon: Dm Warner MD;  Location: [facility]    ESOPHAGOSCOPY, GASTROSCOPY, DUODENOSCOPY (EGD), COMBINED N/A 10/3/2023    Procedure: Esophagogastroduodenoscopy with biopsy;  Surgeon: Manuel Leal MD;  Location: [facility] OR    IR CVC TUNNEL PLACEMENT > 5 YRS OF AGE  10/2/2023    IR CVC TUNNEL PLACEMENT > 5 YRS OF AGE  6/18/2024    IR CVC TUNNEL PLACEMENT > 5 YRS OF AGE  12/13/2024    IR CVC TUNNEL REMOVAL RIGHT  3/7/2024    IR CVC TUNNEL REMOVAL RIGHT  11/29/2024    REPAIR FISTULA ARTERIOVENOUS UPPER EXTREMITY Left 4/22/2025    Procedure: LIGATION OF SIDE BRANCH OF LEFT ARM ARTERIOVENOUS FISTULA;  Surgeon: Ramiro Pantoja MD;  Location:  OR       ALLERGIES:  Hexachlorophene, Iodine, and Tegaderm transparent dressing (informational only)    MEDS:    Current Outpatient Medications:     acetaminophen (TYLENOL) 325 MG tablet, Take 2 tablets (650 mg) by mouth every 4 hours as needed for mild pain., Disp: , Rfl:     atorvastatin (LIPITOR) 10 MG tablet, Take 1 tablet (10 mg) by mouth every evening, Disp: 90 tablet, Rfl: 3    augmented betamethasone dipropionate (DIPROLENE-AF) 0.05 % external ointment, Apply topically 2 times daily., Disp: , Rfl:     bumetanide (BUMEX) 2 MG tablet, Take 2 mg by mouth daily, Disp: , Rfl:     calcium acetate (PHOSLO) 667 MG CAPS capsule, Take 2,001 mg by mouth 3 times daily (with meals), Disp: , Rfl:     calcium acetate (PHOSLO) 667 MG CAPS capsule, Take 1,334 mg by mouth as needed With snacks, Disp: , Rfl:     carvedilol (COREG) 25 MG tablet, Take 0.5 tablets (12.5 mg) by mouth 2 times daily (with meals), Disp: 30 tablet, Rfl: 1    gabapentin (NEURONTIN) 300 MG capsule, Take 300 mg by mouth at bedtime, Disp: , Rfl:     lisinopril (ZESTRIL) 20 MG  tablet, Take 1 tablet (20 mg) by mouth daily, Disp: 30 tablet, Rfl: 1    pantoprazole (PROTONIX) 40 MG EC tablet, Take 40 mg by mouth daily, Disp: , Rfl:     sodium zirconium cyclosilicate (LOKELMA) 10 g PACK packet, Take 10 g by mouth daily., Disp: , Rfl:     SOCIAL HABITS:    History   Smoking Status    Every Day    Types: Cigarettes   Smokeless Tobacco    Never     Social History    Substance and Sexual Activity      Alcohol use: Not Currently        Comment: sober since NOV of 2022      History   Drug Use No       FAMILY HISTORY:    Family History   Problem Relation Age of Onset    Hyperlipidemia Mother     Hyperlipidemia Father        PE:  There were no vitals taken for this visit.  Wt Readings from Last 1 Encounters:   04/22/25 (!) 342 lb 3.2 oz (155.2 kg)     There is no height or weight on file to calculate BMI.    LABS:      Sodium   Date Value Ref Range Status   04/22/2025 134 (L) 135 - 145 mmol/L Final   12/13/2024 136 135 - 145 mmol/L Final   06/18/2024 138 135 - 145 mmol/L Final     Comment:     Reference intervals for this test were updated on 09/26/2023 to more accurately reflect our healthy population. There may be differences in the flagging of prior results with similar values performed with this method. Interpretation of those prior results can be made in the context of the updated reference intervals.    05/12/2021 136 133 - 144 mmol/L Final   04/28/2021 137 133 - 144 mmol/L Final   03/05/2021 139 133 - 144 mmol/L Final     Urea Nitrogen   Date Value Ref Range Status   04/23/2025 17.1 6.0 - 20.0 mg/dL Final   04/23/2025 46.7 (H) 6.0 - 20.0 mg/dL Final   04/22/2025 34.0 (H) 6.0 - 20.0 mg/dL Final   09/20/2021 27 7 - 30 mg/dL Final   05/12/2021 24 7 - 30 mg/dL Final   04/28/2021 26 7 - 30 mg/dL Final   03/05/2021 25 7 - 30 mg/dL Final     Hemoglobin   Date Value Ref Range Status   12/13/2024 11.0 (L) 13.3 - 17.7 g/dL Final   06/18/2024 9.5 (L) 13.3 - 17.7 g/dL Final   05/18/2024 9.1 (L) 13.3 - 17.7  g/dL Final   06/16/2017 11.7 (L) 13.3 - 17.7 g/dL Final   06/15/2017 11.9 (L) 13.3 - 17.7 g/dL Final   06/13/2017 12.0 (L) 13.3 - 17.7 g/dL Final     Platelet Count   Date Value Ref Range Status   12/13/2024 198 150 - 450 10e3/uL Final   06/18/2024 125 (L) 150 - 450 10e3/uL Final   05/18/2024 82 (L) 150 - 450 10e3/uL Final   06/16/2017 214 150 - 450 10e9/L Final   06/15/2017 219 150 - 450 10e9/L Final   06/13/2017 200 150 - 450 10e9/L Final     INR   Date Value Ref Range Status   10/02/2023 1.19 (H) 0.85 - 1.15 Final   11/30/2022 1.01 0.85 - 1.15 Final       15 minutes spent on the day of encounter doing chart review, history and exam, documentation, and further activities as noted.     Chandni Young, NP  VASCULAR SURGERY

## 2025-05-06 ENCOUNTER — OFFICE VISIT (OUTPATIENT)
Dept: OTHER | Facility: CLINIC | Age: 40
End: 2025-05-06
Payer: COMMERCIAL

## 2025-05-06 VITALS — SYSTOLIC BLOOD PRESSURE: 124 MMHG | HEART RATE: 92 BPM | DIASTOLIC BLOOD PRESSURE: 82 MMHG

## 2025-05-06 DIAGNOSIS — Z99.2 ESRD (END STAGE RENAL DISEASE) ON DIALYSIS (H): Primary | ICD-10-CM

## 2025-05-06 DIAGNOSIS — N18.6 ESRD (END STAGE RENAL DISEASE) ON DIALYSIS (H): Primary | ICD-10-CM

## 2025-05-06 PROCEDURE — 99213 OFFICE O/P EST LOW 20 MIN: CPT

## 2025-05-06 NOTE — PROGRESS NOTES
[facility]        Patient is here for a post-op.    Pt is currently taking Statin.    /82 (BP Location: Right arm, Patient Position: Chair, Cuff Size: Adult Regular)   Pulse 92     The provider has been notified that the patient has no concerns.     Questions patient would like addressed today are: N/A.    Refills are needed: N/A    Has homecare services and agency name:  Vickie Deleon MA

## 2025-05-12 ENCOUNTER — TELEPHONE (OUTPATIENT)
Dept: OTHER | Facility: CLINIC | Age: 40
End: 2025-05-12
Payer: COMMERCIAL

## 2025-05-12 NOTE — TELEPHONE ENCOUNTER
Hx: ligation of side branch of left arm arteriovenous fistula, 4 week follow up to 05/06/25 office visit with YUNIER Granger CNP      Routing to scheduling to coordinate the following:   AVF  RETURN VASCULAR PATIENT consult with Dr. Pantoja or YUNIER Granger CNP  Please schedule this around 06/05/25      Appt note:  Hx: ligation of side branch of left arm arteriovenous fistula, 4 week follow up to 05/06/25 office visit with YUNIER Granger CNP

## 2025-05-13 NOTE — TELEPHONE ENCOUNTER
Left voicemail for patient to call back to schedule appointment(s), provided telephone number for patient to call back to schedule.    US AVF  RETURN VASCULAR PATIENT consult with Dr. Panotja or YUNIER Granger CNP  Please schedule this around 06/05/25        Appt note:  Hx: ligation of side branch of left arm arteriovenous fistula, 4 week follow up to 05/06/25 office visit with YUNIER Granger CNP

## 2025-06-02 DIAGNOSIS — Z99.2 ESRD ON HEMODIALYSIS (H): ICD-10-CM

## 2025-06-02 DIAGNOSIS — T82.898A PROBLEM WITH DIALYSIS ACCESS, INITIAL ENCOUNTER: Primary | ICD-10-CM

## 2025-06-02 DIAGNOSIS — N18.6 ESRD ON HEMODIALYSIS (H): ICD-10-CM

## 2025-06-10 ENCOUNTER — HOSPITAL ENCOUNTER (OUTPATIENT)
Dept: ULTRASOUND IMAGING | Facility: CLINIC | Age: 40
Discharge: HOME | End: 2025-06-10
Payer: COMMERCIAL

## 2025-06-10 DIAGNOSIS — Z99.2 ESRD (END STAGE RENAL DISEASE) ON DIALYSIS (H): ICD-10-CM

## 2025-06-10 DIAGNOSIS — N18.6 ESRD (END STAGE RENAL DISEASE) ON DIALYSIS (H): ICD-10-CM

## 2025-06-10 PROCEDURE — 93990 DOPPLER FLOW TESTING: CPT

## 2025-06-12 PROBLEM — E78.5 HYPERLIPIDEMIA, UNSPECIFIED: Status: ACTIVE | Noted: 2023-11-27

## 2025-06-12 PROBLEM — I11.0 LVH (LEFT VENTRICULAR HYPERTROPHY) DUE TO HYPERTENSIVE DISEASE, WITH HEART FAILURE (H): Status: ACTIVE | Noted: 2025-04-17

## 2025-06-12 PROBLEM — Z99.2 END-STAGE RENAL DISEASE ON HEMODIALYSIS (H): Status: ACTIVE | Noted: 2023-10-16

## 2025-06-12 PROBLEM — L23.89 ALLERGIC CONTACT DERMATITIS DUE TO OTHER AGENTS: Status: ACTIVE | Noted: 2024-12-11

## 2025-06-12 PROBLEM — I10 HTN (HYPERTENSION): Status: ACTIVE | Noted: 2022-12-13

## 2025-06-12 PROBLEM — E55.9 VITAMIN D DEFICIENCY: Status: ACTIVE | Noted: 2022-12-13

## 2025-06-12 PROBLEM — I25.2 HISTORY OF NON-ST ELEVATION MYOCARDIAL INFARCTION (NSTEMI): Status: ACTIVE | Noted: 2017-06-13

## 2025-06-12 PROBLEM — R20.2 PARESTHESIA: Status: ACTIVE | Noted: 2023-10-16

## 2025-06-12 PROBLEM — N05.1 FOCAL SEGMENTAL GLOMERULOSCLEROSIS: Status: ACTIVE | Noted: 2023-11-27

## 2025-06-12 PROBLEM — I50.32 CHRONIC DIASTOLIC CONGESTIVE HEART FAILURE (H): Status: ACTIVE | Noted: 2017-06-13

## 2025-06-12 PROBLEM — N18.6 END-STAGE RENAL DISEASE ON HEMODIALYSIS (H): Status: ACTIVE | Noted: 2023-10-16

## 2025-06-12 PROBLEM — G47.33 OSA (OBSTRUCTIVE SLEEP APNEA): Status: ACTIVE | Noted: 2022-12-13

## 2025-06-24 ENCOUNTER — HOSPITAL ENCOUNTER (OUTPATIENT)
Facility: CLINIC | Age: 40
Discharge: HOME | End: 2025-06-24
Admitting: PHYSICIAN ASSISTANT
Payer: COMMERCIAL

## 2025-06-24 ENCOUNTER — APPOINTMENT (OUTPATIENT)
Dept: INTERVENTIONAL RADIOLOGY/VASCULAR | Facility: CLINIC | Age: 40
End: 2025-06-24
Attending: PHYSICIAN ASSISTANT
Payer: COMMERCIAL

## 2025-06-24 VITALS
HEART RATE: 95 BPM | OXYGEN SATURATION: 98 % | RESPIRATION RATE: 16 BRPM | SYSTOLIC BLOOD PRESSURE: 135 MMHG | HEIGHT: 70 IN | DIASTOLIC BLOOD PRESSURE: 84 MMHG | BODY MASS INDEX: 45.1 KG/M2 | TEMPERATURE: 98.2 F | WEIGHT: 315 LBS

## 2025-06-24 DIAGNOSIS — Z99.2 ESRD ON HEMODIALYSIS (H): ICD-10-CM

## 2025-06-24 DIAGNOSIS — T82.898A PROBLEM WITH DIALYSIS ACCESS, INITIAL ENCOUNTER: ICD-10-CM

## 2025-06-24 DIAGNOSIS — N18.6 ESRD ON HEMODIALYSIS (H): ICD-10-CM

## 2025-06-24 PROCEDURE — 36589 REMOVAL TUNNELED CV CATH: CPT

## 2025-06-24 PROCEDURE — 999N000163 HC STATISTIC SIMPLE TUBE INSERTION/CHARGE, PORT, CATH, FISTULOGRAM

## 2025-06-24 ASSESSMENT — ACTIVITIES OF DAILY LIVING (ADL): ADLS_ACUITY_SCORE: 58

## (undated) DEVICE — SOL NACL 0.9% IRRIG 1000ML BOTTLE 2F7124

## (undated) DEVICE — SYR 10ML SLIP TIP W/O NDL

## (undated) DEVICE — SOL NACL 0.9% INJ 250ML BAG 2B1322Q

## (undated) DEVICE — SU MONOCRYL 5-0 PS-2 18" UND Y495G

## (undated) DEVICE — SOL WATER IRRIG 1000ML BOTTLE 2F7114

## (undated) DEVICE — NDL ANGIOCATH 20GA 1.25" 4056

## (undated) DEVICE — GLOVE BIOGEL PI ULTRATOUCH SZ 7.5 41175

## (undated) DEVICE — LINEN TOWEL PACK X5 5464

## (undated) DEVICE — NDL 19GA 1.5"

## (undated) DEVICE — BAG DECANTER STERILE WHITE DYNJDEC09

## (undated) DEVICE — SU SILK 4-0 TIE 24" SA73H

## (undated) DEVICE — PACK AV FISTULA CUSTOM SCV15AVFS1

## (undated) DEVICE — BNDG ROLLER GAUZE CONFORM 4"X4YD 41-54

## (undated) DEVICE — SU SILK 3-0 TIE 24" SA74H

## (undated) DEVICE — ESU GROUND PAD UNIVERSAL W/O CORD

## (undated) DEVICE — SU DERMABOND MINI DHVM12

## (undated) DEVICE — KIT PROCEDURE W/CLEAN-A-SCOPE LINERS V2 200800

## (undated) DEVICE — ENDO FORCEP ENDOJAW BIOPSY 2.8MMX160CM FB-220K

## (undated) DEVICE — SUCTION CANISTER MEDIVAC LINER 3000ML W/LID 65651-530

## (undated) DEVICE — SU VICRYL 3-0 SH 27" J316H

## (undated) DEVICE — PAD CHUX UNDERPAD 30X36" P3036C

## (undated) DEVICE — TUBING SUCTION MEDI-VAC SOFT 3/16"X20' N520A

## (undated) DEVICE — SU DERMABOND ADVANCED .7ML DNX12

## (undated) DEVICE — SUCTION MANIFOLD NEPTUNE 2 SYS 4 PORT 0702-020-000

## (undated) DEVICE — ENDO BITE BLOCK ADULT OLYMPUS LATEX FREE MAJ-1632

## (undated) DEVICE — SYR 20ML LL W/O NDL 302830

## (undated) DEVICE — GEL ULTRASOUND AQUASONIC 20GM 01-01

## (undated) DEVICE — KIT ENDO TURNOVER/PROCEDURE W/CLEAN A SCOPE LINERS 103888

## (undated) DEVICE — GOWN XLG DISP 9545

## (undated) DEVICE — PREP CHLORAPREP 26ML TINTED HI-LITE ORANGE 930815

## (undated) DEVICE — DECANTER VIAL 2006S

## (undated) DEVICE — DRSG TEGADERM 2 1/2X 2 3/4"

## (undated) DEVICE — SYR 03ML LL W/O NDL 309657

## (undated) DEVICE — BAG RED BIOHAZARD 37X50" 40GAL A7450PR

## (undated) DEVICE — VIAL DECANTER STERILE WHITE DYNJDEC06

## (undated) DEVICE — DECANTER BAG 2002S

## (undated) DEVICE — BAG CLEAR TRASH 1.3M 39X33" P4040C

## (undated) RX ORDER — LIDOCAINE HYDROCHLORIDE 10 MG/ML
INJECTION, SOLUTION EPIDURAL; INFILTRATION; INTRACAUDAL; PERINEURAL
Status: DISPENSED
Start: 2024-12-13

## (undated) RX ORDER — LIDOCAINE HYDROCHLORIDE AND EPINEPHRINE 10; 10 MG/ML; UG/ML
INJECTION, SOLUTION INFILTRATION; PERINEURAL
Status: DISPENSED
Start: 2024-12-13

## (undated) RX ORDER — ONDANSETRON 2 MG/ML
INJECTION INTRAMUSCULAR; INTRAVENOUS
Status: DISPENSED
Start: 2025-04-22

## (undated) RX ORDER — LIDOCAINE HYDROCHLORIDE 10 MG/ML
INJECTION, SOLUTION INFILTRATION; PERINEURAL
Status: DISPENSED
Start: 2024-11-29

## (undated) RX ORDER — HEPARIN SODIUM 1000 [USP'U]/ML
INJECTION, SOLUTION INTRAVENOUS; SUBCUTANEOUS
Status: DISPENSED
Start: 2025-04-22

## (undated) RX ORDER — FENTANYL CITRATE 50 UG/ML
INJECTION, SOLUTION INTRAMUSCULAR; INTRAVENOUS
Status: DISPENSED
Start: 2023-11-28

## (undated) RX ORDER — LIDOCAINE HYDROCHLORIDE 10 MG/ML
INJECTION, SOLUTION INFILTRATION; PERINEURAL
Status: DISPENSED
Start: 2025-04-22

## (undated) RX ORDER — FENTANYL CITRATE 50 UG/ML
INJECTION, SOLUTION INTRAMUSCULAR; INTRAVENOUS
Status: DISPENSED
Start: 2022-12-19

## (undated) RX ORDER — LIDOCAINE HYDROCHLORIDE AND EPINEPHRINE 10; 10 MG/ML; UG/ML
INJECTION, SOLUTION INFILTRATION; PERINEURAL
Status: DISPENSED
Start: 2024-06-18

## (undated) RX ORDER — DEXAMETHASONE SODIUM PHOSPHATE 4 MG/ML
INJECTION, SOLUTION INTRA-ARTICULAR; INTRALESIONAL; INTRAMUSCULAR; INTRAVENOUS; SOFT TISSUE
Status: DISPENSED
Start: 2025-04-22

## (undated) RX ORDER — LIDOCAINE HYDROCHLORIDE 10 MG/ML
INJECTION, SOLUTION INFILTRATION; PERINEURAL
Status: DISPENSED
Start: 2024-03-07

## (undated) RX ORDER — BUPIVACAINE HYDROCHLORIDE 5 MG/ML
INJECTION, SOLUTION EPIDURAL; INTRACAUDAL; PERINEURAL
Status: DISPENSED
Start: 2025-04-22

## (undated) RX ORDER — ONDANSETRON 2 MG/ML
INJECTION INTRAMUSCULAR; INTRAVENOUS
Status: DISPENSED
Start: 2023-11-28

## (undated) RX ORDER — HEPARIN SODIUM 1000 [USP'U]/ML
INJECTION, SOLUTION INTRAVENOUS; SUBCUTANEOUS
Status: DISPENSED
Start: 2024-12-13

## (undated) RX ORDER — CEFAZOLIN SODIUM/WATER 3 G/30 ML
SYRINGE (ML) INTRAVENOUS
Status: DISPENSED
Start: 2025-04-22

## (undated) RX ORDER — FENTANYL CITRATE 50 UG/ML
INJECTION, SOLUTION INTRAMUSCULAR; INTRAVENOUS
Status: DISPENSED
Start: 2023-10-03

## (undated) RX ORDER — LIDOCAINE HYDROCHLORIDE 10 MG/ML
INJECTION, SOLUTION EPIDURAL; INFILTRATION; INTRACAUDAL; PERINEURAL
Status: DISPENSED
Start: 2024-06-18

## (undated) RX ORDER — FENTANYL CITRATE 0.05 MG/ML
INJECTION, SOLUTION INTRAMUSCULAR; INTRAVENOUS
Status: DISPENSED
Start: 2022-12-01

## (undated) RX ORDER — FENTANYL CITRATE 50 UG/ML
INJECTION, SOLUTION INTRAMUSCULAR; INTRAVENOUS
Status: DISPENSED
Start: 2024-12-13

## (undated) RX ORDER — PROPOFOL 10 MG/ML
INJECTION, EMULSION INTRAVENOUS
Status: DISPENSED
Start: 2023-11-28

## (undated) RX ORDER — FENTANYL CITRATE 50 UG/ML
INJECTION, SOLUTION INTRAMUSCULAR; INTRAVENOUS
Status: DISPENSED
Start: 2024-06-18

## (undated) RX ORDER — DEXAMETHASONE SODIUM PHOSPHATE 4 MG/ML
INJECTION, SOLUTION INTRA-ARTICULAR; INTRALESIONAL; INTRAMUSCULAR; INTRAVENOUS; SOFT TISSUE
Status: DISPENSED
Start: 2023-11-28

## (undated) RX ORDER — HEPARIN SODIUM 1000 [USP'U]/ML
INJECTION, SOLUTION INTRAVENOUS; SUBCUTANEOUS
Status: DISPENSED
Start: 2024-06-18

## (undated) RX ORDER — HEPARIN SODIUM 1000 [USP'U]/ML
INJECTION, SOLUTION INTRAVENOUS; SUBCUTANEOUS
Status: DISPENSED
Start: 2023-11-28

## (undated) RX ORDER — FENTANYL CITRATE 50 UG/ML
INJECTION, SOLUTION INTRAMUSCULAR; INTRAVENOUS
Status: DISPENSED
Start: 2025-04-22

## (undated) RX ORDER — CEFAZOLIN SODIUM/WATER 3 G/30 ML
SYRINGE (ML) INTRAVENOUS
Status: DISPENSED
Start: 2023-11-28